# Patient Record
Sex: FEMALE | Race: WHITE | NOT HISPANIC OR LATINO | Employment: PART TIME | URBAN - METROPOLITAN AREA
[De-identification: names, ages, dates, MRNs, and addresses within clinical notes are randomized per-mention and may not be internally consistent; named-entity substitution may affect disease eponyms.]

---

## 2017-01-05 ENCOUNTER — OFFICE VISIT (OUTPATIENT)
Dept: OBSTETRICS AND GYNECOLOGY | Facility: HOSPITAL | Age: 27
End: 2017-01-05

## 2017-01-05 ENCOUNTER — ROUTINE PRENATAL (OUTPATIENT)
Dept: OBSTETRICS AND GYNECOLOGY | Facility: CLINIC | Age: 27
End: 2017-01-05

## 2017-01-05 ENCOUNTER — HOSPITAL ENCOUNTER (OUTPATIENT)
Dept: WOMENS IMAGING | Facility: HOSPITAL | Age: 27
Discharge: HOME | End: 2017-01-05
Attending: OBSTETRICS & GYNECOLOGY | Admitting: OBSTETRICS & GYNECOLOGY

## 2017-01-05 VITALS — BODY MASS INDEX: 33.66 KG/M2 | SYSTOLIC BLOOD PRESSURE: 134 MMHG | DIASTOLIC BLOOD PRESSURE: 73 MMHG | WEIGHT: 190 LBS

## 2017-01-05 VITALS — BODY MASS INDEX: 33.66 KG/M2 | WEIGHT: 190 LBS | SYSTOLIC BLOOD PRESSURE: 142 MMHG | DIASTOLIC BLOOD PRESSURE: 80 MMHG

## 2017-01-05 DIAGNOSIS — Z86.79 HISTORY OF HYPERTENSION: ICD-10-CM

## 2017-01-05 DIAGNOSIS — Z34.83 PRENATAL CARE, SUBSEQUENT PREGNANCY, THIRD TRIMESTER: Primary | ICD-10-CM

## 2017-01-05 DIAGNOSIS — O10.913 CHRONIC HYPERTENSION COMPLICATING OR REASON FOR CARE DURING PREGNANCY, THIRD TRIMESTER: ICD-10-CM

## 2017-01-05 DIAGNOSIS — O34.219 PREVIOUS CESAREAN DELIVERY AFFECTING PREGNANCY: Primary | ICD-10-CM

## 2017-01-05 DIAGNOSIS — O34.219 PREVIOUS CESAREAN DELIVERY AFFECTING PREGNANCY: ICD-10-CM

## 2017-01-05 DIAGNOSIS — R10.13 EPIGASTRIC PAIN: ICD-10-CM

## 2017-01-05 PROBLEM — O10.919 CHRONIC HYPERTENSION COMPLICATING OR REASON FOR CARE DURING PREGNANCY: Status: ACTIVE | Noted: 2017-01-05

## 2017-01-05 PROCEDURE — 99213 OFFICE O/P EST LOW 20 MIN: CPT | Performed by: OBSTETRICS & GYNECOLOGY

## 2017-01-05 PROCEDURE — 76816 OB US FOLLOW-UP PER FETUS: CPT | Performed by: OBSTETRICS & GYNECOLOGY

## 2017-01-05 PROCEDURE — 76816 OB US FOLLOW-UP PER FETUS: CPT

## 2017-01-05 RX ORDER — OMEPRAZOLE 20 MG/1
20 TABLET, DELAYED RELEASE ORAL DAILY
Qty: 30 TABLET | Refills: 3 | Status: SHIPPED | OUTPATIENT
Start: 2017-01-05 | End: 2017-03-10

## 2017-01-05 RX ORDER — HYDROXYPROGESTERONE CAPROATE 250 MG/ML
INJECTION INTRAMUSCULAR
Refills: 5 | COMMUNITY
Start: 2016-12-30 | End: 2017-01-27 | Stop reason: SINTOL

## 2017-01-05 NOTE — MR AVS SNAPSHOT
Chen NILESH Galaviz   2017 1:30 PM   Office Visit    Dept Phone:  [phone]   Encounter #:  [id number]    Provider:  Douglas A Milligan, MD   Department:  [facility]                Your Full Care Plan              Your Updated Medication List          This list is accurate as of: 17  1:56 PM.  Always use your most recent med list.                docusate sodium 100 MG capsule   Commonly known as:  COLACE   Take 1 capsule by mouth 2 (Two) Times a Day.       doxylamine 25 MG tablet   Commonly known as:  UNISOM   Take 1 tablet by mouth at night as needed for sleep.       GNP PRENATAL VITAMINS 28-0.8 MG tablet   Take 1 tablet by mouth daily.       MARIO 250 MG/ML injection   Generic drug:  HYDROXYprogesterone caproate       ondansetron ODT 8 MG disintegrating tablet   Commonly known as:  ZOFRAN-ODT       PROAIR  (90 BASE) MCG/ACT inhaler   Generic drug:  albuterol   INHALE 1-2 PUFFS EVERY 4-6 HOURS AS NEEDED FOR SHORTNESS OF BREATH       promethazine 25 MG tablet   Commonly known as:  PHENERGAN   Take 1 tablet by mouth Every 6 (Six) Hours As Needed for nausea or vomiting.       pyridoxine 50 MG tablet tablet   Commonly known as:  VITAMIN B-6   Take 1 tablet by mouth every night.               You Were Diagnosed With        Codes Comments    Previous  delivery affecting pregnancy    -  Primary ICD-10-CM: O34.219  ICD-9-CM: 654.20     Chronic hypertension complicating or reason for care during pregnancy, third trimester     ICD-10-CM: O10.913  ICD-9-CM: 642.03, 401.9       Instructions     None    Patient Instructions History      Upcoming Appointments     Visit Type Date Time Department    OB FOLLOWUP 2017  2:40 PM [facility] [place]    US [facility] 2017  1:45 PM [facility] US PER DIAG CTR    FOLLOW UP 2017  1:30 PM [facility] [place]    FOLLOW UP 2017 10:45 AM [facility] [place]    US [facility] 2017 11:00 AM [facility] US PER DIAG CTR      MyChart  Signup     Our records indicate that you have an active [facility] [employer] account.    You can view your After Visit Summary by going to Moya Ok[employer] and logging in with your [employer] username and password.  If you don't have a [employer] username and password but a parent or guardian has access to your record, the parent or guardian should login with their own [employer] username and password and access your record to view the After Visit Summary.    If you have questions, you can email [email or web address] or call [phone] to talk to our [employer] staff.  Remember, [employer] is NOT to be used for urgent needs.  For medical emergencies, dial 911.               Other Info from Your Visit           Your Appointments     2017  2:40 PM EST   OB FOLLOWUP with Fabien Blake MD   [facility] OBSTETRICS AND GYNECOLOGY (--)    [address]  [facility] [address] [phone]            2017 10:45 AM EST   Follow Up with  [facility] DEPT SCHEDULE   [facility] (--)    [address]  [facility] [address] [phone]           Arrive 15 minutes prior to appointment.            2017 11:00 AM EST   US teresa pdc with [facility]  1   [facility] PER DIAG CTR ([place])    [address] [address] [phone]           Patient is to be hydrated              Allergies     Bupropion  Other (See Comments)    seizure    Naproxen  Rash    Nsaids  Rash    Sulfa Antibiotics  Rash      Reason for Visit     Pregnancy Ultrasound hx lita, ptd      Vital Signs     Blood Pressure Weight Last Menstrual Period Body Mass Index Smoking Status       134/73 190 lb (86.2 kg) 2016 (Exact Date) 33.66 kg/m2 Current Every Day Smoker       Problems and Diagnoses Noted     Chronic high blood pressure complicating or reason for care during pregnancy    Previous  delivery affecting pregnancy

## 2017-01-05 NOTE — PROGRESS NOTES
Lab Results   Component Value Date    ABORH O Rh Positive 10/20/2015       Chief Complaint   Patient presents with   • Routine Prenatal Visit     After U/S at [facility], no complaints       Today Chen complains of heartburn  See ob flowsheet for physical exam.    Prenatal Assessment  Fetal Heart Rate: 152  Movement: Present  Prenatal Vitals  BP: 142/80  Weight: 190 lb (86.2 kg)  Edema  LLE Edema: None  RLE Edema: None  Facial Edema: None     Tests done today: U/S for EFW - at [facility]  At the time of the next visit, she will need to have none    Impression:   Encounter Diagnoses   Name Primary?   • Prenatal care, subsequent pregnancy, third trimester Yes   • Previous  delivery affecting pregnancy    • History of hypertension    • Epigastric pain        Plan: return 1 weeks    This note was electronically signed.    Fabien Blake MD

## 2017-01-05 NOTE — PROGRESS NOTES
Documentation of the ultasound findings, images, and interpretations with be available in the patient's Viewpoint report which is located in the imaging tab in chart review.

## 2017-01-05 NOTE — MR AVS SNAPSHOT
Chentahir Galaviz   1/5/2017 2:40 PM   Routine Prenatal    Dept Phone:  [phone]   Encounter #:  [id number]    Provider:  Fabien Blake MD   Department:  [facility] OBSTETRICS AND GYNECOLOGY                Your Full Care Plan              Today's Medication Changes          These changes are accurate as of: 1/5/17  3:10 PM.  If you have any questions, ask your nurse or doctor.               New Medication(s)Ordered:     omeprazole OTC 20 MG EC tablet   Commonly known as:  PRILOSEC OTC   Take 1 tablet by mouth Daily. Take daily 30 minutes prior to first morning meal   Started by:  Fabien Blake MD            Where to Get Your Medications      These medications were sent to [facility]/pharmacy #[id number] - [place], [place] - [address] - [phone]  - [phone] [place] [address]     Phone:  [phone]     omeprazole OTC 20 MG EC tablet                  Your Updated Medication List          This list is accurate as of: 1/5/17  3:10 PM.  Always use your most recent med list.                docusate sodium 100 MG capsule   Commonly known as:  COLACE   Take 1 capsule by mouth 2 (Two) Times a Day.       doxylamine 25 MG tablet   Commonly known as:  UNISOM   Take 1 tablet by mouth at night as needed for sleep.       GNP PRENATAL VITAMINS 28-0.8 MG tablet   Take 1 tablet by mouth daily.       MARIO 250 MG/ML injection   Generic drug:  HYDROXYprogesterone caproate       omeprazole OTC 20 MG EC tablet   Commonly known as:  PRILOSEC OTC   Take 1 tablet by mouth Daily. Take daily 30 minutes prior to first morning meal       ondansetron ODT 8 MG disintegrating tablet   Commonly known as:  ZOFRAN-ODT       PROAIR  (90 BASE) MCG/ACT inhaler   Generic drug:  albuterol   INHALE 1-2 PUFFS EVERY 4-6 HOURS AS NEEDED FOR SHORTNESS OF BREATH       promethazine 25 MG tablet   Commonly known as:  PHENERGAN   Take 1 tablet by mouth Every 6 (Six)  Hours As Needed for nausea or vomiting.       pyridoxine 50 MG tablet tablet   Commonly known as:  VITAMIN B-6   Take 1 tablet by mouth every night.               You Were Diagnosed With        Codes Comments    Prenatal care, subsequent pregnancy, third trimester    -  Primary ICD-10-CM: Z34.83  ICD-9-CM: V22.1     Previous  delivery affecting pregnancy     ICD-10-CM: O34.219  ICD-9-CM: 654.20     History of hypertension     ICD-10-CM: Z86.79  ICD-9-CM: V12.59     Epigastric pain     ICD-10-CM: R10.13  ICD-9-CM: 789.06       Instructions     None    Patient Instructions History      Upcoming Appointments     Visit Type Date Time Department    OB FOLLOWUP 2017  2:40 PM [facility] [place]    US [facility] 2017  1:45 PM  [facility] US PER DIAG CTR    FOLLOW UP 2017  1:30 PM [facility] [place]    [facility]    [facility] 2017 11:00 AM  [facility] US PER [facility]AG CTR      MyChart Signup     Our records indicate that you have an active [facility] account.    You can view your After Visit Summary by going to [employer] and logging in with your [employer] username and password.  If you don't have a [employer] username and password but a parent or guardian has access to your record, the parent or guardian should login with their own [employer] username and password and access your record to view the After Visit Summary.    If you have questions, you can email [email or web address] [email or web address] or call [phone] to talk to our [employer] staff.  Remember, [employer] is NOT to be used for urgent needs.  For medical emergencies, dial 911.               Other Info from Your Visit           Your Appointments     2017 10:45 AM EST   Follow Up with [facility] DEPT SCHEDULE   [facility] MEDICAL [facility] (--)    [address]  [facility] [address]   [phone]           Arrive 15 minutes prior to appointment.            2017 11:00 AM EST    teresa pdc with  [facility]   [facility] US PER DIAG CTR ([place])    [address] [address] [phone]           Patient is to be hydrated              Allergies     Bupropion  Other (See Comments)    seizure    Naproxen  Rash    Nsaids  Rash    Sulfa Antibiotics  Rash      Reason for Visit     Routine Prenatal Visit After U/S at [facility], no complaints      Vital Signs     Blood Pressure Weight Last Menstrual Period Body Mass Index Smoking Status       142/80 190 lb (86.2 kg) 2016 (Exact Date) 33.66 kg/m2 Current Every Day Smoker       Problems and Diagnoses Noted     Previous  delivery affecting pregnancy    Prenatal care    -  Primary    History of hypertension        Abdominal pain, pit of stomach

## 2017-01-05 NOTE — PROGRESS NOTES
Pt denies lof, vag bleeding or cramping. Reports having a headache that lasted from Sun-Wed. Denies current ha, blurred vision or chest pain.

## 2017-01-11 ENCOUNTER — ROUTINE PRENATAL (OUTPATIENT)
Dept: OBSTETRICS AND GYNECOLOGY | Facility: CLINIC | Age: 27
End: 2017-01-11

## 2017-01-11 VITALS — DIASTOLIC BLOOD PRESSURE: 86 MMHG | WEIGHT: 188 LBS | SYSTOLIC BLOOD PRESSURE: 130 MMHG | BODY MASS INDEX: 33.3 KG/M2

## 2017-01-11 DIAGNOSIS — O34.219 PREVIOUS CESAREAN DELIVERY AFFECTING PREGNANCY: Primary | ICD-10-CM

## 2017-01-11 DIAGNOSIS — O10.913 CHRONIC HYPERTENSION COMPLICATING OR REASON FOR CARE DURING PREGNANCY, THIRD TRIMESTER: ICD-10-CM

## 2017-01-11 DIAGNOSIS — Z34.83 PRENATAL CARE, SUBSEQUENT PREGNANCY, THIRD TRIMESTER: ICD-10-CM

## 2017-01-11 DIAGNOSIS — O09.213 HISTORY OF PRETERM LABOR, CURRENT PREGNANCY, THIRD TRIMESTER: ICD-10-CM

## 2017-01-11 PROCEDURE — 99213 OFFICE O/P EST LOW 20 MIN: CPT | Performed by: OBSTETRICS & GYNECOLOGY

## 2017-01-11 PROCEDURE — 59025 FETAL NON-STRESS TEST: CPT | Performed by: OBSTETRICS & GYNECOLOGY

## 2017-01-11 RX ORDER — NIFEDIPINE 30 MG/1
30 TABLET, EXTENDED RELEASE ORAL DAILY
Qty: 30 TABLET | Refills: 4 | Status: SHIPPED | OUTPATIENT
Start: 2017-01-11 | End: 2017-06-22

## 2017-01-11 NOTE — PROGRESS NOTES
Lab Results   Component Value Date    ABORH O Rh Positive 10/20/2015       Chief Complaint   Patient presents with   • Routine Prenatal Visit     c/o pelvic discomfort and back pain       Today Chen complains of low back pain and occ contractions, pt is on Val weekly  See ob flowsheet for physical exam.    Prenatal Assessment  Fetal Heart Rate: 140  Movement: Present  Prenatal Vitals  BP: 130/86  Weight: 188 lb (85.3 kg)  Urine Glucose/Protein  Urine Glucose: Negative  Urine Protein: Negative  Edema  LLE Edema: None  RLE Edema: None  Facial Edema: None     Tests done today: NST - reactive  At the time of the next visit, she will need to have NST - chronic hypertension    Impression:   Encounter Diagnoses   Name Primary?   • Previous  delivery affecting pregnancy Yes   • Chronic hypertension complicating or reason for care during pregnancy, third trimester    • Prenatal care, subsequent pregnancy, third trimester    • History of  labor, current pregnancy, third trimester        Plan: will start Procardia XL 30 mg qd, return 1 week, will start twice weekly NST's then    This note was electronically signed.    Fabien Blake MD

## 2017-01-11 NOTE — MR AVS SNAPSHOT
Chen Galaviz   1/11/2017 11:00 AM   Routine Prenatal    Dept Phone:  [phone]   Encounter #:  [id number]    Provider:  Fabien Blake MD   Department:  [facility] OBSTETRICS AND GYNECOLOGY                Your Full Care Plan              Today's Medication Changes          These changes are accurate as of: 1/11/17 12:16 PM.  If you have any questions, ask your nurse or doctor.               New Medication(s)Ordered:     NIFEdipine XL 30 MG 24 hr tablet   Commonly known as:  PROCARDIA XL   Take 1 tablet by mouth Daily.            Where to Get Your Medications      These medications were sent to [facility]/pharmacy #[id number] - [place], [place] - [address] - [phone]  - [phone]   [address] [place] [address]     Phone:  [phone]     NIFEdipine XL 30 MG 24 hr tablet                  Your Updated Medication List          This list is accurate as of: 1/11/17 12:16 PM.  Always use your most recent med list.                docusate sodium 100 MG capsule   Commonly known as:  COLACE   Take 1 capsule by mouth 2 (Two) Times a Day.       doxylamine 25 MG tablet   Commonly known as:  UNISOM   Take 1 tablet by mouth at night as needed for sleep.       GNP PRENATAL VITAMINS 28-0.8 MG tablet   Take 1 tablet by mouth daily.       MARIO 250 MG/ML injection   Generic drug:  HYDROXYprogesterone caproate       NIFEdipine XL 30 MG 24 hr tablet   Commonly known as:  PROCARDIA XL   Take 1 tablet by mouth Daily.       omeprazole OTC 20 MG EC tablet   Commonly known as:  PRILOSEC OTC   Take 1 tablet by mouth Daily. Take daily 30 minutes prior to first morning meal       ondansetron ODT 8 MG disintegrating tablet   Commonly known as:  ZOFRAN-ODT       PROAIR  (90 BASE) MCG/ACT inhaler   Generic drug:  albuterol   INHALE 1-2 PUFFS EVERY 4-6 HOURS AS NEEDED FOR SHORTNESS OF BREATH       promethazine 25 MG tablet   Commonly known as:  PHENERGAN   Take 1  tablet by mouth Every 6 (Six) Hours As Needed for nausea or vomiting.       pyridoxine 50 MG tablet tablet   Commonly known as:  VITAMIN B-6   Take 1 tablet by mouth every night.               We Performed the Following     Fetal Nonstress Test       You Were Diagnosed With        Codes Comments    Previous  delivery affecting pregnancy    -  Primary ICD-10-CM: O34.219  ICD-9-CM: 654.20     Chronic hypertension complicating or reason for care during pregnancy, third trimester     ICD-10-CM: O10.913  ICD-9-CM: 642.03, 401.9     Prenatal care, subsequent pregnancy, third trimester     ICD-10-CM: Z34.83  ICD-9-CM: V22.1     History of  labor, current pregnancy, third trimester     ICD-10-CM: O09.213  ICD-9-CM: V23.41       Instructions     None    Patient Instructions History      Upcoming Appointments     Visit Type Date Time Department    OB FOLLOWUP 2017 11:00 AM [facility] [place]    FOLLOW UP 2017 10:45 AM [facility] 2017 11:00 AM [facility] PER DIAG CTR      MyChart Signup     Our records indicate that you have an active [facility] [employer] account.    You can view your After Visit Summary by going to [employer] and logging in with your [employer] username and password.  If you don't have a [employer] username and password but a parent or guardian has access to your record, the parent or guardian should login with their own [employer] username and password and access your record to view the After Visit Summary.    If you have questions, you can email [email or web address] or call [phone] to talk to our [employer] staff.  Remember, [employer] is NOT to be used for urgent needs.  For medical emergencies, dial 911.               Other Info from Your Visit           Your Appointments     2017 10:45 AM EST   Follow Up with [facility] DEPT SCHEDULE   [facility] (--)    [address] [facility] [id number] [phone]            Arrive 15 minutes prior to appointment.            2017 11:00 AM EST   US teresa pdc with [facility] US 1   [facility] US PER DIAG CTR ([place])    [address] [address] [phone]           Patient is to be hydrated              Allergies     Bupropion  Other (See Comments)    seizure    Naproxen  Rash    Nsaids  Rash    Sulfa Antibiotics  Rash      Reason for Visit     Routine Prenatal Visit c/o pelvic discomfort and back pain      Vital Signs     Blood Pressure Weight Last Menstrual Period Body Mass Index Smoking Status       130/86 188 lb (85.3 kg) 2016 (Exact Date) 33.3 kg/m2 Current Every Day Smoker       Problems and Diagnoses Noted     Chronic high blood pressure complicating or reason for care during pregnancy    Previous  delivery affecting pregnancy    Prenatal care        Current pregnancy with history of pre-term labor

## 2017-01-18 ENCOUNTER — ROUTINE PRENATAL (OUTPATIENT)
Dept: OBSTETRICS AND GYNECOLOGY | Facility: CLINIC | Age: 27
End: 2017-01-18

## 2017-01-18 VITALS — WEIGHT: 186 LBS | SYSTOLIC BLOOD PRESSURE: 140 MMHG | DIASTOLIC BLOOD PRESSURE: 88 MMHG | BODY MASS INDEX: 32.95 KG/M2

## 2017-01-18 DIAGNOSIS — O10.913 CHRONIC HYPERTENSION COMPLICATING OR REASON FOR CARE DURING PREGNANCY, THIRD TRIMESTER: ICD-10-CM

## 2017-01-18 DIAGNOSIS — O09.213 HISTORY OF PRETERM LABOR, CURRENT PREGNANCY, THIRD TRIMESTER: ICD-10-CM

## 2017-01-18 DIAGNOSIS — O34.219 PREVIOUS CESAREAN DELIVERY AFFECTING PREGNANCY: Primary | ICD-10-CM

## 2017-01-18 DIAGNOSIS — Z34.83 PRENATAL CARE, SUBSEQUENT PREGNANCY, THIRD TRIMESTER: ICD-10-CM

## 2017-01-18 PROCEDURE — 59025 FETAL NON-STRESS TEST: CPT | Performed by: OBSTETRICS & GYNECOLOGY

## 2017-01-18 PROCEDURE — 99213 OFFICE O/P EST LOW 20 MIN: CPT | Performed by: OBSTETRICS & GYNECOLOGY

## 2017-01-18 NOTE — MR AVS SNAPSHOT
Chen GALLOWAY Guillaume   2017 11:20 AM   Routine Prenatal    Dept Phone:  [phone]   Encounter #:  [id number]    Provider:  Fabien Blake MD   Department:  [facility] OBSTETRICS AND GYNECOLOGY                Your Full Care Plan              Your Updated Medication List          This list is accurate as of: 17 12:58 PM.  Always use your most recent med list.                docusate sodium 100 MG capsule   Commonly known as:  COLACE   Take 1 capsule by mouth 2 (Two) Times a Day.       doxylamine 25 MG tablet   Commonly known as:  UNISOM   Take 1 tablet by mouth at night as needed for sleep.       GNP PRENATAL VITAMINS 28-0.8 MG tablet   Take 1 tablet by mouth daily.       MAIRO 250 MG/ML injection   Generic drug:  HYDROXYprogesterone caproate       NIFEdipine XL 30 MG 24 hr tablet   Commonly known as:  PROCARDIA XL   Take 1 tablet by mouth Daily.       omeprazole OTC 20 MG EC tablet   Commonly known as:  PRILOSEC OTC   Take 1 tablet by mouth Daily. Take daily 30 minutes prior to first morning meal       ondansetron ODT 8 MG disintegrating tablet   Commonly known as:  ZOFRAN-ODT       PROAIR  (90 BASE) MCG/ACT inhaler   Generic drug:  albuterol   INHALE 1-2 PUFFS EVERY 4-6 HOURS AS NEEDED FOR SHORTNESS OF BREATH       promethazine 25 MG tablet   Commonly known as:  PHENERGAN   Take 1 tablet by mouth Every 6 (Six) Hours As Needed for nausea or vomiting.       pyridoxine 50 MG tablet tablet   Commonly known as:  VITAMIN B-6   Take 1 tablet by mouth every night.               We Performed the Following     Fetal Nonstress Test       You Were Diagnosed With        Codes Comments    Previous  delivery affecting pregnancy    -  Primary ICD-10-CM: O34.219  ICD-9-CM: 654.20     Chronic hypertension complicating or reason for care during pregnancy, third trimester     ICD-10-CM: O10.913  ICD-9-CM: 642.03, 401.9     Prenatal care, subsequent pregnancy, third  trimester     ICD-10-CM: Z34.83  ICD-9-CM: V22.1     History of  labor, current pregnancy, third trimester     ICD-10-CM: O09.213  ICD-9-CM: V23.41       Instructions     None    Patient Instructions History      Upcoming Appointments     Visit Type Date Time Department    OB FOLLOWUP 2017 11:20 AM [facility] [place]    FOLLOW UP 2017 10:45 AM [facility] [place]    US [facility] 2017 11:00 AM  [facility] US PER DIAG CTR      OpenDNShart Signup     Our records indicate that you have an active [facility] [employer] account.    You can view your After Visit Summary by going to Ave[employer]a and logging in with your [employer] username and password.  If you don't have a [employer] username and password but a parent or guardian has access to your record, the parent or guardian should login with their own [employer] username and password and access your record to view the After Visit Summary.    If you have questions, you can email [email or web address] or call [phone] to talk to our [employer] staff.  Remember, [employer] is NOT to be used for urgent needs.  For medical emergencies, dial 911.               Other Info from Your Visit           Your Appointments     2017 10:45 AM EST   Follow Up with  [facility] DEPT SCHEDULE   [facility] MEDICAL [facility] (--)    [address] [address] [phone]           Arrive 15 minutes prior to appointment.            2017 11:00 AM EST   US teresa pdc with [facility] US 1   [facility] PER DIAG CTR ([place])    [address] [address] [phone]           Patient is to be hydrated              Allergies     Bupropion  Other (See Comments)    seizure    Naproxen  Rash    Nsaids  Rash    Sulfa Antibiotics  Rash      Reason for Visit     Routine Prenatal Visit no complaints      Vital Signs     Blood Pressure Weight Last Menstrual Period Body Mass Index Smoking Status       140/88 186 lb  (84.4 kg) 2016 (Exact Date) 32.95 kg/m2 Current Every Day Smoker       Problems and Diagnoses Noted     Chronic high blood pressure complicating or reason for care during pregnancy    Previous  delivery affecting pregnancy    Prenatal care        Current pregnancy with history of pre-term labor

## 2017-01-18 NOTE — PROGRESS NOTES
Lab Results   Component Value Date    ABORH O Rh Positive 10/20/2015       Chief Complaint   Patient presents with   • Routine Prenatal Visit     no complaints       Today Chen has no specific complaints  See ob flowsheet for physical exam.    Prenatal Assessment  Fetal Heart Rate: 164  Movement: Present  Prenatal Vitals  BP: 140/88  Weight: 186 lb (84.4 kg)  Urine Glucose/Protein  Urine Glucose: Negative  Urine Protein: Negative  Edema  LLE Edema: None  RLE Edema: None  Facial Edema: None     Tests done today: NST - reactive  At the time of the next visit, she will need to have NST - chronic hypertension    Impression:   Encounter Diagnoses   Name Primary?   • Previous  delivery affecting pregnancy Yes   • Chronic hypertension complicating or reason for care during pregnancy, third trimester    • Prenatal care, subsequent pregnancy, third trimester    • History of  labor, current pregnancy, third trimester        Plan: return 2017    This note was electronically signed.    Fabien Blake MD

## 2017-01-21 ENCOUNTER — HOSPITAL ENCOUNTER (OUTPATIENT)
Facility: HOSPITAL | Age: 27
Setting detail: OBSERVATION
Discharge: HOME | End: 2017-01-21
Attending: OBSTETRICS & GYNECOLOGY | Admitting: OBSTETRICS & GYNECOLOGY

## 2017-01-21 VITALS
HEART RATE: 81 BPM | SYSTOLIC BLOOD PRESSURE: 121 MMHG | RESPIRATION RATE: 16 BRPM | DIASTOLIC BLOOD PRESSURE: 73 MMHG | TEMPERATURE: 98.4 F

## 2017-01-21 PROCEDURE — 59025 FETAL NON-STRESS TEST: CPT | Performed by: OBSTETRICS & GYNECOLOGY

## 2017-01-21 PROCEDURE — 59025 FETAL NON-STRESS TEST: CPT

## 2017-01-21 NOTE — PROGRESS NOTES
Laborist   NST  CHTN  Reactive, moderate variability, positive accelerations 15 x 15, no decelerations, [id number], no reg CTX,  F/U routine OB or prn  2x/w   testing

## 2017-01-21 NOTE — IP AVS SNAPSHOT
AFTER VISIT SUMMARY             Chen Galaviz           About your hospitalization     You were admitted on:  January 21, 2017 You last received care in the:  [facility] ANTE[facility]UM       Procedures & Surgeries         Medications    If you or your caregiver advised us that you are currently taking a medication and that medication is marked below as “Resume”, this simply indicates that we have reviewed those medications to make sure our new therapy recommendations do not interfere.  If you have concerns about medications other than those new ones which we are prescribing today, please consult the physician who prescribed them (or your primary physician).  Our review of your home medications is not meant to indicate that we are directing their use.             Your Medications      CONTINUE taking these medications     docusate sodium 100 MG capsule   Take 1 capsule by mouth 2 (Two) Times a Day.   Commonly known as:  COLACE           doxylamine 25 MG tablet   Take 1 tablet by mouth at night as needed for sleep.   Commonly known as:  UNISOM           GNP PRENATAL VITAMINS 28-0.8 MG tablet   Take 1 tablet by mouth daily.           MARIO 250 MG/ML injection   Generic drug:  HYDROXYprogesterone caproate           NIFEdipine XL 30 MG 24 hr tablet   Take 1 tablet by mouth Daily.   Commonly known as:  PROCARDIA XL           omeprazole OTC 20 MG EC tablet   Take 1 tablet by mouth Daily. Take daily 30 minutes prior to first morning meal   Commonly known as:  PRILOSEC OTC           ondansetron ODT 8 MG disintegrating tablet   Take 1 tablet by mouth every 8 (eight) hours as needed.   Commonly known as:  ZOFRAN-ODT           PROAIR  (90 BASE) MCG/ACT inhaler   INHALE 1-2 PUFFS EVERY 4-6 HOURS AS NEEDED FOR SHORTNESS OF BREATH   Generic drug:  albuterol           promethazine 25 MG tablet   Take 1 tablet by mouth Every 6 (Six) Hours As Needed for nausea or vomiting.   Commonly known as:  PHENERGAN              pyridoxine 50 MG tablet tablet   Take 1 tablet by mouth every night.   Commonly known as:  VITAMIN B-6                      Your Medications      Your Medication List           Morning Noon Evening Bedtime As Needed    docusate sodium 100 MG capsule   Take 1 capsule by mouth 2 (Two) Times a Day.   Commonly known as:  COLACE                                doxylamine 25 MG tablet   Take 1 tablet by mouth at night as needed for sleep.   Commonly known as:  UNISOM                                GNP PRENATAL VITAMINS 28-0.8 MG tablet   Take 1 tablet by mouth daily.                                MARIO 250 MG/ML injection   Generic drug:  HYDROXYprogesterone caproate                                NIFEdipine XL 30 MG 24 hr tablet   Take 1 tablet by mouth Daily.   Commonly known as:  PROCARDIA XL                                omeprazole OTC 20 MG EC tablet   Take 1 tablet by mouth Daily. Take daily 30 minutes prior to first morning meal   Commonly known as:  PRILOSEC OTC                                ondansetron ODT 8 MG disintegrating tablet   Take 1 tablet by mouth every 8 (eight) hours as needed.   Commonly known as:  ZOFRAN-ODT                                PROAIR  (90 BASE) MCG/ACT inhaler   INHALE 1-2 PUFFS EVERY 4-6 HOURS AS NEEDED FOR SHORTNESS OF BREATH   Generic drug:  albuterol                                promethazine 25 MG tablet   Take 1 tablet by mouth Every 6 (Six) Hours As Needed for nausea or vomiting.   Commonly known as:  PHENERGAN                                pyridoxine 50 MG tablet tablet   Take 1 tablet by mouth every night.   Commonly known as:  VITAMIN B-6                                         Instructions for After Discharge        Other Instructions     Discharge Instructions       D/C to home F/U OB routine or PRN cont 2x/w  testing             Discharge References/Attachments     NONSTRESS TEST (ENGLISH)       Follow-ups for After Discharge        Scheduled  Appointments     Jan 24, 2017  9:50 AM EST   OB FOLLOWUP with Fabien Blake MD   [facility] OBSTETRICS AND GYNECOLOGY (--)    [address]  [facility] [address] [phone]            Feb 01, 2017 10:45 AM EST   Follow Up with  [facility] DEPT SCHEDULE   [facility] (--)    [address]  [place] [id number] [phone]           Arrive 15 minutes prior to appointment.            Feb 01, 2017 11:00 AM EST   US teresa pdc with [facility] US 1   [facility] PER DIAG CTR ([place])    [address] [facility] [address] [phone]           Patient is to be hydrated              MyChart Signup     Our records indicate that you have an active [employer] account.    You can view your After Visit Summary by going to QuanDx and logging in with your [employer] username and password.  If you don't have a [employer] username and password but a parent or guardian has access to your record, the parent or guardian should login with their own Ad[employer] username and password and access your record to view the After Visit Summary.    If you have questions, you can email Bagel [email or web address] or call [phone] to talk to our Ad[employer] staff.  Remember, [employer] is NOT to be used for urgent needs.  For medical emergencies, dial 911.           Summary of Your Hospitalization        Reason for Hospitalization     Your primary diagnosis was:  Not on File    Your diagnoses also included:  Pregnancy      Care Providers     Provider Service Role Specialty    Fabien Blake MD -- Attending Provider Obstetrics and Gynecology      Your Allergies  Date Reviewed: 1/21/2017    Allergen Reactions    Bupropion Other (See Comments)    seizure         Naproxen Rash         Nsaids Rash         Sulfa Antibiotics Rash      Patient Belongings Returned     Document Return of Belongings Flowsheet     Were the patient bedside  belongings sent home?   --   Belongings Retrieved from Security & Sent Home   --    Belongings Sent to Safe   --   Medications Retrieved from Pharmacy & Sent Home   --              More Information      Nonstress Test  The nonstress test is a procedure that monitors the fetus's heartbeat. The test will monitor the heartbeat when the fetus is at rest and while the fetus is moving. In a healthy fetus, there will be an increase in fetal heart rate when the fetus moves or kicks. The heart rate will decrease at rest. This test helps determine if the fetus is healthy. Your health care provider will look at a number of patterns in the heart rate tracing to make sure your baby is thriving. If there is concern, your health care provider may order additional tests or may suggest another course of action. This test is often done in the third trimester and can help determine if an early delivery is needed and safe. Common reasons to have this test are:  · You are past your due date.  · You have a high-risk pregnancy.  · You are feeling less movement than normal.  · You have lost a pregnancy in the past.  · Your health care provider suspects fetal growth problems.  · You have too much or too little amniotic fluid.  BEFORE THE PROCEDURE  · Eat a meal right before the test or as directed by your health care provider. Food may help stimulate fetal movements.  · Use the restroom right before the test.  PROCEDURE  · Two belts will be placed around your abdomen. These belts have monitors attached to them. One records the fetal heart rate and the other records uterine contractions.  · You may be asked to lie down on your side or to stay sitting upright.  · You may be given a button to press when you feel movement.  · The fetal heartbeat is listened to and watched on a screen. The heartbeat is recorded on a sheet of paper.  · If the fetus seems to be sleeping, you may be asked to drink some juice or soda, gently press your abdomen, or  make some noise to wake the fetus.  AFTER THE PROCEDURE   Your health care provider will discuss the test results with you and make recommendations for the near future.     This information is not intended to replace advice given to you by your health care provider. Make sure you discuss any questions you have with your health care provider.     Document Released: 12/08/2003 Document Revised: 01/08/2016 Document Reviewed: 01/21/2014  [employer] Interactive Patient Education ©2016 Elsevier Inc.            SYMPTOMS OF A STROKE    Call 911 or have someone take you to the Emergency Department if you have any of the following:    · Sudden numbness or weakness of your face, arm or leg especially on one side of the body  · Sudden confusion, diffiiculty speaking or trouble understanding   · Changes in your vision or loss of sight in one eye  · Sudden severe headache with no known cause  · sudden dizziness, trouble walking, loss of balance or coordination    It is important to seek emergency care right away if you have further stroke symptoms. If you get emergency help quickly, the powerful clot-dissolving medicines can reduce the disabilities caused by a stroke.     For more information:    American Stroke Association  9-859-6-STROKE  www.strokeassociation.org           IF YOU SMOKE OR USE TOBACCO PLEASE READ THE FOLLOWING:    Why is smoking bad for me?  Smoking increases the risk of heart disease, lung disease, vascular disease, stroke, and cancer.     If you smoke, STOP!    If you would like more information on quitting smoking, please visit the Page2Images website: www.Nu-Med Plus/Ocutec/healthier-together/smoke   This link will provide additional resources including the QUIT line and the Beat the Pack support groups.     For more information:    American Cancer Society  (245) 383-6060    American Heart Association  1-182.173.5769               YOU ARE THE MOST IMPORTANT FACTOR IN YOUR RECOVERY.      Follow all instructions carefully.     I have reviewed my discharge instructions with my nurse, including the following information, if applicable:     Information about my illness and diagnosis   Follow up appointments (including lab draws)   Wound Care   Equipment Needs   Medications (new and continuing) along with side effects   Preventative information such as vaccines and smoking cessations   Diet   Pain   I know when to contact my Doctor's office or seek emergency care      I want my nurse to describe the side effects of my medications: YES NO   If the answer is no, I understand the side effects of my medications: YES NO   My nurse described the side effects of my medications in a way that I could understand: YES NO   I have taken my personal belongings and my own medications with me at discharge: YES NO            I have received this information and my questions have been answered. I have discussed any concerns I see with this plan with the nurse or physician. I understand these instructions.    Signature of Patient or Responsible Person: _____________________________________    Date: _________________  Time: __________________    Signature of Healthcare Provider: _______________________________________  Date: _________________  Time: __________________

## 2017-01-24 ENCOUNTER — ROUTINE PRENATAL (OUTPATIENT)
Dept: OBSTETRICS AND GYNECOLOGY | Facility: CLINIC | Age: 27
End: 2017-01-24

## 2017-01-24 VITALS — WEIGHT: 186 LBS | DIASTOLIC BLOOD PRESSURE: 80 MMHG | SYSTOLIC BLOOD PRESSURE: 120 MMHG | BODY MASS INDEX: 32.95 KG/M2

## 2017-01-24 DIAGNOSIS — O10.913 CHRONIC HYPERTENSION COMPLICATING OR REASON FOR CARE DURING PREGNANCY, THIRD TRIMESTER: ICD-10-CM

## 2017-01-24 DIAGNOSIS — Z34.83 PRENATAL CARE, SUBSEQUENT PREGNANCY, THIRD TRIMESTER: ICD-10-CM

## 2017-01-24 DIAGNOSIS — O34.219 PREVIOUS CESAREAN DELIVERY AFFECTING PREGNANCY: Primary | ICD-10-CM

## 2017-01-24 DIAGNOSIS — O09.213 HISTORY OF PRETERM LABOR, CURRENT PREGNANCY, THIRD TRIMESTER: ICD-10-CM

## 2017-01-24 PROCEDURE — 59025 FETAL NON-STRESS TEST: CPT | Performed by: OBSTETRICS & GYNECOLOGY

## 2017-01-24 PROCEDURE — 99213 OFFICE O/P EST LOW 20 MIN: CPT | Performed by: OBSTETRICS & GYNECOLOGY

## 2017-01-24 NOTE — PROGRESS NOTES
Lab Results   Component Value Date    ABORH O Rh Positive 10/20/2015       Chief Complaint   Patient presents with   • Routine Prenatal Visit     c/o occasional contractiions       Today Chen has no specific complaints  See ob flowsheet for physical exam.    Prenatal Assessment  Fetal Heart Rate: 136  Movement: Present  Prenatal Vitals  BP: 120/80  Weight: 186 lb (84.4 kg)  Urine Glucose/Protein  Urine Glucose: Negative  Urine Protein: Negative  Edema  LLE Edema: None  RLE Edema: None  Facial Edema: None     Tests done today: NST - reactive  At the time of the next visit, she will need to have NST - chronic hypertension    Impression:   Encounter Diagnoses   Name Primary?   • Previous  delivery affecting pregnancy Yes   • Chronic hypertension complicating or reason for care during pregnancy, third trimester    • Prenatal care, subsequent pregnancy, third trimester    • History of  labor, current pregnancy, third trimester        Plan: Return 2017    This note was electronically signed.    Fabien Blake MD

## 2017-01-24 NOTE — MR AVS SNAPSHOT
Chen GALLOWAY Guillaume   2017 9:50 AM   Routine Prenatal    Dept Phone:  [phone]   Encounter #:  [id number]    Provider:  Fabien Blake MD   Department:  [facility] OBSTETRICS AND GYNECOLOGY                Your Full Care Plan              Your Updated Medication List          This list is accurate as of: 17 10:58 AM.  Always use your most recent med list.                docusate sodium 100 MG capsule   Commonly known as:  COLACE   Take 1 capsule by mouth 2 (Two) Times a Day.       doxylamine 25 MG tablet   Commonly known as:  UNISOM   Take 1 tablet by mouth at night as needed for sleep.       GNP PRENATAL VITAMINS 28-0.8 MG tablet   Take 1 tablet by mouth daily.       MARIO 250 MG/ML injection   Generic drug:  HYDROXYprogesterone caproate       NIFEdipine XL 30 MG 24 hr tablet   Commonly known as:  PROCARDIA XL   Take 1 tablet by mouth Daily.       omeprazole OTC 20 MG EC tablet   Commonly known as:  PRILOSEC OTC   Take 1 tablet by mouth Daily. Take daily 30 minutes prior to first morning meal       ondansetron ODT 8 MG disintegrating tablet   Commonly known as:  ZOFRAN-ODT       PROAIR  (90 BASE) MCG/ACT inhaler   Generic drug:  albuterol   INHALE 1-2 PUFFS EVERY 4-6 HOURS AS NEEDED FOR SHORTNESS OF BREATH       promethazine 25 MG tablet   Commonly known as:  PHENERGAN   Take 1 tablet by mouth Every 6 (Six) Hours As Needed for nausea or vomiting.       pyridoxine 50 MG tablet tablet   Commonly known as:  VITAMIN B-6   Take 1 tablet by mouth every night.               We Performed the Following     Fetal Nonstress Test       You Were Diagnosed With        Codes Comments    Previous  delivery affecting pregnancy    -  Primary ICD-10-CM: O34.219  ICD-9-CM: 654.20     Chronic hypertension complicating or reason for care during pregnancy, third trimester     ICD-10-CM: O10.913  ICD-9-CM: 642.03, 401.9     Prenatal care, subsequent pregnancy, third  trimester     ICD-10-CM: Z34.83  ICD-9-CM: V22.1     History of  labor, current pregnancy, third trimester     ICD-10-CM: O09.213  ICD-9-CM: V23.41       Instructions     None    Patient Instructions History      Upcoming Appointments     Visit Type Date Time Department    OB FOLLOWUP 2017  9:50 AM [facility] [place]    FOLLOW UP 2017 10:45 AM [facility] [place]    US [facility] 2017 11:00 AM  [facility]      My[employer] Signup     Our records indicate that you have an active [facility] AAVLife account.    You can view your After Visit Summary by going to [employer] and logging in with your [employer] username and password.  If you don't have a [employer] username and password but a parent or guardian has access to your record, the parent or guardian should login with their own [employer] username and password and access your record to view the After Visit Summary.    If you have questions, you can email [email or web address] or call [phone] to talk to our [employer] staff.  Remember, [employer] is NOT to be used for urgent needs.  For medical emergencies, dial 911.               Other Info from Your Visit           Your Appointments     2017 10:45 AM EST   Follow Up with  [facility] DEPT SCHEDULE   [facility] MED[facility] [place] (--)    [address] [facility] [address] [phone]           Arrive 15 minutes prior to appointment.            2017 11:00 AM EST   US teresa pdc with [facility] US 1   [facility] PER DIAG CTR ([place])    [address] [facility] [address] [phone]           Patient is to be hydrated              Allergies     Bupropion  Other (See Comments)    seizure    Naproxen  Rash    Nsaids  Rash    Sulfa Antibiotics  Rash      Reason for Visit     Routine Prenatal Visit c/o occasional contractiions      Vital Signs     Blood Pressure Weight Last Menstrual Period Body Mass Index Smoking Status        120/80 186 lb (84.4 kg) 2016 (Exact Date) 32.95 kg/m2 Current Every Day Smoker       Problems and Diagnoses Noted     Chronic high blood pressure complicating or reason for care during pregnancy    Previous  delivery affecting pregnancy    Prenatal care        Current pregnancy with history of pre-term labor

## 2017-01-27 ENCOUNTER — ROUTINE PRENATAL (OUTPATIENT)
Dept: OBSTETRICS AND GYNECOLOGY | Facility: CLINIC | Age: 27
End: 2017-01-27

## 2017-01-27 VITALS — SYSTOLIC BLOOD PRESSURE: 120 MMHG | BODY MASS INDEX: 32.42 KG/M2 | DIASTOLIC BLOOD PRESSURE: 80 MMHG | WEIGHT: 183 LBS

## 2017-01-27 DIAGNOSIS — O34.219 PREVIOUS CESAREAN DELIVERY AFFECTING PREGNANCY: Primary | ICD-10-CM

## 2017-01-27 DIAGNOSIS — O10.913 CHRONIC HYPERTENSION COMPLICATING OR REASON FOR CARE DURING PREGNANCY, THIRD TRIMESTER: ICD-10-CM

## 2017-01-27 DIAGNOSIS — O09.213 HISTORY OF PRETERM LABOR, CURRENT PREGNANCY, THIRD TRIMESTER: ICD-10-CM

## 2017-01-27 DIAGNOSIS — Z34.83 PRENATAL CARE, SUBSEQUENT PREGNANCY, THIRD TRIMESTER: ICD-10-CM

## 2017-01-27 PROCEDURE — 99213 OFFICE O/P EST LOW 20 MIN: CPT | Performed by: OBSTETRICS & GYNECOLOGY

## 2017-01-27 PROCEDURE — 59025 FETAL NON-STRESS TEST: CPT | Performed by: OBSTETRICS & GYNECOLOGY

## 2017-01-27 NOTE — PROGRESS NOTES
Lab Results   Component Value Date    ABORH O Rh Positive 10/20/2015       Chief Complaint   Patient presents with   • Routine Prenatal Visit   • Headache       Today Chen complains of headache and pt feels is secondary to 17 P shot, will switch to Prometrium 200 mg nightly  See ob flowsheet for physical exam.    Prenatal Assessment  Fetal Heart Rate: 140  Movement: Present  Prenatal Vitals  BP: 120/80  Weight: 183 lb (83 kg)  Urine Glucose/Protein  Urine Glucose: Negative  Urine Protein: Trace  Edema  LLE Edema: None  RLE Edema: None  Facial Edema: None     Tests done today: NST - reactive  At the time of the next visit, she will need to have U/S for EFW - at PD[facility]    Impression:   Encounter Diagnoses   Name Primary?   • Previous  delivery affecting pregnancy Yes   • Chronic hypertension complicating or reason for care during pregnancy, third trimester    • History of  labor, current pregnancy, third trimester    • Prenatal care, subsequent pregnancy, third trimester        Plan: return 17    This note was electronically signed.    Fabien Blake MD

## 2017-01-27 NOTE — MR AVS SNAPSHOT
Chen NILESH Galaviz   1/27/2017 8:20 AM   Routine Prenatal    Dept Phone:  [phone]   Encounter #:  [id number]    Provider:  Fabien Blake MD   Department:  [facility] OBSTETRICS AND GYNECOLOGY                Your Full Care Plan              Today's Medication Changes          These changes are accurate as of: 1/27/17  9:19 AM.  If you have any questions, ask your nurse or doctor.               Stop taking medication(s)listed here:     MARIO 250 MG/ML injection   Generic drug:  HYDROXYprogesterone caproate                      Your Updated Medication List          This list is accurate as of: 1/27/17  9:19 AM.  Always use your most recent med list.                docusate sodium 100 MG capsule   Commonly known as:  COLACE   Take 1 capsule by mouth 2 (Two) Times a Day.       doxylamine 25 MG tablet   Commonly known as:  UNISOM   Take 1 tablet by mouth at night as needed for sleep.       GNP PRENATAL VITAMINS 28-0.8 MG tablet   Take 1 tablet by mouth daily.       NIFEdipine XL 30 MG 24 hr tablet   Commonly known as:  PROCARDIA XL   Take 1 tablet by mouth Daily.       omeprazole OTC 20 MG EC tablet   Commonly known as:  PRILOSEC OTC   Take 1 tablet by mouth Daily. Take daily 30 minutes prior to first morning meal       ondansetron ODT 8 MG disintegrating tablet   Commonly known as:  ZOFRAN-ODT       PROAIR  (90 BASE) MCG/ACT inhaler   Generic drug:  albuterol   INHALE 1-2 PUFFS EVERY 4-6 HOURS AS NEEDED FOR SHORTNESS OF BREATH       progesterone 200 MG capsule   Commonly known as:  PROMETRIUM   Take 1 capsule by mouth Daily. Insert 1 capsule vaginally each evening       promethazine 25 MG tablet   Commonly known as:  PHENERGAN   Take 1 tablet by mouth Every 6 (Six) Hours As Needed for nausea or vomiting.       pyridoxine 50 MG tablet tablet   Commonly known as:  VITAMIN B-6   Take 1 tablet by mouth every night.               We Performed the Following     Fetal  Nonstress Test       You Were Diagnosed With        Codes Comments    Previous  delivery affecting pregnancy    -  Primary ICD-10-CM: O34.219  ICD-9-CM: 654.20     Chronic hypertension complicating or reason for care during pregnancy, third trimester     ICD-10-CM: O10.913  ICD-9-CM: 642.03, 401.9     History of  labor, current pregnancy, third trimester     ICD-10-CM: O09.213  ICD-9-CM: V23.41     Prenatal care, subsequent pregnancy, third trimester     ICD-10-CM: Z34.83  ICD-9-CM: V22.1       Instructions     None    Patient Instructions History      Upcoming Appointments     Visit Type Date Time Department    OB FOLLOWUP 2017  8:20 AM [facility] [place]    FOLLOW UP 2017 10:45 AM [facility] [place]    US [facility] 2017 11:00 AM  [facility]      [employer] Signup     Our records indicate that you have an active [employer] account.    You can view your After Visit Summary by going to Arganteal and logging in with your [employer] username and password.  If you don't have a [employer] username and password but a parent or guardian has access to your record, the parent or guardian should login with their own [employer] username and password and access your record to view the After Visit Summary.    If you have questions, you can email [email or web address] or call [phone] to talk to our [employer] staff.  Remember, [employer] is NOT to be used for urgent needs.  For medical emergencies, dial 911.               Other Info from Your Visit           Your Appointments     2017 10:45 AM EST   Follow Up with  [facility] DEPT SCHEDULE   [facility] MEDICAL [facility] (--)    [address] [facility] [address]  [place] [address]   [phone]           Arrive 15 minutes prior to appointment.            2017 11:00 AM EST   US teresa pdc with [facility] US 1   [facility] PER DIAG CTR ([place])    [address] [facility] [address]    [phone]           Patient is to be hydrated              Allergies     Bupropion  Other (See Comments)    seizure    Naproxen  Rash    Nsaids  Rash    Sulfa Antibiotics  Rash      Reason for Visit     Routine Prenatal Visit     Headache           Vital Signs     Blood Pressure Weight Last Menstrual Period Body Mass Index Smoking Status       120/80 183 lb (83 kg) 2016 (Exact Date) 32.42 kg/m2 Current Every Day Smoker       Problems and Diagnoses Noted     Chronic high blood pressure complicating or reason for care during pregnancy    Previous  delivery affecting pregnancy    Current pregnancy with history of pre-term labor        Prenatal care

## 2017-02-01 ENCOUNTER — OFFICE VISIT (OUTPATIENT)
Dept: OBSTETRICS AND GYNECOLOGY | Facility: HOSPITAL | Age: 27
End: 2017-02-01

## 2017-02-01 ENCOUNTER — HOSPITAL ENCOUNTER (OUTPATIENT)
Dept: WOMENS IMAGING | Facility: HOSPITAL | Age: 27
Discharge: HOME | End: 2017-02-01
Attending: OBSTETRICS & GYNECOLOGY | Admitting: OBSTETRICS & GYNECOLOGY

## 2017-02-01 ENCOUNTER — ROUTINE PRENATAL (OUTPATIENT)
Dept: OBSTETRICS AND GYNECOLOGY | Facility: CLINIC | Age: 27
End: 2017-02-01

## 2017-02-01 VITALS — DIASTOLIC BLOOD PRESSURE: 79 MMHG | BODY MASS INDEX: 33.34 KG/M2 | SYSTOLIC BLOOD PRESSURE: 134 MMHG | WEIGHT: 188.2 LBS

## 2017-02-01 VITALS — SYSTOLIC BLOOD PRESSURE: 140 MMHG | BODY MASS INDEX: 33.13 KG/M2 | DIASTOLIC BLOOD PRESSURE: 82 MMHG | WEIGHT: 187 LBS

## 2017-02-01 DIAGNOSIS — I10 ESSENTIAL HYPERTENSION: Primary | ICD-10-CM

## 2017-02-01 DIAGNOSIS — O10.913 CHRONIC HYPERTENSION COMPLICATING OR REASON FOR CARE DURING PREGNANCY, THIRD TRIMESTER: ICD-10-CM

## 2017-02-01 DIAGNOSIS — O34.219 PREVIOUS CESAREAN DELIVERY AFFECTING PREGNANCY: ICD-10-CM

## 2017-02-01 DIAGNOSIS — O34.219 PREVIOUS CESAREAN DELIVERY AFFECTING PREGNANCY: Primary | ICD-10-CM

## 2017-02-01 DIAGNOSIS — Z34.83 PRENATAL CARE, SUBSEQUENT PREGNANCY, THIRD TRIMESTER: ICD-10-CM

## 2017-02-01 PROCEDURE — 99212 OFFICE O/P EST SF 10 MIN: CPT | Performed by: OBSTETRICS & GYNECOLOGY

## 2017-02-01 PROCEDURE — 76816 OB US FOLLOW-UP PER FETUS: CPT | Performed by: OBSTETRICS & GYNECOLOGY

## 2017-02-01 PROCEDURE — 76816 OB US FOLLOW-UP PER FETUS: CPT

## 2017-02-01 NOTE — PROGRESS NOTES
Documentation of the ultrasound findings, images, and interpretations will be available in the patient's ViewPoint report located in the chart review imaging tab in [employer].

## 2017-02-01 NOTE — PROGRESS NOTES
"States, \"I can feel the baby's head moving in my pelvis\". Denies any bleeding or leaking fluid. Does report increased vaginal discharge past couple of days. Dr. Blake stopped [place] last week, patient states it caused migraines, and started her on vaginal Progesterone.   "

## 2017-02-01 NOTE — PROGRESS NOTES
Lab Results   Component Value Date    ABORH O Rh Positive 10/20/2015       Chief Complaint   Patient presents with   • Routine Prenatal Visit     c/o pelvic discomfort. Is here after U/S @ [facility].       Today Chen complains of pelvic pressure  See ob flowsheet for physical exam.    Prenatal Assessment  Fetal Heart Rate: 142  Movement: Present  Prenatal Vitals  BP: 140/82  Weight: 187 lb (84.8 kg)  Edema  LLE Edema: None  RLE Edema: None  Facial Edema: None     Tests done today: U/S for EFW - and BPP  At the time of the next visit, she will need to have NST - for chronic hypertension    Impression:   Encounter Diagnoses   Name Primary?   • Previous  delivery affecting pregnancy Yes   • Chronic hypertension complicating or reason for care during pregnancy, third trimester    • Prenatal care, subsequent pregnancy, third trimester        Plan: Return 2/3/17    This note was electronically signed.    Fabien Blake MD

## 2017-02-03 ENCOUNTER — ROUTINE PRENATAL (OUTPATIENT)
Dept: OBSTETRICS AND GYNECOLOGY | Facility: CLINIC | Age: 27
End: 2017-02-03

## 2017-02-03 VITALS — DIASTOLIC BLOOD PRESSURE: 84 MMHG | BODY MASS INDEX: 34.01 KG/M2 | WEIGHT: 192 LBS | SYSTOLIC BLOOD PRESSURE: 120 MMHG

## 2017-02-03 DIAGNOSIS — Z34.83 PRENATAL CARE, SUBSEQUENT PREGNANCY, THIRD TRIMESTER: ICD-10-CM

## 2017-02-03 DIAGNOSIS — O10.913 CHRONIC HYPERTENSION COMPLICATING OR REASON FOR CARE DURING PREGNANCY, THIRD TRIMESTER: ICD-10-CM

## 2017-02-03 DIAGNOSIS — O34.219 PREVIOUS CESAREAN DELIVERY AFFECTING PREGNANCY: Primary | ICD-10-CM

## 2017-02-03 PROCEDURE — 59025 FETAL NON-STRESS TEST: CPT | Performed by: OBSTETRICS & GYNECOLOGY

## 2017-02-03 PROCEDURE — 99213 OFFICE O/P EST LOW 20 MIN: CPT | Performed by: OBSTETRICS & GYNECOLOGY

## 2017-02-03 NOTE — PROGRESS NOTES
Lab Results   Component Value Date    ABORH O Rh Positive 10/20/2015       Chief Complaint   Patient presents with   • Routine Prenatal Visit     Complains of some pelvic pain and irregular contractions through out the day.       Today Chen complains of irregular contractions   See ob flowsheet for physical exam.    Prenatal Assessment  Fetal Heart Rate: 133  Movement: Present  Prenatal Vitals  BP: 120/84  Weight: 192 lb (87.1 kg)  Urine Glucose/Protein  Urine Glucose: Negative  Urine Protein: Negative     Tests done today: NST - reactive  At the time of the next visit, she will need to have NST - for chronic hypertension    Impression:   Encounter Diagnoses   Name Primary?   • Previous  delivery affecting pregnancy Yes   • Chronic hypertension complicating or reason for care during pregnancy, third trimester    • Prenatal care, subsequent pregnancy, third trimester        Plan: return 2017    This note was electronically signed.    Fabien Blake MD

## 2017-02-09 ENCOUNTER — HOSPITAL ENCOUNTER (OUTPATIENT)
Facility: HOSPITAL | Age: 27
Discharge: HOME | End: 2017-02-09
Attending: OBSTETRICS & GYNECOLOGY | Admitting: OBSTETRICS & GYNECOLOGY

## 2017-02-09 VITALS
TEMPERATURE: 98.7 F | SYSTOLIC BLOOD PRESSURE: 125 MMHG | DIASTOLIC BLOOD PRESSURE: 82 MMHG | RESPIRATION RATE: 16 BRPM | HEART RATE: 97 BPM

## 2017-02-09 PROCEDURE — 99213 OFFICE O/P EST LOW 20 MIN: CPT | Performed by: OBSTETRICS & GYNECOLOGY

## 2017-02-09 PROCEDURE — 59025 FETAL NON-STRESS TEST: CPT

## 2017-02-09 PROCEDURE — G0463 HOSPITAL OUTPT CLINIC VISIT: HCPCS

## 2017-02-09 PROCEDURE — G0008 ADMIN INFLUENZA VIRUS VAC: HCPCS | Performed by: OBSTETRICS & GYNECOLOGY

## 2017-02-09 PROCEDURE — 90674 CCIIV4 VAC NO PRSV 0.5 ML IM: CPT | Performed by: OBSTETRICS & GYNECOLOGY

## 2017-02-09 PROCEDURE — 25010000004 INFLUENZA VAC SPLIT QUAD 0.5 ML SUSPENSION PREFILLED SYRINGE: Performed by: OBSTETRICS & GYNECOLOGY

## 2017-02-09 RX ADMIN — INFLUENZA A VIRUS A/CALIFORNIA/7/2009 X-179A (H1N1) ANTIGEN (FORMALDEHYDE INACTIVATED), INFLUENZA A VIRUS A/HONG KONG/4801/2014 X-263B (H3N2) ANTIGEN (FORMALDEHYDE INACTIVATED), INFLUENZA B VIRUS B/PHUKET/3073/2013 ANTIGEN (FORMALDEHYDE INACTIVATED), AND INFLUENZA B VIRUS B/BRISBANE/60/2008 ANTIGEN (FORMALDEHYDE INACTIVATED) 0.5 ML: 15; 15; 15; 15 INJECTION, SUSPENSION INTRAMUSCULAR at 16:45

## 2017-02-09 NOTE — H&P
Date of Discharge:  2/9/2017    Discharge Diagnosis: Stable pregnancy    Presenting Problem/History of Present Illness  There are no admission diagnoses documented for this encounter.       Hospital Course  Patient is a 26 y.o. female presented with irregular contractions. No evidence of labor. .        Consults:   Consults     No orders found from 1/11/2017 to 2/10/2017.          Pertinent Test Results: Lab Results   Component Value Date    WBC 17.40 (H) 12/18/2016    HGB 11.6 12/18/2016    HCT 34.4 (L) 12/18/2016    MCV 84.3 12/18/2016     12/18/2016    ABORH O Rh Positive 10/20/2015    RUBELLAIGGIN Immune 07/25/2016    HEPBSAG Non-Reactive 07/25/2016                   Condition on Discharge:  stable    Vital Signs  Temp:  [98.7 °F (37.1 °C)] 98.7 °F (37.1 °C)  Heart Rate:  [97] 97  Resp:  [16] 16  BP: (125)/(82) 125/82    Physical Exam:     General Appearance:    Alert, cooperative, in no acute distress   Head:    Normocephalic, without obvious abnormality, atraumatic   Eyes:            Lids and lashes normal, conjunctivae and sclerae normal, no   icterus, no pallor, corneas clear, PERRLA   Ears:    Ears appear intact with no abnormalities noted   Throat:   No oral lesions, no thrush, oral mucosa moist   Neck:   No adenopathy, supple, trachea midline, no thyromegaly, no     carotid bruit, no JVD   Back:     No kyphosis present, no scoliosis present, no skin lesions,       erythema or scars, no tenderness to percussion or                   palpation,   range of motion normal   Lungs:     Clear to auscultation,respirations regular, even and                   unlabored    Heart:    Regular rhythm and normal rate, normal S1 and S2, no            murmur, no gallop, no rub, no click   Breast Exam:    Deferred   Abdomen:     Normal bowel sounds, no masses, no organomegaly, soft        non-tender, non-distended, no guarding, no rebound                 tenderness   Genitalia:    Deferred   Extremities:   Moves  all extremities well, no edema, no cyanosis, no              redness   Pulses:   Pulses palpable and equal bilaterally   Skin:   No bleeding, bruising or rash   Lymph nodes:   No palpable adenopathy   Neurologic:   Cranial nerves 2 - 12 grossly intact, sensation intact, DTR        present and equal bilaterally       Discharge Disposition  Home or Self Care    Discharge Medications   GuillaumeHeladioChen NILESH   Home Medication Instructions MAXWELL:[id number]    Printed on:02/09/17 1640   Medication Information                      docusate sodium (COLACE) 100 MG capsule  Take 1 capsule by mouth 2 (Two) Times a Day.             doxylamine (UNISOM) 25 MG tablet  Take 1 tablet by mouth at night as needed for sleep.             NIFEdipine XL (PROCARDIA XL) 30 MG 24 hr tablet  Take 1 tablet by mouth Daily.             omeprazole OTC (PRILOSEC OTC) 20 MG EC tablet  Take 1 tablet by mouth Daily. Take daily 30 minutes prior to first morning meal             ondansetron ODT (ZOFRAN-ODT) 8 MG disintegrating tablet  Take 1 tablet by mouth every 8 (eight) hours as needed.             Prenatal Vit-Fe Fumarate-FA (GNP PRENATAL VITAMINS) 28-0.8 MG tablet  Take 1 tablet by mouth daily.             PROAIR  (90 BASE) MCG/ACT inhaler  INHALE 1-2 PUFFS EVERY 4-6 HOURS AS NEEDED FOR SHORTNESS OF BREATH             progesterone (PROMETRIUM) 200 MG capsule  Take 1 capsule by mouth Daily. Insert 1 capsule vaginally each evening             promethazine (PHENERGAN) 25 MG tablet  Take 1 tablet by mouth Every 6 (Six) Hours As Needed for nausea or vomiting.             pyridoxine (VITAMIN B-6) 50 MG tablet tablet  Take 1 tablet by mouth every night.                 Activity at Discharge: routine    Follow-up Appointments  Future Appointments  Date Time Provider Department Center   2/10/2017 11:00 AM [facility] INDUCTION [facility]OM 2 [facility]   2/23/2017 9:30 AM Fabien Blake MD [facility] None   2/23/2017 11:00 AM PAT 1 [facility]    2/28/2017 9:00 AM Fabien Blake MD [facility] None     [unfilled]    Test Results Pending at Discharge  [unfilled]     David Melo MD  02/09/17  4:40 PM

## 2017-02-14 ENCOUNTER — ROUTINE PRENATAL (OUTPATIENT)
Dept: OBSTETRICS AND GYNECOLOGY | Facility: CLINIC | Age: 27
End: 2017-02-14

## 2017-02-14 VITALS — BODY MASS INDEX: 34.19 KG/M2 | SYSTOLIC BLOOD PRESSURE: 120 MMHG | DIASTOLIC BLOOD PRESSURE: 90 MMHG | WEIGHT: 193 LBS

## 2017-02-14 DIAGNOSIS — O34.219 PREVIOUS CESAREAN DELIVERY AFFECTING PREGNANCY: Primary | ICD-10-CM

## 2017-02-14 DIAGNOSIS — Z34.83 PRENATAL CARE, SUBSEQUENT PREGNANCY, THIRD TRIMESTER: ICD-10-CM

## 2017-02-14 DIAGNOSIS — O10.913 CHRONIC HYPERTENSION COMPLICATING OR REASON FOR CARE DURING PREGNANCY, THIRD TRIMESTER: ICD-10-CM

## 2017-02-14 DIAGNOSIS — B37.31 CANDIDIASIS OF VULVA AND VAGINA: ICD-10-CM

## 2017-02-14 DIAGNOSIS — N89.8 VAGINA ITCHING: ICD-10-CM

## 2017-02-14 LAB — WET PREP GENITAL: NORMAL

## 2017-02-14 PROCEDURE — 99213 OFFICE O/P EST LOW 20 MIN: CPT | Performed by: OBSTETRICS & GYNECOLOGY

## 2017-02-14 PROCEDURE — 87210 SMEAR WET MOUNT SALINE/INK: CPT | Performed by: OBSTETRICS & GYNECOLOGY

## 2017-02-14 PROCEDURE — 59025 FETAL NON-STRESS TEST: CPT | Performed by: OBSTETRICS & GYNECOLOGY

## 2017-02-14 NOTE — PROGRESS NOTES
Lab Results   Component Value Date    ABORH O Rh Positive 10/20/2015       Chief Complaint   Patient presents with   • Routine Prenatal Visit     Pt. complains of having vaginal itching and irritation for the last 2 days,.        Today Chen complains of vaginal itching  See ob flowsheet for physical exam.    Prenatal Assessment  Fetal Heart Rate: 136  Movement: Present  Prenatal Vitals  BP: 120/90  Weight: 193 lb (87.5 kg)     Tests done today: NST - reactive, Wet mount  At the time of the next visit, she will need to have NST - twice weekly    Impression:   Encounter Diagnoses   Name Primary?   • Previous  delivery affecting pregnancy Yes   • Chronic hypertension complicating or reason for care during pregnancy, third trimester    • Prenatal care, subsequent pregnancy, third trimester    • Vagina itching        Plan: Return 17    This note was electronically signed.    Fabien Blake MD

## 2017-02-20 ENCOUNTER — HOSPITAL ENCOUNTER (OUTPATIENT)
Facility: HOSPITAL | Age: 27
Setting detail: OBSERVATION
Discharge: HOME | End: 2017-02-20
Attending: OBSTETRICS & GYNECOLOGY | Admitting: OBSTETRICS & GYNECOLOGY

## 2017-02-20 VITALS
HEART RATE: 111 BPM | SYSTOLIC BLOOD PRESSURE: 123 MMHG | HEIGHT: 63 IN | RESPIRATION RATE: 18 BRPM | DIASTOLIC BLOOD PRESSURE: 79 MMHG | BODY MASS INDEX: 34.2 KG/M2 | TEMPERATURE: 97.9 F | WEIGHT: 193 LBS

## 2017-02-20 LAB
BACTERIA UR QL AUTO: ABNORMAL /HPF
BILIRUB UR QL STRIP: NEGATIVE
CLARITY UR: CLEAR
COLOR UR: YELLOW
GLUCOSE UR STRIP-MCNC: NEGATIVE MG/DL
HGB UR QL STRIP.AUTO: NEGATIVE
HYALINE CASTS UR QL AUTO: ABNORMAL /LPF
KETONES UR QL STRIP: NEGATIVE
LEUKOCYTE ESTERASE UR QL STRIP.AUTO: ABNORMAL
NITRITE UR QL STRIP: NEGATIVE
PH UR STRIP.AUTO: 6 [PH] (ref 5–8)
PROT UR QL STRIP: NEGATIVE
RBC # UR: ABNORMAL /HPF
REF LAB TEST METHOD: ABNORMAL
SP GR UR STRIP: 1.02 (ref 1–1.03)
SQUAMOUS #/AREA URNS HPF: ABNORMAL /HPF
UROBILINOGEN UR QL STRIP: ABNORMAL
WBC UR QL AUTO: ABNORMAL /HPF

## 2017-02-20 PROCEDURE — 59025 FETAL NON-STRESS TEST: CPT

## 2017-02-20 PROCEDURE — 99218 PR INITIAL OBSERVATION CARE/DAY 30 MINUTES: CPT | Performed by: OBSTETRICS & GYNECOLOGY

## 2017-02-20 PROCEDURE — 87086 URINE CULTURE/COLONY COUNT: CPT | Performed by: OBSTETRICS & GYNECOLOGY

## 2017-02-20 PROCEDURE — G0378 HOSPITAL OBSERVATION PER HR: HCPCS

## 2017-02-20 PROCEDURE — 59025 FETAL NON-STRESS TEST: CPT | Performed by: OBSTETRICS & GYNECOLOGY

## 2017-02-20 PROCEDURE — 81001 URINALYSIS AUTO W/SCOPE: CPT | Performed by: OBSTETRICS & GYNECOLOGY

## 2017-02-20 NOTE — DISCHARGE INSTR - APPOINTMENTS
Keep all follow up appointments. Call your doctor or go to the nearest hospital if you have any problems or concerns

## 2017-02-20 NOTE — NURSING NOTE
Discharge instructional sheets gone over and given to pt. Pt verbalized understanding of all. Pt walked out to private car with family.

## 2017-02-20 NOTE — PROGRESS NOTES
[facility]  Obstetric History and Physical    Chief Complaint   Patient presents with   • Contractions       Subjective     Patient is a 26 y.o. female  currently at 38w2d, who presents with C/O mild-moderate pelvic pressure and low back pain since last pm.  Reports some intermittent contractions 2-4/h, without associated leaking of fluid, vaginal bleeding, and reports normal fetal activity.  Patient denies any other associated symptoms.  Patient prenatal course complicated by previous  section of twins at 24 weeks gestation.  Patient planned have a repeat  section at 39 weeks.    Her prenatal care is complicated by  prior   desires repeat .  Her previous obstetric/gynecological history is noted for is remarkable for .    The following portions of the patients history were reviewed and updated as appropriate: current medications, allergies, past medical history, past surgical history, past family history, past social history and problem list .       Prenatal Information:   Maternal Prenatal Labs  Blood Type No results found for: ABO   Rh Status No results found for: RH   Antibody Screen No results found for: ABSCRN   Gonnorhea No results found for: GCCX   Chlamydia No results found for: CLAMYDCU   RPR No results found for: RPR   Syphilis Antibody No results found for: SYPHILIS   Rubella No results found for: RUBELLAIGGIN   Hepatitis B Surface Antigen No results found for: HEPBSAG   HIV-1 Antibody No results found for: LABHIV1   Hepatitis C Antibody No results found for: HEPCAB   Rapid Urin Drug Screen No results found for: AMPMETHU, BARBITSCNUR, LABBENZSCN, LABMETHSCN, LABOPIASCN, THCURSCR, COCAINEUR, AMPHETSCREEN, PROPOXSCN, BUPRENORSCNU, METAMPSCNUR, OXYCODONESCN, TRICYCLICSCN   Group B Strep Culture No results found for: GBSANTIGEN                 Past OB History:       Obstetric History       T0      TAB0   SAB0   E0   M1   L2       # Outcome Date GA Lbr  Anthony/2nd Weight Sex Delivery Anes PTL Lv   2 Current            1A   24w0d  1 lb 11 oz (0.765 kg) M CS-Unspec EPI Y Y      Complications: Twins,Breech presentation,Premature labor   1B   24w0d  1 lb 8 oz (0.68 kg) M CS-Unspec EPI Y Y      Complications: Twins,Premature labor,Breech presentation      Obstetric Comments   Same fob.        Past Medical History: Past Medical History   Diagnosis Date   • Allergic rhinitis    • Anxiety    • Arthritis      since age 21 in her back   • Asthma      seasonal   • Back pain    • Bipolar disorder      dx age 19   • Chest pain, pleuritic    • Depression    • Dyslipidemia    • Endometriosis    • Fatigue    • Febrile seizure    • Frequent UTI    • GERD (gastroesophageal reflux disease)    • Headache    • Herpes zoster      denies this visit   • Hyperlipidemia    • Hypertension      chronic since age 23   • Itching    • Mental retardation      A. 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 History of wisdom teeth extraction, age 21   •  section     • Pelvic laparoscopy       age 21   • Cholecystectomy       age 21   • Tonsillectomy       age 23   • [place] tooth extraction        Family History: Family History   Problem Relation Age of Onset   • Asthma Mother    • Hypertension Mother    • Breast cancer Mother    • Hypertension Father    • Asthma Brother    • Colon cancer Maternal Grandfather    • Ovarian cancer Neg Hx    • Uterine cancer Neg Hx       Social History:  reports that she has been smoking.  She has been smoking about 0.50 packs per day. She does not have any smokeless tobacco history on file.   reports that she does not drink alcohol.   reports that she does not use illicit drugs.                    General ROS Negative Findings:Headaches, Visual Changes, Epigastric pain, Anorexia, Nausia/Vomiting, ROM and Vaginal Bleeding    ROS      Objective       Vital Signs Range for the last 24 hours  Temperature: Temp:  [97.9 °F (36.6 °C)] 97.9 °F (36.6 °C)   Temp Source: Temp src: Oral   BP: BP: (123)/(79) 123/79   Pulse: Heart Rate:  [111] 111   Respirations: Resp:  [18] 18   SPO2:     O2 Amount (l/min):     O2 Devices     Weight: Weight:  [193 lb (87.5 kg)] 193 lb (87.5 kg)     Physical Examination:   General:   alert, appears stated age and cooperative   Skin:   normal   HEENT:     Lungs:   clear to auscultation bilaterally   Heart:   regular rate and rhythm, S1, S2 normal, no murmur, click, rub or gallop   Abdomen:  soft, uterus-nontender, no guarding, no rebound, negative CVA tenderness.     Lower Extremeties Trace edema, anus, DTRs 2+ over 4 no clonus,.     Pelvis:  External genitalia: normal general appearance  Vaginal: normal without tenderness, induration or masses  Uterus: enlarged         Presentation: VTX   Cervix: Exam by: Method: sterile exam per physician   Dilation: Dilation: 1   Effacement: Cervical Effacement: 50%   Station: Station: -2       Fetal Heart Rate Assessment   Method: Fetal HR Assessment Method: external   Beats/min: Fetal HR (Beats/Min): 135   Baseline: Fetal HR Baseline: normal range (110-160 bpm)   Varibility: Fetal HR Variability: moderate (amplitude range 6 to 25 bpm)   Accels: Fetal HR Accelerations: greater than/equal to 15 bpm   Decels: Fetal HR Decelerations: absent   Tracing Category:     NST ind  Contractions   Reactive MOD V , + accel,15x15, no decel,onset   1322     Offset  1500. No contractions  Uterine Assessment   Method: Method: palpation, TOCO (external toco transducer)   Frequency (min):     Ctx Count in 10 min:     Duration:     Intensity: Contraction Intensity: no contractions   Intensity by IUPC:     Resting Tone: Uterine Resting Tone: soft by palpation   Resting Tone  by BARRIE:     Nabor Units:       Laboratory Results: @[id number]@        UA   Cult pending  Radiology Review:@lastrad@  Other Studies:    Assessment/Plan     Active Problems:    Pregnancy    Previous  delivery affecting pregnancy        Assessment:  1.  Intrauterine pregnancy at 38w2d weeks gestation with reactive fetal status.    2.  No evidence of labor or ROM  3.  Urine cult pending  4.      Plan:  1. D/C to home,labor instructions,F/U OB ,  2. Plan of care has been reviewed with patient.  3.  Risks, benefits of treatment plan have been discussed.  4.  All questions have been answered.  5   D/W Dr Chay Schofield, DO  2017  3:03 PM

## 2017-02-21 ENCOUNTER — ROUTINE PRENATAL (OUTPATIENT)
Dept: OBSTETRICS AND GYNECOLOGY | Facility: CLINIC | Age: 27
End: 2017-02-21

## 2017-02-21 VITALS — SYSTOLIC BLOOD PRESSURE: 130 MMHG | DIASTOLIC BLOOD PRESSURE: 82 MMHG | BODY MASS INDEX: 34.19 KG/M2 | WEIGHT: 193 LBS

## 2017-02-21 DIAGNOSIS — Z34.83 PRENATAL CARE, SUBSEQUENT PREGNANCY, THIRD TRIMESTER: ICD-10-CM

## 2017-02-21 DIAGNOSIS — O34.219 PREVIOUS CESAREAN DELIVERY AFFECTING PREGNANCY: Primary | ICD-10-CM

## 2017-02-21 DIAGNOSIS — O10.913 CHRONIC HYPERTENSION COMPLICATING OR REASON FOR CARE DURING PREGNANCY, THIRD TRIMESTER: ICD-10-CM

## 2017-02-21 PROCEDURE — 99213 OFFICE O/P EST LOW 20 MIN: CPT | Performed by: OBSTETRICS & GYNECOLOGY

## 2017-02-21 NOTE — PROGRESS NOTES
Lab Results   Component Value Date    ABORH O Rh Positive 10/20/2015       Chief Complaint   Patient presents with   • Routine Prenatal Visit     Pt. has no complaints this visit.  section scheduled for Friday.       Today Chen has no specific complaints  See ob flowsheet for physical exam.    Prenatal Assessment  Fetal Heart Rate: 132  Movement: Present  Prenatal Vitals  BP: 130/82  Weight: 193 lb (87.5 kg)     Tests done today: none  At the time of the next visit, she will need to have NST - twice weekly NST    Impression:   Encounter Diagnoses   Name Primary?   • Previous  delivery affecting pregnancy Yes   • Chronic hypertension complicating or reason for care during pregnancy, third trimester    • Prenatal care, subsequent pregnancy, third trimester        Plan: returning to office 17    This note was electronically signed.    Fabien Blake MD

## 2017-02-22 ENCOUNTER — ANESTHESIA EVENT (OUTPATIENT)
Dept: LABOR AND DELIVERY | Facility: HOSPITAL | Age: 27
End: 2017-02-22

## 2017-02-22 LAB — BACTERIA SPEC AEROBE CULT: NORMAL

## 2017-02-23 ENCOUNTER — ROUTINE PRENATAL (OUTPATIENT)
Dept: OBSTETRICS AND GYNECOLOGY | Facility: CLINIC | Age: 27
End: 2017-02-23

## 2017-02-23 ENCOUNTER — APPOINTMENT (OUTPATIENT)
Dept: PREADMISSION TESTING | Facility: HOSPITAL | Age: 27
End: 2017-02-23

## 2017-02-23 VITALS — DIASTOLIC BLOOD PRESSURE: 86 MMHG | SYSTOLIC BLOOD PRESSURE: 136 MMHG | WEIGHT: 190 LBS | BODY MASS INDEX: 33.66 KG/M2

## 2017-02-23 VITALS — BODY MASS INDEX: 32.33 KG/M2 | WEIGHT: 189.38 LBS | HEIGHT: 64 IN

## 2017-02-23 DIAGNOSIS — O34.219 PREVIOUS CESAREAN DELIVERY AFFECTING PREGNANCY: Primary | ICD-10-CM

## 2017-02-23 DIAGNOSIS — Z34.83 PRENATAL CARE, SUBSEQUENT PREGNANCY, THIRD TRIMESTER: ICD-10-CM

## 2017-02-23 DIAGNOSIS — O10.913 CHRONIC HYPERTENSION COMPLICATING OR REASON FOR CARE DURING PREGNANCY, THIRD TRIMESTER: ICD-10-CM

## 2017-02-23 LAB
ABO GROUP BLD: NORMAL
ALP SERPL-CCNC: 194 U/L (ref 25–100)
ALT SERPL W P-5'-P-CCNC: 10 U/L (ref 7–40)
AST SERPL-CCNC: 10 U/L (ref 0–33)
BILIRUB SERPL-MCNC: 0.2 MG/DL (ref 0.3–1.2)
BLD GP AB SCN SERPL QL: NEGATIVE
CREAT BLD-MCNC: 0.6 MG/DL (ref 0.6–1.3)
DEPRECATED RDW RBC AUTO: 44.2 FL (ref 37–54)
ERYTHROCYTE [DISTWIDTH] IN BLOOD BY AUTOMATED COUNT: 14.9 % (ref 11.3–14.5)
HCT VFR BLD AUTO: 40.8 % (ref 34.5–44)
HGB BLD-MCNC: 14 G/DL (ref 11.5–15.5)
LDH SERPL-CCNC: 132 U/L (ref 120–246)
MCH RBC QN AUTO: 28 PG (ref 27–31)
MCHC RBC AUTO-ENTMCNC: 34.3 G/DL (ref 32–36)
MCV RBC AUTO: 81.6 FL (ref 80–99)
PLATELET # BLD AUTO: 272 10*3/MM3 (ref 150–450)
PMV BLD AUTO: 11.5 FL (ref 6–12)
RBC # BLD AUTO: 5 10*6/MM3 (ref 3.89–5.14)
RH BLD: POSITIVE
URATE SERPL-MCNC: 6.4 MG/DL (ref 3.1–7.8)
WBC NRBC COR # BLD: 14.58 10*3/MM3 (ref 3.5–10.8)

## 2017-02-23 PROCEDURE — 59025 FETAL NON-STRESS TEST: CPT | Performed by: OBSTETRICS & GYNECOLOGY

## 2017-02-23 PROCEDURE — 99213 OFFICE O/P EST LOW 20 MIN: CPT | Performed by: OBSTETRICS & GYNECOLOGY

## 2017-02-23 NOTE — PROGRESS NOTES
Lab Results   Component Value Date    ABORH O Rh Positive 10/20/2015       Chief Complaint   Patient presents with   • Routine Prenatal Visit     Scheduled for C/S tomorrow. C/O headache today       Today Chen complains of headache  See ob flowsheet for physical exam.    Prenatal Assessment  Fetal Heart Rate: 138  Movement: Present  Prenatal Vitals  BP: 136/86  Weight: 190 lb (86.2 kg)  Urine Glucose/Protein  Urine Glucose: Negative  Urine Protein: Negative  Edema  LLE Edema: Mild pitting, slight indentation  RLE Edema: Mild pitting, slight indentation  Facial Edema: None     Tests done today: NST - reactive  At the time of the next visit, she will need to have none    Impression:   Encounter Diagnoses   Name Primary?   • Previous  delivery affecting pregnancy Yes   • Chronic hypertension complicating or reason for care during pregnancy, third trimester    • Prenatal care, subsequent pregnancy, third trimester        Plan: Repeat C section tomorrow    This note was electronically signed.    Fabien Blake MD

## 2017-02-24 ENCOUNTER — ANESTHESIA (OUTPATIENT)
Dept: LABOR AND DELIVERY | Facility: HOSPITAL | Age: 27
End: 2017-02-24

## 2017-02-24 ENCOUNTER — HOSPITAL ENCOUNTER (INPATIENT)
Facility: HOSPITAL | Age: 27
LOS: 3 days | Discharge: HOME | End: 2017-02-27
Attending: OBSTETRICS & GYNECOLOGY | Admitting: OBSTETRICS & GYNECOLOGY

## 2017-02-24 DIAGNOSIS — O13.3 GESTATIONAL HYPERTENSION, THIRD TRIMESTER: ICD-10-CM

## 2017-02-24 DIAGNOSIS — Z98.51 S/P TUBAL LIGATION: ICD-10-CM

## 2017-02-24 DIAGNOSIS — Z98.891 STATUS POST CESAREAN SECTION: ICD-10-CM

## 2017-02-24 DIAGNOSIS — O34.219 PREVIOUS CESAREAN SECTION COMPLICATING PREGNANCY: Primary | ICD-10-CM

## 2017-02-24 PROBLEM — Z33.1 PREGNANT STATE, INCIDENTAL: Status: ACTIVE | Noted: 2017-02-24

## 2017-02-24 PROCEDURE — 59514 CESAREAN DELIVERY ONLY: CPT | Performed by: OBSTETRICS & GYNECOLOGY

## 2017-02-24 PROCEDURE — 0UB70ZZ EXCISION OF BILATERAL FALLOPIAN TUBES, OPEN APPROACH: ICD-10-PCS | Performed by: OBSTETRICS & GYNECOLOGY

## 2017-02-24 PROCEDURE — 58611 LIGATE OVIDUCT(S) ADD-ON: CPT | Performed by: OBSTETRICS & GYNECOLOGY

## 2017-02-24 PROCEDURE — 25010000002 MORPHINE PER 10 MG: Performed by: NURSE ANESTHETIST, CERTIFIED REGISTERED

## 2017-02-24 PROCEDURE — 25010000003 MORPHINE PER 10 MG: Performed by: NURSE ANESTHETIST, CERTIFIED REGISTERED

## 2017-02-24 PROCEDURE — 88302 TISSUE EXAM BY PATHOLOGIST: CPT | Performed by: OBSTETRICS & GYNECOLOGY

## 2017-02-24 PROCEDURE — 99221 1ST HOSP IP/OBS SF/LOW 40: CPT | Performed by: OBSTETRICS & GYNECOLOGY

## 2017-02-24 PROCEDURE — 59025 FETAL NON-STRESS TEST: CPT

## 2017-02-24 PROCEDURE — 25010000002 MIDAZOLAM PER 1 MG: Performed by: NURSE ANESTHETIST, CERTIFIED REGISTERED

## 2017-02-24 PROCEDURE — 63710000001 DIPHENHYDRAMINE PER 50 MG: Performed by: NURSE ANESTHETIST, CERTIFIED REGISTERED

## 2017-02-24 PROCEDURE — 25010000002 DIPHENHYDRAMINE PER 50 MG: Performed by: NURSE ANESTHETIST, CERTIFIED REGISTERED

## 2017-02-24 PROCEDURE — 25010000002 FENTANYL CITRATE (PF) 100 MCG/2ML SOLUTION: Performed by: NURSE ANESTHETIST, CERTIFIED REGISTERED

## 2017-02-24 PROCEDURE — 25010000002 METOCLOPRAMIDE PER 10 MG: Performed by: ANESTHESIOLOGY

## 2017-02-24 PROCEDURE — 25010000003 CEFAZOLIN IN DEXTROSE 2-4 GM/100ML-% SOLUTION: Performed by: OBSTETRICS & GYNECOLOGY

## 2017-02-24 PROCEDURE — 25010000002 PHENYLEPHRINE PER 1 ML: Performed by: NURSE ANESTHETIST, CERTIFIED REGISTERED

## 2017-02-24 RX ORDER — SODIUM CHLORIDE 0.9 % (FLUSH) 0.9 %
1-10 SYRINGE (ML) INJECTION AS NEEDED
Status: DISCONTINUED | OUTPATIENT
Start: 2017-02-24 | End: 2017-02-27 | Stop reason: HOSPADM

## 2017-02-24 RX ORDER — OXYTOCIN/RINGER'S LACTATE 20/1000 ML
125 PLASTIC BAG, INJECTION (ML) INTRAVENOUS AS NEEDED
Status: DISCONTINUED | OUTPATIENT
Start: 2017-02-24 | End: 2017-02-24 | Stop reason: HOSPADM

## 2017-02-24 RX ORDER — SODIUM CHLORIDE, SODIUM LACTATE, POTASSIUM CHLORIDE, CALCIUM CHLORIDE 600; 310; 30; 20 MG/100ML; MG/100ML; MG/100ML; MG/100ML
125 INJECTION, SOLUTION INTRAVENOUS CONTINUOUS
Status: DISCONTINUED | OUTPATIENT
Start: 2017-02-24 | End: 2017-02-27 | Stop reason: HOSPADM

## 2017-02-24 RX ORDER — DOCUSATE SODIUM 100 MG/1
100 CAPSULE, LIQUID FILLED ORAL 2 TIMES DAILY PRN
Status: DISCONTINUED | OUTPATIENT
Start: 2017-02-24 | End: 2017-02-27 | Stop reason: HOSPADM

## 2017-02-24 RX ORDER — CEFAZOLIN SODIUM 2 G/100ML
2 INJECTION, SOLUTION INTRAVENOUS EVERY 8 HOURS
Status: COMPLETED | OUTPATIENT
Start: 2017-02-24 | End: 2017-02-25

## 2017-02-24 RX ORDER — SODIUM CHLORIDE 0.9 % (FLUSH) 0.9 %
1-10 SYRINGE (ML) INJECTION AS NEEDED
Status: DISCONTINUED | OUTPATIENT
Start: 2017-02-24 | End: 2017-02-24 | Stop reason: HOSPADM

## 2017-02-24 RX ORDER — PROMETHAZINE HYDROCHLORIDE 25 MG/ML
12.5 INJECTION, SOLUTION INTRAMUSCULAR; INTRAVENOUS EVERY 6 HOURS PRN
Status: DISCONTINUED | OUTPATIENT
Start: 2017-02-24 | End: 2017-02-24 | Stop reason: HOSPADM

## 2017-02-24 RX ORDER — DIPHENHYDRAMINE HYDROCHLORIDE 50 MG/ML
25 INJECTION INTRAMUSCULAR; INTRAVENOUS EVERY 4 HOURS PRN
Status: DISCONTINUED | OUTPATIENT
Start: 2017-02-24 | End: 2017-02-27 | Stop reason: HOSPADM

## 2017-02-24 RX ORDER — FENTANYL CITRATE 50 UG/ML
INJECTION, SOLUTION INTRAMUSCULAR; INTRAVENOUS AS NEEDED
Status: DISCONTINUED | OUTPATIENT
Start: 2017-02-24 | End: 2017-02-24 | Stop reason: SURG

## 2017-02-24 RX ORDER — MORPHINE SULFATE 4 MG/ML
2 INJECTION, SOLUTION INTRAMUSCULAR; INTRAVENOUS
Status: DISCONTINUED | OUTPATIENT
Start: 2017-02-24 | End: 2017-02-27 | Stop reason: HOSPADM

## 2017-02-24 RX ORDER — PRENATAL WITH FERROUS FUM AND FOLIC ACID 3080; 920; 120; 400; 22; 1.84; 3; 20; 10; 1; 12; 200; 27; 25; 2 [IU]/1; [IU]/1; MG/1; [IU]/1; MG/1; MG/1; MG/1; MG/1; MG/1; MG/1; UG/1; MG/1; MG/1; MG/1; MG/1
1 TABLET ORAL DAILY
Status: DISCONTINUED | OUTPATIENT
Start: 2017-02-24 | End: 2017-02-27 | Stop reason: HOSPADM

## 2017-02-24 RX ORDER — LIDOCAINE HYDROCHLORIDE 10 MG/ML
5 INJECTION, SOLUTION INFILTRATION; PERINEURAL AS NEEDED
Status: DISCONTINUED | OUTPATIENT
Start: 2017-02-24 | End: 2017-02-24 | Stop reason: HOSPADM

## 2017-02-24 RX ORDER — METOCLOPRAMIDE HYDROCHLORIDE 5 MG/ML
INJECTION INTRAMUSCULAR; INTRAVENOUS AS NEEDED
Status: DISCONTINUED | OUTPATIENT
Start: 2017-02-24 | End: 2017-02-24 | Stop reason: SURG

## 2017-02-24 RX ORDER — MORPHINE SULFATE 0.5 MG/ML
INJECTION, SOLUTION EPIDURAL; INTRATHECAL; INTRAVENOUS AS NEEDED
Status: DISCONTINUED | OUTPATIENT
Start: 2017-02-24 | End: 2017-02-24 | Stop reason: SURG

## 2017-02-24 RX ORDER — PROMETHAZINE HYDROCHLORIDE 25 MG/ML
12.5 INJECTION, SOLUTION INTRAMUSCULAR; INTRAVENOUS EVERY 6 HOURS PRN
Status: DISCONTINUED | OUTPATIENT
Start: 2017-02-24 | End: 2017-02-27 | Stop reason: HOSPADM

## 2017-02-24 RX ORDER — SODIUM CHLORIDE, SODIUM LACTATE, POTASSIUM CHLORIDE, CALCIUM CHLORIDE 600; 310; 30; 20 MG/100ML; MG/100ML; MG/100ML; MG/100ML
125 INJECTION, SOLUTION INTRAVENOUS CONTINUOUS
Status: DISCONTINUED | OUTPATIENT
Start: 2017-02-24 | End: 2017-02-24 | Stop reason: SDUPTHER

## 2017-02-24 RX ORDER — BUPIVACAINE HYDROCHLORIDE 7.5 MG/ML
INJECTION, SOLUTION EPIDURAL; RETROBULBAR AS NEEDED
Status: DISCONTINUED | OUTPATIENT
Start: 2017-02-24 | End: 2017-02-24 | Stop reason: SURG

## 2017-02-24 RX ORDER — CARBOPROST TROMETHAMINE 250 UG/ML
250 INJECTION, SOLUTION INTRAMUSCULAR AS NEEDED
Status: DISCONTINUED | OUTPATIENT
Start: 2017-02-24 | End: 2017-02-24 | Stop reason: HOSPADM

## 2017-02-24 RX ORDER — OXYCODONE HYDROCHLORIDE AND ACETAMINOPHEN 5; 325 MG/1; MG/1
1 TABLET ORAL EVERY 4 HOURS PRN
Status: DISCONTINUED | OUTPATIENT
Start: 2017-02-24 | End: 2017-02-25 | Stop reason: SDUPTHER

## 2017-02-24 RX ORDER — MIDAZOLAM HYDROCHLORIDE 1 MG/ML
INJECTION INTRAMUSCULAR; INTRAVENOUS AS NEEDED
Status: DISCONTINUED | OUTPATIENT
Start: 2017-02-24 | End: 2017-02-24 | Stop reason: SURG

## 2017-02-24 RX ORDER — MORPHINE SULFATE 4 MG/ML
2 INJECTION, SOLUTION INTRAMUSCULAR; INTRAVENOUS
Status: ACTIVE | OUTPATIENT
Start: 2017-02-24 | End: 2017-02-24

## 2017-02-24 RX ORDER — MORPHINE SULFATE 4 MG/ML
2 INJECTION, SOLUTION INTRAMUSCULAR; INTRAVENOUS
Status: DISCONTINUED | OUTPATIENT
Start: 2017-02-24 | End: 2017-02-24 | Stop reason: HOSPADM

## 2017-02-24 RX ORDER — OXYTOCIN/RINGER'S LACTATE 20/1000 ML
999 PLASTIC BAG, INJECTION (ML) INTRAVENOUS ONCE
Status: DISCONTINUED | OUTPATIENT
Start: 2017-02-24 | End: 2017-02-24 | Stop reason: HOSPADM

## 2017-02-24 RX ORDER — DIPHENHYDRAMINE HCL 25 MG
25 CAPSULE ORAL EVERY 4 HOURS PRN
Status: DISCONTINUED | OUTPATIENT
Start: 2017-02-24 | End: 2017-02-27 | Stop reason: HOSPADM

## 2017-02-24 RX ORDER — ONDANSETRON 2 MG/ML
4 INJECTION INTRAMUSCULAR; INTRAVENOUS ONCE AS NEEDED
Status: DISCONTINUED | OUTPATIENT
Start: 2017-02-24 | End: 2017-02-27 | Stop reason: HOSPADM

## 2017-02-24 RX ORDER — PROMETHAZINE HYDROCHLORIDE 12.5 MG/1
12.5 SUPPOSITORY RECTAL EVERY 6 HOURS PRN
Status: DISCONTINUED | OUTPATIENT
Start: 2017-02-24 | End: 2017-02-24 | Stop reason: HOSPADM

## 2017-02-24 RX ORDER — OXYTOCIN/RINGER'S LACTATE 20/1000 ML
2 PLASTIC BAG, INJECTION (ML) INTRAVENOUS CONTINUOUS
Status: DISCONTINUED | OUTPATIENT
Start: 2017-02-24 | End: 2017-02-27 | Stop reason: HOSPADM

## 2017-02-24 RX ORDER — LIDOCAINE HYDROCHLORIDE 10 MG/ML
5 INJECTION, SOLUTION INFILTRATION; PERINEURAL AS NEEDED
Status: DISCONTINUED | OUTPATIENT
Start: 2017-02-24 | End: 2017-02-24

## 2017-02-24 RX ORDER — CEFAZOLIN SODIUM 2 G/100ML
2 INJECTION, SOLUTION INTRAVENOUS ONCE
Status: COMPLETED | OUTPATIENT
Start: 2017-02-24 | End: 2017-02-24

## 2017-02-24 RX ORDER — FAMOTIDINE 10 MG/ML
INJECTION, SOLUTION INTRAVENOUS AS NEEDED
Status: DISCONTINUED | OUTPATIENT
Start: 2017-02-24 | End: 2017-02-24 | Stop reason: SURG

## 2017-02-24 RX ORDER — SIMETHICONE 80 MG
80 TABLET,CHEWABLE ORAL
Status: DISCONTINUED | OUTPATIENT
Start: 2017-02-24 | End: 2017-02-27 | Stop reason: HOSPADM

## 2017-02-24 RX ORDER — HYDROCODONE BITARTRATE AND ACETAMINOPHEN 5; 325 MG/1; MG/1
1 TABLET ORAL EVERY 4 HOURS PRN
Status: DISCONTINUED | OUTPATIENT
Start: 2017-02-24 | End: 2017-02-27 | Stop reason: HOSPADM

## 2017-02-24 RX ORDER — METHYLERGONOVINE MALEATE 0.2 MG/ML
200 INJECTION INTRAVENOUS AS NEEDED
Status: DISCONTINUED | OUTPATIENT
Start: 2017-02-24 | End: 2017-02-24 | Stop reason: HOSPADM

## 2017-02-24 RX ORDER — MISOPROSTOL 200 UG/1
800 TABLET ORAL AS NEEDED
Status: DISCONTINUED | OUTPATIENT
Start: 2017-02-24 | End: 2017-02-24 | Stop reason: HOSPADM

## 2017-02-24 RX ORDER — PROMETHAZINE HYDROCHLORIDE 12.5 MG/1
12.5 TABLET ORAL EVERY 6 HOURS PRN
Status: DISCONTINUED | OUTPATIENT
Start: 2017-02-24 | End: 2017-02-24 | Stop reason: HOSPADM

## 2017-02-24 RX ORDER — SODIUM CHLORIDE, SODIUM LACTATE, POTASSIUM CHLORIDE, CALCIUM CHLORIDE 600; 310; 30; 20 MG/100ML; MG/100ML; MG/100ML; MG/100ML
125 INJECTION, SOLUTION INTRAVENOUS ONCE
Status: DISCONTINUED | OUTPATIENT
Start: 2017-02-24 | End: 2017-02-27 | Stop reason: HOSPADM

## 2017-02-24 RX ORDER — OXYTOCIN 10 [USP'U]/ML
INJECTION, SOLUTION INTRAMUSCULAR; INTRAVENOUS AS NEEDED
Status: DISCONTINUED | OUTPATIENT
Start: 2017-02-24 | End: 2017-02-24 | Stop reason: SURG

## 2017-02-24 RX ORDER — PROMETHAZINE HYDROCHLORIDE 12.5 MG/1
12.5 SUPPOSITORY RECTAL EVERY 6 HOURS PRN
Status: DISCONTINUED | OUTPATIENT
Start: 2017-02-24 | End: 2017-02-27 | Stop reason: HOSPADM

## 2017-02-24 RX ORDER — OXYCODONE HYDROCHLORIDE AND ACETAMINOPHEN 5; 325 MG/1; MG/1
1 TABLET ORAL EVERY 4 HOURS PRN
Status: DISCONTINUED | OUTPATIENT
Start: 2017-02-24 | End: 2017-02-24 | Stop reason: HOSPADM

## 2017-02-24 RX ORDER — ACETAMINOPHEN 325 MG/1
650 TABLET ORAL EVERY 4 HOURS PRN
Status: DISCONTINUED | OUTPATIENT
Start: 2017-02-24 | End: 2017-02-24 | Stop reason: HOSPADM

## 2017-02-24 RX ORDER — LANOLIN 100 %
OINTMENT (GRAM) TOPICAL
Status: DISCONTINUED | OUTPATIENT
Start: 2017-02-24 | End: 2017-02-27 | Stop reason: HOSPADM

## 2017-02-24 RX ORDER — PROMETHAZINE HYDROCHLORIDE 25 MG/1
25 TABLET ORAL EVERY 6 HOURS PRN
Status: DISCONTINUED | OUTPATIENT
Start: 2017-02-24 | End: 2017-02-27 | Stop reason: HOSPADM

## 2017-02-24 RX ORDER — NALOXONE HCL 0.4 MG/ML
0.4 VIAL (ML) INJECTION
Status: ACTIVE | OUTPATIENT
Start: 2017-02-24 | End: 2017-02-25

## 2017-02-24 RX ADMIN — SODIUM CHLORIDE, POTASSIUM CHLORIDE, SODIUM LACTATE AND CALCIUM CHLORIDE: 600; 310; 30; 20 INJECTION, SOLUTION INTRAVENOUS at 12:54

## 2017-02-24 RX ADMIN — OXYTOCIN 30 UNITS: 10 INJECTION, SOLUTION INTRAMUSCULAR; INTRAVENOUS at 12:35

## 2017-02-24 RX ADMIN — SIMETHICONE CHEW TAB 80 MG 80 MG: 80 TABLET ORAL at 22:01

## 2017-02-24 RX ADMIN — SODIUM CHLORIDE, POTASSIUM CHLORIDE, SODIUM LACTATE AND CALCIUM CHLORIDE: 600; 310; 30; 20 INJECTION, SOLUTION INTRAVENOUS at 12:05

## 2017-02-24 RX ADMIN — OXYTOCIN 30 UNITS: 10 INJECTION, SOLUTION INTRAMUSCULAR; INTRAVENOUS at 12:54

## 2017-02-24 RX ADMIN — MIDAZOLAM HYDROCHLORIDE 1.5 MG: 1 INJECTION, SOLUTION INTRAMUSCULAR; INTRAVENOUS at 12:03

## 2017-02-24 RX ADMIN — METOCLOPRAMIDE 10 MG: 5 INJECTION, SOLUTION INTRAMUSCULAR; INTRAVENOUS at 11:15

## 2017-02-24 RX ADMIN — DIPHENHYDRAMINE HYDROCHLORIDE 25 MG: 50 INJECTION INTRAMUSCULAR; INTRAVENOUS at 14:24

## 2017-02-24 RX ADMIN — MIDAZOLAM HYDROCHLORIDE 2 MG: 1 INJECTION, SOLUTION INTRAMUSCULAR; INTRAVENOUS at 12:43

## 2017-02-24 RX ADMIN — MORPHINE SULFATE 2 MG: 4 INJECTION, SOLUTION INTRAMUSCULAR; INTRAVENOUS at 13:58

## 2017-02-24 RX ADMIN — Medication 2 MILLI-UNITS/MIN: at 16:54

## 2017-02-24 RX ADMIN — BUPIVACAINE HYDROCHLORIDE 1.4 ML: 7.5 INJECTION, SOLUTION EPIDURAL; RETROBULBAR at 12:09

## 2017-02-24 RX ADMIN — MIDAZOLAM HYDROCHLORIDE 1.5 MG: 1 INJECTION, SOLUTION INTRAMUSCULAR; INTRAVENOUS at 12:37

## 2017-02-24 RX ADMIN — SIMETHICONE CHEW TAB 80 MG 80 MG: 80 TABLET ORAL at 18:03

## 2017-02-24 RX ADMIN — SODIUM CHLORIDE, POTASSIUM CHLORIDE, SODIUM LACTATE AND CALCIUM CHLORIDE 1000 ML/HR: 600; 310; 30; 20 INJECTION, SOLUTION INTRAVENOUS at 11:45

## 2017-02-24 RX ADMIN — FAMOTIDINE 20 MG: 10 INJECTION, SOLUTION INTRAVENOUS at 11:14

## 2017-02-24 RX ADMIN — SODIUM CITRATE AND CITRIC ACID MONOHYDRATE 30 ML: 500; 334 SOLUTION ORAL at 12:00

## 2017-02-24 RX ADMIN — DIPHENHYDRAMINE HYDROCHLORIDE 25 MG: 25 CAPSULE ORAL at 19:48

## 2017-02-24 RX ADMIN — CEFAZOLIN SODIUM 2 G: 2 INJECTION, SOLUTION INTRAVENOUS at 11:55

## 2017-02-24 RX ADMIN — FENTANYL CITRATE 15 MCG: 50 INJECTION, SOLUTION INTRAMUSCULAR; INTRAVENOUS at 12:09

## 2017-02-24 RX ADMIN — CEFAZOLIN SODIUM 2 G: 2 INJECTION, SOLUTION INTRAVENOUS at 19:32

## 2017-02-24 RX ADMIN — OXYCODONE AND ACETAMINOPHEN 1 TABLET: 5; 325 TABLET ORAL at 18:03

## 2017-02-24 RX ADMIN — PHENYLEPHRINE HYDROCHLORIDE 100 MCG: 10 INJECTION INTRAVENOUS at 12:12

## 2017-02-24 RX ADMIN — OXYCODONE AND ACETAMINOPHEN 1 TABLET: 5; 325 TABLET ORAL at 22:02

## 2017-02-24 RX ADMIN — OXYCODONE AND ACETAMINOPHEN 1 TABLET: 5; 325 TABLET ORAL at 13:59

## 2017-02-24 RX ADMIN — PHENYLEPHRINE HYDROCHLORIDE 100 MCG: 10 INJECTION INTRAVENOUS at 12:15

## 2017-02-24 RX ADMIN — PRENATAL WITH FERROUS FUM AND FOLIC ACID 1 TABLET: 3080; 920; 120; 400; 22; 1.84; 3; 20; 10; 1; 12; 200; 27; 25; 2 TABLET ORAL at 16:28

## 2017-02-24 RX ADMIN — MORPHINE SULFATE 0.15 MG: 0.5 INJECTION, SOLUTION EPIDURAL; INTRATHECAL; INTRAVENOUS at 12:09

## 2017-02-24 RX ADMIN — SODIUM CHLORIDE, POTASSIUM CHLORIDE, SODIUM LACTATE AND CALCIUM CHLORIDE: 600; 310; 30; 20 INJECTION, SOLUTION INTRAVENOUS at 12:31

## 2017-02-24 RX ADMIN — SODIUM CHLORIDE, POTASSIUM CHLORIDE, SODIUM LACTATE AND CALCIUM CHLORIDE 1000 ML: 600; 310; 30; 20 INJECTION, SOLUTION INTRAVENOUS at 11:00

## 2017-02-24 NOTE — ANESTHESIA PREPROCEDURE EVALUATION
Anesthesia Evaluation     Patient summary reviewed and Nursing notes reviewed      Airway   Mallampati: II  TM distance: <3 FB  Neck ROM: full  no difficulty expected  Dental      Pulmonary    (+) asthma,   Cardiovascular     (+) hypertension,       Neuro/Psych  (+) seizures (h/o) well controlled, psychiatric history Anxiety,    GI/Hepatic/Renal/Endo    (+)  GERD,     Musculoskeletal (-) negative ROS    Abdominal    Substance History - negative use     OB/GYN    (+) Pregnant, pregnancy induced hypertension        Other - negative ROS                                   Anesthesia Plan    ASA 3     spinal and ITN     Anesthetic plan and risks discussed with patient and spouse/significant other.    Plan discussed with CRNA.

## 2017-02-24 NOTE — ANESTHESIA POSTPROCEDURE EVALUATION
Patient: Chen Galaviz    Procedure Summary     Date Anesthesia Start Anesthesia Stop Room / Location    17 1203  [facility]       Procedure Diagnosis Surgeon Provider     SECTION REPEAT WITH TUBAL (N/A Abdomen) No diagnosis on file. MD Sammie Taylor DO          Anesthesia Type: spinal, ITN  Last vitals  BP (!) 123/107 (17 1318)    Temp 97.8 °F (36.6 °C) (17 1318)    Pulse 98 (17 1318)   Resp   18   SpO2   98%     Post Anesthesia Care and Evaluation    Patient location during evaluation: bedside  Patient participation: complete - patient participated  Level of consciousness: awake  Pain score: 0  Pain management: satisfactory to patient  Airway patency: patent  Anesthetic complications: No anesthetic complications    Cardiovascular status: acceptable  Respiratory status: acceptable  Hydration status: acceptable

## 2017-02-24 NOTE — ANESTHESIA PROCEDURE NOTES
Spinal Block    Indication:procedure for pain  Performed By  Anesthesiologist: MECHELLE CROOKS  CRNA: PENNY PARK  Preanesthetic Checklist  Completed: patient identified, surgical consent, pre-op evaluation, timeout performed, IV checked, risks and benefits discussed and monitors and equipment checked  Spinal Block Prep:  Patient Position:sitting  Sterile Tech:cap, gloves, mask and sterile barriers  Prep:Betadine  Patient Monitoring:blood pressure monitoring, continuous pulse oximetry and EKG  Spinal Block Procedure  Approach:midline  Guidance:palpation technique  Location:L3-L4  Needle Type:Bisi  Needle Gauge:25 G  Fluid Appearance:clear  Post Assessment  Patient Tolerance:patient tolerated the procedure well with no apparent complications  Complications no

## 2017-02-25 LAB
BASOPHILS # BLD AUTO: 0.01 10*3/MM3 (ref 0–0.2)
BASOPHILS NFR BLD AUTO: 0.1 % (ref 0–1)
DEPRECATED RDW RBC AUTO: 45.8 FL (ref 37–54)
EOSINOPHIL # BLD AUTO: 0.13 10*3/MM3 (ref 0.1–0.3)
EOSINOPHIL NFR BLD AUTO: 0.9 % (ref 0–3)
ERYTHROCYTE [DISTWIDTH] IN BLOOD BY AUTOMATED COUNT: 15.1 % (ref 11.3–14.5)
HCT VFR BLD AUTO: 35.1 % (ref 34.5–44)
HGB BLD-MCNC: 11.5 G/DL (ref 11.5–15.5)
IMM GRANULOCYTES # BLD: 0.07 10*3/MM3 (ref 0–0.03)
IMM GRANULOCYTES NFR BLD: 0.5 % (ref 0–0.6)
LYMPHOCYTES # BLD AUTO: 1.56 10*3/MM3 (ref 0.6–4.8)
LYMPHOCYTES NFR BLD AUTO: 11.1 % (ref 24–44)
MCH RBC QN AUTO: 27.4 PG (ref 27–31)
MCHC RBC AUTO-ENTMCNC: 32.8 G/DL (ref 32–36)
MCV RBC AUTO: 83.6 FL (ref 80–99)
MONOCYTES # BLD AUTO: 0.59 10*3/MM3 (ref 0–1)
MONOCYTES NFR BLD AUTO: 4.2 % (ref 0–12)
NEUTROPHILS # BLD AUTO: 11.67 10*3/MM3 (ref 1.5–8.3)
NEUTROPHILS NFR BLD AUTO: 83.2 % (ref 41–71)
PLAT MORPH BLD: NORMAL
PLATELET # BLD AUTO: 220 10*3/MM3 (ref 150–450)
PMV BLD AUTO: 11.6 FL (ref 6–12)
RBC # BLD AUTO: 4.2 10*6/MM3 (ref 3.89–5.14)
RBC MORPH BLD: NORMAL
WBC MORPH BLD: NORMAL
WBC NRBC COR # BLD: 14.03 10*3/MM3 (ref 3.5–10.8)

## 2017-02-25 PROCEDURE — 99231 SBSQ HOSP IP/OBS SF/LOW 25: CPT | Performed by: OBSTETRICS & GYNECOLOGY

## 2017-02-25 PROCEDURE — 85007 BL SMEAR W/DIFF WBC COUNT: CPT | Performed by: OBSTETRICS & GYNECOLOGY

## 2017-02-25 PROCEDURE — 85025 COMPLETE CBC W/AUTO DIFF WBC: CPT | Performed by: OBSTETRICS & GYNECOLOGY

## 2017-02-25 PROCEDURE — 63710000001 DIPHENHYDRAMINE PER 50 MG: Performed by: NURSE ANESTHETIST, CERTIFIED REGISTERED

## 2017-02-25 PROCEDURE — 25010000003 CEFAZOLIN IN DEXTROSE 2-4 GM/100ML-% SOLUTION: Performed by: OBSTETRICS & GYNECOLOGY

## 2017-02-25 RX ORDER — OXYCODONE HYDROCHLORIDE AND ACETAMINOPHEN 5; 325 MG/1; MG/1
1 TABLET ORAL
Status: CANCELLED | OUTPATIENT
Start: 2017-02-25

## 2017-02-25 RX ORDER — OXYCODONE AND ACETAMINOPHEN 10; 325 MG/1; MG/1
1 TABLET ORAL EVERY 4 HOURS PRN
Status: DISCONTINUED | OUTPATIENT
Start: 2017-02-25 | End: 2017-02-27 | Stop reason: HOSPADM

## 2017-02-25 RX ADMIN — DOCUSATE SODIUM 100 MG: 100 CAPSULE, LIQUID FILLED ORAL at 17:12

## 2017-02-25 RX ADMIN — SIMETHICONE CHEW TAB 80 MG 80 MG: 80 TABLET ORAL at 17:12

## 2017-02-25 RX ADMIN — SIMETHICONE CHEW TAB 80 MG 80 MG: 80 TABLET ORAL at 08:14

## 2017-02-25 RX ADMIN — OXYCODONE AND ACETAMINOPHEN 1 TABLET: 5; 325 TABLET ORAL at 12:14

## 2017-02-25 RX ADMIN — CEFAZOLIN SODIUM 2 G: 2 INJECTION, SOLUTION INTRAVENOUS at 04:43

## 2017-02-25 RX ADMIN — DIPHENHYDRAMINE HYDROCHLORIDE 25 MG: 25 CAPSULE ORAL at 00:47

## 2017-02-25 RX ADMIN — OXYCODONE HYDROCHLORIDE AND ACETAMINOPHEN 1 TABLET: 10; 325 TABLET ORAL at 16:00

## 2017-02-25 RX ADMIN — SIMETHICONE CHEW TAB 80 MG 80 MG: 80 TABLET ORAL at 12:14

## 2017-02-25 RX ADMIN — DOCUSATE SODIUM 100 MG: 100 CAPSULE, LIQUID FILLED ORAL at 08:15

## 2017-02-25 RX ADMIN — OXYCODONE AND ACETAMINOPHEN 1 TABLET: 5; 325 TABLET ORAL at 13:03

## 2017-02-25 RX ADMIN — PRENATAL WITH FERROUS FUM AND FOLIC ACID 1 TABLET: 3080; 920; 120; 400; 22; 1.84; 3; 20; 10; 1; 12; 200; 27; 25; 2 TABLET ORAL at 08:14

## 2017-02-25 RX ADMIN — OXYCODONE AND ACETAMINOPHEN 1 TABLET: 5; 325 TABLET ORAL at 08:15

## 2017-02-25 RX ADMIN — SODIUM CHLORIDE, POTASSIUM CHLORIDE, SODIUM LACTATE AND CALCIUM CHLORIDE 125 ML/HR: 600; 310; 30; 20 INJECTION, SOLUTION INTRAVENOUS at 00:47

## 2017-02-25 RX ADMIN — OXYCODONE HYDROCHLORIDE AND ACETAMINOPHEN 1 TABLET: 10; 325 TABLET ORAL at 19:54

## 2017-02-25 RX ADMIN — OXYCODONE HYDROCHLORIDE AND ACETAMINOPHEN 1 TABLET: 10; 325 TABLET ORAL at 23:40

## 2017-02-25 RX ADMIN — OXYCODONE AND ACETAMINOPHEN 1 TABLET: 5; 325 TABLET ORAL at 04:41

## 2017-02-26 PROCEDURE — 99231 SBSQ HOSP IP/OBS SF/LOW 25: CPT | Performed by: OBSTETRICS & GYNECOLOGY

## 2017-02-26 RX ADMIN — DOCUSATE SODIUM 100 MG: 100 CAPSULE, LIQUID FILLED ORAL at 16:23

## 2017-02-26 RX ADMIN — OXYCODONE HYDROCHLORIDE AND ACETAMINOPHEN 1 TABLET: 10; 325 TABLET ORAL at 20:06

## 2017-02-26 RX ADMIN — SIMETHICONE CHEW TAB 80 MG 80 MG: 80 TABLET ORAL at 20:06

## 2017-02-26 RX ADMIN — OXYCODONE HYDROCHLORIDE AND ACETAMINOPHEN 1 TABLET: 10; 325 TABLET ORAL at 04:02

## 2017-02-26 RX ADMIN — OXYCODONE HYDROCHLORIDE AND ACETAMINOPHEN 1 TABLET: 10; 325 TABLET ORAL at 16:23

## 2017-02-26 RX ADMIN — SIMETHICONE CHEW TAB 80 MG 80 MG: 80 TABLET ORAL at 07:59

## 2017-02-26 RX ADMIN — SIMETHICONE CHEW TAB 80 MG 80 MG: 80 TABLET ORAL at 16:23

## 2017-02-26 RX ADMIN — PRENATAL WITH FERROUS FUM AND FOLIC ACID 1 TABLET: 3080; 920; 120; 400; 22; 1.84; 3; 20; 10; 1; 12; 200; 27; 25; 2 TABLET ORAL at 07:59

## 2017-02-26 RX ADMIN — SIMETHICONE CHEW TAB 80 MG 80 MG: 80 TABLET ORAL at 12:00

## 2017-02-26 RX ADMIN — OXYCODONE HYDROCHLORIDE AND ACETAMINOPHEN 1 TABLET: 10; 325 TABLET ORAL at 07:59

## 2017-02-26 RX ADMIN — OXYCODONE HYDROCHLORIDE AND ACETAMINOPHEN 1 TABLET: 10; 325 TABLET ORAL at 12:00

## 2017-02-26 RX ADMIN — DOCUSATE SODIUM 100 MG: 100 CAPSULE, LIQUID FILLED ORAL at 07:59

## 2017-02-27 VITALS
HEIGHT: 63 IN | HEART RATE: 103 BPM | BODY MASS INDEX: 33.66 KG/M2 | DIASTOLIC BLOOD PRESSURE: 77 MMHG | RESPIRATION RATE: 18 BRPM | WEIGHT: 190 LBS | SYSTOLIC BLOOD PRESSURE: 122 MMHG | OXYGEN SATURATION: 100 % | TEMPERATURE: 98.7 F

## 2017-02-27 LAB
CYTO UR: NORMAL
LAB AP CASE REPORT: NORMAL
LAB AP CLINICAL INFORMATION: NORMAL
Lab: NORMAL
PATH REPORT.FINAL DX SPEC: NORMAL
PATH REPORT.GROSS SPEC: NORMAL

## 2017-02-27 PROCEDURE — 99238 HOSP IP/OBS DSCHRG MGMT 30/<: CPT | Performed by: OBSTETRICS & GYNECOLOGY

## 2017-02-27 RX ORDER — PSEUDOEPHEDRINE HCL 30 MG
100 TABLET ORAL 2 TIMES DAILY PRN
Qty: 60 CAPSULE | Refills: 2 | Status: SHIPPED | OUTPATIENT
Start: 2017-02-27 | End: 2017-03-10

## 2017-02-27 RX ORDER — OXYCODONE AND ACETAMINOPHEN 10; 325 MG/1; MG/1
1 TABLET ORAL EVERY 4 HOURS PRN
Qty: 20 TABLET | Refills: 0 | Status: SHIPPED | OUTPATIENT
Start: 2017-02-27 | End: 2017-03-07

## 2017-02-27 RX ADMIN — PRENATAL WITH FERROUS FUM AND FOLIC ACID 1 TABLET: 3080; 920; 120; 400; 22; 1.84; 3; 20; 10; 1; 12; 200; 27; 25; 2 TABLET ORAL at 08:54

## 2017-02-27 RX ADMIN — SIMETHICONE CHEW TAB 80 MG 80 MG: 80 TABLET ORAL at 13:39

## 2017-02-27 RX ADMIN — OXYCODONE HYDROCHLORIDE AND ACETAMINOPHEN 1 TABLET: 10; 325 TABLET ORAL at 13:00

## 2017-02-27 RX ADMIN — DOCUSATE SODIUM 100 MG: 100 CAPSULE, LIQUID FILLED ORAL at 08:54

## 2017-02-27 RX ADMIN — OXYCODONE HYDROCHLORIDE AND ACETAMINOPHEN 1 TABLET: 10; 325 TABLET ORAL at 08:54

## 2017-02-27 RX ADMIN — OXYCODONE HYDROCHLORIDE AND ACETAMINOPHEN 1 TABLET: 10; 325 TABLET ORAL at 00:50

## 2017-02-27 RX ADMIN — SIMETHICONE CHEW TAB 80 MG 80 MG: 80 TABLET ORAL at 08:54

## 2017-02-27 RX ADMIN — OXYCODONE HYDROCHLORIDE AND ACETAMINOPHEN 1 TABLET: 10; 325 TABLET ORAL at 04:48

## 2017-03-10 ENCOUNTER — POSTPARTUM VISIT (OUTPATIENT)
Dept: OBSTETRICS AND GYNECOLOGY | Facility: CLINIC | Age: 27
End: 2017-03-10

## 2017-03-10 VITALS
BODY MASS INDEX: 30.83 KG/M2 | SYSTOLIC BLOOD PRESSURE: 120 MMHG | WEIGHT: 174 LBS | DIASTOLIC BLOOD PRESSURE: 70 MMHG | HEIGHT: 63 IN

## 2017-03-10 DIAGNOSIS — Z98.891 STATUS POST CESAREAN SECTION: ICD-10-CM

## 2017-03-10 PROCEDURE — 0503F POSTPARTUM CARE VISIT: CPT | Performed by: OBSTETRICS & GYNECOLOGY

## 2017-03-10 RX ORDER — OMEPRAZOLE 20 MG/1
CAPSULE, DELAYED RELEASE ORAL
Refills: 3 | COMMUNITY
Start: 2017-02-28 | End: 2017-06-22

## 2017-03-10 NOTE — PROGRESS NOTES
Subjective   Chen Galaviz is a 26 y.o. female.     History of Present Illness    2 weeks post C section, no PP depression, BC BTL, bottle feeding, no problems  The following portions of the patient's history were reviewed and updated as appropriate: allergies, current medications, past family history, past medical history, past social history, past surgical history and problem list.    Review of Systems   Constitutional: Negative.    Respiratory: Negative.    Cardiovascular: Negative.    Gastrointestinal: Negative.    Genitourinary: Negative.    Psychiatric/Behavioral: Negative.        Objective   Physical Exam   Abdominal:           Assessment/Plan   Chen was seen today for postpartum care.    Diagnoses and all orders for this visit:    Routine postpartum follow-up    Status post  section       Return 4 weeks    Fabien Blake MD

## 2017-04-07 ENCOUNTER — POSTPARTUM VISIT (OUTPATIENT)
Dept: OBSTETRICS AND GYNECOLOGY | Facility: CLINIC | Age: 27
End: 2017-04-07

## 2017-04-07 VITALS
WEIGHT: 173 LBS | DIASTOLIC BLOOD PRESSURE: 80 MMHG | HEIGHT: 63 IN | SYSTOLIC BLOOD PRESSURE: 120 MMHG | BODY MASS INDEX: 30.65 KG/M2

## 2017-04-07 DIAGNOSIS — Z98.891 STATUS POST CESAREAN SECTION: ICD-10-CM

## 2017-04-07 DIAGNOSIS — R10.31 PAIN, ABDOMINAL, RLQ: ICD-10-CM

## 2017-04-07 DIAGNOSIS — Z98.51 STATUS POST TUBAL LIGATION: ICD-10-CM

## 2017-04-07 NOTE — PROGRESS NOTES
Subjective   Chen Galaviz is a 26 y.o. female.     History of Present Illness    6 weeks post C section, bottle feeding, tubal for BC, no PP depression, RLQ pain for 1 month, pt seen at [facility], CT of abdomen was normal  The following portions of the patient's history were reviewed and updated as appropriate: allergies, current medications, past family history, past medical history, past social history, past surgical history and problem list.    Review of Systems   Constitutional: Negative.    Respiratory: Negative.    Cardiovascular: Negative.    Gastrointestinal: Positive for abdominal pain and nausea. Negative for abdominal distention, anal bleeding, blood in stool, constipation, diarrhea, rectal pain and vomiting.   Genitourinary: Positive for pelvic pain. Negative for decreased urine volume, difficulty urinating, dysuria, enuresis, flank pain, frequency, genital sores, hematuria, menstrual problem, urgency, vaginal bleeding, vaginal discharge and vaginal pain.   Psychiatric/Behavioral: Negative.        Objective   Physical Exam   Constitutional: She appears well-developed and well-nourished.   HENT:   Head: Normocephalic.   Abdominal: Soft. Bowel sounds are normal. She exhibits no distension and no mass. There is no hepatosplenomegaly, splenomegaly or hepatomegaly. There is tenderness. There is guarding. There is no rebound, no tenderness at McBurney's point and negative Lebron's sign. No hernia.       Genitourinary: No labial fusion. There is no rash, tenderness, lesion or injury on the right labia. There is no rash, tenderness, lesion or injury on the left labia. Uterus is fixed and tender. Uterus is not deviated and not enlarged. Cervix exhibits no motion tenderness, no discharge and no friability. Right adnexum displays tenderness. Right adnexum displays no mass and no fullness. Left adnexum displays no mass, no tenderness and no fullness. There is bleeding in the vagina. No erythema or tenderness in the  vagina. No foreign body in the vagina. No signs of injury around the vagina. No vaginal discharge found.       Nursing note and vitals reviewed.      Assessment/Plan   Chen was seen today for postpartum care.    Diagnoses and all orders for this visit:    Routine postpartum follow-up    Status post  section    Status post tubal ligation    Pain, abdominal, RLQ            Continue Motrin for pain  Return 1 month    Fabien Blake MD

## 2017-05-09 ENCOUNTER — PATIENT MESSAGE (OUTPATIENT)
Dept: OBSTETRICS AND GYNECOLOGY | Facility: CLINIC | Age: 27
End: 2017-05-09

## 2017-05-26 ENCOUNTER — POSTPARTUM VISIT (OUTPATIENT)
Dept: OBSTETRICS AND GYNECOLOGY | Facility: CLINIC | Age: 27
End: 2017-05-26

## 2017-05-26 VITALS
BODY MASS INDEX: 31.54 KG/M2 | HEIGHT: 63 IN | SYSTOLIC BLOOD PRESSURE: 120 MMHG | WEIGHT: 178 LBS | DIASTOLIC BLOOD PRESSURE: 70 MMHG

## 2017-05-26 DIAGNOSIS — R10.2 PELVIC PAIN: ICD-10-CM

## 2017-05-26 DIAGNOSIS — Z98.891 STATUS POST CESAREAN SECTION: Primary | ICD-10-CM

## 2017-05-26 DIAGNOSIS — Z98.51 STATUS POST TUBAL LIGATION: ICD-10-CM

## 2017-05-26 DIAGNOSIS — Z87.42 HISTORY OF ENDOMETRIOSIS: ICD-10-CM

## 2017-05-26 PROCEDURE — 99213 OFFICE O/P EST LOW 20 MIN: CPT | Performed by: OBSTETRICS & GYNECOLOGY

## 2017-05-26 RX ORDER — NORETHINDRONE ACETATE AND ETHINYL ESTRADIOL 1; .02 MG/1; MG/1
1 TABLET ORAL DAILY
Qty: 28 TABLET | Refills: 12 | Status: SHIPPED | OUTPATIENT
Start: 2017-05-26 | End: 2018-05-26

## 2017-06-22 ENCOUNTER — TELEPHONE (OUTPATIENT)
Dept: INTERNAL MEDICINE | Facility: CLINIC | Age: 27
End: 2017-06-22

## 2017-06-22 ENCOUNTER — OFFICE VISIT (OUTPATIENT)
Dept: INTERNAL MEDICINE | Facility: CLINIC | Age: 27
End: 2017-06-22

## 2017-06-22 VITALS
SYSTOLIC BLOOD PRESSURE: 132 MMHG | BODY MASS INDEX: 30.82 KG/M2 | HEART RATE: 93 BPM | WEIGHT: 174 LBS | OXYGEN SATURATION: 99 % | DIASTOLIC BLOOD PRESSURE: 72 MMHG

## 2017-06-22 DIAGNOSIS — F32.A DEPRESSION, UNSPECIFIED DEPRESSION TYPE: Primary | ICD-10-CM

## 2017-06-22 DIAGNOSIS — R11.0 CHRONIC NAUSEA: ICD-10-CM

## 2017-06-22 DIAGNOSIS — F31.9 BIPOLAR 1 DISORDER, DEPRESSED (HCC): ICD-10-CM

## 2017-06-22 PROCEDURE — 99214 OFFICE O/P EST MOD 30 MIN: CPT | Performed by: NURSE PRACTITIONER

## 2017-06-22 RX ORDER — LAMOTRIGINE 25 MG/1
TABLET ORAL
Qty: 60 TABLET | Refills: 11 | Status: SHIPPED | OUTPATIENT
Start: 2017-06-22 | End: 2018-04-04

## 2017-06-22 RX ORDER — LAMOTRIGINE 200 MG/1
200 TABLET ORAL DAILY
Qty: 30 TABLET | Refills: 11 | Status: SHIPPED | OUTPATIENT
Start: 2017-06-22 | End: 2017-07-07

## 2017-06-22 RX ORDER — PAROXETINE 30 MG/1
30 TABLET, FILM COATED ORAL EVERY MORNING
Qty: 30 TABLET | Refills: 11 | Status: SHIPPED | OUTPATIENT
Start: 2017-06-22 | End: 2018-04-04

## 2017-06-22 RX ORDER — PROMETHAZINE HYDROCHLORIDE 25 MG/1
25 TABLET ORAL EVERY 6 HOURS PRN
Qty: 30 TABLET | Refills: 3 | Status: SHIPPED | OUTPATIENT
Start: 2017-06-22 | End: 2018-02-26 | Stop reason: SDUPTHER

## 2017-06-22 NOTE — TELEPHONE ENCOUNTER
Alexa from [facility] called to clarify dosage on Lamictal that was sent over for pt today. Kang wanted to know if the pt was to take 250mg daily. Kang stated they had not filled Lamictal for the pt in over a year and it was dangerous to start her out on such a high dose without triturating it up. Kang wanted to make sure we had the correct information from the pt and unless she was getting her medication somewhere else, she had not been taking it for a long time. I informed Kang I would have to speak with her provider and contact him back with her recommendations. Kang verbalized understanding and had no further questions.  Contact number for Kang is [phone].

## 2017-06-22 NOTE — PROGRESS NOTES
Subjective  Med Refill (BP, phenergan, would like to discuss Prozac and BP medication )      Chen Galaviz is a 26 y.o. female.   Allergies   Allergen Reactions   • Bupropion Other (See Comments)     Seizure / wellbutrin    • Naproxen Rash   • Nsaids Rash   • Sulfa Antibiotics Rash     History of Present Illness      When had last pregnancy was on paxil and lamictal and was seeing [facility] care , they took her out of [facility] care system and told her to come to pcp, is not breast feeding, does feels depression and mood swings, had before got pregnant and then restarted 2 mos ago  Does take phenergan for chronic nausea  The following portions of the patient's history were reviewed and updated as appropriate: allergies, current medications, past family history, past medical history, past social history, past surgical history and problem list.    Review of Systems   Gastrointestinal: Positive for nausea.   Psychiatric/Behavioral: Positive for behavioral problems and decreased concentration.   All other systems reviewed and are negative.      Objective   Physical Exam   Constitutional: She is oriented to person, place, and time. She appears well-developed and well-nourished.   HENT:   Head: Normocephalic and atraumatic.   Eyes: Conjunctivae are normal.   Cardiovascular: Normal rate and regular rhythm.    Pulmonary/Chest: Effort normal and breath sounds normal.   Neurological: She is alert and oriented to person, place, and time.   Skin: Skin is warm and dry.   Psychiatric: She has a normal mood and affect. Her behavior is normal. Judgment and thought content normal.   Nursing note and vitals reviewed.    /72  Pulse 93  Wt 174 lb (78.9 kg)  LMP 04/20/2017 (Approximate)  SpO2 99%  BMI 30.82 kg/m2    Assessment/Plan     Problem List Items Addressed This Visit        Other    Depression - Primary    Relevant Medications    PARoxetine (PAXIL) 30 MG tablet      Other Visit Diagnoses     Bipolar 1 disorder, depressed         Relevant Medications    PARoxetine (PAXIL) 30 MG tablet    lamoTRIgine (LaMICtal) 200 MG tablet    lamoTRIgine (LaMICtal) 25 MG tablet    Chronic nausea        Relevant Medications    promethazine (PHENERGAN) 25 MG tablet          rtc 3 mos

## 2017-06-22 NOTE — TELEPHONE ENCOUNTER
Called and informed Kang of Crystal's recommendations. Kang verbalized understanding and had no further questions.

## 2017-07-21 ENCOUNTER — OFFICE VISIT (OUTPATIENT)
Dept: OBSTETRICS AND GYNECOLOGY | Facility: CLINIC | Age: 27
End: 2017-07-21

## 2017-07-21 VITALS
BODY MASS INDEX: 32.07 KG/M2 | DIASTOLIC BLOOD PRESSURE: 80 MMHG | WEIGHT: 181 LBS | HEIGHT: 63 IN | SYSTOLIC BLOOD PRESSURE: 120 MMHG

## 2017-07-21 DIAGNOSIS — R10.2 PELVIC PAIN: Primary | ICD-10-CM

## 2017-07-21 DIAGNOSIS — N94.10 DYSPAREUNIA IN FEMALE: ICD-10-CM

## 2017-07-21 DIAGNOSIS — N92.1 BREAKTHROUGH BLEEDING ON BIRTH CONTROL PILLS: ICD-10-CM

## 2017-07-21 PROBLEM — Z33.1 PREGNANT STATE, INCIDENTAL: Status: RESOLVED | Noted: 2017-02-24 | Resolved: 2017-07-21

## 2017-07-21 PROCEDURE — 99213 OFFICE O/P EST LOW 20 MIN: CPT | Performed by: OBSTETRICS & GYNECOLOGY

## 2017-07-21 NOTE — PROGRESS NOTES
Subjective   Chen Galaviz is a 27 y.o. female.     History of Present Illness  Patient had a  bilateral tubal ligation in February of this year.  Pain started about a month after her delivery.  The pain has persisted.  The pain is periodic but worse in the last week.  The patient has been on birth control pills for about 1 month.  Her period is still heavy and she is having breakthrough bleeding on the pill.  She has developed dyspareunia.  The following portions of the patient's history were reviewed and updated as appropriate: allergies, current medications, past family history, past medical history, past social history, past surgical history and problem list.    Review of Systems   Constitutional: Negative.    Gastrointestinal: Negative.    Genitourinary: Positive for dyspareunia and pelvic pain. Negative for decreased urine volume, difficulty urinating, dysuria, enuresis, flank pain, frequency, genital sores, hematuria, menstrual problem, urgency, vaginal bleeding, vaginal discharge and vaginal pain.   Psychiatric/Behavioral: The patient is nervous/anxious.         Depression, pt had depression prior to pregnancy       Objective   Physical Exam   Constitutional: She appears well-developed and well-nourished.   Abdominal: Soft. Bowel sounds are normal. She exhibits no distension and no mass. There is no tenderness. There is no rebound and no guarding. No hernia.   Genitourinary: Vagina normal. Pelvic exam was performed with patient supine. No labial fusion. There is no rash, tenderness, lesion or injury on the right labia. There is no rash, tenderness, lesion or injury on the left labia. Uterus is tender. Uterus is not deviated, not enlarged and not fixed. Cervix exhibits no motion tenderness, no discharge and no friability. Right adnexum displays no mass, no tenderness and no fullness. Left adnexum displays no mass, no tenderness and no fullness.       Nursing note and vitals  reviewed.      Assessment/Plan   Chen was seen today for follow-up.    Diagnoses and all orders for this visit:    Pelvic pain  -     Ambulatory Referral to Gynecology    Dyspareunia in female  -     Ambulatory Referral to Gynecology    Breakthrough bleeding on birth control pills       Continue birth control pills  Refer to Dr. Richey possible laparoscopy  Return when necessary    Fabien Blake MD

## 2017-09-12 ENCOUNTER — OFFICE VISIT (OUTPATIENT)
Dept: RETAIL CLINIC | Facility: CLINIC | Age: 27
End: 2017-09-12

## 2017-09-12 VITALS
WEIGHT: 185 LBS | HEART RATE: 110 BPM | BODY MASS INDEX: 32.78 KG/M2 | HEIGHT: 63 IN | TEMPERATURE: 98 F | OXYGEN SATURATION: 98 %

## 2017-09-12 DIAGNOSIS — J06.9 ACUTE URI: Primary | ICD-10-CM

## 2017-09-12 DIAGNOSIS — R50.9 FEVER AND CHILLS: ICD-10-CM

## 2017-09-12 PROCEDURE — 99213 OFFICE O/P EST LOW 20 MIN: CPT | Performed by: NURSE PRACTITIONER

## 2017-09-12 NOTE — PROGRESS NOTES
Chen Galaviz is 27 y.o. female      Fever    This is a new problem. The current episode started yesterday. The problem occurs intermittently. The problem has been resolved. The maximum temperature noted was 101 to 101.9 F. The temperature was taken using an oral thermometer. Associated symptoms include congestion, coughing, ear pain, headaches, muscle aches and a sore throat. Pertinent negatives include no abdominal pain, chest pain, diarrhea, nausea, rash, sleepiness, urinary pain, vomiting or wheezing. She has tried acetaminophen for the symptoms. The treatment provided significant relief.   Risk factors: no contaminated food, no contaminated water, no hx of cancer, no immunosuppression, no occupational exposure, no recent sickness, no recent travel and no sick contacts    Sore Throat    This is a new problem. The current episode started yesterday. The problem has been resolved. Associated symptoms include congestion, coughing, ear pain and headaches. Pertinent negatives include no abdominal pain, diarrhea, ear discharge, neck pain, shortness of breath, stridor, swollen glands, trouble swallowing or vomiting. She has tried acetaminophen for the symptoms. The treatment provided significant relief.   Earache    There is pain in both ears. The current episode started yesterday. The problem has been gradually improving. The pain is mild. Associated symptoms include coughing, headaches, rhinorrhea and a sore throat. Pertinent negatives include no abdominal pain, diarrhea, ear discharge, hearing loss, neck pain, rash or vomiting. She has tried acetaminophen for the symptoms. The treatment provided moderate relief.   Cough   This is a new problem. The current episode started yesterday. The problem occurs every few hours. The cough is non-productive. Associated symptoms include chills, ear congestion, ear pain, a fever, headaches, nasal congestion, rhinorrhea and a sore throat. Pertinent negatives include no chest pain,  heartburn, hemoptysis, postnasal drip, rash, shortness of breath, sweats, weight loss or wheezing. Nothing aggravates the symptoms. Risk factors for lung disease include smoking/tobacco exposure. She has tried nothing for the symptoms. Her past medical history is significant for asthma. There is no history of bronchiectasis, bronchitis, COPD, emphysema, environmental allergies or pneumonia.             Current Outpatient Prescriptions:   •  lamoTRIgine (LaMICtal) 25 MG tablet, 2 po qd  Indications: Manic-Depression, Disp: 60 tablet, Rfl: 11  •  norethindrone-ethinyl estradiol (LOESTRIN 1/20, 21,) 1-20 MG-MCG per tablet, Take 1 tablet by mouth Daily., Disp: 28 tablet, Rfl: 12  •  PARoxetine (PAXIL) 30 MG tablet, Take 1 tablet by mouth Every Morning., Disp: 30 tablet, Rfl: 11  •  PROAIR  (90 BASE) MCG/ACT inhaler, INHALE 1-2 PUFFS EVERY 4-6 HOURS AS NEEDED FOR SHORTNESS OF BREATH, Disp: 8.5 inhaler, Rfl: 5  •  promethazine (PHENERGAN) 25 MG tablet, Take 1 tablet by mouth Every 6 (Six) Hours As Needed for Nausea or Vomiting., Disp: 30 tablet, Rfl: 3        Allergies   Allergen Reactions   • Bupropion Other (See Comments)     Seizure / wellbutrin    • Naproxen Rash   • Nsaids Rash   • Sulfa Antibiotics Rash           Past Medical History:   Diagnosis Date   • Allergic rhinitis    • Anxiety    • Arthritis     since age 21 in her back   • Asthma     seasonal   • Back pain    • Bipolar disorder     dx age 19   • Chest pain, pleuritic    • Depression    • Dyslipidemia    • Endometriosis    • Fatigue    • Febrile seizure     FEBRILE SEIZURE AT 3YO AND AT 14YO D/T WELBUTRIN   • Frequent UTI    • GERD (gastroesophageal reflux disease)    • Gestational hypertension    • Headache    • Herpes zoster     denies this visit   • Hypertension     chronic since age 23   • Itching    • 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 History of wisdom teeth extraction, age 21   • PELVIC LAPAROSCOPY      age 21   • TONSILLECTOMY      age 23   • WISDOM TOOTH EXTRACTION             Family History   Problem Relation Age of Onset   • Asthma Mother    • Hypertension Mother    • Breast cancer Mother    • Hypertension Father    • Asthma Brother    • Colon cancer Maternal Grandfather    • Ovarian cancer Neg Hx    • Uterine cancer Neg Hx            Social History     Social History   • Marital status: Single     Spouse name: N/A   • Number of children: N/A   • Years of education: N/A     Occupational History   • Not on file.     Social History Main Topics   • Smoking status: Current Every Day Smoker     Packs/day: 0.50     Years: 9.00     Types: Cigarettes   • Smokeless tobacco: Never Used   • Alcohol use No   • Drug use: No   • Sexual activity: Yes     Partners: Male      Comment: yesterday     Other Topics Concern   • Not on file     Social History Narrative           Review of Systems   Constitutional: Positive for chills and fever. Negative for activity change, appetite change, fatigue and weight loss.   HENT: Positive for congestion, ear pain, rhinorrhea and sore throat. Negative for ear discharge, hearing loss, mouth sores, nosebleeds, postnasal drip, sinus pressure, trouble swallowing and voice change.    Eyes: Negative.    Respiratory: Positive for cough. Negative for hemoptysis, choking, chest tightness, shortness of breath, wheezing and stridor.    Cardiovascular: Negative.  Negative for chest pain.   Gastrointestinal: Negative.   Negative for abdominal pain, diarrhea, heartburn, nausea and vomiting.   Genitourinary: Negative for dysuria.   Musculoskeletal: Negative for neck pain.   Skin: Negative.  Negative for rash.   Allergic/Immunologic: Negative.  Negative for environmental allergies.   Neurological: Positive for headaches.           Physical Exam   Constitutional: She is oriented to person, place, and time. Vital signs are normal. She appears well-developed and well-nourished. She is cooperative.  Non-toxic appearance. She does not have a sickly appearance. She does not appear ill. No distress.   HENT:   Head: Normocephalic and atraumatic.   Right Ear: External ear normal.   Left Ear: Tympanic membrane and external ear normal.   Nose: Nose normal. Right sinus exhibits no maxillary sinus tenderness and no frontal sinus tenderness. Left sinus exhibits no maxillary sinus tenderness and no frontal sinus tenderness.   Mouth/Throat: Uvula is midline, oropharynx is clear and moist and mucous membranes are normal. No oropharyngeal exudate. No tonsillar exudate.   Eyes: Conjunctivae are normal.   Neck: Neck supple.   Cardiovascular: Normal rate, regular rhythm and normal heart sounds.    Pulmonary/Chest: Effort normal and breath sounds normal. No respiratory distress. She has no wheezes. She has no rales. She exhibits no tenderness.   Lymphadenopathy:     She has no cervical adenopathy.   Neurological: She is alert and oriented to person, place, and time.   Skin: Skin is warm and dry.   Psychiatric: She has a normal mood and affect. Her behavior is normal.           Chen was seen today for fever, sore throat, earache and cough.    Diagnoses and all orders for this visit:    Acute URI    Fever and chills  Comments:  Resolved            Education instructions given to patient per AVS diagnosis. Patient verbalizes understanding of instructions.

## 2017-09-12 NOTE — PATIENT INSTRUCTIONS
Fever, Adult  A fever is an increase in the body's temperature. It is often defined as a temperature of 100° F (38°C) or higher. Short mild or moderate fevers often have no long-term effects. They also often do not need treatment. Moderate or high fevers may make you feel uncomfortable. Sometimes, they can also be a sign of a serious illness or disease. The sweating that may happen with repeated fevers or fevers that last a while may also cause you to not have enough fluid in your body (dehydration).  You can take your temperature with a thermometer to see if you have a fever. A measured temperature can change with:  · Age.  · Time of day.  · Where the thermometer is placed:    Mouth (oral).    Rectum (rectal).    Ear (tympanic).    Underarm (axillary).    Forehead (temporal).  HOME CARE  Pay attention to any changes in your symptoms. Take these actions to help with your condition:  · Take over-the-counter and prescription medicines only as told by your doctor. Follow the dosing instructions carefully.  · If you were prescribed an antibiotic medicine, take it as told by your doctor. Do not stop taking the antibiotic even if you start to feel better.  · Rest as needed.  · Drink enough fluid to keep your pee (urine) clear or pale yellow.  · Sponge yourself or bathe with room-temperature water as needed. This helps to lower your body temperature . Do not use ice water.  · Do not wear too many blankets or heavy clothes.  GET HELP IF:  · You throw up (vomit).  · You cannot eat or drink without throwing up.  · You have watery poop (diarrhea).  · It hurts when you pee.  · Your symptoms do not get better with treatment.  · You have new symptoms.  · You feel very weak.  GET HELP RIGHT AWAY IF:  · You are short of breath or have trouble breathing.  · You are dizzy or you pass out (faint).  · You feel confused.  · You have signs of not having enough fluid in your body, such as:    A dry mouth.    Peeing less.    Looking  "pale.  · You have very bad pain in your belly (abdomen).  · You keep throwing up or having water poop.  · You have a skin rash.  · Your symptoms suddenly get worse.     This information is not intended to replace advice given to you by your health care provider. Make sure you discuss any questions you have with your health care provider.     Document Released: 09/26/2009 Document Revised: 09/07/2016 Document Reviewed: 02/11/2016  [employer] Interactive Patient Education ©2017 [employer]  Upper Respiratory Infection, Adult  Most upper respiratory infections (URIs) are caused by a virus. A URI affects the nose, throat, and upper air passages. The most common type of URI is often called \"the common cold.\"  HOME CARE   · Take medicines only as told by your doctor.  · Gargle warm saltwater or take cough drops to comfort your throat as told by your doctor.  · Use a warm mist humidifier or inhale steam from a shower to increase air moisture. This may make it easier to breathe.  · Drink enough fluid to keep your pee (urine) clear or pale yellow.  · Eat soups and other clear broths.  · Have a healthy diet.  · Rest as needed.  · Go back to work when your fever is gone or your doctor says it is okay.  ¨ You may need to stay home longer to avoid giving your URI to others.  ¨ You can also wear a face mask and wash your hands often to prevent spread of the virus.  · Use your inhaler more if you have asthma.  · Do not use any tobacco products, including cigarettes, chewing tobacco, or electronic cigarettes. If you need help quitting, ask your doctor.  GET HELP IF:  · You are getting worse, not better.  · Your symptoms are not helped by medicine.  · You have chills.  · You are getting more short of breath.  · You have brown or red mucus.  · You have yellow or brown discharge from your nose.  · You have pain in your face, especially when you bend forward.  · You have a fever.  · You have puffy (swollen) neck glands.  · You have " pain while swallowing.  · You have white areas in the back of your throat.  GET HELP RIGHT AWAY IF:   · You have very bad or constant:    Headache.    Ear pain.    Pain in your forehead, behind your eyes, and over your cheekbones (sinus pain).    Chest pain.  · You have long-lasting (chronic) lung disease and any of the following:    Wheezing.    Long-lasting cough.    Coughing up blood.    A change in your usual mucus.  · You have a stiff neck.  · You have changes in your:    Vision.    Hearing.    Thinking.    Mood.  MAKE SURE YOU:   · Understand these instructions.  · Will watch your condition.  · Will get help right away if you are not doing well or get worse.     This information is not intended to replace advice given to you by your health care provider. Make sure you discuss any questions you have with your health care provider.     Document Released: 06/05/2009 Document Revised: 05/03/2016 Document Reviewed: 03/25/2015  [employer] Interactive Patient Education ©2017 [employer] Inc.

## 2017-09-18 ENCOUNTER — OFFICE VISIT (OUTPATIENT)
Dept: INTERNAL MEDICINE | Facility: CLINIC | Age: 27
End: 2017-09-18

## 2017-09-18 VITALS
SYSTOLIC BLOOD PRESSURE: 118 MMHG | HEIGHT: 63 IN | OXYGEN SATURATION: 98 % | DIASTOLIC BLOOD PRESSURE: 78 MMHG | WEIGHT: 186.6 LBS | BODY MASS INDEX: 33.06 KG/M2 | HEART RATE: 96 BPM

## 2017-09-18 DIAGNOSIS — M54.9 MID BACK PAIN: ICD-10-CM

## 2017-09-18 DIAGNOSIS — M54.50 ACUTE MIDLINE LOW BACK PAIN WITHOUT SCIATICA: Primary | ICD-10-CM

## 2017-09-18 PROCEDURE — 99213 OFFICE O/P EST LOW 20 MIN: CPT | Performed by: NURSE PRACTITIONER

## 2017-09-19 ENCOUNTER — HOSPITAL ENCOUNTER (OUTPATIENT)
Dept: GENERAL RADIOLOGY | Facility: HOSPITAL | Age: 27
Discharge: HOME | End: 2017-09-19
Admitting: NURSE PRACTITIONER

## 2017-09-19 ENCOUNTER — TELEPHONE (OUTPATIENT)
Dept: INTERNAL MEDICINE | Facility: CLINIC | Age: 27
End: 2017-09-19

## 2017-09-19 DIAGNOSIS — M54.50 ACUTE MIDLINE LOW BACK PAIN WITHOUT SCIATICA: ICD-10-CM

## 2017-09-19 DIAGNOSIS — M54.9 MID BACK PAIN: ICD-10-CM

## 2017-09-19 PROCEDURE — 72100 X-RAY EXAM L-S SPINE 2/3 VWS: CPT

## 2017-09-19 PROCEDURE — 72072 X-RAY EXAM THORAC SPINE 3VWS: CPT

## 2017-09-19 NOTE — TELEPHONE ENCOUNTER
PATIENT HAD X-RAYS DONE TODAY. SHE IS WANTING TO SEE IF WE GOT THE RESULTS FROM THE X-RAYS. YOU CAN REACH PATIENT BACK [phone]

## 2017-09-20 NOTE — TELEPHONE ENCOUNTER
PT CALLING BACK ABOUT THE XRAY RESULTS.  I ADVISED HER THAT THE XRAY WAS NORMAL AND THAT [facility] COULD DO A REFERRAL FOR PT.  SHE HAD SOME QUESTIONS ABOUT IF THE XRAY IS NORMAL WHAT COULD CAUSE THE PAIN, AND SO ON. I TOLD HER I WOULD HAVE A NURSE CONTACT HER AND DISCUSS THE XRAY AND WHAT PT MIGHT HELP WITH.

## 2017-09-21 ENCOUNTER — TELEPHONE (OUTPATIENT)
Dept: INTERNAL MEDICINE | Facility: CLINIC | Age: 27
End: 2017-09-21

## 2017-09-21 DIAGNOSIS — M54.50 ACUTE BILATERAL LOW BACK PAIN WITHOUT SCIATICA: Primary | ICD-10-CM

## 2017-09-21 NOTE — TELEPHONE ENCOUNTER
I don't know if her insurance will cover before PT but I will put into the referral team and they will check and order

## 2017-09-21 NOTE — TELEPHONE ENCOUNTER
Pt was notified but she is asking if she can have an MRI before she does any physical therapy because she thinks that she may have a bulging disc

## 2017-09-21 NOTE — TELEPHONE ENCOUNTER
PATIENT HAS MRI SCHEDULED FOR 9/29.  WOULD LIKE SOMETHING FOR NERVES FOR HER MRI BECAUSE SHE IS CLAUSTROPHOBIC

## 2017-09-22 RX ORDER — DIAZEPAM 5 MG/1
TABLET ORAL
Qty: 1 TABLET | Refills: 0 | Status: SHIPPED | OUTPATIENT
Start: 2017-09-22 | End: 2017-09-22 | Stop reason: SDUPTHER

## 2017-09-22 RX ORDER — DIAZEPAM 5 MG/1
TABLET ORAL
Qty: 1 TABLET | Refills: 0 | OUTPATIENT
Start: 2017-09-22 | End: 2017-12-27

## 2017-09-29 ENCOUNTER — HOSPITAL ENCOUNTER (OUTPATIENT)
Dept: MRI IMAGING | Facility: HOSPITAL | Age: 27
Discharge: HOME | End: 2017-09-29
Admitting: NURSE PRACTITIONER

## 2017-09-29 DIAGNOSIS — M54.50 ACUTE BILATERAL LOW BACK PAIN WITHOUT SCIATICA: ICD-10-CM

## 2017-09-29 PROCEDURE — 72148 MRI LUMBAR SPINE W/O DYE: CPT

## 2017-12-14 NOTE — NURSING NOTE
1641 - Discharge instructions reviewed with patient and mother including medications, and follow up appts. Patient and mother verbalized understanding and denied further needs or concerns. Flu shot given in left deltoid. Patient ambulated off unit with mother to private vehicle for discharge.    Occupational Therapy         Patient Name: Sol SNOW Date: 12/14/2017      OT orders received and chart reviewed- pt on spine prec pending ns consult and possible sx - will defer    Bertin Rogers OT

## 2017-12-27 ENCOUNTER — OFFICE VISIT (OUTPATIENT)
Dept: RETAIL CLINIC | Facility: CLINIC | Age: 27
End: 2017-12-27

## 2017-12-27 VITALS
HEART RATE: 87 BPM | BODY MASS INDEX: 32.43 KG/M2 | RESPIRATION RATE: 16 BRPM | HEIGHT: 63 IN | SYSTOLIC BLOOD PRESSURE: 122 MMHG | OXYGEN SATURATION: 98 % | DIASTOLIC BLOOD PRESSURE: 82 MMHG | TEMPERATURE: 99.1 F | WEIGHT: 183 LBS

## 2017-12-27 DIAGNOSIS — J11.1 FLU: Primary | ICD-10-CM

## 2017-12-27 DIAGNOSIS — J45.20 MILD INTERMITTENT ASTHMA WITHOUT COMPLICATION: ICD-10-CM

## 2017-12-27 LAB
EXPIRATION DATE: NORMAL
FLUAV AG NPH QL: NEGATIVE
FLUBV AG NPH QL: NEGATIVE
INTERNAL CONTROL: NORMAL
Lab: NORMAL

## 2017-12-27 PROCEDURE — 99213 OFFICE O/P EST LOW 20 MIN: CPT | Performed by: NURSE PRACTITIONER

## 2017-12-27 PROCEDURE — 87804 INFLUENZA ASSAY W/OPTIC: CPT | Performed by: NURSE PRACTITIONER

## 2017-12-27 RX ORDER — FLUCONAZOLE 150 MG/1
150 TABLET ORAL ONCE
Qty: 1 TABLET | Refills: 0 | Status: SHIPPED | OUTPATIENT
Start: 2017-12-27 | End: 2017-12-27

## 2017-12-27 RX ORDER — AMOXICILLIN 500 MG/1
1000 CAPSULE ORAL DAILY
Qty: 20 CAPSULE | Refills: 0 | Status: SHIPPED | OUTPATIENT
Start: 2017-12-27 | End: 2018-04-04

## 2017-12-27 RX ORDER — METHYLPREDNISOLONE 4 MG/1
TABLET ORAL
Qty: 1 EACH | Refills: 0 | Status: SHIPPED | OUTPATIENT
Start: 2017-12-27 | End: 2018-04-04

## 2017-12-27 NOTE — PATIENT INSTRUCTIONS
Otitis Media, Adult  Otitis media is redness, soreness, and inflammation of the middle ear. Otitis media may be caused by allergies or, most commonly, by infection. Often it occurs as a complication of the common cold.  SIGNS AND SYMPTOMS  Symptoms of otitis media may include:  · Earache.  · Fever.  · Ringing in your ear.  · Headache.  · Leakage of fluid from the ear.  DIAGNOSIS  To diagnose otitis media, your health care provider will examine your ear with an otoscope. This is an instrument that allows your health care provider to see into your ear in order to examine your eardrum. Your health care provider also will ask you questions about your symptoms.  TREATMENT   Typically, otitis media resolves on its own within 3-5 days. Your health care provider may prescribe medicine to ease your symptoms of pain. If otitis media does not resolve within 5 days or is recurrent, your health care provider may prescribe antibiotic medicines if he or she suspects that a bacterial infection is the cause.  HOME CARE INSTRUCTIONS   · If you were prescribed an antibiotic medicine, finish it all even if you start to feel better.  · Take medicines only as directed by your health care provider.  · Keep all follow-up visits as directed by your health care provider.  SEEK MEDICAL CARE IF:  · You have otitis media only in one ear, or bleeding from your nose, or both.  · You notice a lump on your neck.  · You are not getting better in 3-5 days.  · You feel worse instead of better.  SEEK IMMEDIATE MEDICAL CARE IF:   · You have pain that is not controlled with medicine.  · You have swelling, redness, or pain around your ear or stiffness in your neck.  · You notice that part of your face is paralyzed.  · You notice that the bone behind your ear (mastoid) is tender when you touch it.  MAKE SURE YOU:   · Understand these instructions.  · Will watch your condition.  · Will get help right away if you are not doing well or get worse.     This  "information is not intended to replace advice given to you by your health care provider. Make sure you discuss any questions you have with your health care provider.     Document Released: 09/22/2005 Document Revised: 04/10/2017 Document Reviewed: 07/15/2014  [employer] Interactive Patient Education ©2017 [employer].  Influenza, Adult  Influenza, more commonly known as \"the flu,\" is a viral infection that primarily affects the respiratory tract. The respiratory tract includes organs that help you breathe, such as the lungs, nose, and throat. The flu causes many common cold symptoms, as well as a high fever and body aches.  The flu spreads easily from person to person (is contagious). Getting a flu shot (influenza vaccination) every year is the best way to prevent influenza.  CAUSES  Influenza is caused by a virus. You can catch the virus by:  · Breathing in droplets from an infected person's cough or sneeze.  · Touching something that was recently contaminated with the virus and then touching your mouth, nose, or eyes.  RISK FACTORS  The following factors may make you more likely to get the flu:  · Not cleaning your hands frequently with soap and water or alcohol-based hand .  · Having close contact with many people during cold and flu season.  · Touching your mouth, eyes, or nose without washing or sanitizing your hands first.  · Not drinking enough fluids or not eating a healthy diet.  · Not getting enough sleep or exercise.  · Being under a high amount of stress.  · Not getting a yearly (annual) flu shot.  You may be at a higher risk of complications from the flu, such as a severe lung infection (pneumonia), if you:  · Are over the age of 65.  · Are pregnant.  · Have a weakened disease-fighting system (immune system). You may have a weakened immune system if you:    Have HIV or AIDS.    Are undergoing chemotherapy.    Are taking medicines that reduce the activity of (suppress) the immune system.  · Have " a long-term (chronic) illness, such as heart disease, kidney disease, diabetes, or lung disease.  · Have a liver disorder.  · Are obese.  · Have anemia.  SYMPTOMS  Symptoms of this condition typically last 4-10 days and may include:  · Fever.  · Chills.  · Headache, body aches, or muscle aches.  · Sore throat.  · Cough.  · Runny or congested nose.  · Chest discomfort and cough.  · Poor appetite.  · Weakness or tiredness (fatigue).  · Dizziness.  · Nausea or vomiting.  DIAGNOSIS  This condition may be diagnosed based on your medical history and a physical exam. Your health care provider may do a nose or throat swab test to confirm the diagnosis.  TREATMENT  If influenza is detected early, you can be treated with antiviral medicine that can reduce the length of your illness and the severity of your symptoms. This medicine may be given by mouth (orally) or through an IV tube that is inserted in one of your veins.  The goal of treatment is to relieve symptoms by taking care of yourself at home. This may include taking over-the-counter medicines, drinking plenty of fluids, and adding humidity to the air in your home.  In some cases, influenza goes away on its own. Severe influenza or complications from influenza may be treated in a hospital.  HOME CARE INSTRUCTIONS  · Take over-the-counter and prescription medicines only as told by your health care provider.  · Use a cool mist humidifier to add humidity to the air in your home. This can make breathing easier.  · Rest as needed.  · Drink enough fluid to keep your urine clear or pale yellow.  · Cover your mouth and nose when you cough or sneeze.  · Wash your hands with soap and water often, especially after you cough or sneeze. If soap and water are not available, use hand .  · Stay home from work or school as told by your health care provider. Unless you are visiting your health care provider, try to avoid leaving home until your fever has been gone for 24 hours  without the use of medicine.  · Keep all follow-up visits as told by your health care provider. This is important.  PREVENTION  · Getting an annual flu shot is the best way to avoid getting the flu. You may get the flu shot in late summer, fall, or winter. Ask your health care provider when you should get your flu shot.  · Wash your hands often or use hand  often.  · Avoid contact with people who are sick during cold and flu season.  · Eat a healthy diet, drink plenty of fluids, get enough sleep, and exercise regularly.  SEEK MEDICAL CARE IF:  · You develop new symptoms.  · You have:    Chest pain.    Diarrhea.    A fever.  · Your cough gets worse.  · You produce more mucus.  · You feel nauseous or you vomit.  SEEK IMMEDIATE MEDICAL CARE IF:  · You develop shortness of breath or difficulty breathing.  · Your skin or nails turn a bluish color.  · You have severe pain or stiffness in your neck.  · You develop a sudden headache or sudden pain in your face or ear.  · You cannot stop vomiting.     This information is not intended to replace advice given to you by your health care provider. Make sure you discuss any questions you have with your health care provider.     Document Released: 12/15/2001 Document Revised: 04/10/2017 Document Reviewed: 10/11/2016  [employer] Interactive Patient Education ©2017 [employer]    See your primary care provider if you do not improve within 3-5 days, you get worse or you develop new symptoms  Drink plenty of water and other noncaffeinated fluids  You may take tylenol as needed per bottle instructions

## 2017-12-27 NOTE — PROGRESS NOTES
Subjective   MARSI    Chen Galaviz is a 27 y.o. female who complains of cough, headache ear pain and nausea. .     Influenza   This is a new problem. Episode onset: 4 days. The problem occurs constantly. The problem has been unchanged. Associated symptoms include chills, congestion, coughing, fatigue, headaches, nausea and a sore throat. Pertinent negatives include no abdominal pain, chest pain, neck pain or rash. Nothing aggravates the symptoms. She has tried acetaminophen for the symptoms. The treatment provided no relief.        History Obtained from: Patient    Past Medical History:   Diagnosis Date   • Allergic rhinitis    • Anxiety    • Arthritis     since age 21 in her back   • Asthma     seasonal   • Back pain    • Bipolar disorder     dx age 19   • Chest pain, pleuritic    • Depression    • Dyslipidemia    • Endometriosis    • Fatigue    • Febrile seizure     FEBRILE SEIZURE AT 3YO AND AT 14YO D/T WELBUTRIN   • Frequent UTI    • GERD (gastroesophageal reflux disease)    • Gestational hypertension    • Headache    • Herpes zoster     denies this visit   • Hypertension     chronic since age 23   • Itching    • Mental retardation     A. 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 History of wisdom teeth extraction, age 21   • PELVIC LAPAROSCOPY      age 21   • TONSILLECTOMY      age 23   • WISDOM TOOTH EXTRACTION       Social History     Social History   • Marital status: Single     Spouse name: N/A   • Number of children: N/A    • Years of education: N/A     Occupational History   • Not on file.     Social History Main Topics   • Smoking status: Current Every Day Smoker     Packs/day: 0.50     Years: 9.00     Types: Cigarettes   • Smokeless tobacco: Never Used      Comment: cutting back   • Alcohol use No   • Drug use: No   • Sexual activity: Yes     Partners: Male     Birth control/ protection: OCP     Other Topics Concern   • Not on file     Social History Narrative     Family History   Problem Relation Age of Onset   • Asthma Mother    • Hypertension Mother    • Breast cancer Mother    • Hypertension Father    • Asthma Brother    • Colon cancer Maternal Grandfather    • Ovarian cancer Neg Hx    • Uterine cancer Neg Hx      Allergies   Allergen Reactions   • Bupropion Other (See Comments)     Seizure / wellbutrin    • Naproxen Rash   • Nsaids Rash   • Sulfa Antibiotics Rash     Current Outpatient Prescriptions   Medication Sig Dispense Refill   • lamoTRIgine (LaMICtal) 25 MG tablet 2 po qd  Indications: Manic-Depression 60 tablet 11   • norethindrone-ethinyl estradiol (LOESTRIN 1/20, 21,) 1-20 MG-MCG per tablet Take 1 tablet by mouth Daily. 28 tablet 12   • PARoxetine (PAXIL) 30 MG tablet Take 1 tablet by mouth Every Morning. 30 tablet 11   • PROAIR  (90 Base) MCG/ACT inhaler INHALE 1-2 PUFFS EVERY 4-6 HOURS AS NEEDED FOR SHORTNESS OF BREATH 8.5 inhaler 0   • promethazine (PHENERGAN) 25 MG tablet Take 1 tablet by mouth Every 6 (Six) Hours As Needed for Nausea or Vomiting. 30 tablet 3   • amoxicillin (AMOXIL) 500 MG capsule Take 2 capsules by mouth Daily. 20 capsule 0   • fluconazole (DIFLUCAN) 150 MG tablet Take 1 tablet by mouth 1 (One) Time for 1 dose. 1 tablet 0   • MethylPREDNISolone (MEDROL, MIRELA,) 4 MG tablet Take as directed on package instructions. 1 each 0     No current facility-administered medications for this visit.         The following portions of the patient's history were reviewed and updated as appropriate:  "allergies, current medications, past family history, past medical history, past social history and past surgical history.    Review of Systems   Constitutional: Positive for chills and fatigue.   HENT: Positive for congestion, ear pain, rhinorrhea and sore throat. Negative for sinus pressure.    Respiratory: Positive for cough and wheezing.    Cardiovascular: Negative for chest pain and palpitations.   Gastrointestinal: Positive for nausea. Negative for abdominal pain and diarrhea.   Musculoskeletal: Negative for neck pain and neck stiffness.   Skin: Negative for rash and wound.   Neurological: Positive for headaches. Negative for dizziness.   Hematological: Negative for adenopathy.       Objective     VITAL SIGNS:   Vitals:    12/27/17 0954   BP: 122/82   Pulse: 87   Resp: 16   Temp: 99.1 °F (37.3 °C)   TempSrc: Oral   SpO2: 98%   Weight: 83 kg (183 lb)   Height: 160 cm (63\")   Body mass index is 32.42 kg/(m^2).    Physical Exam   Constitutional: She appears well-developed and well-nourished. She appears ill. No distress.   HENT:   Head: Normocephalic and atraumatic.   Right Ear: External ear and ear canal normal. Tympanic membrane is injected. A middle ear effusion is present.   Left Ear: External ear and ear canal normal. Tympanic membrane is injected. A middle ear effusion is present.   Nose: Rhinorrhea present.   Mouth/Throat: Uvula is midline and mucous membranes are normal. Posterior oropharyngeal erythema present. No tonsillar exudate.   Eyes: Conjunctivae and EOM are normal. Pupils are equal, round, and reactive to light. No scleral icterus.   Neck: Phonation normal. Neck supple. No tracheal deviation present.   Cardiovascular: Normal rate and regular rhythm.    Pulmonary/Chest: No accessory muscle usage. No tachypnea. No respiratory distress. She has no decreased breath sounds. She has wheezes. She has no rhonchi. She has no rales.   Lymphadenopathy:     She has no cervical adenopathy.        Right: No " supraclavicular adenopathy present.        Left: No supraclavicular adenopathy present.   Neurological: She is alert. Gait normal.   Skin: Skin is warm and dry. No cyanosis. No pallor.   Psychiatric: Her behavior is normal. She is attentive.       LABS:   Results for orders placed or performed in visit on 12/27/17   POCT Influenza A/B   Result Value Ref Range    Rapid Influenza A Ag NEGATIVE     Rapid Influenza B Ag NEGATIVE     Internal Control Passed Passed    Lot Number [id number]     Expiration Date 29349            Assessment/Plan     Chen was seen today for uri.    Diagnoses and all orders for this visit:    Flu  -     POCT Influenza A/B    Mild intermittent asthma without complication    Other orders  -     amoxicillin (AMOXIL) 500 MG capsule; Take 2 capsules by mouth Daily.  -     MethylPREDNISolone (MEDROL, MIRELA,) 4 MG tablet; Take as directed on package instructions.  -     fluconazole (DIFLUCAN) 150 MG tablet; Take 1 tablet by mouth 1 (One) Time for 1 dose.      Flu diagnosis is based on history and clinical PE.   PLAN:  Patient should follow up with primary care provider if symptoms fail to improve, worsen or for the development of new symptoms that need attention.    The patient voiced understanding and agreement to the patient treatment plan and instructions       FRANCHESKA Weston

## 2018-02-26 DIAGNOSIS — R11.0 CHRONIC NAUSEA: ICD-10-CM

## 2018-02-27 RX ORDER — PROMETHAZINE HYDROCHLORIDE 25 MG/1
TABLET ORAL
Qty: 30 TABLET | Refills: 3 | Status: SHIPPED | OUTPATIENT
Start: 2018-02-27 | End: 2018-12-11

## 2018-04-04 ENCOUNTER — OFFICE VISIT (OUTPATIENT)
Dept: INTERNAL MEDICINE | Facility: CLINIC | Age: 28
End: 2018-04-04

## 2018-04-04 VITALS
SYSTOLIC BLOOD PRESSURE: 136 MMHG | OXYGEN SATURATION: 98 % | WEIGHT: 195 LBS | DIASTOLIC BLOOD PRESSURE: 72 MMHG | HEART RATE: 90 BPM | BODY MASS INDEX: 34.54 KG/M2

## 2018-04-04 DIAGNOSIS — E66.09 CLASS 1 OBESITY DUE TO EXCESS CALORIES WITHOUT SERIOUS COMORBIDITY WITH BODY MASS INDEX (BMI) OF 34.0 TO 34.9 IN ADULT: ICD-10-CM

## 2018-04-04 DIAGNOSIS — Z80.3 FAMILY HISTORY OF BREAST CANCER IN MOTHER: ICD-10-CM

## 2018-04-04 DIAGNOSIS — R61 HYPERHIDROSIS: Primary | ICD-10-CM

## 2018-04-04 PROCEDURE — 99214 OFFICE O/P EST MOD 30 MIN: CPT | Performed by: NURSE PRACTITIONER

## 2018-04-04 RX ORDER — FLUOXETINE 10 MG/1
1 CAPSULE ORAL DAILY
Refills: 1 | COMMUNITY
Start: 2018-03-07 | End: 2019-01-21

## 2018-04-04 RX ORDER — LAMOTRIGINE 150 MG/1
1 TABLET ORAL DAILY
Refills: 1 | COMMUNITY
Start: 2018-03-23 | End: 2021-04-21 | Stop reason: SDUPTHER

## 2018-04-04 RX ORDER — OXYBUTYNIN CHLORIDE 5 MG/1
5 TABLET, EXTENDED RELEASE ORAL DAILY
Refills: 3 | COMMUNITY
Start: 2018-02-09 | End: 2018-04-10

## 2018-04-04 RX ORDER — GLYCOPYRROLATE 1 MG/1
TABLET ORAL
Qty: 60 TABLET | Refills: 11 | Status: SHIPPED | OUTPATIENT
Start: 2018-04-04 | End: 2018-09-11

## 2018-04-04 RX ORDER — BUSPIRONE HYDROCHLORIDE 7.5 MG/1
1 TABLET ORAL 2 TIMES DAILY
Refills: 1 | COMMUNITY
Start: 2018-03-23 | End: 2018-12-11

## 2018-04-04 RX ORDER — TRAZODONE HYDROCHLORIDE 50 MG/1
1 TABLET ORAL NIGHTLY
Refills: 1 | COMMUNITY
Start: 2018-03-23 | End: 2021-04-21

## 2018-04-04 RX ORDER — MIRABEGRON 50 MG/1
1 TABLET, FILM COATED, EXTENDED RELEASE ORAL DAILY
Refills: 3 | COMMUNITY
Start: 2018-03-02 | End: 2018-12-11

## 2018-04-04 NOTE — PROGRESS NOTES
Subjective  Follow-up (depression )      Chen Galaviz is a 27 y.o. female.   Allergies   Allergen Reactions   • Bupropion Other (See Comments)     Seizure / wellbutrin    • Naproxen Rash   • Nsaids Rash   • Sulfa Antibiotics Rash     History of Present Illness      Gaining weight  X 3-4 months , after she had 3 rd baby she was losing weight well but periods back and now gaining weight again , gyn checked tsh and it normal, gets outside on the sidewalks with the kids , does not get cv exercise , cooks at home but usually fried meats and pot, eric/cheese , drinks 3-4 can diet pop a day     Mom recently dx with breast cancer was told to check with pcp    Pt c/o sweating and smelling like [place] even though she wears deodorant , has tried different kinds and products but none seem to be working, worsening about 3 mos ago   The following portions of the patient's history were reviewed and updated as appropriate: allergies, current medications and problem list.    Review of Systems   Constitutional:        Obesity   Endocrine:        Increase sweating   All other systems reviewed and are negative.      Objective   Physical Exam   Constitutional: She is oriented to person, place, and time. She appears well-developed and well-nourished.   obesity   HENT:   Head: Normocephalic.   Eyes: Conjunctivae are normal.   Cardiovascular: Normal rate, regular rhythm and normal heart sounds.    Pulmonary/Chest: Effort normal and breath sounds normal.   Neurological: She is alert and oriented to person, place, and time.   Skin: Skin is warm and dry.   Psychiatric: She has a normal mood and affect. Her behavior is normal. Judgment and thought content normal.   Nursing note and vitals reviewed.    /72   Pulse 90   Wt 88.5 kg (195 lb)   SpO2 98%   BMI 34.54 kg/m²     Assessment/Plan     Problem List Items Addressed This Visit     None      Visit Diagnoses     Family history of breast cancer in mother    -  Primary    Relevant Orders     Ambulatory Referral to Genetic Counseling (Erica)    Hyperhidrosis        Relevant Medications    glycopyrrolate (ROBINUL) 1 MG tablet    Class 1 obesity due to excess calories without serious comorbidity with body mass index (BMI) of 34.0 to 34.9 in adult              Encouraged cv activity 5 days week 45 mins each session , needs to eat more baked/grilled lean meat and protein, focused on correct portion control and how to measure , rc 3 mos

## 2018-04-10 ENCOUNTER — OFFICE VISIT (OUTPATIENT)
Dept: OBSTETRICS AND GYNECOLOGY | Facility: CLINIC | Age: 28
End: 2018-04-10

## 2018-04-10 ENCOUNTER — PREP FOR SURGERY (OUTPATIENT)
Dept: OTHER | Facility: HOSPITAL | Age: 28
End: 2018-04-10

## 2018-04-10 VITALS
SYSTOLIC BLOOD PRESSURE: 124 MMHG | HEIGHT: 63 IN | WEIGHT: 196 LBS | DIASTOLIC BLOOD PRESSURE: 80 MMHG | BODY MASS INDEX: 34.73 KG/M2

## 2018-04-10 DIAGNOSIS — N92.0 MENORRHAGIA WITH REGULAR CYCLE: Primary | ICD-10-CM

## 2018-04-10 DIAGNOSIS — N80.9 ENDOMETRIOSIS DETERMINED BY LAPAROSCOPY: ICD-10-CM

## 2018-04-10 DIAGNOSIS — R10.2 PELVIC PAIN: Primary | ICD-10-CM

## 2018-04-10 PROBLEM — O10.919 CHRONIC HYPERTENSION COMPLICATING OR REASON FOR CARE DURING PREGNANCY: Status: RESOLVED | Noted: 2017-01-05 | Resolved: 2018-04-10

## 2018-04-10 PROCEDURE — 99214 OFFICE O/P EST MOD 30 MIN: CPT | Performed by: OBSTETRICS & GYNECOLOGY

## 2018-04-10 RX ORDER — SODIUM CHLORIDE 0.9 % (FLUSH) 0.9 %
1-10 SYRINGE (ML) INJECTION AS NEEDED
Status: CANCELLED | OUTPATIENT
Start: 2018-04-10

## 2018-04-10 NOTE — PROGRESS NOTES
Subjective   Chief Complaint   Patient presents with   • Consult     discuss surgery for endometriosis     Chen Galaviz is a 27 y.o. year old  presenting to be seen because of Increasing pelvic pain with a history of endometriosis.   Current birth control method: tubal ligation.  She is complaining of horrible cramping with clotting SCUD worse this last year.  Ever since her  section.  She has painful intercourse can have sex because of the pain and this is disrupting her life.  She is sharp and achy pains that radiate from her lower abdomen to her low back.  This is a cyclic pain starts about 2 days before her period.  She also has bloating and cramping.  Medications over-the-counter not helping.  She had a laparoscopy about  that showed endometriosis.  Sometimes it hurts to void or defecate.  Also having heavy periods lasting 6-7 days with 4-5 days being heavy.  Current use using birth control pill in addition to her tubal ligation.  Doesn't want try Depo-Provera because she had increased weight gain.  Changing pads and tampons fairly frequently.    Options discussed included doing nothing the size may be multiple vitamins with iron to prevent anemia of other option would be birth control pills but she's currently on smoker so not a good option long-term.  She declines double because of weight gain not interested in an IUD she's had Lupron for 3 months before and had side effects which made her crazy and she is on Paxil at the same time.  Other option would be repeat laparoscopy and consider ablation due to heavy menses.  Other more definitive therapy would be hysterectomy with removal of her tubes and preserve her ovaries at her young age.  Sounds like she like to preserve her uterus for now may want to have laparoscopy and if the pain came back after that consider hysterectomy.    Discussed smoking.  About 1 pack per day or more.  She has 3 children discussed health risks of them and herself.  " Also discussed cost of cigarettes thing her mother Ortiz and baby with her she bowels but 2 cards a month and then bums the rest of the cigarettes.  She try to pass but has problems with sweating was recently started on the medication.  She try the gum in the past and this did not help.  She used Chantix for about 3 months.     Past 6 month menstrual and contraceptive history:    Patient's last menstrual period was 03/27/2018.  Cycle Frequency: regular, predictable and consistent every 28 - 32 days   Menstrual cycle character: flow is typically moderately heavy   Cycle Duration: 5 - 7   Number of heavy days of flows: 5   Intermenstrual bleeding present: no   Post-coital bleeding present: no       The following portions of the patient's history were reviewed and updated as appropriate:problem list, current medications and allergies    Review of Systems normal bowels but occasional urinary leakage with cough laughing especially.     Objective   /80   Ht 160 cm (63\")   Wt 88.9 kg (196 lb)   LMP 03/27/2018   BMI 34.72 kg/m²     General:  well developed; well nourished  no acute distress  appears stated age   Skin:  No suspicious lesions seen   Thyroid: not examined   Lungs:  breathing is unlabored  clear to auscultation bilaterally   Heart:  regular rate and rhythm       Abdomen: no umbilical or inginual hernias are present  no hepato-splenomegaly  Tender left lower quadrant greater than right overall soft   Pelvis: Clinical staff was present for exam  External genitalia:  normal appearance of the external genitalia including Bartholin's and [place]'s glands.  :  urethral meatus normal;  Vaginal:  normal pink mucosa without prolapse or lesions. she is able to perform a Kegel contraction upon request; Tenderness on examination and entry  Uterus:  normal size, shape and consistency. tenderness to palpation is present;  Adnexa:  tenderness of the bilateral adnexa to  deep palpation;  Rectal:  digital rectal exam " not performed; anus visually normal appearing.     Lab Review   No data reviewed    Imaging   No data reviewed       Assessment   1. History of endometriosis with dyspareunia dysmenorrhea; options discussed including nothing, birth control pills/IUD/laparoscopy with or without ablation/hysterectomy  2. Menorrhagia despite birth control pills.  3. Status post tubal ligation at repeat   4. Some urinary leakage when she laughs.  Kegel exercises do help  5. Smoker discussed health risks along with finances     Plan   1. Check ultrasound regarding pain and menorrhagia  2. Put in prep for surgery for diagnostic laser laparoscopy and endometrial ablation (had some difficulty and contacted IT to get this corrected)  3. Given Epic information regarding laparoscopy and ablation's    Medications Rx this encounter:  No orders of the defined types were placed in this encounter.         This note was electronically signed.    Amador Richey MD  April 10, 2018

## 2018-04-11 ENCOUNTER — PREP FOR SURGERY (OUTPATIENT)
Dept: OTHER | Facility: HOSPITAL | Age: 28
End: 2018-04-11

## 2018-04-11 DIAGNOSIS — R10.2 PELVIC PAIN: Primary | ICD-10-CM

## 2018-04-11 DIAGNOSIS — R52 PAIN: Primary | ICD-10-CM

## 2018-04-11 PROBLEM — N92.0 MENORRHAGIA: Status: ACTIVE | Noted: 2018-04-11

## 2018-04-11 PROBLEM — N80.9 ENDOMETRIOSIS DETERMINED BY LAPAROSCOPY: Status: ACTIVE | Noted: 2018-04-11

## 2018-04-11 PROBLEM — Z90.49 S/P CHOLECYSTECTOMY: Status: ACTIVE | Noted: 2018-04-11

## 2018-04-11 PROBLEM — F17.200 SMOKER: Status: ACTIVE | Noted: 2018-04-11

## 2018-04-11 PROBLEM — Z98.891 PREVIOUS CESAREAN SECTION: Status: ACTIVE | Noted: 2018-04-11

## 2018-04-11 PROBLEM — Z80.3 FAMILY HISTORY OF BREAST CANCER IN MOTHER: Status: ACTIVE | Noted: 2018-04-11

## 2018-04-20 ENCOUNTER — OUTSIDE FACILITY SERVICE (OUTPATIENT)
Dept: OBSTETRICS AND GYNECOLOGY | Facility: CLINIC | Age: 28
End: 2018-04-20

## 2018-04-20 PROCEDURE — 58662 LAPAROSCOPY EXCISE LESIONS: CPT | Performed by: OBSTETRICS & GYNECOLOGY

## 2018-04-20 PROCEDURE — 58563 HYSTEROSCOPY ABLATION: CPT | Performed by: OBSTETRICS & GYNECOLOGY

## 2018-04-26 ENCOUNTER — APPOINTMENT (OUTPATIENT)
Dept: GENETICS | Facility: HOSPITAL | Age: 28
End: 2018-04-26

## 2018-05-03 ENCOUNTER — CLINICAL SUPPORT (OUTPATIENT)
Dept: GENETICS | Facility: HOSPITAL | Age: 28
End: 2018-05-03

## 2018-05-03 DIAGNOSIS — Z80.3 FAMILY HISTORY OF BREAST CANCER: Primary | ICD-10-CM

## 2018-05-03 PROCEDURE — 96040: CPT | Performed by: GENETIC COUNSELOR, MS

## 2018-05-03 NOTE — PROGRESS NOTES
Chen Galaviz, a 27-year-old female, was seen for genetic counseling due to a family history of breast and ovarian cancer.  Ms. Galaviz was 15 years old at menarche and had her first child at age 25.  She retains her uterus and ovaries, and is pre-menopausal.  She had a breast ultrasound several years ago which identified cysts.  She was interested in discussing genetic testing recommendations based on her family history.    PERTINENT FAMILY HISTORY: (See attached pedigree)   Mother:  Breast cancer, 50     “Pre-cancerous” finding in breast, 36  Mat. Grandmother: Melanoma, 66  Mat. Grandfather: Melanoma     Throat cancer  Pat. Grandmother: Stomach cancer, 79     We do not currently have records regarding any of these diagnoses.      RISK ASSESSMENT:  Ms. Galaviz’s mother’s history of breast cancer raises the questions of a hereditary cancer syndrome.  It was not known if the reported “pre-cancerous” finding in the breast at age 37 for Ms. Galaviz’s mother was a type of atypical hyperplasia or if it was ductal carcinoma in situ (DCIS), Ms. Galaviz plans to clarify this with her mother.  Genetic testing is always most informative if first completed on an affected family member.  If Ms. Galaviz’s mother were to have negative genetic testing, testing would not be indicated for Ms. Galaviz or other unaffected relatives.   Ms. Galaviz did not know if her mother had undergone genetic testing, and plans to discuss this with her.  If her mother were to test positive for a mutation in BRCA1/2 or another breast cancer susceptibility gene, single site testing could then be offered to Ms. Galaviz and her brother, as well as other relatives.      If Ms. Galaviz’s mother were to test negative for a causative genetic mutation, Ms. Galaviz’s management should be guided by a family history based risk assessment.  Risk modeling using a family history based model, such as the Tyrer-Cuzick model, can estimate the lifetime risk for breast cancer based on  family history and personal factors (age at menarche, age of first liveborn child, etc).  A risk greater than 20% warrants consideration of increased screening per NCCN guidelines.  This risk assessment is based on the family history information provided at the time of the appointment.  The assessment could change in the future should new information be obtained.    GENETIC COUNSELING (35 minutes):  We reviewed the family history information in detail.  Cancer is very common; approximately 1 in 3 individuals will develop cancer within a lifetime.  It is not uncommon to see multiple individuals within a family with cancer given the high chance for a person to develop cancer.  The interaction of many factors determines each person’s personal risk, including age, family or personal history of cancer, and external factors such as diet and environmental exposures.  Exactly how these various risk factors interact in an individual is unclear.  Most often, we believe cancer to be a multifactorial disease caused by an accumulation of genetic alterations acquired over our lifetime, with environmental factors acting in the development of a malignancy.    Cancer cases follow three general patterns: sporadic, familial, and hereditary.  While most breast cancer is sporadic, some cases appear to occur in family clusters.  These cases are said to be familial and account for 10-20% of breast cancer cases.  Familial cases may be due to a combination of shared genes and environmental factors among family members.  In even fewer families, the cancer is said to be inherited, and the genes responsible for the cancer are known.      The pedigree patterns observed in sporadic versus hereditary cancer families were reviewed.  Family histories typical of hereditary cancer syndromes usually include multiple first- and second-degree relatives diagnosed with cancer types that define a syndrome.  These cases tend to be diagnosed at younger-  than-expected ages and can be bilateral or multifocal.  The cancer in these families follows an autosomal dominant inheritance pattern, which indicates the likely presence of a mutation in a cancer susceptibility gene.  Children and siblings of an individual believed to carry this mutation have a 50% chance of inheriting that mutation, thereby inheriting the increased risk to develop cancer.    Hereditary breast cancer accounts for 5-10% of all cases of breast cancer.  A significant proportion of hereditary breast cancer can be attributed to mutations in the BRCA1 and BRCA2 genes.  The cancer in these families follows an autosomal dominant pattern of inheritance.  Mutations in these genes confer an increased risk for breast cancer, ovarian cancer, male breast cancer, prostate cancer and pancreatic cancer.  There are other genes that impact breast cancer risk, and we discussed the availability of multigene panels which allow for testing of BRCA1/2 and a number of other breast cancer susceptibility genes simultaneously.       GENETIC TESTING:  The risks, benefits and limitations of genetic testing and implications for clinical management following testing were reviewed.  DNA test results can influence decisions regarding screening and prevention.  Genetic testing can have significant psychological implications for both individuals and families. The implications of a positive or negative test result were discussed.  The limited value of negative test results was emphasized, particularly given the significant population risk for breast cancer and the existence of other breast cancer susceptibility genes.  The importance of initiating testing on an affected family member was emphasized.  In general, a negative genetic test result in an unaffected individual is most informative if a mutation has first been established in an affected member of the family.  In cases where an affected individual is not willing or available  to be tested, it is reasonable to offer testing to an unaffected individual.  Ms. Galaviz is going to discuss this information further with her mother before proceeding with testing.  If multigene panel testing (evaluating for BRCA1/2 and several other breast cancer related genes) was negative for Ms. Galaviz’s mother, testing would not be indicated for Ms. Galaviz or other unaffected relatives.    PLAN:  Genetic testing will be more informative if first completed on Ms. Galaviz’s mother who has a personal history of breast cancer. Ms. Galaviz plans to discuss this information with her mother.  We would be happy to see her mother for counseling and coordination of testing if it has not already been completed.  Ms. Galaviz will call us to follow-up once she obtains additional information.  Ms. Galaviz and her family are welcome to contact us with any questions they may have at [phone].      Joeclyn Copeland MS, [facility] [place] [facility]  Licensed Certified Genetic Counselor    Cc:  FRANCHESKA Yu

## 2018-05-04 ENCOUNTER — OFFICE VISIT (OUTPATIENT)
Dept: OBSTETRICS AND GYNECOLOGY | Facility: CLINIC | Age: 28
End: 2018-05-04

## 2018-05-04 VITALS
BODY MASS INDEX: 34.54 KG/M2 | DIASTOLIC BLOOD PRESSURE: 70 MMHG | RESPIRATION RATE: 16 BRPM | SYSTOLIC BLOOD PRESSURE: 124 MMHG | WEIGHT: 195 LBS

## 2018-05-04 DIAGNOSIS — Z09 SURGERY FOLLOW-UP: Primary | ICD-10-CM

## 2018-05-04 PROCEDURE — 99024 POSTOP FOLLOW-UP VISIT: CPT | Performed by: OBSTETRICS & GYNECOLOGY

## 2018-05-04 NOTE — PROGRESS NOTES
Subjective   Chief Complaint   Patient presents with   • Post-op     s/p op hyst/novasure ablation/op lap 18     Chen Galaviz is a 27 y.o. year old  presenting to be seen for her post-operative visit.  She had a endometrial ablation, laser of endometriosis and lysis of adhesions.  Currently she reports no problems with eating, bowel movements, voiding, or wound drainage and pain is well controlled.    The following portions of the patient's history were reviewed and updated as appropriate:problem list, current medications and allergies    Review of Systems no problems     Objective   /70   Resp 16   Wt 88.5 kg (195 lb)   BMI 34.54 kg/m²     General:  well developed; well nourished  no acute distress  appears stated age   Abdomen: soft, non-tender; no masses  no umbilical or inginual hernias are present  no hepato-splenomegaly  incision is healing and erythematous   Pelvis: Clinical staff was present for exam  External genitalia:  normal appearance of the external genitalia including Bartholin's and [place]'s glands.  :  urethral meatus normal;  Cervix:  normal appearance. mobile non tender  Uterus:  normal size, shape and consistency. anteverted; mobile min tender  Adnexa:  normal bimanual exam of the adnexa.  Rectal:  digital rectal exam not performed; anus visually normal appearing.          Assessment   1. Doing well status post NovaSure ablation combined with laser vaporization of endometriosis and lysis of dense uterine adhesion to anterior abdominal wall  2. Discussed that she may not have total amenorrhea given the fact that she has endometriosis.     Plan   1. Normal activities at this point  2. Her discharge may persist for 2 weeks more after the ablation but should resolve  3. Yearly or as needed    Medications Rx this encounter:  No orders of the defined types were placed in this encounter.         This note was electronically signed.    Amador Richey MD  May 4, 2018

## 2018-05-16 ENCOUNTER — TELEPHONE (OUTPATIENT)
Dept: OBSTETRICS AND GYNECOLOGY | Facility: CLINIC | Age: 28
End: 2018-05-16

## 2018-05-16 NOTE — TELEPHONE ENCOUNTER
Called patient, advised her no uterine ablations do not cause yeast infections.  States that she has vag itching and discharge. Would like medication for this

## 2018-05-16 NOTE — TELEPHONE ENCOUNTER
Kaiden pt called stating she had an ablation on 4/20/18 and keeps getting yeast infections. Wants to know if this could be from the surgery. Please contact pt

## 2018-05-17 ENCOUNTER — TELEPHONE (OUTPATIENT)
Dept: OBSTETRICS AND GYNECOLOGY | Facility: CLINIC | Age: 28
End: 2018-05-17

## 2018-05-17 RX ORDER — FLUCONAZOLE 150 MG/1
150 TABLET ORAL DAILY
Qty: 1 TABLET | Refills: 0 | Status: SHIPPED | OUTPATIENT
Start: 2018-05-17 | End: 2018-06-07

## 2018-05-17 NOTE — TELEPHONE ENCOUNTER
If she has a fever she needs to see primary care if that's been going on for 3 days of doubt her vagina L itching is the cause.  Regarding the piece of flesh-like tissue she needed an exam for that.

## 2018-05-17 NOTE — TELEPHONE ENCOUNTER
Dr Richey Pt    Pt states she has had a fever for 3 days and is also having itching and vaginal discharge.   She also noticed a tissue like piece of flesh when she wiped today.    Callback  [phone]    [facility]

## 2018-05-19 ENCOUNTER — HOSPITAL ENCOUNTER (EMERGENCY)
Facility: HOSPITAL | Age: 28
Discharge: HOME | End: 2018-05-20
Attending: EMERGENCY MEDICINE | Admitting: EMERGENCY MEDICINE

## 2018-05-19 DIAGNOSIS — N83.202 CYST OF LEFT OVARY: Primary | ICD-10-CM

## 2018-05-19 DIAGNOSIS — R10.12 INTERMITTENT LEFT UPPER QUADRANT ABDOMINAL PAIN: ICD-10-CM

## 2018-05-19 LAB
ALBUMIN SERPL-MCNC: 4.5 G/DL (ref 3.2–4.8)
ALBUMIN/GLOB SERPL: 1.7 G/DL (ref 1.5–2.5)
ALP SERPL-CCNC: 133 U/L (ref 25–100)
ALT SERPL W P-5'-P-CCNC: 37 U/L (ref 7–40)
ANION GAP SERPL CALCULATED.3IONS-SCNC: 6 MMOL/L (ref 3–11)
AST SERPL-CCNC: 28 U/L (ref 0–33)
B-HCG UR QL: NEGATIVE
BASOPHILS # BLD AUTO: 0.03 10*3/MM3 (ref 0–0.2)
BASOPHILS NFR BLD AUTO: 0.3 % (ref 0–1)
BILIRUB SERPL-MCNC: 0.2 MG/DL (ref 0.3–1.2)
BILIRUB UR QL STRIP: NEGATIVE
BUN BLD-MCNC: 14 MG/DL (ref 9–23)
BUN/CREAT SERPL: 23.3 (ref 7–25)
CALCIUM SPEC-SCNC: 9.4 MG/DL (ref 8.7–10.4)
CHLORIDE SERPL-SCNC: 106 MMOL/L (ref 99–109)
CLARITY UR: CLEAR
CO2 SERPL-SCNC: 26 MMOL/L (ref 20–31)
COLOR UR: YELLOW
CREAT BLD-MCNC: 0.6 MG/DL (ref 0.6–1.3)
DEPRECATED RDW RBC AUTO: 43.8 FL (ref 37–54)
EOSINOPHIL # BLD AUTO: 0.43 10*3/MM3 (ref 0–0.3)
EOSINOPHIL NFR BLD AUTO: 5 % (ref 0–3)
ERYTHROCYTE [DISTWIDTH] IN BLOOD BY AUTOMATED COUNT: 14.5 % (ref 11.3–14.5)
GFR SERPL CREATININE-BSD FRML MDRD: 121 ML/MIN/1.73
GLOBULIN UR ELPH-MCNC: 2.7 GM/DL
GLUCOSE BLD-MCNC: 110 MG/DL (ref 70–100)
GLUCOSE UR STRIP-MCNC: NEGATIVE MG/DL
HCT VFR BLD AUTO: 44 % (ref 34.5–44)
HGB BLD-MCNC: 14.7 G/DL (ref 11.5–15.5)
HGB UR QL STRIP.AUTO: NEGATIVE
IMM GRANULOCYTES # BLD: 0.02 10*3/MM3 (ref 0–0.03)
IMM GRANULOCYTES NFR BLD: 0.2 % (ref 0–0.6)
KETONES UR QL STRIP: NEGATIVE
LEUKOCYTE ESTERASE UR QL STRIP.AUTO: NEGATIVE
LIPASE SERPL-CCNC: 44 U/L (ref 6–51)
LYMPHOCYTES # BLD AUTO: 2.55 10*3/MM3 (ref 0.6–4.8)
LYMPHOCYTES NFR BLD AUTO: 29.7 % (ref 24–44)
MCH RBC QN AUTO: 27.6 PG (ref 27–31)
MCHC RBC AUTO-ENTMCNC: 33.4 G/DL (ref 32–36)
MCV RBC AUTO: 82.7 FL (ref 80–99)
MONOCYTES # BLD AUTO: 0.57 10*3/MM3 (ref 0–1)
MONOCYTES NFR BLD AUTO: 6.6 % (ref 0–12)
NEUTROPHILS # BLD AUTO: 5.01 10*3/MM3 (ref 1.5–8.3)
NEUTROPHILS NFR BLD AUTO: 58.4 % (ref 41–71)
NITRITE UR QL STRIP: NEGATIVE
PH UR STRIP.AUTO: 5.5 [PH] (ref 5–8)
PLATELET # BLD AUTO: 281 10*3/MM3 (ref 150–450)
PMV BLD AUTO: 11 FL (ref 6–12)
POTASSIUM BLD-SCNC: 3.9 MMOL/L (ref 3.5–5.5)
PROT SERPL-MCNC: 7.2 G/DL (ref 5.7–8.2)
PROT UR QL STRIP: NEGATIVE
RBC # BLD AUTO: 5.32 10*6/MM3 (ref 3.89–5.14)
SODIUM BLD-SCNC: 138 MMOL/L (ref 132–146)
SP GR UR STRIP: 1.01 (ref 1–1.03)
UROBILINOGEN UR QL STRIP: NORMAL
WBC NRBC COR # BLD: 8.59 10*3/MM3 (ref 3.5–10.8)

## 2018-05-19 PROCEDURE — 80053 COMPREHEN METABOLIC PANEL: CPT | Performed by: EMERGENCY MEDICINE

## 2018-05-19 PROCEDURE — 81003 URINALYSIS AUTO W/O SCOPE: CPT | Performed by: EMERGENCY MEDICINE

## 2018-05-19 PROCEDURE — 81025 URINE PREGNANCY TEST: CPT | Performed by: EMERGENCY MEDICINE

## 2018-05-19 PROCEDURE — 85025 COMPLETE CBC W/AUTO DIFF WBC: CPT | Performed by: EMERGENCY MEDICINE

## 2018-05-19 PROCEDURE — 99284 EMERGENCY DEPT VISIT MOD MDM: CPT

## 2018-05-19 PROCEDURE — 83690 ASSAY OF LIPASE: CPT | Performed by: EMERGENCY MEDICINE

## 2018-05-19 RX ORDER — MORPHINE SULFATE 2 MG/ML
4 INJECTION, SOLUTION INTRAMUSCULAR; INTRAVENOUS ONCE
Status: DISCONTINUED | OUTPATIENT
Start: 2018-05-19 | End: 2018-05-20

## 2018-05-19 RX ORDER — SODIUM CHLORIDE 0.9 % (FLUSH) 0.9 %
10 SYRINGE (ML) INJECTION AS NEEDED
Status: DISCONTINUED | OUTPATIENT
Start: 2018-05-19 | End: 2018-05-20 | Stop reason: HOSPADM

## 2018-05-19 RX ORDER — ONDANSETRON 2 MG/ML
4 INJECTION INTRAMUSCULAR; INTRAVENOUS ONCE
Status: COMPLETED | OUTPATIENT
Start: 2018-05-19 | End: 2018-05-20

## 2018-05-20 ENCOUNTER — APPOINTMENT (OUTPATIENT)
Dept: CT IMAGING | Facility: HOSPITAL | Age: 28
End: 2018-05-20

## 2018-05-20 VITALS
HEART RATE: 90 BPM | DIASTOLIC BLOOD PRESSURE: 59 MMHG | TEMPERATURE: 97.8 F | BODY MASS INDEX: 35.08 KG/M2 | RESPIRATION RATE: 16 BRPM | HEIGHT: 63 IN | OXYGEN SATURATION: 94 % | WEIGHT: 198 LBS | SYSTOLIC BLOOD PRESSURE: 104 MMHG

## 2018-05-20 LAB
HOLD SPECIMEN: NORMAL
HOLD SPECIMEN: NORMAL
WHOLE BLOOD HOLD SPECIMEN: NORMAL
WHOLE BLOOD HOLD SPECIMEN: NORMAL

## 2018-05-20 PROCEDURE — 25010000002 ONDANSETRON PER 1 MG: Performed by: EMERGENCY MEDICINE

## 2018-05-20 PROCEDURE — 74176 CT ABD & PELVIS W/O CONTRAST: CPT

## 2018-05-20 PROCEDURE — 25010000002 MORPHINE PER 10 MG: Performed by: EMERGENCY MEDICINE

## 2018-05-20 PROCEDURE — 96374 THER/PROPH/DIAG INJ IV PUSH: CPT

## 2018-05-20 PROCEDURE — 96361 HYDRATE IV INFUSION ADD-ON: CPT

## 2018-05-20 PROCEDURE — 96375 TX/PRO/DX INJ NEW DRUG ADDON: CPT

## 2018-05-20 RX ORDER — MORPHINE SULFATE 4 MG/ML
4 INJECTION, SOLUTION INTRAMUSCULAR; INTRAVENOUS ONCE
Status: COMPLETED | OUTPATIENT
Start: 2018-05-20 | End: 2018-05-20

## 2018-05-20 RX ORDER — HYDROCODONE BITARTRATE AND ACETAMINOPHEN 5; 325 MG/1; MG/1
1 TABLET ORAL EVERY 6 HOURS PRN
Qty: 12 TABLET | Refills: 0 | Status: SHIPPED | OUTPATIENT
Start: 2018-05-20 | End: 2018-08-06

## 2018-05-20 RX ADMIN — MORPHINE SULFATE 4 MG: 4 INJECTION, SOLUTION INTRAMUSCULAR; INTRAVENOUS at 00:41

## 2018-05-20 RX ADMIN — SODIUM CHLORIDE 1000 ML: 9 INJECTION, SOLUTION INTRAVENOUS at 00:36

## 2018-05-20 RX ADMIN — ONDANSETRON 4 MG: 2 INJECTION INTRAMUSCULAR; INTRAVENOUS at 00:36

## 2018-05-20 NOTE — ED PROVIDER NOTES
Subjective   Ms. Chen Galaviz is a 27 year old female who presents to the ED with c/o abdominal pain. She reports she experiences constant dull left sided abdominal pain with intermittent episodes of exacerbations described as sharp. She experiences these exacerbations 8-9 times per day and reports this is increasing in frequency and severity. She also complains of a non-productive cough and a fever of 101.2 yesterday as well as nausea and vomiting. She denies dysuria and chest pain. She had a recent surgery for endometriosis performed by Dr. Richey 3 weeks ago, her second surgery for endometriosis. She also reports a surgical history of  x2 and cholecystectomy. There are no other acute symptoms at this time.        History provided by:  Patient  Abdominal Pain   Pain quality: dull and sharp    Pain radiates to:  Does not radiate  Pain severity:  Moderate  Onset quality:  Gradual  Duration:  1 week  Timing:  Constant  Progression:  Worsening  Chronicity:  New  Relieved by:  None tried  Worsened by:  Nothing  Ineffective treatments:  None tried  Associated symptoms: cough (non-productive), fever (101.1 highest, yesterday), nausea and vomiting    Associated symptoms: no chest pain, no dysuria and no shortness of breath    Cough:     Cough characteristics:  Non-productive    Duration:  3 days      Review of Systems   Constitutional: Positive for fever (101.1 highest, yesterday).   Respiratory: Positive for cough (non-productive). Negative for shortness of breath.    Cardiovascular: Negative for chest pain.   Gastrointestinal: Positive for abdominal pain, nausea and vomiting.   Genitourinary: Negative for dysuria.   All other systems reviewed and are negative.      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   • Migraine    • Migraine    • Multiple gestation    • Organic hypersomnia    • Ovarian cyst    • Pain, upper back    • Pulmonary nodule    • Sinus tachycardia        Allergies   Allergen Reactions   • Bupropion Other (See Comments)     Seizure / wellbutrin    • Naproxen Rash   • Nsaids Rash   • Sulfa Antibiotics Rash       Past Surgical History:   Procedure Laterality Date   •  SECTION     •  SECTION WITH TUBAL N/A 2017    Procedure:  SECTION REPEAT WITH TUBAL;  Surgeon: Fabien Blake MD;  Location: [facility] LABOR DELIVERY;  Service:    • CHOLECYSTECTOMY      age 21   • DIAGNOSTIC LAPAROSCOPY     • ENDOMETRIAL ABLATION     • MOUTH SURGERY      A. History of wisdom teeth extraction, age 21   • PELVIC LAPAROSCOPY      age 21   • TONSILLECTOMY      age 23   • WISDOM TOOTH EXTRACTION         Family History   Problem Relation Age of Onset   • Asthma Mother    • Hypertension Mother    • Breast cancer Mother    • Hypertension Father    • Asthma Brother    • Colon cancer Maternal Grandfather    • Ovarian cancer Neg Hx    • Uterine cancer Neg Hx        Social History     Social History   • Marital status: Single     Social History Main Topics   • Smoking status: Current Every Day Smoker     Packs/day: 0.50     Years: 9.00     Types: Cigarettes   • Smokeless tobacco: Never Used      Comment: cutting back   • Alcohol use No   • Drug use: No   • Sexual activity: Yes     Partners: Male     Birth control/ protection: OCP     Other Topics Concern   • Not on file         Objective   Physical Exam    Constitutional: She is oriented to person, place, and time. She appears well-developed and well-nourished. No distress.   HENT:   Head: Normocephalic and atraumatic.   Nose: Nose normal.   Eyes: Conjunctivae are normal. No scleral icterus.   Neck: Normal range of motion.   Cardiovascular: Normal rate, regular rhythm and normal heart sounds.    No murmur heard.  Pulmonary/Chest: Effort normal and breath sounds normal. No respiratory distress.   Abdominal: Soft. There is tenderness (  Mod tenderness in the LUQ, left mid, and LLQ).   Musculoskeletal: Normal range of motion.   Neurological: She is alert and oriented to person, place, and time.   Skin: Skin is warm and dry.   Psychiatric: She has a normal mood and affect. Her behavior is normal.   Nursing note and vitals reviewed.      Procedures         ED Course                     MDM  Number of Diagnoses or Management Options  Cyst of left ovary: new and requires workup  Intermittent left upper quadrant abdominal pain: new and requires workup  Diagnosis management comments: CT scan of the abdomen pelvis shows 3.7 cm left ovarian cyst versus endometrioma, there is no concern for underlying ovarian torsion.  Patient is resting comfortably on repeat evaluation.    No significant laboratory abnormalities.    Patient will be discharged with a course of Lortab to help with pain, and will be advised to follow-up with Dr. Richey on an outpatient basis.       Amount and/or Complexity of Data Reviewed  Clinical lab tests: ordered and reviewed  Tests in the radiology section of CPT®: ordered and reviewed  Review and summarize past medical records: yes  Independent visualization of images, tracings, or specimens: yes    Patient Progress  Patient progress: stable      Final diagnoses:   Cyst of left ovary   Intermittent left upper quadrant abdominal pain       Documentation assistance provided by jame Cruz.  Information recorded by the jame was done at my direction  and has been verified and validated by me.     Steve Cruz  05/20/18 0010       Nader Dejesus MD  05/20/18 0126

## 2018-05-21 ENCOUNTER — EPISODE CHANGES (OUTPATIENT)
Dept: CASE MANAGEMENT | Facility: OTHER | Age: 28
End: 2018-05-21

## 2018-06-07 ENCOUNTER — OFFICE VISIT (OUTPATIENT)
Dept: INTERNAL MEDICINE | Facility: CLINIC | Age: 28
End: 2018-06-07

## 2018-06-07 VITALS
DIASTOLIC BLOOD PRESSURE: 84 MMHG | WEIGHT: 201 LBS | HEART RATE: 93 BPM | HEIGHT: 63 IN | BODY MASS INDEX: 35.61 KG/M2 | OXYGEN SATURATION: 99 % | SYSTOLIC BLOOD PRESSURE: 124 MMHG

## 2018-06-07 DIAGNOSIS — M54.42 ACUTE LEFT-SIDED LOW BACK PAIN WITH LEFT-SIDED SCIATICA: Primary | ICD-10-CM

## 2018-06-07 PROCEDURE — 96372 THER/PROPH/DIAG INJ SC/IM: CPT | Performed by: NURSE PRACTITIONER

## 2018-06-07 PROCEDURE — 99213 OFFICE O/P EST LOW 20 MIN: CPT | Performed by: NURSE PRACTITIONER

## 2018-06-07 RX ORDER — METHYLPREDNISOLONE ACETATE 40 MG/ML
40 INJECTION, SUSPENSION INTRA-ARTICULAR; INTRALESIONAL; INTRAMUSCULAR; SOFT TISSUE ONCE
Status: COMPLETED | OUTPATIENT
Start: 2018-06-07 | End: 2018-06-07

## 2018-06-07 RX ORDER — PREDNISONE 20 MG/1
TABLET ORAL
Qty: 19 TABLET | Refills: 0 | Status: SHIPPED | OUTPATIENT
Start: 2018-06-07 | End: 2018-08-06

## 2018-06-07 RX ADMIN — METHYLPREDNISOLONE ACETATE 40 MG: 40 INJECTION, SUSPENSION INTRA-ARTICULAR; INTRALESIONAL; INTRAMUSCULAR; SOFT TISSUE at 11:52

## 2018-06-07 NOTE — PROGRESS NOTES
"Subjective  Sciatica      Chen Galaviz is a 27 y.o. female.   Allergies   Allergen Reactions   • Bupropion Other (See Comments)     Seizure / wellbutrin    • Naproxen Rash   • Nsaids Rash   • Sulfa Antibiotics Rash     History of Present Illness      Pain left hip and down into buttock , goes down back of leg , started 2 days ago and is worsening, is constant , has tried advil/tylenol w/o relief   .    Review of Systems   Musculoskeletal: Positive for back pain.   All other systems reviewed and are negative.      Objective   Physical Exam   Constitutional: She is oriented to person, place, and time. She appears well-developed and well-nourished.   HENT:   Head: Normocephalic and atraumatic.   Eyes: Conjunctivae are normal.   Cardiovascular: Normal rate.    Pulmonary/Chest: Effort normal.   Musculoskeletal:        Left hip: She exhibits bony tenderness.   Neurological: She is alert and oriented to person, place, and time.   Skin: Skin is warm and dry.   Psychiatric: She has a normal mood and affect. Her behavior is normal. Judgment and thought content normal.   Nursing note and vitals reviewed.    /84   Pulse 93   Ht 160 cm (63\")   Wt 91.2 kg (201 lb)   SpO2 99%   BMI 35.61 kg/m²     Assessment/Plan     Problem List Items Addressed This Visit        Nervous and Auditory    Back pain - Primary    Relevant Medications    predniSONE (DELTASONE) 20 MG tablet    methylPREDNISolone acetate (DEPO-medrol) injection 40 mg (Completed) (Start on 6/7/2018 12:30 PM)          Heat/ice     "

## 2018-06-11 RX ORDER — NORETHINDRONE ACETATE AND ETHINYL ESTRADIOL 1; 20 MG/1; UG/1
1 TABLET ORAL DAILY
Qty: 21 TABLET | Refills: 12 | OUTPATIENT
Start: 2018-06-11 | End: 2019-06-11

## 2018-06-19 ENCOUNTER — TELEPHONE (OUTPATIENT)
Dept: OBSTETRICS AND GYNECOLOGY | Facility: CLINIC | Age: 28
End: 2018-06-19

## 2018-06-19 NOTE — TELEPHONE ENCOUNTER
Provider Name  Dr Richey    Reason for Call  Has questions about MRI that she had in Emergency Room after her surgery, patient still having pain, please call her    Pharmacy Name  [facility] [address] in [place], [place]    Call Back Number  [phone]

## 2018-06-20 ENCOUNTER — OFFICE VISIT (OUTPATIENT)
Dept: OBSTETRICS AND GYNECOLOGY | Facility: CLINIC | Age: 28
End: 2018-06-20

## 2018-06-20 VITALS
DIASTOLIC BLOOD PRESSURE: 70 MMHG | RESPIRATION RATE: 16 BRPM | WEIGHT: 203 LBS | SYSTOLIC BLOOD PRESSURE: 118 MMHG | BODY MASS INDEX: 35.96 KG/M2

## 2018-06-20 DIAGNOSIS — N80.9 ENDOMETRIOSIS: ICD-10-CM

## 2018-06-20 DIAGNOSIS — N94.6 DYSMENORRHEA: Primary | ICD-10-CM

## 2018-06-20 PROBLEM — Z98.890 S/P ENDOMETRIAL ABLATION: Status: ACTIVE | Noted: 2018-06-20

## 2018-06-20 PROCEDURE — 99213 OFFICE O/P EST LOW 20 MIN: CPT | Performed by: OBSTETRICS & GYNECOLOGY

## 2018-06-20 NOTE — PROGRESS NOTES
Subjective   Chief Complaint   Patient presents with   • Follow-up     from ER on 18, CT scan results in chart     Chen Galaviz is a 27 y.o. year old  presenting to be seen because of Abdominal/pelvic pain.  She recently underwent a laparoscopy and endometrial ablation.  Findings revealed dense adhesions and endometriosis.  Her pain is more on the right side.  She had a CAT scan in May that showed a 3.7 left centimeter cyst.  I reviewed her operative pictures with her and certainly there is nothing about size that time for surgery.  There were adhesions of spot of endometriosis on the ovary.  There is adhesions involving the right tube to the sidewall that was taken down.  She has a good response to the ablation if she's not having periods and really no cramping.  She's not had intercourse so no evaluation regarding dyspareunia.  She describes the pain more as a stabbing sort of pain not every day so days are good some days are bad yesterday and brought her to her knees.  Bowels and bladder are normal.   Current birth control method: tubal ligation.     Past 6 month menstrual and contraceptive history:    No LMP recorded. Patient has had an ablation. recently       The following portions of the patient's history were reviewed and updated as appropriate:problem list, current medications and allergies    Review of Systems normal bladder and bowels     Objective   /70   Resp 16   Wt 92.1 kg (203 lb)   BMI 35.96 kg/m²     General:  well developed; well nourished  no acute distress  appears stated age   Skin:  No suspicious lesions seen   Thyroid: not examined   Lungs:  breathing is unlabored   Heart:  Not performed.       Abdomen: soft, non-tender; no masses  no umbilical or inginual hernias are present  no hepato-splenomegaly  slightly tender but soft everywhere   Pelvis: Clinical staff was present for exam  External genitalia:  normal appearance of the external genitalia including Bartholin's and  [place]'s glands.  :  urethral meatus normal;  Vaginal:  normal pink mucosa without prolapse or lesions.  Uterus:  normal size, shape and consistency. anteverted; Slightly tender more so abdominally than on vaginal examination.  Uterus feels fairly mobile  Adnexa:  Guarding bilaterally no masses noted     Lab Review   No data reviewed    Imaging   CT of abdomen/pelvis report       Assessment   1. Persistent pain despite laparoscopy.  Due to recent nature could consider Lupron prior to next step of hysterectomy.  Certainly has had a good response to the ablation.  May need trial of birth control pills temporarily until Lupron effective     Plan   1. As above however looking at her insurance is Medicare I'm not sure whether they'll cover Lupron or not.  2. We'll give samples of Ortho-Novum 7/7/7 birth control pills.  #3    Medications Rx this encounter:  New Medications Ordered This Visit   Medications   • leuprolide (LUPRON DEPOT, 1-MONTH,) 3.75 MG injection     Sig: Inject 3.75 mg into the shoulder, thigh, or buttocks Every 28 (Twenty-Eight) Days.     Dispense:  1 each     Refill:  5          This note was electronically signed.    Amador Richey MD  June 20, 2018

## 2018-06-29 ENCOUNTER — TELEPHONE (OUTPATIENT)
Dept: OBSTETRICS AND GYNECOLOGY | Facility: CLINIC | Age: 28
End: 2018-06-29

## 2018-07-06 ENCOUNTER — TELEPHONE (OUTPATIENT)
Dept: OBSTETRICS AND GYNECOLOGY | Facility: CLINIC | Age: 28
End: 2018-07-06

## 2018-07-06 NOTE — TELEPHONE ENCOUNTER
Provider Name  Dr Richey    Reason for Call  Patient having very heavy period, has questions, please call her    [facility] Name  [facility] [address]    Call Back Number  [phone]

## 2018-07-09 ENCOUNTER — TELEPHONE (OUTPATIENT)
Dept: OBSTETRICS AND GYNECOLOGY | Facility: CLINIC | Age: 28
End: 2018-07-09

## 2018-07-09 NOTE — TELEPHONE ENCOUNTER
I called patient to tell her that her Lupron was approved and [facility] will be shipping it out on Wednesday. She scheduled her first injection for Monday 7/16.

## 2018-07-17 ENCOUNTER — CLINICAL SUPPORT (OUTPATIENT)
Dept: OBSTETRICS AND GYNECOLOGY | Facility: CLINIC | Age: 28
End: 2018-07-17

## 2018-07-17 DIAGNOSIS — N80.9 ENDOMETRIOSIS: Primary | ICD-10-CM

## 2018-07-17 PROCEDURE — 96372 THER/PROPH/DIAG INJ SC/IM: CPT | Performed by: OBSTETRICS & GYNECOLOGY

## 2018-07-23 ENCOUNTER — TELEPHONE (OUTPATIENT)
Dept: OBSTETRICS AND GYNECOLOGY | Facility: CLINIC | Age: 28
End: 2018-07-23

## 2018-07-23 NOTE — TELEPHONE ENCOUNTER
Dr. Richey Pt    Pt has been on Lupron.  She has been having Sx of hot flashes and headaches and menstrual cramping since she has been on the Lupron.    Callback [phone]    [facility]

## 2018-07-23 NOTE — TELEPHONE ENCOUNTER
I would use add back therapy with Aygestin 5 mg daily if that does not help then we can go to 10 mg a day.  I sent in a prescription #30 with 3 refills

## 2018-08-06 ENCOUNTER — APPOINTMENT (OUTPATIENT)
Dept: ULTRASOUND IMAGING | Facility: HOSPITAL | Age: 28
End: 2018-08-06

## 2018-08-06 ENCOUNTER — HOSPITAL ENCOUNTER (EMERGENCY)
Facility: HOSPITAL | Age: 28
Discharge: HOME | End: 2018-08-06
Attending: EMERGENCY MEDICINE | Admitting: EMERGENCY MEDICINE

## 2018-08-06 ENCOUNTER — APPOINTMENT (OUTPATIENT)
Dept: CT IMAGING | Facility: HOSPITAL | Age: 28
End: 2018-08-06

## 2018-08-06 VITALS
SYSTOLIC BLOOD PRESSURE: 114 MMHG | TEMPERATURE: 97.9 F | HEIGHT: 63 IN | OXYGEN SATURATION: 98 % | DIASTOLIC BLOOD PRESSURE: 67 MMHG | RESPIRATION RATE: 18 BRPM | BODY MASS INDEX: 35.44 KG/M2 | WEIGHT: 200 LBS | HEART RATE: 79 BPM

## 2018-08-06 DIAGNOSIS — R10.2 PELVIC PAIN: ICD-10-CM

## 2018-08-06 DIAGNOSIS — N83.202 CYSTS OF BOTH OVARIES: Primary | ICD-10-CM

## 2018-08-06 DIAGNOSIS — N83.201 CYSTS OF BOTH OVARIES: Primary | ICD-10-CM

## 2018-08-06 LAB
ALBUMIN SERPL-MCNC: 4.37 G/DL (ref 3.2–4.8)
ALBUMIN/GLOB SERPL: 1.8 G/DL (ref 1.5–2.5)
ALP SERPL-CCNC: 94 U/L (ref 25–100)
ALT SERPL W P-5'-P-CCNC: 11 U/L (ref 7–40)
ANION GAP SERPL CALCULATED.3IONS-SCNC: 11 MMOL/L (ref 3–11)
AST SERPL-CCNC: 12 U/L (ref 0–33)
B-HCG UR QL: NEGATIVE
BACTERIA UR QL AUTO: ABNORMAL /HPF
BASOPHILS # BLD AUTO: 0.05 10*3/MM3 (ref 0–0.2)
BASOPHILS NFR BLD AUTO: 0.6 % (ref 0–1)
BILIRUB SERPL-MCNC: 0.2 MG/DL (ref 0.3–1.2)
BILIRUB UR QL STRIP: NEGATIVE
BUN BLD-MCNC: 10 MG/DL (ref 9–23)
BUN/CREAT SERPL: 11.9 (ref 7–25)
CALCIUM SPEC-SCNC: 9.2 MG/DL (ref 8.7–10.4)
CHLORIDE SERPL-SCNC: 109 MMOL/L (ref 99–109)
CLARITY UR: ABNORMAL
CO2 SERPL-SCNC: 24 MMOL/L (ref 20–31)
COLOR UR: YELLOW
CREAT BLD-MCNC: 0.84 MG/DL (ref 0.6–1.3)
DEPRECATED RDW RBC AUTO: 46.1 FL (ref 37–54)
EOSINOPHIL # BLD AUTO: 0.06 10*3/MM3 (ref 0–0.3)
EOSINOPHIL NFR BLD AUTO: 0.7 % (ref 0–3)
ERYTHROCYTE [DISTWIDTH] IN BLOOD BY AUTOMATED COUNT: 15.2 % (ref 11.3–14.5)
GFR SERPL CREATININE-BSD FRML MDRD: 81 ML/MIN/1.73
GLOBULIN UR ELPH-MCNC: 2.4 GM/DL
GLUCOSE BLD-MCNC: 118 MG/DL (ref 70–100)
GLUCOSE UR STRIP-MCNC: NEGATIVE MG/DL
HCT VFR BLD AUTO: 44.1 % (ref 34.5–44)
HGB BLD-MCNC: 14.6 G/DL (ref 11.5–15.5)
HGB UR QL STRIP.AUTO: NEGATIVE
HOLD SPECIMEN: NORMAL
HOLD SPECIMEN: NORMAL
HYALINE CASTS UR QL AUTO: ABNORMAL /LPF
IMM GRANULOCYTES # BLD: 0.03 10*3/MM3 (ref 0–0.03)
IMM GRANULOCYTES NFR BLD: 0.3 % (ref 0–0.6)
INTERNAL NEGATIVE CONTROL: NORMAL
INTERNAL POSITIVE CONTROL: NORMAL
KETONES UR QL STRIP: NEGATIVE
LEUKOCYTE ESTERASE UR QL STRIP.AUTO: ABNORMAL
LIPASE SERPL-CCNC: 39 U/L (ref 6–51)
LYMPHOCYTES # BLD AUTO: 2.28 10*3/MM3 (ref 0.6–4.8)
LYMPHOCYTES NFR BLD AUTO: 25.1 % (ref 24–44)
Lab: NORMAL
MCH RBC QN AUTO: 27.6 PG (ref 27–31)
MCHC RBC AUTO-ENTMCNC: 33.1 G/DL (ref 32–36)
MCV RBC AUTO: 83.4 FL (ref 80–99)
MONOCYTES # BLD AUTO: 0.47 10*3/MM3 (ref 0–1)
MONOCYTES NFR BLD AUTO: 5.2 % (ref 0–12)
NEUTROPHILS # BLD AUTO: 6.23 10*3/MM3 (ref 1.5–8.3)
NEUTROPHILS NFR BLD AUTO: 68.4 % (ref 41–71)
NITRITE UR QL STRIP: NEGATIVE
PH UR STRIP.AUTO: 5.5 [PH] (ref 5–8)
PLATELET # BLD AUTO: 300 10*3/MM3 (ref 150–450)
PMV BLD AUTO: 10.6 FL (ref 6–12)
POTASSIUM BLD-SCNC: 4 MMOL/L (ref 3.5–5.5)
PROT SERPL-MCNC: 6.8 G/DL (ref 5.7–8.2)
PROT UR QL STRIP: NEGATIVE
RBC # BLD AUTO: 5.29 10*6/MM3 (ref 3.89–5.14)
RBC # UR: ABNORMAL /HPF
REF LAB TEST METHOD: ABNORMAL
SODIUM BLD-SCNC: 144 MMOL/L (ref 132–146)
SP GR UR STRIP: 1.01 (ref 1–1.03)
SQUAMOUS #/AREA URNS HPF: ABNORMAL /HPF
UROBILINOGEN UR QL STRIP: ABNORMAL
WBC NRBC COR # BLD: 9.09 10*3/MM3 (ref 3.5–10.8)
WBC UR QL AUTO: ABNORMAL /HPF
WHOLE BLOOD HOLD SPECIMEN: NORMAL
WHOLE BLOOD HOLD SPECIMEN: NORMAL

## 2018-08-06 PROCEDURE — 76830 TRANSVAGINAL US NON-OB: CPT

## 2018-08-06 PROCEDURE — 96376 TX/PRO/DX INJ SAME DRUG ADON: CPT

## 2018-08-06 PROCEDURE — 85025 COMPLETE CBC W/AUTO DIFF WBC: CPT | Performed by: EMERGENCY MEDICINE

## 2018-08-06 PROCEDURE — 81025 URINE PREGNANCY TEST: CPT | Performed by: EMERGENCY MEDICINE

## 2018-08-06 PROCEDURE — 25010000002 ONDANSETRON PER 1 MG: Performed by: EMERGENCY MEDICINE

## 2018-08-06 PROCEDURE — 74176 CT ABD & PELVIS W/O CONTRAST: CPT

## 2018-08-06 PROCEDURE — 96374 THER/PROPH/DIAG INJ IV PUSH: CPT

## 2018-08-06 PROCEDURE — 99284 EMERGENCY DEPT VISIT MOD MDM: CPT

## 2018-08-06 PROCEDURE — 80053 COMPREHEN METABOLIC PANEL: CPT | Performed by: EMERGENCY MEDICINE

## 2018-08-06 PROCEDURE — 81001 URINALYSIS AUTO W/SCOPE: CPT | Performed by: EMERGENCY MEDICINE

## 2018-08-06 PROCEDURE — 96375 TX/PRO/DX INJ NEW DRUG ADDON: CPT

## 2018-08-06 PROCEDURE — 25010000002 HYDROMORPHONE PER 4 MG: Performed by: EMERGENCY MEDICINE

## 2018-08-06 PROCEDURE — 83690 ASSAY OF LIPASE: CPT | Performed by: EMERGENCY MEDICINE

## 2018-08-06 RX ORDER — HYDROMORPHONE HYDROCHLORIDE 1 MG/ML
0.5 INJECTION, SOLUTION INTRAMUSCULAR; INTRAVENOUS; SUBCUTANEOUS ONCE
Status: COMPLETED | OUTPATIENT
Start: 2018-08-06 | End: 2018-08-06

## 2018-08-06 RX ORDER — ONDANSETRON 2 MG/ML
4 INJECTION INTRAMUSCULAR; INTRAVENOUS ONCE
Status: COMPLETED | OUTPATIENT
Start: 2018-08-06 | End: 2018-08-06

## 2018-08-06 RX ORDER — HYDROMORPHONE HYDROCHLORIDE 1 MG/ML
0.25 INJECTION, SOLUTION INTRAMUSCULAR; INTRAVENOUS; SUBCUTANEOUS ONCE
Status: COMPLETED | OUTPATIENT
Start: 2018-08-06 | End: 2018-08-06

## 2018-08-06 RX ORDER — SODIUM CHLORIDE 0.9 % (FLUSH) 0.9 %
10 SYRINGE (ML) INJECTION AS NEEDED
Status: DISCONTINUED | OUTPATIENT
Start: 2018-08-06 | End: 2018-08-06 | Stop reason: HOSPADM

## 2018-08-06 RX ADMIN — Medication 10 ML: at 14:44

## 2018-08-06 RX ADMIN — HYDROMORPHONE HYDROCHLORIDE 0.5 MG: 1 INJECTION, SOLUTION INTRAMUSCULAR; INTRAVENOUS; SUBCUTANEOUS at 11:21

## 2018-08-06 RX ADMIN — Medication 10 ML: at 11:21

## 2018-08-06 RX ADMIN — HYDROMORPHONE HYDROCHLORIDE 0.25 MG: 1 INJECTION, SOLUTION INTRAMUSCULAR; INTRAVENOUS; SUBCUTANEOUS at 14:43

## 2018-08-06 RX ADMIN — ONDANSETRON 4 MG: 2 INJECTION INTRAMUSCULAR; INTRAVENOUS at 11:20

## 2018-08-07 ENCOUNTER — EPISODE CHANGES (OUTPATIENT)
Dept: CASE MANAGEMENT | Facility: OTHER | Age: 28
End: 2018-08-07

## 2018-08-08 NOTE — ED PROVIDER NOTES
Subjective   Patient complaining of lower abdominal pain.  Patient reports she has a history of ovarian cysts in the past but states that she's been having pain in the periumbilical area as well.  Patient reports some pain in the right upper quadrant.  Should no fevers no chills his had no and has had nausea but no vomiting.  Patient had extensive workups in the past.  Patient does a vaginal bleeding vaginal discharge.  She's had no melena hematochezia or hematemesis.  Denies a dysuria frequency urgency or hematuria associated with this.        History provided by:  Patient   used: No    Abdominal Pain   Pain location:  Generalized  Pain quality: aching and dull    Pain radiates to:  Does not radiate  Pain severity:  Moderate  Onset quality:  Gradual  Timing:  Intermittent  Progression:  Waxing and waning  Chronicity:  Recurrent  Context: not alcohol use, not diet changes, not eating, not previous surgeries, not recent illness, not recent sexual activity, not recent travel, not sick contacts, not suspicious food intake and not trauma    Relieved by:  Nothing  Worsened by:  Nothing  Ineffective treatments:  None tried  Associated symptoms: nausea    Associated symptoms: no anorexia, no chills, no cough, no diarrhea, no dysuria, no hematemesis, no hematochezia, no hematuria, no shortness of breath, no vaginal bleeding, no vaginal discharge and no vomiting        Review of Systems   Constitutional: Negative for chills.   Respiratory: Negative for cough and shortness of breath.    Gastrointestinal: Positive for abdominal pain and nausea. Negative for anorexia, diarrhea, hematemesis, hematochezia and vomiting.   Genitourinary: Negative for dysuria, hematuria, urgency, vaginal bleeding and vaginal discharge.   Musculoskeletal: Negative for arthralgias and neck pain.   Neurological: Negative for dizziness and weakness.   Hematological: Negative for adenopathy.   Psychiatric/Behavioral: Negative.    All  other systems reviewed and are negative.      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   • Migraine    • Migraine    • Multiple gestation    • Organic hypersomnia    • Ovarian cyst    • Pain, upper back    • Pulmonary nodule    • Sinus tachycardia        Allergies   Allergen Reactions   • Bupropion Other (See Comments)     Seizure / wellbutrin    • Naproxen Rash   • Nsaids Rash   • Sulfa Antibiotics Rash       Past Surgical History:   Procedure Laterality Date   •  SECTION     •  SECTION WITH TUBAL N/A 2017    Procedure:  SECTION REPEAT WITH TUBAL;  Surgeon: Fabien Blake MD;  Location: [facility] LABOR DELIVERY;  Service:    • CHOLECYSTECTOMY      age 21   • DIAGNOSTIC LAPAROSCOPY     • ENDOMETRIAL ABLATION     • MOUTH SURGERY      A. History of wisdom teeth extraction, age 21   • PELVIC LAPAROSCOPY      age 21   • TONSILLECTOMY      age 23   • WISDOM TOOTH EXTRACTION         Family History   Problem Relation Age of Onset   • Asthma Mother    • Hypertension Mother    • Breast cancer Mother    • Hypertension Father    • Asthma Brother    • Colon cancer Maternal Grandfather    • Ovarian cancer Neg Hx    • Uterine cancer Neg Hx        Social History     Social History   • Marital status: Single     Social History Main Topics   • Smoking status: Current Every Day Smoker      Packs/day: 0.50     Years: 9.00     Types: Cigarettes   • Smokeless tobacco: Never Used      Comment: cutting back   • Alcohol use No   • Drug use: No   • Sexual activity: Yes     Partners: Male     Birth control/ protection: OCP     Other Topics Concern   • Not on file           Objective   Physical Exam   Constitutional: She is oriented to person, place, and time. She appears well-developed and well-nourished.   HENT:   Head: Normocephalic and atraumatic.   Right Ear: External ear normal.   Left Ear: External ear normal.   Nose: Nose normal.   Mouth/Throat: Oropharynx is clear and moist.   Eyes: Pupils are equal, round, and reactive to light. Conjunctivae and EOM are normal. No scleral icterus.   Neck: Normal range of motion. No thyromegaly present.   Cardiovascular: Normal rate, regular rhythm and normal heart sounds.    Pulmonary/Chest: Effort normal and breath sounds normal. No respiratory distress. She has no wheezes. She has no rales. She exhibits no tenderness.   Abdominal: Soft. Bowel sounds are normal. She exhibits no distension. There is tenderness (diffuse tenderness no focal tenderness.  She has no guarding no rebound or rigidity no tenderness at McBurney point negative Lebron sign.).   Musculoskeletal: Normal range of motion.   Lymphadenopathy:     She has no cervical adenopathy.   Neurological: She is alert and oriented to person, place, and time. She has normal reflexes. She displays normal reflexes. No cranial nerve deficit. Coordination normal.   Skin: Skin is warm and dry.   Psychiatric: She has a normal mood and affect. Her behavior is normal. Judgment and thought content normal.   Nursing note and vitals reviewed.      Procedures           ED Course  ED Course as of Aug 07 2319   Tue Aug 07, 2018   2319 Discussed the findings with the patient.  Is no urinary symptoms.  [CHRISTINE]      ED Course User Index  [CHRISTINE] Dinesh Farr PA                  MDM  Number of Diagnoses or Management  Options  Cysts of both ovaries: new and requires workup  Pelvic pain: new and requires workup     Amount and/or Complexity of Data Reviewed  Clinical lab tests: reviewed and ordered  Tests in the radiology section of CPT®: reviewed and ordered  Discuss the patient with other providers: yes    Patient Progress  Patient progress: stable        Final diagnoses:   Cysts of both ovaries   Pelvic pain            Dinesh Farr PA  08/07/18 1561

## 2018-08-09 ENCOUNTER — TELEPHONE (OUTPATIENT)
Dept: OBSTETRICS AND GYNECOLOGY | Facility: CLINIC | Age: 28
End: 2018-08-09

## 2018-08-09 NOTE — TELEPHONE ENCOUNTER
Dr. Richey Pt    Pt is having Sx of mood swings, irritability, hot flashes.  She states that the Aygestin she is taking has not helped.    Callback [phone]    [facility]

## 2018-08-09 NOTE — TELEPHONE ENCOUNTER
Patient is on Lupron 3.75mg injection with add back therapy Aygestin 5 mg. First injection was on 7/16/2018. Patient states she is having mood swings, irritability and hot flashes.

## 2018-08-15 ENCOUNTER — CLINICAL SUPPORT (OUTPATIENT)
Dept: OBSTETRICS AND GYNECOLOGY | Facility: CLINIC | Age: 28
End: 2018-08-15

## 2018-08-15 ENCOUNTER — TELEPHONE (OUTPATIENT)
Dept: OBSTETRICS AND GYNECOLOGY | Facility: CLINIC | Age: 28
End: 2018-08-15

## 2018-08-15 DIAGNOSIS — N80.9 ENDOMETRIOSIS: Primary | ICD-10-CM

## 2018-08-15 NOTE — TELEPHONE ENCOUNTER
[phone] called [facility] and spoke with Galina and advised her that it's OK to go ahead and ship patient's Lupron.

## 2018-08-15 NOTE — TELEPHONE ENCOUNTER
Provider Name  Dr Richey    Reason for Call  [facility] calling to see if okay to send Lupron, please call [facility]      Call Back Number  [phone]  [facility]

## 2018-08-24 DIAGNOSIS — R11.0 CHRONIC NAUSEA: ICD-10-CM

## 2018-08-24 RX ORDER — PROMETHAZINE HYDROCHLORIDE 25 MG/1
TABLET ORAL
Qty: 30 TABLET | Refills: 3 | OUTPATIENT
Start: 2018-08-24

## 2018-08-29 ENCOUNTER — OFFICE VISIT (OUTPATIENT)
Dept: RETAIL CLINIC | Facility: CLINIC | Age: 28
End: 2018-08-29

## 2018-08-29 VITALS
HEIGHT: 63 IN | BODY MASS INDEX: 37.56 KG/M2 | WEIGHT: 212 LBS | SYSTOLIC BLOOD PRESSURE: 132 MMHG | TEMPERATURE: 98.1 F | OXYGEN SATURATION: 98 % | HEART RATE: 80 BPM | DIASTOLIC BLOOD PRESSURE: 76 MMHG | RESPIRATION RATE: 20 BRPM

## 2018-08-29 DIAGNOSIS — J02.9 SORE THROAT: Primary | ICD-10-CM

## 2018-08-29 DIAGNOSIS — B37.0 ORAL THRUSH: ICD-10-CM

## 2018-08-29 LAB
EXPIRATION DATE: NORMAL
INTERNAL CONTROL: NORMAL
Lab: NORMAL
S PYO AG THROAT QL: NEGATIVE

## 2018-08-29 PROCEDURE — 87880 STREP A ASSAY W/OPTIC: CPT | Performed by: NURSE PRACTITIONER

## 2018-08-29 PROCEDURE — 99213 OFFICE O/P EST LOW 20 MIN: CPT | Performed by: NURSE PRACTITIONER

## 2018-08-29 NOTE — PATIENT INSTRUCTIONS
Oral Thrush, Adult  Oral thrush is an infection in your mouth and throat. It causes white patches on your tongue and in your mouth.  Follow these instructions at home:  Helping with soreness  · To lessen your pain:  ? Drink cold liquids, like water and iced tea.  ? Eat frozen ice pops or frozen juices.  ? Eat foods that are easy to swallow, like gelatin and ice cream.  ? Drink from a straw if the patches in your mouth are painful.  General instructions    · Take or use over-the-counter and prescription medicines only as told by your doctor. Medicine for oral thrush may be something to swallow, or it may be something to put on the infected area.  · Eat plain yogurt that has live cultures in it. Read the label to make sure.  · If you wear dentures:  ? Take out your dentures before you go to bed.  ? Brush them well.  ? Soak them in a denture .  · Rinse your mouth with warm salt-water many times a day. To make the salt-water mixture, completely dissolve 1/2-1 teaspoon of salt in 1 cup of warm water.  Contact a doctor if:  · Your problems are getting worse.  · Your problems do not get better in less than 7 days with treatment.  · Your infection is spreading. This may show as white patches on the skin outside of your mouth.  · You are nursing your baby and you have redness and pain in the nipples.  This information is not intended to replace advice given to you by your health care provider. Make sure you discuss any questions you have with your health care provider.  Document Released: 03/14/2011 Document Revised: 09/11/2017 Document Reviewed: 09/11/2017  [employer] Interactive Patient Education © 2017 [employer]  Sore Throat  When you have a sore throat, your throat may:  · Hurt.  · Burn.  · Feel irritated.  · Feel scratchy.    Many things can cause a sore throat, including:  · An infection.  · Allergies.  · Dryness in the air.  · Smoke or pollution.  · Gastroesophageal reflux disease (GERD).  · A tumor.    A  sore throat can be the first sign of another sickness. It can happen with other problems, like coughing or a fever. Most sore throats go away without treatment.  Follow these instructions at home:  · Take over-the-counter medicines only as told by your doctor.  · Drink enough fluids to keep your pee (urine) clear or pale yellow.  · Rest when you feel you need to.  · To help with pain, try:  ? Sipping warm liquids, such as broth, herbal tea, or warm water.  ? Eating or drinking cold or frozen liquids, such as frozen ice pops.  ? Gargling with a salt-water mixture 3-4 times a day or as needed. To make a salt-water mixture, add ½-1 tsp of salt in 1 cup of warm water. Mix it until you cannot see the salt anymore.  ? Sucking on hard candy or throat lozenges.  ? Putting a cool-mist humidifier in your bedroom at night.  ? Sitting in the bathroom with the door closed for 5-10 minutes while you run hot water in the shower.  · Do not use any tobacco products, such as cigarettes, chewing tobacco, and e-cigarettes. If you need help quitting, ask your doctor.  Contact a doctor if:  · You have a fever for more than 2-3 days.  · You keep having symptoms for more than 2-3 days.  · Your throat does not get better in 7 days.  · You have a fever and your symptoms suddenly get worse.  Get help right away if:  · You have trouble breathing.  · You cannot swallow fluids, soft foods, or your saliva.  · You have swelling in your throat or neck that gets worse.  · You keep feeling like you are going to throw up (vomit).  · You keep throwing up.  This information is not intended to replace advice given to you by your health care provider. Make sure you discuss any questions you have with your health care provider.  Document Released: 09/26/2009 Document Revised: 08/13/2017 Document Reviewed: 10/07/2016  [employer] Interactive Patient Education © 2018 [employer]    Medications and plan of care reviewed with patient and teaching done on  medication reactions/side effects.  Oral care as discussed.  If symptoms persist follow up with PCP, if worse go to urgent care or ER as discussed.

## 2018-08-29 NOTE — PROGRESS NOTES
LUIS@  Chen GALLOWAY Guillaume is a 28 y.o. female presents with sore throat, white spots on tongue and white coating.  Patient states tongue started hurting yesterday and she noticed white spots.  States throat has been sore for a couple of days. Denies fever.  No known exposure to strep.     Chief Complaint   Patient presents with   • Sore Throat      Sore Throat    This is a new problem. The current episode started in the past 7 days. The problem has been waxing and waning. There has been no fever. The pain is at a severity of 6/10. The pain is moderate. Associated symptoms include coughing (mild dry cough) and headaches. Pertinent negatives include no abdominal pain, congestion, diarrhea, drooling, ear discharge, ear pain, hoarse voice, plugged ear sensation, neck pain, shortness of breath, stridor, swollen glands or trouble swallowing. She has tried nothing for the symptoms.        The following portions of the patient's history were reviewed and updated as appropriate: allergies, current medications, past family history, past medical history, past social history, past surgical history and problem list.    Current Outpatient Prescriptions:   •  busPIRone (BUSPAR) 7.5 MG tablet, Take 1 tablet by mouth 2 (Two) Times a Day., Disp: , Rfl: 1  •  FLUoxetine (PROzac) 10 MG capsule, Take 1 capsule by mouth Daily., Disp: , Rfl: 1  •  glycopyrrolate (ROBINUL) 1 MG tablet, One po bid, Disp: 60 tablet, Rfl: 11  •  lamoTRIgine (LaMICtal) 150 MG tablet, Take 1 tablet by mouth Daily., Disp: , Rfl: 1  •  leuprolide (LUPRON DEPOT, 1-MONTH,) 3.75 MG injection, Inject 3.75 mg into the shoulder, thigh, or buttocks Every 28 (Twenty-Eight) Days., Disp: 1 each, Rfl: 5  •  MYRBETRIQ 50 MG tablet sustained-release 24 hour 24 hr tablet, Take 1 tablet by mouth Daily., Disp: , Rfl: 3  •  norethindrone (AYGESTIN) 5 MG tablet, Take 2 tablets by mouth Daily., Disp: 60 tablet, Rfl: 3  •  PROAIR  (90 Base) MCG/ACT inhaler, INHALE 1-2  "PUFFS EVERY 4-6 HOURS AS NEEDED FOR SHORTNESS OF BREATH, Disp: 8.5 inhaler, Rfl: 0  •  promethazine (PHENERGAN) 25 MG tablet, TAKE 1 TABLET BY MOUTH EVERY 6 (SIX) HOURS AS NEEDED FOR NAUSEA OR VOMITING., Disp: 30 tablet, Rfl: 3  •  traZODone (DESYREL) 50 MG tablet, Take 1 tablet by mouth Every Night., Disp: , Rfl: 1    Allergies   Allergen Reactions   • Bupropion Other (See Comments)     Seizure / wellbutrin    • Naproxen Rash   • Nsaids Rash   • Sulfa Antibiotics Rash       Review of Systems   Constitutional: Negative for appetite change, chills, fever and unexpected weight change.   HENT: Positive for sore throat (sore throat and white coating on tongue). Negative for congestion, drooling, ear discharge, ear pain, hoarse voice, postnasal drip, rhinorrhea, sinus pain, sinus pressure, sneezing, trouble swallowing and voice change.    Eyes: Negative.    Respiratory: Positive for cough (mild dry cough). Negative for chest tightness, shortness of breath, wheezing and stridor.    Cardiovascular: Negative.    Gastrointestinal: Negative.  Negative for abdominal pain and diarrhea.   Genitourinary: Negative.    Musculoskeletal: Negative.  Negative for neck pain.   Skin: Negative.    Neurological: Positive for headaches. Negative for dizziness and syncope.       Objective     Visit Vitals  /76 (BP Location: Left arm)   Pulse 80   Temp 98.1 °F (36.7 °C) (Oral)   Resp 20   Ht 160 cm (63\")   Wt 96.2 kg (212 lb)   LMP 07/03/2018   SpO2 98%   BMI 37.55 kg/m²         Physical Exam   Constitutional: She is oriented to person, place, and time. She appears well-developed and well-nourished. No distress.   HENT:   Head: Normocephalic and atraumatic.   Right Ear: A middle ear effusion is present.   Left Ear: A middle ear effusion is present.   Nose: Mucosal edema present. No sinus tenderness.   Mouth/Throat: Uvula is midline, oropharynx is clear and moist and mucous membranes are normal. Tonsils are 0 on the right. Tonsils are 0 " on the left. No tonsillar exudate.       Eyes: Pupils are equal, round, and reactive to light. Conjunctivae and lids are normal.   Neck: Normal range of motion.   Cardiovascular: Normal rate and regular rhythm.    No murmur heard.  Pulmonary/Chest: Effort normal and breath sounds normal. No respiratory distress.   Lymphadenopathy:     She has no cervical adenopathy.   Neurological: She is alert and oriented to person, place, and time.   Skin: Skin is warm and dry. Capillary refill takes less than 2 seconds.   Psychiatric: She has a normal mood and affect. Her speech is normal and behavior is normal.         Assessment/Plan   There are no diagnoses linked to this encounter.   Chen was seen today for sore throat.    Diagnoses and all orders for this visit:    Sore throat  -     POC Rapid Strep A  Results for orders placed or performed in visit on 08/29/18   POC Rapid Strep A   Result Value Ref Range    Rapid Strep A Screen Negative Negative, VALID, INVALID, Not Performed    Internal Control Passed Passed    Lot Number [id number]     Expiration Date 12/2,019          Oral thrush  -     nystatin (MYCOSTATIN) 179304 UNIT/ML suspension; Swish and swallow 5 mL 4 (Four) Times a Day for 7 days.         Strep negative, discussed with patient.  Medications and plan of care reviewed with patient and teaching done on medication reactions/side effects.  Oral care as discussed.  If symptoms persist follow up with PCP, if worse go to urgent care or ER as discussed.  Patient verbalized understanding.    FRANCHESKA Oneal

## 2018-08-30 ENCOUNTER — OFFICE VISIT (OUTPATIENT)
Dept: OBSTETRICS AND GYNECOLOGY | Facility: CLINIC | Age: 28
End: 2018-08-30

## 2018-08-30 VITALS
BODY MASS INDEX: 35.96 KG/M2 | DIASTOLIC BLOOD PRESSURE: 80 MMHG | RESPIRATION RATE: 16 BRPM | SYSTOLIC BLOOD PRESSURE: 120 MMHG | WEIGHT: 203 LBS

## 2018-08-30 DIAGNOSIS — R10.2 PELVIC PAIN: Primary | ICD-10-CM

## 2018-08-30 DIAGNOSIS — R51.9 SEVERE FRONTAL HEADACHES: ICD-10-CM

## 2018-08-30 PROCEDURE — 96372 THER/PROPH/DIAG INJ SC/IM: CPT | Performed by: OBSTETRICS & GYNECOLOGY

## 2018-08-30 PROCEDURE — 99214 OFFICE O/P EST MOD 30 MIN: CPT | Performed by: OBSTETRICS & GYNECOLOGY

## 2018-08-30 RX ORDER — OXYBUTYNIN CHLORIDE 5 MG/1
TABLET, EXTENDED RELEASE ORAL
COMMUNITY
End: 2018-09-27

## 2018-08-30 RX ORDER — BUTALBITAL, ACETAMINOPHEN AND CAFFEINE 300; 40; 50 MG/1; MG/1; MG/1
1 CAPSULE ORAL EVERY 4 HOURS PRN
Qty: 15 CAPSULE | Refills: 1 | Status: SHIPPED | OUTPATIENT
Start: 2018-08-30 | End: 2021-04-21

## 2018-08-30 NOTE — PROGRESS NOTES
Subjective   Chief Complaint   Patient presents with   • Follow-up     lupron f/u 3rd lupron     Chen Galaviz is a 28 y.o. year old  presenting to be seen for follow up of pelvic pain/endometriosis on Lupron x 2 monbths.   Current birth control method: tubal ligation.  Her pain may be a little bit better.  She is more concerned about headaches would like something for these.  Discussed options of continuing for full 6 month course of Lupron for at least 1 more month and see if she sees improvement.  If she does we can complete 6 months if not consider hysterectomy as next option.  She has not had intercourse since she started the Lupron.     Past 6 month menstrual and contraceptive history:    No LMP recorded. Patient is not currently having periods (Reason: Other).  Cycle Frequency: absent                           The following portions of the patient's history were reviewed and updated as appropriate:problem list, current medications and allergies    Review of Systems headaches on Lupron - forehead hx migraines - different than her typical migraines.  She has used Maxalt in the past.     Objective   /80   Resp 16   Wt 92.1 kg (203 lb)   BMI 35.96 kg/m²     General:  well developed; well nourished  no acute distress  appears stated age  obese - Body mass index is 35.96 kg/m².   Skin:  No suspicious lesions seen   Thyroid: not examined   Lungs:  breathing is unlabored   Heart:  Not performed.       Abdomen: no umbilical or inginual hernias are present  no hepato-splenomegaly  Slightly tender upper abdomen   Pelvis: Clinical staff was present for exam  External genitalia:  normal appearance of the external genitalia including Bartholin's and Thurman's glands.  :  urethral meatus normal;  Uterus:  normal size, shape and consistency. anteverted; tenderness to palpation is present;  Adnexa:  tenderness of the bilateral adnexa to  deep palpation;  Rectal:  digital rectal exam not performed; anus  visually normal appearing.     Lab Review   No data reviewed    Imaging   No data reviewed       Assessment   1. History of endometriosis.  Recent laparoscopy lysis of adhesions April 2018 also endometrial ablation.  That is working well with no essentially no menses.  Options were discussed and she'll get Lupron shot #3 today if no improvement in a month she may want to consider hysterectomy if improvement she'll go with a 6 month course.  2. Headaches which are different than her history of migraines.  We'll try Fiorinal.     Plan   Lupron No. 3 today.  Hopefully she'll build complete coursing good pain relief.  Fiorinal for headaches.    Medications Rx this encounter:  No orders of the defined types were placed in this encounter.         This note was electronically signed.    Amador Richey MD  August 30, 2018

## 2018-09-08 DIAGNOSIS — R11.0 CHRONIC NAUSEA: ICD-10-CM

## 2018-09-10 RX ORDER — PROMETHAZINE HYDROCHLORIDE 25 MG/1
TABLET ORAL
Qty: 30 TABLET | Refills: 3 | OUTPATIENT
Start: 2018-09-10

## 2018-09-11 ENCOUNTER — HOSPITAL ENCOUNTER (EMERGENCY)
Facility: HOSPITAL | Age: 28
Discharge: HOME | End: 2018-09-12
Attending: EMERGENCY MEDICINE | Admitting: EMERGENCY MEDICINE

## 2018-09-11 ENCOUNTER — APPOINTMENT (OUTPATIENT)
Dept: CT IMAGING | Facility: HOSPITAL | Age: 28
End: 2018-09-11

## 2018-09-11 DIAGNOSIS — R19.7 NAUSEA, VOMITING, AND DIARRHEA: Primary | ICD-10-CM

## 2018-09-11 DIAGNOSIS — R11.2 NAUSEA, VOMITING, AND DIARRHEA: Primary | ICD-10-CM

## 2018-09-11 DIAGNOSIS — E86.9 VOLUME DEPLETION: ICD-10-CM

## 2018-09-11 DIAGNOSIS — R10.10 UPPER ABDOMINAL PAIN: ICD-10-CM

## 2018-09-11 LAB
ALBUMIN SERPL-MCNC: 4.74 G/DL (ref 3.2–4.8)
ALBUMIN/GLOB SERPL: 2.2 G/DL (ref 1.5–2.5)
ALP SERPL-CCNC: 137 U/L (ref 25–100)
ALT SERPL W P-5'-P-CCNC: 28 U/L (ref 7–40)
ANION GAP SERPL CALCULATED.3IONS-SCNC: 10 MMOL/L (ref 3–11)
AST SERPL-CCNC: 34 U/L (ref 0–33)
B-HCG UR QL: NEGATIVE
BACTERIA UR QL AUTO: ABNORMAL /HPF
BASOPHILS # BLD AUTO: 0.02 10*3/MM3 (ref 0–0.2)
BASOPHILS NFR BLD AUTO: 0.2 % (ref 0–1)
BILIRUB SERPL-MCNC: 0.3 MG/DL (ref 0.3–1.2)
BILIRUB UR QL STRIP: ABNORMAL
BUN BLD-MCNC: 13 MG/DL (ref 9–23)
BUN/CREAT SERPL: 15.3 (ref 7–25)
CALCIUM SPEC-SCNC: 8.5 MG/DL (ref 8.7–10.4)
CHLORIDE SERPL-SCNC: 104 MMOL/L (ref 99–109)
CLARITY UR: CLEAR
CO2 SERPL-SCNC: 27 MMOL/L (ref 20–31)
COLOR UR: ABNORMAL
CREAT BLD-MCNC: 0.85 MG/DL (ref 0.6–1.3)
DEPRECATED RDW RBC AUTO: 43.3 FL (ref 37–54)
EOSINOPHIL # BLD AUTO: 0 10*3/MM3 (ref 0–0.3)
EOSINOPHIL NFR BLD AUTO: 0 % (ref 0–3)
ERYTHROCYTE [DISTWIDTH] IN BLOOD BY AUTOMATED COUNT: 14.3 % (ref 11.3–14.5)
GFR SERPL CREATININE-BSD FRML MDRD: 80 ML/MIN/1.73
GLOBULIN UR ELPH-MCNC: 2.2 GM/DL
GLUCOSE BLD-MCNC: 90 MG/DL (ref 70–100)
GLUCOSE UR STRIP-MCNC: NEGATIVE MG/DL
HCT VFR BLD AUTO: 42 % (ref 34.5–44)
HGB BLD-MCNC: 14 G/DL (ref 11.5–15.5)
HGB UR QL STRIP.AUTO: NEGATIVE
HOLD SPECIMEN: NORMAL
HOLD SPECIMEN: NORMAL
HYALINE CASTS UR QL AUTO: ABNORMAL /LPF
IMM GRANULOCYTES # BLD: 0.02 10*3/MM3 (ref 0–0.03)
IMM GRANULOCYTES NFR BLD: 0.2 % (ref 0–0.6)
INTERNAL NEGATIVE CONTROL: NEGATIVE
INTERNAL POSITIVE CONTROL: POSITIVE
KETONES UR QL STRIP: ABNORMAL
LEUKOCYTE ESTERASE UR QL STRIP.AUTO: ABNORMAL
LIPASE SERPL-CCNC: 28 U/L (ref 6–51)
LYMPHOCYTES # BLD AUTO: 2.02 10*3/MM3 (ref 0.6–4.8)
LYMPHOCYTES NFR BLD AUTO: 21 % (ref 24–44)
Lab: NORMAL
MCH RBC QN AUTO: 27.8 PG (ref 27–31)
MCHC RBC AUTO-ENTMCNC: 33.3 G/DL (ref 32–36)
MCV RBC AUTO: 83.3 FL (ref 80–99)
MONOCYTES # BLD AUTO: 0.67 10*3/MM3 (ref 0–1)
MONOCYTES NFR BLD AUTO: 7 % (ref 0–12)
NEUTROPHILS # BLD AUTO: 6.89 10*3/MM3 (ref 1.5–8.3)
NEUTROPHILS NFR BLD AUTO: 71.8 % (ref 41–71)
NITRITE UR QL STRIP: NEGATIVE
PH UR STRIP.AUTO: 7 [PH] (ref 5–8)
PLATELET # BLD AUTO: 296 10*3/MM3 (ref 150–450)
PMV BLD AUTO: 11.1 FL (ref 6–12)
POTASSIUM BLD-SCNC: 3.6 MMOL/L (ref 3.5–5.5)
PROT SERPL-MCNC: 6.9 G/DL (ref 5.7–8.2)
PROT UR QL STRIP: ABNORMAL
RBC # BLD AUTO: 5.04 10*6/MM3 (ref 3.89–5.14)
RBC # UR: ABNORMAL /HPF
REF LAB TEST METHOD: ABNORMAL
SODIUM BLD-SCNC: 141 MMOL/L (ref 132–146)
SP GR UR STRIP: 1.04 (ref 1–1.03)
SQUAMOUS #/AREA URNS HPF: ABNORMAL /HPF
UROBILINOGEN UR QL STRIP: ABNORMAL
WBC NRBC COR # BLD: 9.6 10*3/MM3 (ref 3.5–10.8)
WBC UR QL AUTO: ABNORMAL /HPF
WHOLE BLOOD HOLD SPECIMEN: NORMAL
WHOLE BLOOD HOLD SPECIMEN: NORMAL

## 2018-09-11 PROCEDURE — 96374 THER/PROPH/DIAG INJ IV PUSH: CPT

## 2018-09-11 PROCEDURE — 25010000002 DICYCLOMINE PER 20 MG: Performed by: EMERGENCY MEDICINE

## 2018-09-11 PROCEDURE — 83690 ASSAY OF LIPASE: CPT

## 2018-09-11 PROCEDURE — 99284 EMERGENCY DEPT VISIT MOD MDM: CPT

## 2018-09-11 PROCEDURE — 25010000002 IOPAMIDOL 61 % SOLUTION: Performed by: EMERGENCY MEDICINE

## 2018-09-11 PROCEDURE — 81025 URINE PREGNANCY TEST: CPT | Performed by: EMERGENCY MEDICINE

## 2018-09-11 PROCEDURE — 85025 COMPLETE CBC W/AUTO DIFF WBC: CPT

## 2018-09-11 PROCEDURE — 80053 COMPREHEN METABOLIC PANEL: CPT

## 2018-09-11 PROCEDURE — 81001 URINALYSIS AUTO W/SCOPE: CPT | Performed by: EMERGENCY MEDICINE

## 2018-09-11 PROCEDURE — 25010000002 MORPHINE PER 10 MG: Performed by: EMERGENCY MEDICINE

## 2018-09-11 PROCEDURE — 25010000002 PROCHLORPERAZINE EDISYLATE PER 10 MG: Performed by: EMERGENCY MEDICINE

## 2018-09-11 PROCEDURE — 96375 TX/PRO/DX INJ NEW DRUG ADDON: CPT

## 2018-09-11 PROCEDURE — 96372 THER/PROPH/DIAG INJ SC/IM: CPT

## 2018-09-11 PROCEDURE — 74177 CT ABD & PELVIS W/CONTRAST: CPT

## 2018-09-11 RX ORDER — ONDANSETRON 8 MG/1
8 TABLET, ORALLY DISINTEGRATING ORAL EVERY 8 HOURS PRN
Qty: 15 TABLET | Refills: 0 | Status: SHIPPED | OUTPATIENT
Start: 2018-09-11 | End: 2018-12-11

## 2018-09-11 RX ORDER — SODIUM CHLORIDE 0.9 % (FLUSH) 0.9 %
10 SYRINGE (ML) INJECTION AS NEEDED
Status: DISCONTINUED | OUTPATIENT
Start: 2018-09-11 | End: 2018-09-12 | Stop reason: HOSPADM

## 2018-09-11 RX ORDER — TRAMADOL HYDROCHLORIDE 50 MG/1
50 TABLET ORAL EVERY 6 HOURS PRN
Qty: 12 TABLET | Refills: 0 | Status: SHIPPED | OUTPATIENT
Start: 2018-09-11 | End: 2018-12-11

## 2018-09-11 RX ORDER — DICYCLOMINE HYDROCHLORIDE 10 MG/ML
20 INJECTION INTRAMUSCULAR ONCE
Status: COMPLETED | OUTPATIENT
Start: 2018-09-11 | End: 2018-09-11

## 2018-09-11 RX ORDER — HEPARIN SODIUM 1000 [USP'U]/ML
80 INJECTION, SOLUTION INTRAVENOUS; SUBCUTANEOUS ONCE
Status: DISCONTINUED | OUTPATIENT
Start: 2018-09-11 | End: 2018-09-11

## 2018-09-11 RX ORDER — MORPHINE SULFATE 4 MG/ML
4 INJECTION, SOLUTION INTRAMUSCULAR; INTRAVENOUS ONCE
Status: COMPLETED | OUTPATIENT
Start: 2018-09-11 | End: 2018-09-11

## 2018-09-11 RX ADMIN — IOPAMIDOL 85 ML: 612 INJECTION, SOLUTION INTRAVENOUS at 22:47

## 2018-09-11 RX ADMIN — DICYCLOMINE HYDROCHLORIDE 20 MG: 20 INJECTION, SOLUTION INTRAMUSCULAR at 23:02

## 2018-09-11 RX ADMIN — SODIUM CHLORIDE 2000 ML: 9 INJECTION, SOLUTION INTRAVENOUS at 22:31

## 2018-09-11 RX ADMIN — MORPHINE SULFATE 4 MG: 4 INJECTION INTRAVENOUS at 22:36

## 2018-09-11 RX ADMIN — PROCHLORPERAZINE EDISYLATE 10 MG: 5 INJECTION INTRAMUSCULAR; INTRAVENOUS at 22:35

## 2018-09-12 VITALS
DIASTOLIC BLOOD PRESSURE: 64 MMHG | TEMPERATURE: 98.3 F | HEART RATE: 87 BPM | WEIGHT: 202 LBS | HEIGHT: 63 IN | RESPIRATION RATE: 16 BRPM | BODY MASS INDEX: 35.79 KG/M2 | SYSTOLIC BLOOD PRESSURE: 112 MMHG | OXYGEN SATURATION: 97 %

## 2018-09-12 NOTE — ED PROVIDER NOTES
Subjective   Ms. Chen Galaviz is a 28-year-old female presenting to the emergency department with complaints of diffuse sharp abdominal pain with onset two days ago. She also complains of a sharp pain between the shoulder blades, nausea, vomiting, and diarrhea, but denies fever or any urinary symptoms. The patient also denies any recent sick contacts, IVDA, or alcohol use, but she does report smoking approximately one half of a pack of cigarettes per day. She has a hx of cholecystectomy. There are no other acute complaints at this time.        History provided by:  Patient  Abdominal Pain   Pain location:  Generalized  Pain quality: sharp    Pain radiates to:  Back  Pain severity:  Moderate  Onset quality:  Sudden  Duration:  2 days  Timing:  Constant  Progression:  Unchanged  Chronicity:  New  Relieved by:  None tried  Worsened by:  Nothing  Ineffective treatments:  None tried  Associated symptoms: diarrhea, nausea and vomiting    Associated symptoms: no dysuria, no fever and no hematuria    Risk factors: obesity        Review of Systems   Constitutional: Negative for fever.   Gastrointestinal: Positive for abdominal pain, diarrhea, nausea and vomiting.   Genitourinary: Negative for decreased urine volume, dysuria, hematuria and urgency.   All other systems reviewed and are negative.      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  at birth due to placenta previa   • Migraine    • Migraine    • Multiple gestation    • Organic hypersomnia    • Ovarian cyst    • Pain, upper back    • Pulmonary nodule    • Sinus tachycardia        Allergies   Allergen Reactions   • Bupropion Other (See Comments)     Seizure / wellbutrin    • Naproxen Rash   • Nsaids Rash   • Sulfa Antibiotics Rash       Past Surgical History:   Procedure Laterality Date   •  SECTION     •  SECTION WITH TUBAL N/A 2017    Procedure:  SECTION REPEAT WITH TUBAL;  Surgeon: Fabien Blake MD;  Location: [facility] LABOR DELIVERY;  Service:    • CHOLECYSTECTOMY      age 21   • DIAGNOSTIC LAPAROSCOPY     • ENDOMETRIAL ABLATION     • MOUTH SURGERY      A. History of wisdom teeth extraction, age 21   • PELVIC LAPAROSCOPY      age 21   • TONSILLECTOMY      age 23   • WISDOM TOOTH EXTRACTION         Family History   Problem Relation Age of Onset   • Asthma Mother    • Hypertension Mother    • Breast cancer Mother    • Hypertension Father    • Asthma Brother    • Colon cancer Maternal Grandfather    • Ovarian cancer Neg Hx    • Uterine cancer Neg Hx        Social History     Social History   • Marital status: Single     Social History Main Topics   • Smoking status: Current Every Day Smoker     Packs/day: 0.50     Years: 9.00     Types: Cigarettes   • Smokeless tobacco: Never Used      Comment: cutting back, encourage cessation   • Alcohol use No   • Drug use: No   • Sexual activity: Not Currently     Partners: Male     Birth control/ protection: OCP, Injection     Other Topics Concern   • Not on file         Objective   Physical Exam   Constitutional: She is oriented to person, place, and time. She appears well-developed and well-nourished. No distress.   HENT:   Head: Normocephalic and atraumatic.   Nose: Nose normal.   Eyes: Conjunctivae are normal. No scleral icterus.   Neck: Normal range of motion. Neck supple.   Cardiovascular: Normal rate, regular rhythm,  normal heart sounds and intact distal pulses.    No murmur heard.  Pulmonary/Chest: Effort normal and breath sounds normal. No respiratory distress.   Abdominal: Soft. Bowel sounds are normal. There is tenderness.   TTP to all four quadrants diffusely. No CVA TTP.   Musculoskeletal: Normal range of motion.   Neurological: She is alert and oriented to person, place, and time.   Skin: Skin is warm and dry.   Psychiatric: She has a normal mood and affect. Her behavior is normal.   Nursing note and vitals reviewed.      Procedures         ED Course  ED Course as of Sep 12 0404   Tue Sep 11, 2018   2348 Labs demonstrate no emergent findings with evidence of mild volume depletion.  CT scan demonstrates no emergent findings or surgical etiology.  We'll discharge the patient home symptomatically management follow-up with her doctor if symptoms persist return to ER for any concerns or worsening.  She voices understanding and agrees.  [RS]      ED Course User Index  [RS] David Ruiz MD     Recent Results (from the past 24 hour(s))   Comprehensive Metabolic Panel    Collection Time: 09/11/18  7:56 PM   Result Value Ref Range    Glucose 90 70 - 100 mg/dL    BUN 13 9 - 23 mg/dL    Creatinine 0.85 0.60 - 1.30 mg/dL    Sodium 141 132 - 146 mmol/L    Potassium 3.6 3.5 - 5.5 mmol/L    Chloride 104 99 - 109 mmol/L    CO2 27.0 20.0 - 31.0 mmol/L    Calcium 8.5 (L) 8.7 - 10.4 mg/dL    Total Protein 6.9 5.7 - 8.2 g/dL    Albumin 4.74 3.20 - 4.80 g/dL    ALT (SGPT) 28 7 - 40 U/L    AST (SGOT) 34 (H) 0 - 33 U/L    Alkaline Phosphatase 137 (H) 25 - 100 U/L    Total Bilirubin 0.3 0.3 - 1.2 mg/dL    eGFR Non African Amer 80 >60 mL/min/1.73    Globulin 2.2 gm/dL    A/G Ratio 2.2 1.5 - 2.5 g/dL    BUN/Creatinine Ratio 15.3 7.0 - 25.0    Anion Gap 10.0 3.0 - 11.0 mmol/L   Lipase    Collection Time: 09/11/18  7:56 PM   Result Value Ref Range    Lipase 28 6 - 51 U/L   Light Blue Top    Collection Time: 09/11/18  7:56 PM   Result Value  Ref Range    Extra Tube hold for add-on    Green Top (Gel)    Collection Time: 09/11/18  7:56 PM   Result Value Ref Range    Extra Tube Hold for add-ons.    Lavender Top    Collection Time: 09/11/18  7:56 PM   Result Value Ref Range    Extra Tube hold for add-on    Gold Top - SST    Collection Time: 09/11/18  7:56 PM   Result Value Ref Range    Extra Tube Hold for add-ons.    CBC Auto Differential    Collection Time: 09/11/18  7:56 PM   Result Value Ref Range    WBC 9.60 3.50 - 10.80 10*3/mm3    RBC 5.04 3.89 - 5.14 10*6/mm3    Hemoglobin 14.0 11.5 - 15.5 g/dL    Hematocrit 42.0 34.5 - 44.0 %    MCV 83.3 80.0 - 99.0 fL    MCH 27.8 27.0 - 31.0 pg    MCHC 33.3 32.0 - 36.0 g/dL    RDW 14.3 11.3 - 14.5 %    RDW-SD 43.3 37.0 - 54.0 fl    MPV 11.1 6.0 - 12.0 fL    Platelets 296 150 - 450 10*3/mm3    Neutrophil % 71.8 (H) 41.0 - 71.0 %    Lymphocyte % 21.0 (L) 24.0 - 44.0 %    Monocyte % 7.0 0.0 - 12.0 %    Eosinophil % 0.0 0.0 - 3.0 %    Basophil % 0.2 0.0 - 1.0 %    Immature Grans % 0.2 0.0 - 0.6 %    Neutrophils, Absolute 6.89 1.50 - 8.30 10*3/mm3    Lymphocytes, Absolute 2.02 0.60 - 4.80 10*3/mm3    Monocytes, Absolute 0.67 0.00 - 1.00 10*3/mm3    Eosinophils, Absolute 0.00 0.00 - 0.30 10*3/mm3    Basophils, Absolute 0.02 0.00 - 0.20 10*3/mm3    Immature Grans, Absolute 0.02 0.00 - 0.03 10*3/mm3   Urinalysis With Microscopic If Indicated (No Culture) - Urine, Clean Catch    Collection Time: 09/11/18  9:10 PM   Result Value Ref Range    Color, UA Dark Yellow (A) Yellow, Straw    Appearance, UA Clear Clear    pH, UA 7.0 5.0 - 8.0    Specific Gravity, UA 1.041 (H) 1.001 - 1.030    Glucose, UA Negative Negative    Ketones, UA 80 mg/dL (3+) (A) Negative    Bilirubin, UA Small (1+) (A) Negative    Blood, UA Negative Negative    Protein, UA 30 mg/dL (1+) (A) Negative    Leuk Esterase, UA Trace (A) Negative    Nitrite, UA Negative Negative    Urobilinogen, UA 1.0 E.U./dL 0.2 - 1.0 E.U./dL   Urinalysis, Microscopic Only -  Urine, Clean Catch    Collection Time: 09/11/18  9:10 PM   Result Value Ref Range    RBC, UA 3-6 (A) None Seen, 0-2 /HPF    WBC, UA 3-5 (A) None Seen, 0-2 /HPF    Bacteria, UA None Seen None Seen, Trace /HPF    Squamous Epithelial Cells, UA 7-12 (A) None Seen, 0-2 /HPF    Hyaline Casts, UA 13-20 0 - 6 /LPF    Methodology Manual Light Microscopy    POCT pregnancy, urine    Collection Time: 09/11/18  9:16 PM   Result Value Ref Range    HCG, Urine, QL Negative Negative    Lot Number [id number]     Internal Positive Control Positive     Internal Negative Control Negative      Note: In addition to lab results from this visit, the labs listed above may include labs taken at another facility or during a different encounter within the last 24 hours. Please correlate lab times with ED admission and discharge times for further clarification of the services performed during this visit.    CT Abdomen Pelvis With Contrast   Final Result      1. No acute findings.      2. Non-acute findings are described above.      THIS DOCUMENT HAS BEEN ELECTRONICALLY SIGNED BY GABBIE ALONZO MD        Vitals:    09/11/18 2110 09/11/18 2300 09/11/18 2306 09/12/18 0000   BP: 131/88 126/84  112/64   BP Location:       Patient Position:       Pulse: 96  87    Resp:       Temp:       TempSrc:       SpO2:  95%  97%   Weight:       Height:         Medications   sodium chloride 0.9 % bolus 2,000 mL (0 mL Intravenous Stopped 9/12/18 0015)   dicyclomine (BENTYL) injection 20 mg (20 mg Intramuscular Given 9/11/18 2302)   prochlorperazine (COMPAZINE) injection 10 mg (10 mg Intravenous Given 9/11/18 2235)   Morphine sulfate (PF) injection 4 mg (4 mg Intravenous Given 9/11/18 2236)   iopamidol (ISOVUE-300) 61 % injection 100 mL (85 mL Intravenous Given 9/11/18 2247)     ECG/EMG Results (last 24 hours)     ** No results found for the last 24 hours. **                        MDM  Number of Diagnoses or Management Options  Nausea, vomiting, and diarrhea:   Upper  abdominal pain:   Volume depletion:      Amount and/or Complexity of Data Reviewed  Clinical lab tests: reviewed  Tests in the radiology section of CPT®: reviewed  Review and summarize past medical records: yes  Independent visualization of images, tracings, or specimens: yes        Final diagnoses:   Nausea, vomiting, and diarrhea   Upper abdominal pain   Volume depletion       Documentation assistance provided by jame Streeter.  Information recorded by the scribe was done at my direction and has been verified and validated by me.     Leonard Streeter  09/11/18 5721       David Ruiz MD  09/12/18 1671

## 2018-09-14 ENCOUNTER — OFFICE VISIT (OUTPATIENT)
Dept: RETAIL CLINIC | Facility: CLINIC | Age: 28
End: 2018-09-14

## 2018-09-14 VITALS
SYSTOLIC BLOOD PRESSURE: 126 MMHG | TEMPERATURE: 98.6 F | BODY MASS INDEX: 36.5 KG/M2 | HEIGHT: 63 IN | RESPIRATION RATE: 20 BRPM | WEIGHT: 206 LBS | DIASTOLIC BLOOD PRESSURE: 84 MMHG | HEART RATE: 84 BPM | OXYGEN SATURATION: 98 %

## 2018-09-14 DIAGNOSIS — R35.0 FREQUENCY OF URINATION: Primary | ICD-10-CM

## 2018-09-14 LAB

## 2018-09-14 PROCEDURE — 81003 URINALYSIS AUTO W/O SCOPE: CPT | Performed by: NURSE PRACTITIONER

## 2018-09-14 PROCEDURE — 99214 OFFICE O/P EST MOD 30 MIN: CPT | Performed by: NURSE PRACTITIONER

## 2018-09-14 PROCEDURE — 81025 URINE PREGNANCY TEST: CPT | Performed by: NURSE PRACTITIONER

## 2018-09-14 RX ORDER — IBUPROFEN 800 MG/1
800 TABLET ORAL EVERY 6 HOURS PRN
Qty: 20 TABLET | Refills: 0 | Status: SHIPPED | OUTPATIENT
Start: 2018-09-14 | End: 2019-08-14 | Stop reason: SDUPTHER

## 2018-09-14 RX ORDER — NITROFURANTOIN 25; 75 MG/1; MG/1
100 CAPSULE ORAL EVERY 12 HOURS SCHEDULED
Qty: 14 CAPSULE | Refills: 0 | Status: SHIPPED | OUTPATIENT
Start: 2018-09-14 | End: 2018-09-21

## 2018-09-14 NOTE — PATIENT INSTRUCTIONS
Urinary Tract Infection, Adult  A urinary tract infection (UTI) is an infection of any part of the urinary tract. The urinary tract includes the:  · Kidneys.  · Ureters.  · Bladder.  · Urethra.    These organs make, store, and get rid of pee (urine) in the body.  Follow these instructions at home:  · Take over-the-counter and prescription medicines only as told by your doctor.  · If you were prescribed an antibiotic medicine, take it as told by your doctor. Do not stop taking the antibiotic even if you start to feel better.  · Avoid the following drinks:  ? Alcohol.  ? Caffeine.  ? Tea.  ? Carbonated drinks.  · Drink enough fluid to keep your pee clear or pale yellow.  · Keep all follow-up visits as told by your doctor. This is important.  · Make sure to:  ? Empty your bladder often and completely. Do not to hold pee for long periods of time.  ? Empty your bladder before and after sex.  ? Wipe from front to back after a bowel movement if you are female. Use each tissue one time when you wipe.  Contact a doctor if:  · You have back pain.  · You have a fever.  · You feel sick to your stomach (nauseous).  · You throw up (vomit).  · Your symptoms do not get better after 3 days.  · Your symptoms go away and then come back.  Get help right away if:  · You have very bad back pain.  · You have very bad lower belly (abdominal) pain.  · You are throwing up and cannot keep down any medicines or water.  This information is not intended to replace advice given to you by your health care provider. Make sure you discuss any questions you have with your health care provider.  Document Released: 06/05/2009 Document Revised: 05/25/2017 Document Reviewed: 11/07/2016  [employer] Patient Education © 2018 [employer]    Follow up with Crystal Moreno as previously recommended  Go to ER if pain worsens or you develop new symptoms  Drink plenty of water and other decaffeinated fluids  We will notify you with urine culture  results, in the meantime, begin antibiotics as directed

## 2018-09-15 NOTE — PROGRESS NOTES
"Subjective   Urinary Tract Infection    Chen Galaviz is a 28 y.o. female who reports \"I think I have a kidney infection\".  Patient was seen in ER on 9/11/18 for abd pain with n/v, but has now developed right flank pain. Had CT scan of the abdomen while in ER, which was negative for acute findings. According to patient, Urinalysis was abnormal at that visit. She is being treated with Lupron for endometriosis and ovarian cysts. She has had multiple radiographs (CT, US, plain film spine) recently for various complaints. She does not want to go to ER today and is says she is not able to get appt with her PCP.     Urinary Tract Infection    This is a new problem. The current episode started today. The problem occurs intermittently. The problem has been unchanged. The quality of the pain is described as aching. The pain is at a severity of 5/10. The pain is moderate. There has been no fever. She is sexually active. There is no history of pyelonephritis. Associated symptoms include flank pain, frequency and nausea. Pertinent negatives include no chills, discharge, hematuria, hesitancy, possible pregnancy, sweats, urgency or vomiting. She has tried increased fluids for the symptoms. The treatment provided no relief. There is no history of kidney stones or recurrent UTIs.        History Obtained from: Patient    Past Medical History:   Diagnosis Date   • Allergic rhinitis    • Anxiety    • Arthritis     since age 21 in her back   • Asthma     seasonal   • Back pain    • Bipolar disorder (CMS/HCC)     dx age 19   • Chest pain, pleuritic    • Depression    • Dyslipidemia    • Endometriosis    • Fatigue    • Febrile seizure (CMS/HCC)     FEBRILE SEIZURE AT 3YO AND AT 16YO D/T WELBUTRIN   • Frequent UTI    • GERD (gastroesophageal reflux disease)    • Gestational hypertension    • Headache    • Herpes zoster     denies this visit   • Hypertension     chronic since age 23   • Itching    • 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 History of wisdom teeth extraction, age 21   • PELVIC LAPAROSCOPY      age 21   • TONSILLECTOMY      age 23   • WISDOM TOOTH EXTRACTION       Social History     Social History   • Marital status: Single     Spouse name: N/A   • Number of children: N/A   • Years of education: N/A     Occupational History   • Not on file.     Social History Main Topics   • Smoking status: Current Every Day Smoker     Packs/day: 0.50     Years: 9.00     Types: Cigarettes   • Smokeless tobacco: Never Used      Comment: cutting back, encourage cessation   • Alcohol use No   • Drug use: No   • Sexual activity: Not Currently     Partners: Male     Birth control/ protection: OCP, Injection     Other Topics Concern   • Not on file     Social History Narrative   • No narrative on file     Family History   Problem Relation Age of Onset   • Asthma Mother    • Hypertension Mother    • Breast cancer Mother    • Hypertension Father    • Asthma Brother    • Colon cancer Maternal Grandfather    • Ovarian cancer Neg Hx    • Uterine cancer Neg Hx      Allergies   Allergen Reactions   • Bupropion Other (See Comments)     Seizure / wellbutrin    • Naproxen Rash   • Nsaids Rash   • Sulfa Antibiotics Rash     Current Outpatient Prescriptions   Medication Sig Dispense Refill   • busPIRone (BUSPAR) 7.5 MG tablet Take 1 tablet by mouth 2 (Two) Times a Day.  1   •  butalbital-acetaminophen-caffeine (FIORICET) -40 MG capsule capsule Take 1 capsule by mouth Every 4 (Four) Hours As Needed (Headache). 15 capsule 1   • FLUoxetine (PROzac) 10 MG capsule Take 1 capsule by mouth Daily.  1   • hyoscyamine (LEVSIN) 0.125 MG SL tablet Take 1 tablet by mouth Every 4 (Four) Hours As Needed for Cramping. 15 tablet 0   • lamoTRIgine (LaMICtal) 150 MG tablet Take 1 tablet by mouth Daily.  1   • leuprolide (LUPRON DEPOT, 1-MONTH,) 3.75 MG injection Inject 3.75 mg into the shoulder, thigh, or buttocks Every 28 (Twenty-Eight) Days. 1 each 5   • MYRBETRIQ 50 MG tablet sustained-release 24 hour 24 hr tablet Take 1 tablet by mouth Daily.  3   • norethindrone (AYGESTIN) 5 MG tablet Take 2 tablets by mouth Daily. 60 tablet 3   • ondansetron ODT (ZOFRAN-ODT) 8 MG disintegrating tablet Take 1 tablet by mouth Every 8 (Eight) Hours As Needed for Nausea or Vomiting. 15 tablet 0   • oxybutynin XL (DITROPAN-XL) 5 MG 24 hr tablet oxybutynin chloride ER 5 mg tablet,extended release 24 hr   TAKE 1 TABLET BY MOUTH EVERY DAY.     • PROAIR  (90 Base) MCG/ACT inhaler INHALE 1-2 PUFFS EVERY 4-6 HOURS AS NEEDED FOR SHORTNESS OF BREATH 8.5 inhaler 0   • promethazine (PHENERGAN) 25 MG tablet TAKE 1 TABLET BY MOUTH EVERY 6 (SIX) HOURS AS NEEDED FOR NAUSEA OR VOMITING. 30 tablet 3   • traMADol (ULTRAM) 50 MG tablet Take 1 tablet by mouth Every 6 (Six) Hours As Needed for Moderate Pain . 12 tablet 0   • traZODone (DESYREL) 50 MG tablet Take 1 tablet by mouth Every Night.  1   • ibuprofen (ADVIL,MOTRIN) 800 MG tablet Take 1 tablet by mouth Every 6 (Six) Hours As Needed for Moderate Pain . 20 tablet 0   • nitrofurantoin, macrocrystal-monohydrate, (MACROBID) 100 MG capsule Take 1 capsule by mouth Every 12 (Twelve) Hours for 7 days. 14 capsule 0     No current facility-administered medications for this visit.         The following portions of the patient's history were reviewed and updated as appropriate:  "allergies, current medications, past family history, past medical history, past social history and past surgical history.    Review of Systems   Constitutional: Positive for fatigue. Negative for chills and fever.   HENT: Negative.    Eyes: Negative.    Respiratory: Negative for cough, shortness of breath and wheezing.    Cardiovascular: Negative for chest pain and palpitations.   Gastrointestinal: Positive for nausea. Negative for abdominal pain, diarrhea and vomiting.   Genitourinary: Positive for flank pain and frequency. Negative for hematuria, hesitancy, urgency, vaginal bleeding and vaginal discharge.   Musculoskeletal: Negative for gait problem, myalgias, neck pain and neck stiffness.   Skin: Negative.    Allergic/Immunologic: Negative for immunocompromised state.   Neurological: Negative for dizziness, weakness, light-headedness and headaches.   Hematological: Negative for adenopathy. Does not bruise/bleed easily.   Psychiatric/Behavioral: Negative for agitation and dysphoric mood.       Objective     VITAL SIGNS:   Vitals:    09/14/18 1007   BP: 126/84   BP Location: Left arm   Patient Position: Sitting   Pulse: 84   Resp: 20   Temp: 98.6 °F (37 °C)   TempSrc: Oral   SpO2: 98%   Weight: 93.4 kg (206 lb)   Height: 160 cm (63\")   Body mass index is 36.49 kg/m².    Physical Exam   Constitutional: She is cooperative.  Non-toxic appearance. Distressed: uncomfortable appearing.   HENT:   Head: Normocephalic and atraumatic.   Right Ear: External ear normal.   Left Ear: External ear normal.   Nose: Nose normal.   Mouth/Throat: Uvula is midline, oropharynx is clear and moist and mucous membranes are normal.   Eyes: Pupils are equal, round, and reactive to light. EOM and lids are normal. No scleral icterus.   Neck: Normal range of motion. Neck supple. No tracheal deviation present.   Cardiovascular: Normal rate and regular rhythm.    No murmur heard.  Pulmonary/Chest: Effort normal and breath sounds normal. "   Abdominal: Soft. Bowel sounds are normal. There is no hepatosplenomegaly. There is no tenderness. There is CVA tenderness (mild, right sided).   Musculoskeletal: Normal range of motion. She exhibits no edema or deformity.   Lymphadenopathy:     She has no cervical adenopathy.        Right: No supraclavicular adenopathy present.        Left: No supraclavicular adenopathy present.   Neurological: She is alert. She is not disoriented. Gait normal.   Skin: Skin is warm and dry. Capillary refill takes less than 2 seconds. No cyanosis. No pallor.   Psychiatric: Her behavior is normal. She is attentive.       LABS:   Results for orders placed or performed in visit on 09/14/18   POC Urinalysis Dipstick, Automated   Result Value Ref Range    Color Yellow Yellow, Straw, Dark Yellow, [place]    Clarity, UA Clear Clear    Specific Gravity  1.015 1.005 - 1.030    pH, Urine 6.0 5.0 - 8.0    Leukocytes Negative Negative    Nitrite, UA Negative Negative    Protein, POC Negative Negative mg/dL    Glucose, UA Negative Negative, 1000 mg/dL (3+) mg/dL    Ketones, UA Negative Negative    Urobilinogen, UA Normal Normal    Bilirubin Negative Negative    Blood, UA Negative Negative   POCT pregnancy, urine   Result Value Ref Range    HCG, Urine, QL Negative Negative    Lot Number [id number]     Internal Positive Control Positive     Internal Negative Control Negative        CLINICAL QUALITY MEASURES:  Tobacco Screening & Intervention Tobacco user, received tobacco cessation counseling. Current every day smoker 3-10 mintues spent counseling Has reduced Tobbacos use   WEIGHT SCREENING/BMI  Not eligible, overweight & managed by other physician     Assessment/Plan     Chen was seen today for urinary tract infection.    Diagnoses and all orders for this visit:    Frequency of urination  -     POC Urinalysis Dipstick, Automated  -     POCT pregnancy, urine  -     Urine Culture - Urine, Urine, Clean Catch    Other orders  -     nitrofurantoin,  macrocrystal-monohydrate, (MACROBID) 100 MG capsule; Take 1 capsule by mouth Every 12 (Twelve) Hours for 7 days.  -     ibuprofen (ADVIL,MOTRIN) 800 MG tablet; Take 1 tablet by mouth Every 6 (Six) Hours As Needed for Moderate Pain .        PLAN:  Patient with recent dehydration with concentrated urine and hyaline casts seen with UA on 9/11/18. DD include nephrolithiasis. Will treat with NSAIDs and have patient follow with PCP. Will begin antibiotics until culture is available.  Patient should follow up with primary care provider next week. ER if symptoms worsen or for the development of new symptoms that need attention.    The patient voiced understanding and agreement to the patient treatment plan and instructions     FRANCHESKA Weston

## 2018-09-16 ENCOUNTER — TELEPHONE (OUTPATIENT)
Dept: RETAIL CLINIC | Facility: CLINIC | Age: 28
End: 2018-09-16

## 2018-09-16 LAB
BACTERIA UR CULT: NORMAL
BACTERIA UR CULT: NORMAL

## 2018-09-27 ENCOUNTER — OFFICE VISIT (OUTPATIENT)
Dept: OBSTETRICS AND GYNECOLOGY | Facility: CLINIC | Age: 28
End: 2018-09-27

## 2018-09-27 VITALS
DIASTOLIC BLOOD PRESSURE: 70 MMHG | WEIGHT: 204 LBS | SYSTOLIC BLOOD PRESSURE: 118 MMHG | RESPIRATION RATE: 16 BRPM | BODY MASS INDEX: 36.14 KG/M2

## 2018-09-27 DIAGNOSIS — R39.15 URINARY URGENCY: ICD-10-CM

## 2018-09-27 DIAGNOSIS — N39.3 SUI (STRESS URINARY INCONTINENCE, FEMALE): Primary | ICD-10-CM

## 2018-09-27 DIAGNOSIS — K59.01 SLOW TRANSIT CONSTIPATION: ICD-10-CM

## 2018-09-27 PROCEDURE — 99212 OFFICE O/P EST SF 10 MIN: CPT | Performed by: OBSTETRICS & GYNECOLOGY

## 2018-09-27 NOTE — PROGRESS NOTES
Subjective   Chief Complaint   Patient presents with   • Consult     wants to discuss surgery     Chen Galaviz is a 28 y.o. year old  presenting to be seen because of pelvic pain.  She said this occurs about 70% of the time.  Every other day possibly.  Asked what makes it worse and she said it's worse if she coughs last sneezes or so strain have a bowel movement or urination.  Bowels may move every other day she uses some MiraLAX about every other day.  I reviewed her medications and it appears she is on Myrbetriq 50 mg and also Ditropan.  I told her that they're both for urinary urgency.  She reports she does have urgency and also stress urinary incontinence.  Kegel exercises help some.  I want her to stop the Ditropan.  Discussed both of those may contribute to constipation.  It appears that Linzess is covered so give her a sample of an try this for constipation as well.  She thinks Lupron was calls her migraines get worse so she doesn't want to take the fourth shot.  Discussed that we'll probably just wait and see how she does off of the Lupron regarding her history of endometriosis.  She is status post  lap per Ben P 2018.  Discussed that it doesn't sound like it's her main cause of pain is necessarily related to endometriosis.  Sounds like it's associated with increased abdominal pressure with Valsalva.     Past 6 month menstrual and contraceptive history:    No LMP recorded. Patient is not currently having periods (Reason: Other).  The ablation has been effective                              The following portions of the patient's history were reviewed and updated as appropriate:problem list, current medications and allergies    Review of Systems no fever no vaginal discharge bladder and bowels as above     Objective   /70   Resp 16   Wt 92.5 kg (204 lb)   BMI 36.14 kg/m²     General:  well developed; well nourished  no acute distress  appears stated age   Skin:  No suspicious lesions  seen   Thyroid: not examined   Lungs:  breathing is unlabored   Heart:  Not performed.       Abdomen: soft, non-tender; no masses  no umbilical or inginual hernias are present  no hepato-splenomegaly   Pelvis: Not performed.     Lab Review   No data reviewed    Imaging   CT of abdomen/pelvis report  Pelvic ultrasound report       Assessment   1. She has abdominal pelvic pain but this sounds more like potentially have prolapse issue?  Necessarily related to endometriosis which was treated for months ago and also with 3 months of Lupron the last shot August 30.  2. Encourage her to continue or Kegel exercises and timed voiding for ZAINAB  3. Regarding constipation will stop one of her anticholinergics and at Linzess daily warned about side effects of cramping     Plan   1. As above we'll see her back in about 3 months and reassess things but as I mentioned will avoid surgery if a hysterectomy at this point.    Medications Rx this encounter:  New Medications Ordered This Visit   Medications   • linaclotide (LINZESS) 145 MCG capsule capsule     Sig: Take 1 capsule by mouth Every Morning Before Breakfast. Take 30 minutes before first daily meal.     Dispense:  30 capsule     Refill:  5          This note was electronically signed.    Amador Richey MD  September 27, 2018

## 2018-10-08 ENCOUNTER — TELEPHONE (OUTPATIENT)
Dept: OBSTETRICS AND GYNECOLOGY | Facility: CLINIC | Age: 28
End: 2018-10-08

## 2018-12-11 ENCOUNTER — OFFICE VISIT (OUTPATIENT)
Dept: OBSTETRICS AND GYNECOLOGY | Facility: CLINIC | Age: 28
End: 2018-12-11

## 2018-12-11 VITALS
DIASTOLIC BLOOD PRESSURE: 80 MMHG | WEIGHT: 204 LBS | BODY MASS INDEX: 36.14 KG/M2 | SYSTOLIC BLOOD PRESSURE: 120 MMHG | RESPIRATION RATE: 16 BRPM

## 2018-12-11 DIAGNOSIS — N91.2 AMENORRHEA: ICD-10-CM

## 2018-12-11 DIAGNOSIS — N94.10 DYSPAREUNIA, FEMALE: ICD-10-CM

## 2018-12-11 DIAGNOSIS — N80.9 ENDOMETRIOSIS DETERMINED BY LAPAROSCOPY: ICD-10-CM

## 2018-12-11 DIAGNOSIS — Z98.890 S/P ENDOMETRIAL ABLATION: ICD-10-CM

## 2018-12-11 DIAGNOSIS — N94.6 DYSMENORRHEA: Primary | ICD-10-CM

## 2018-12-11 PROBLEM — Z98.891 PREVIOUS CESAREAN SECTION: Status: RESOLVED | Noted: 2018-04-11 | Resolved: 2018-12-11

## 2018-12-11 PROBLEM — N92.0 MENORRHAGIA: Status: RESOLVED | Noted: 2018-04-11 | Resolved: 2018-12-11

## 2018-12-11 PROCEDURE — 99214 OFFICE O/P EST MOD 30 MIN: CPT | Performed by: OBSTETRICS & GYNECOLOGY

## 2018-12-11 RX ORDER — HYDROXYZINE HYDROCHLORIDE 25 MG/1
TABLET, FILM COATED ORAL
Refills: 2 | COMMUNITY
Start: 2018-11-27 | End: 2019-01-21

## 2018-12-11 RX ORDER — SODIUM CHLORIDE, SODIUM LACTATE, POTASSIUM CHLORIDE, CALCIUM CHLORIDE 600; 310; 30; 20 MG/100ML; MG/100ML; MG/100ML; MG/100ML
125 INJECTION, SOLUTION INTRAVENOUS CONTINUOUS
Status: CANCELLED | OUTPATIENT
Start: 2018-12-11

## 2018-12-11 RX ORDER — SODIUM CHLORIDE 0.9 % (FLUSH) 0.9 %
1-10 SYRINGE (ML) INJECTION AS NEEDED
Status: CANCELLED | OUTPATIENT
Start: 2018-12-11

## 2018-12-11 RX ORDER — VENLAFAXINE HYDROCHLORIDE 75 MG/1
75 CAPSULE, EXTENDED RELEASE ORAL EVERY MORNING
Refills: 2 | COMMUNITY
Start: 2018-12-04 | End: 2021-04-21

## 2018-12-11 RX ORDER — PROMETHAZINE HYDROCHLORIDE 12.5 MG/1
TABLET ORAL
Refills: 1 | COMMUNITY
Start: 2018-11-05 | End: 2019-01-21

## 2018-12-11 RX ORDER — SODIUM CHLORIDE 0.9 % (FLUSH) 0.9 %
3 SYRINGE (ML) INJECTION EVERY 12 HOURS SCHEDULED
Status: CANCELLED | OUTPATIENT
Start: 2018-12-11

## 2018-12-11 RX ORDER — VARENICLINE TARTRATE 1 MG/1
TABLET, FILM COATED ORAL SEE ADMIN INSTRUCTIONS
Refills: 1 | COMMUNITY
Start: 2018-11-30 | End: 2019-01-21

## 2018-12-11 RX ORDER — RISPERIDONE 1 MG/1
TABLET ORAL
Refills: 2 | COMMUNITY
Start: 2018-12-04 | End: 2019-01-21

## 2018-12-11 RX ORDER — GLYCOPYRROLATE 1 MG/1
1 TABLET ORAL 2 TIMES DAILY
Refills: 8 | COMMUNITY
Start: 2018-11-17 | End: 2021-04-21

## 2018-12-11 RX ORDER — OXYBUTYNIN CHLORIDE 5 MG/1
5 TABLET, EXTENDED RELEASE ORAL DAILY
Refills: 3 | COMMUNITY
Start: 2018-11-07 | End: 2021-04-21

## 2018-12-11 NOTE — PROGRESS NOTES
Subjective   Chief Complaint   Patient presents with   • Follow-up     on meds and desires surg     Chen Galaviz is a 28 y.o. year old  presenting to be seen for her annual exam.    Current birth control method: tubal ligation.     No LMP recorded. Patient is not currently having periods (Reason: Other). s/p ablation  If bleeding it lasts 3 days not heavy but she has bad cramps - Midol not helping neither does Motrin or Aleve  She is taking Lupron for 3 cycles or months and does not notice much of improvement taking no medication.  Getting sore frustrated with the pain she's having along with painful intercourse or lack of intercourse because of this pain.  She has been emergency room in August and September had ultrasound and CT scan of abdomen and pelvis both essentially normal.  The os and showed some cysts but CAT scan did not show any significant abnormalities.  This would be consistent with possible endometriosis not showing a mass.    She is sexually active.   Condoms are not typically used.          She exercises regularly: no. 3 young children 3 yo twins  Calcium intake: is not} adequate.  Caffeine intake:moderate  She performs self breast exam:no.  She has concerns about domestic violence: no.    The following portions of the patient's history were reviewed and updated as appropriate:problem list, current medications, allergies, past family history, past medical history, past social history and past surgical history.    Review of Systems    normal bladder and bowels - BM 1-3 x day and voids 6+ x daily; 2x at night  Objective   /80   Resp 16   Wt 92.5 kg (204 lb)   BMI 36.14 kg/m²      General:  well developed; well nourished  no acute distress  appears stated age   Skin:  No suspicious lesions seen   Thyroid:    Breasts:  Not performed.   Abdomen: no umbilical or inguinal hernias are present  no hepato-splenomegaly  soft but tender in lower half   Pelvis: Clinical staff was present for  exam  External genitalia:  normal appearance of the external genitalia including Bartholin's and [place]'s glands.  :  urethral meatus normal; Bladder slightly tender  Uterus:  normal size, shape and consistency. tenderness to palpation is present;  Adnexa:  Guarding bilaterally  Rectal:  digital rectal exam not performed; anus visually normal appearing.     Lab Review   No data reviewed    Imaging  CT of abdomen/pelvis report  Pelvic ultrasound report       Assessment   1. Chronic pelvic pain with history of endometriosis and laparoscopy 2018.  She also had lysis of adhesions from a dense scar associated with  section.  She is status post 3 months of Lupron with no improvement.  She has hypomenorrhea or amenorrhea associated with endometrial ablation but still has dysmenorrhea.  Also has dyspareunia.  Emergency room visits in August and September of this year.  Options discussed.  She would rather proceed with hysterectomy at this point.  I will like to preserve her ovaries.  She is status post tubal ligation.     Plan   1. 1000 mg calcium in divided doses ideally in diet; regular exercise  2. Self breast awareness and mammogram protocols discussed.  3. I will place orders for prep for surgery try to get T LH bilateral salpingectomy appropriate.  There may be adhesions and endometriosis to be treated at same time due to her young age it would like to preserve her ovaries.  We'll give her information regarding hysterectomy.      Medications Rx this encounter:  No orders of the defined types were placed in this encounter.         This note was electronically signed. Approximately 25 including risks, case planned hysterectomy which is her preference given failure of conservative medical and surgical management.    Amador Rihcey MD  2018

## 2018-12-12 PROBLEM — N94.10 DYSPAREUNIA, FEMALE: Status: ACTIVE | Noted: 2018-12-12

## 2018-12-17 ENCOUNTER — TELEPHONE (OUTPATIENT)
Dept: OBSTETRICS AND GYNECOLOGY | Facility: CLINIC | Age: 28
End: 2018-12-17

## 2018-12-17 NOTE — TELEPHONE ENCOUNTER
PT IS CALLING SHE IS SCHEDULED FOR SURGERY NEXT MONTH BUT SHE IS CALLING AND SHE IS HAVING PAIN - MORE THAN USUAL AND IS WANTING TO KNOW WHAT SHE CAN DO ABOUT IT - PLEASE CALL HER AT [phone][

## 2018-12-18 ENCOUNTER — TELEPHONE (OUTPATIENT)
Dept: OBSTETRICS AND GYNECOLOGY | Facility: CLINIC | Age: 28
End: 2018-12-18

## 2018-12-18 NOTE — TELEPHONE ENCOUNTER
The surgery scheduler looks fairly full with have to check with Romi.  I think she scheduled in January.  Lighted she canceled her appointment for tomorrow.  We'll need to use Motrin type drugs potentially Toradol for few days of intense and alternate with Tylenol.

## 2018-12-18 NOTE — TELEPHONE ENCOUNTER
PT IS CALLING BACK ABOUT PAIN MEDS- SHE IS HAVING MORE PAIN THAN NORMAL AS WELL SHE WANTS TO KNOW IF DR OBANDO HAS ANY CANCELLATIONS FOR SURGERY - SHE WOULD LIKE TO MOVE UP IF AT ALL POSSIBLE  PLEASE CALL HER [phone]

## 2018-12-19 ENCOUNTER — TELEPHONE (OUTPATIENT)
Dept: OBSTETRICS AND GYNECOLOGY | Facility: CLINIC | Age: 28
End: 2018-12-19

## 2018-12-19 RX ORDER — IBUPROFEN 800 MG/1
800 TABLET ORAL EVERY 6 HOURS PRN
Qty: 30 TABLET | Refills: 0 | Status: SHIPPED | OUTPATIENT
Start: 2018-12-19 | End: 2019-01-21

## 2018-12-19 NOTE — TELEPHONE ENCOUNTER
Called pt back re: moving up date for surgery, explained that there is availability before h1/23. She said she understands and appreciates any med that can be sent in to help her get through this period that is trying to start.

## 2018-12-19 NOTE — TELEPHONE ENCOUNTER
PT CALLED STATES THE TRAMADOL THAT DR OBANDO PRESCRIBED FOR HER PAIN HER INSURANCE WILL NOT COVER AND SHE IS CALLING ASKING FOR SOMETHING THAT HER INSURANCE WILL COVER - PLEASE CALL HER .[phone]

## 2018-12-23 ENCOUNTER — HOSPITAL ENCOUNTER (EMERGENCY)
Facility: HOSPITAL | Age: 28
Discharge: HOME | End: 2018-12-23
Attending: EMERGENCY MEDICINE | Admitting: EMERGENCY MEDICINE

## 2018-12-23 VITALS
TEMPERATURE: 97.3 F | HEART RATE: 95 BPM | OXYGEN SATURATION: 94 % | HEIGHT: 63 IN | WEIGHT: 200 LBS | SYSTOLIC BLOOD PRESSURE: 142 MMHG | RESPIRATION RATE: 16 BRPM | BODY MASS INDEX: 35.44 KG/M2 | DIASTOLIC BLOOD PRESSURE: 86 MMHG

## 2018-12-23 DIAGNOSIS — S46.912A MUSCLE STRAIN OF LEFT UPPER ARM, INITIAL ENCOUNTER: Primary | ICD-10-CM

## 2018-12-23 PROCEDURE — 99283 EMERGENCY DEPT VISIT LOW MDM: CPT

## 2018-12-23 RX ORDER — PREDNISONE 50 MG/1
50 TABLET ORAL DAILY
Qty: 5 TABLET | Refills: 0 | Status: SHIPPED | OUTPATIENT
Start: 2018-12-23 | End: 2019-01-21

## 2018-12-23 NOTE — ED PROVIDER NOTES
Subjective   Chen Galaviz is a 28 y.o.female who presents to the emergency department with complaints of left upper extremity pain. She states that she has developed this pain in the upper left arm and left shoulder which radiates to the left neck. She notes that this pain worsens with movement and stretching the area. 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 She denies any CP or SOB. Mrs. Galaviz denies a recent fall or prior neck injury. She has a history of smoking. There are no other acute complaints at this time.          History provided by:  Patient  Upper Extremity Issue   Location:  Arm and shoulder  Shoulder location:  L shoulder  Arm location:  L upper arm  Pain details:     Radiates to: left neck.    Severity:  Moderate    Onset quality:  Sudden    Timing:  Constant  Dislocation: no    Foreign body present:  No foreign bodies  Prior injury to area:  No  Relieved by:  None tried  Worsened by:  Movement and stretching area  Ineffective treatments:  None tried  Associated symptoms: neck pain    Associated symptoms: no fatigue and no fever        Review of Systems   Constitutional: Negative for fatigue and fever.   Respiratory: Negative for shortness of breath.    Cardiovascular: Negative for chest pain.   Musculoskeletal: Positive for myalgias and neck pain.   All other systems reviewed and are negative.      Past Medical History:   Diagnosis Date   • Allergic rhinitis    • Anxiety    • Arthritis     since age 21 in her back   • Asthma     seasonal   • Back pain    • Bipolar disorder (CMS/HCC)     dx age 19   • Chest pain, pleuritic    • Depression    • Dyslipidemia    • Endometriosis    • Fatigue    • Febrile seizure (CMS/HCC)     FEBRILE SEIZURE AT 3YO AND AT 16YO D/T WELBUTRIN   • Frequent UTI    • GERD (gastroesophageal reflux disease)    • Gestational hypertension    • Headache    • Herpes zoster     denies this visit   • Hypertension     chronic since age 23    • Itching    • Mental retardation     A. 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 birth due to placenta previa   • Migraine    • Migraine    • Multiple gestation    • Organic hypersomnia    • Ovarian cyst    • Pain, upper back    • Pulmonary nodule    • Schizophrenia (CMS/HCC)    • Sinus tachycardia        Allergies   Allergen Reactions   • Bupropion Other (See Comments)     Seizure / wellbutrin    • Naproxen Rash   • Nsaids Rash   • Sulfa Antibiotics Rash       Past Surgical History:   Procedure Laterality Date   •  SECTION     •  SECTION WITH TUBAL N/A 2017    Procedure:  SECTION REPEAT WITH TUBAL;  Surgeon: Fabien Blake MD;  Location: [facility] LABOR DELIVERY;  Service:    • CHOLECYSTECTOMY      age 21   • DIAGNOSTIC LAPAROSCOPY     • ENDOMETRIAL ABLATION     • MOUTH SURGERY      A. History of wisdom teeth extraction, age 21   • PELVIC LAPAROSCOPY      age 21   • TONSILLECTOMY      age 23   • WISDOM TOOTH EXTRACTION         Family History   Problem Relation Age of Onset   • Asthma Mother    • Hypertension Mother    • Breast cancer Mother    • Hypertension Father    • Asthma Brother    • Colon cancer Maternal Grandfather    • Ovarian cancer Neg Hx    • Uterine cancer Neg Hx        Social History     Socioeconomic History   • Marital status: Single     Spouse name: Not on file   • Number of children: Not on file   • Years of education: Not on file   • Highest education level: Not on file   Tobacco Use   • Smoking status: Current Every Day Smoker     Packs/day: 0.50     Years: 9.00     Pack years: 4.50     Types: Cigarettes   • Smokeless tobacco: Never Used   • Tobacco comment: cutting back, encourage cessation   Substance and Sexual Activity   • Alcohol use: No   • Drug use: No   • Sexual activity: Not Currently     Partners: Male     Birth control/protection: OCP, Injection         Objective   Physical Exam   Constitutional: She is oriented to person, place, and time. She appears  well-developed and well-nourished. No distress.   HENT:   Head: Normocephalic and atraumatic.   Nose: Nose normal.   Eyes: Conjunctivae are normal. No scleral icterus.   Neck: Normal range of motion. Neck supple. No muscular tenderness present.   Cardiovascular: Normal rate, regular rhythm and normal heart sounds.   Radial pulse was 4+.    Pulmonary/Chest: Effort normal and breath sounds normal. No respiratory distress.   Abdominal: Soft. There is no tenderness.   Musculoskeletal:        Right shoulder: She exhibits tenderness and pain. She exhibits no deformity.   Tender left deltoid. No deformity. Pain with abduction.   Neurological: She is alert and oriented to person, place, and time.   Radial, ulnar, median nerves are sensory and motor intact. Axillary sensory nerves intact.   Skin: Skin is warm and dry.   Psychiatric: She has a normal mood and affect. Her behavior is normal.   Nursing note and vitals reviewed.      Procedures         ED Course  ED Course as of Dec 23 2012   Sun Dec 23, 2018   1935 Patient denies trauma except packing children and left arm.  Given this and the lack of symptoms suggesting an etiology other than muscle strain, we will treat her inflammation and have her follow-up with her doctor.  [JI]   2009 She states that she can have steroids.  [JI]      ED Course User Index  [JI] Matt Herron PA       No results found for this or any previous visit (from the past 24 hour(s)).  Note: In addition to lab results from this visit, the labs listed above may include labs taken at another facility or during a different encounter within the last 24 hours. Please correlate lab times with ED admission and discharge times for further clarification of the services performed during this visit.    No orders to display     Vitals:    12/23/18 1645 12/23/18 1814   BP: 142/81 142/86   BP Location: Left arm Left arm   Patient Position: Sitting Sitting   Pulse: 110 95   Resp: 16 16   Temp: 97.3 °F (36.3 °C)   "  TempSrc: Oral    SpO2: 97% 94%   Weight: 90.7 kg (200 lb)    Height: 160 cm (63\")      Medications - No data to display  ECG/EMG Results (last 24 hours)     ** No results found for the last 24 hours. **          No results found for this or any previous visit (from the past 24 hour(s)).  Note: In addition to lab results from this visit, the labs listed above may include labs taken at another facility or during a different encounter within the last 24 hours. Please correlate lab times with ED admission and discharge times for further clarification of the services performed during this visit.    No orders to display     Vitals:    12/23/18 1645 12/23/18 1814   BP: 142/81 142/86   BP Location: Left arm Left arm   Patient Position: Sitting Sitting   Pulse: 110 95   Resp: 16 16   Temp: 97.3 °F (36.3 °C)    TempSrc: Oral    SpO2: 97% 94%   Weight: 90.7 kg (200 lb)    Height: 160 cm (63\")      Medications - No data to display  ECG/EMG Results (last 24 hours)     ** No results found for the last 24 hours. **                  [facility]    Final diagnoses:   Muscle strain of left upper arm, initial encounter       Documentation assistance provided by jame Vences.  Information recorded by the scribleana was done at my direction and has been verified and validated by me.     Tello Vences  12/23/18 1918       Matt Herron PA  12/23/18 2012    "

## 2018-12-24 ENCOUNTER — EPISODE CHANGES (OUTPATIENT)
Dept: CASE MANAGEMENT | Facility: OTHER | Age: 28
End: 2018-12-24

## 2018-12-26 ENCOUNTER — EPISODE CHANGES (OUTPATIENT)
Dept: CASE MANAGEMENT | Facility: OTHER | Age: 28
End: 2018-12-26

## 2018-12-26 ENCOUNTER — PATIENT OUTREACH (OUTPATIENT)
Dept: CASE MANAGEMENT | Facility: OTHER | Age: 28
End: 2018-12-26

## 2019-01-04 ENCOUNTER — PATIENT OUTREACH (OUTPATIENT)
Dept: CASE MANAGEMENT | Facility: OTHER | Age: 29
End: 2019-01-04

## 2019-01-04 NOTE — OUTREACH NOTE
Care Management Plan 1/4/2019   Lifestyle Goals Quit smoking   Barriers Other (See Comment)   Barriers Needing starter Chantix Rx   Self Management Educational materials provided;Decrease smoking   Annual Wellness Visit:  Patient Will Schedule   Annual Wellness Visit:  Pt agrees to receive information by mail.   Care Gaps Addressed Other (See Comment);Pneumonia Vaccine   Care Gaps Addressed Pt said she received the flu shot, but not the Pna vaccine.   Does patient have depression diagnosis? Yes   Does patient have depression diagnosis? on medication   Advanced Directives: Send Information   Ed Visits past 12 months: 3 or more   Hospitalizations past 12 months None   Medication Adherence Medications understood   Goal Progress Making Progress Toward Goal(s)     The main concerns and/or symptoms the patient would like to address are: see above notes. Spoke with Ms Galaviz for HRCM call.  States the left arm muscle strain has resolved since ED visit 12/23/18.  She has reduced cigarette smoking from 1 pack to 1/2 pack per day; plans to request a starter Chantrix Rx by calling PCP office. She is familiar with resource Quit Now [place] phone line.      Education/instruction provided by Care Coordinator:  Informed of care advisor role and contact number.  Informed of Health Dept's program for smoking cessation. Addressed AWV and advanced directives. Pt agrees to receive information my mail. MyChart is active.    Follow Up Outreach Due:  4-6 weeks or as needed    Chetan Sosa RN

## 2019-01-21 ENCOUNTER — APPOINTMENT (OUTPATIENT)
Dept: PREADMISSION TESTING | Facility: HOSPITAL | Age: 29
End: 2019-01-21

## 2019-01-21 ENCOUNTER — OFFICE VISIT (OUTPATIENT)
Dept: OBSTETRICS AND GYNECOLOGY | Facility: CLINIC | Age: 29
End: 2019-01-21

## 2019-01-21 VITALS
WEIGHT: 210 LBS | SYSTOLIC BLOOD PRESSURE: 130 MMHG | BODY MASS INDEX: 35.85 KG/M2 | HEIGHT: 64 IN | DIASTOLIC BLOOD PRESSURE: 80 MMHG

## 2019-01-21 VITALS — HEIGHT: 63 IN | WEIGHT: 208.56 LBS | BODY MASS INDEX: 36.95 KG/M2

## 2019-01-21 DIAGNOSIS — N94.10 DYSPAREUNIA, FEMALE: ICD-10-CM

## 2019-01-21 DIAGNOSIS — N80.9 ENDOMETRIOSIS DETERMINED BY LAPAROSCOPY: ICD-10-CM

## 2019-01-21 DIAGNOSIS — Z01.818 PRE-OP EXAM: ICD-10-CM

## 2019-01-21 DIAGNOSIS — N94.6 DYSMENORRHEA: ICD-10-CM

## 2019-01-21 DIAGNOSIS — N80.9 ENDOMETRIOSIS DETERMINED BY LAPAROSCOPY: Primary | ICD-10-CM

## 2019-01-21 LAB
B-HCG UR QL: NEGATIVE
BASOPHILS # BLD AUTO: 0.03 10*3/MM3 (ref 0–0.2)
BASOPHILS NFR BLD AUTO: 0.3 % (ref 0–1)
DEPRECATED RDW RBC AUTO: 44.8 FL (ref 37–54)
EOSINOPHIL # BLD AUTO: 0.04 10*3/MM3 (ref 0–0.3)
EOSINOPHIL NFR BLD AUTO: 0.4 % (ref 0–3)
ERYTHROCYTE [DISTWIDTH] IN BLOOD BY AUTOMATED COUNT: 14.5 % (ref 11.3–14.5)
HCT VFR BLD AUTO: 41.5 % (ref 34.5–44)
HGB BLD-MCNC: 14 G/DL (ref 11.5–15.5)
IMM GRANULOCYTES # BLD AUTO: 0.02 10*3/MM3 (ref 0–0.03)
IMM GRANULOCYTES NFR BLD AUTO: 0.2 % (ref 0–0.6)
LYMPHOCYTES # BLD AUTO: 2.12 10*3/MM3 (ref 0.6–4.8)
LYMPHOCYTES NFR BLD AUTO: 23.6 % (ref 24–44)
MCH RBC QN AUTO: 28.6 PG (ref 27–31)
MCHC RBC AUTO-ENTMCNC: 33.7 G/DL (ref 32–36)
MCV RBC AUTO: 84.7 FL (ref 80–99)
MONOCYTES # BLD AUTO: 0.54 10*3/MM3 (ref 0–1)
MONOCYTES NFR BLD AUTO: 6 % (ref 0–12)
NEUTROPHILS # BLD AUTO: 6.25 10*3/MM3 (ref 1.5–8.3)
NEUTROPHILS NFR BLD AUTO: 69.7 % (ref 41–71)
PLATELET # BLD AUTO: 283 10*3/MM3 (ref 150–450)
PMV BLD AUTO: 9.9 FL (ref 6–12)
RBC # BLD AUTO: 4.9 10*6/MM3 (ref 3.89–5.14)
WBC NRBC COR # BLD: 8.98 10*3/MM3 (ref 3.5–10.8)

## 2019-01-21 PROCEDURE — 36415 COLL VENOUS BLD VENIPUNCTURE: CPT

## 2019-01-21 PROCEDURE — 81025 URINE PREGNANCY TEST: CPT | Performed by: OBSTETRICS & GYNECOLOGY

## 2019-01-21 PROCEDURE — 85025 COMPLETE CBC W/AUTO DIFF WBC: CPT | Performed by: OBSTETRICS & GYNECOLOGY

## 2019-01-21 PROCEDURE — 99213 OFFICE O/P EST LOW 20 MIN: CPT | Performed by: OBSTETRICS & GYNECOLOGY

## 2019-01-21 RX ORDER — HYDROXYZINE PAMOATE 25 MG/1
CAPSULE ORAL
Refills: 2 | COMMUNITY
Start: 2019-01-15 | End: 2019-01-21

## 2019-01-21 RX ORDER — KETOROLAC TROMETHAMINE 10 MG/1
TABLET, FILM COATED ORAL
Refills: 0 | COMMUNITY
Start: 2018-12-21 | End: 2019-01-21

## 2019-01-21 RX ORDER — RISPERIDONE 1 MG/1
1 TABLET ORAL DAILY
COMMUNITY
End: 2021-04-21 | Stop reason: SDUPTHER

## 2019-01-21 RX ORDER — HYDROXYZINE PAMOATE 25 MG/1
25 CAPSULE ORAL 3 TIMES DAILY PRN
COMMUNITY
End: 2021-04-21

## 2019-01-21 RX ORDER — PROMETHAZINE HYDROCHLORIDE 12.5 MG/1
12.5 TABLET ORAL EVERY 8 HOURS PRN
COMMUNITY
End: 2021-04-21

## 2019-01-21 NOTE — PAT
Patient to apply Chlorhexadine wipes  to surgical area (as instructed) the night before procedure and the AM of procedure. Wipes provided.    Pt. States she has a history of gestational hypertension.  States her blood pressure is sometimes high but she does not take any medications.    No EKG due to no risk factors.

## 2019-01-21 NOTE — H&P
"Subjective   Chen Galaviz is a 28 y.o. year old  who is scheduled  for surgery due to chronic pelvic pain with a history of endometriosis.  She's had a laparoscopy in 2018 with lysis of adhesions from dense scar so she with probable  section.  She has been on Lupron for 3 months with no improvement.  She is presented on tubal ligation.  She's presented on endometrial ablation has hypomenorrhea associated with the endometrial ablation.  She has dyspareunia.  Options have been discussed and she would like to proceed with hysterectomy as more definitive therapy.  Discussed removal of her tubes to reduce tubal cancer.  Preserve her ovaries at her young age.  She voices understanding and agreement to do a \"partial hysterectomy\" had a question about how we would remove the uterus.  Discussed this would be removed through the vagina and that there are before incisions on her abdomen.  Discussed same-day discharge and she is agreeable.  She is Vasu on Motrin.  She does better with Lortab versus Percocet.    Past Medical History:   Diagnosis Date   • Allergic rhinitis    • Anxiety    • Arthritis     since age 21 in her back   • Asthma     seasonal   • Back pain    • Bipolar disorder (CMS/HCC)     dx age 19   • Chest pain, pleuritic    • Depression    • Dyslipidemia    • Endometriosis    • Fatigue    • Febrile seizure (CMS/HCC)     FEBRILE SEIZURE AT 3YO AND AT 16YO D/T WELBUTRIN   • Frequent UTI    • GERD (gastroesophageal reflux disease)    • Gestational hypertension    • Headache    • Herpes zoster     denies this visit   • Hypertension     chronic since age 23   • Itching    • Mental retardation     A. [place] to be related to hypoxia at birth due to placenta previa   • Migraine    • Migraine    • Multiple gestation    • Organic hypersomnia    • Ovarian cyst    • Pain, upper back    • Pulmonary nodule    • Schizophrenia (CMS/HCC)    • Sinus tachycardia      Past Surgical History:   Procedure " Laterality Date   •  SECTION     •  SECTION WITH TUBAL N/A 2017    Procedure:  SECTION REPEAT WITH TUBAL;  Surgeon: Fabien Blake MD;  Location: [facility] LABOR DELIVERY;  Service:    • CHOLECYSTECTOMY      age 21   • DIAGNOSTIC LAPAROSCOPY     • ENDOMETRIAL ABLATION     • MOUTH SURGERY      A. History of wisdom teeth extraction, age 21   • PELVIC LAPAROSCOPY      age 21   • TONSILLECTOMY      age 23   • WISDOM TOOTH EXTRACTION       Social History     Socioeconomic History   • Marital status: Single     Spouse name: Not on file   • Number of children: Not on file   • Years of education: Not on file   • Highest education level: Not on file   Tobacco Use   • Smoking status: Current Every Day Smoker     Packs/day: 0.50     Years: 9.00     Pack years: 4.50     Types: Cigarettes   • Smokeless tobacco: Never Used   • Tobacco comment: cutting back, encourage cessation   Substance and Sexual Activity   • Alcohol use: No   • Drug use: No   • Sexual activity: Not Currently     Partners: Male     Birth control/protection: OCP, Injection       Current Outpatient Medications:   •  butalbital-acetaminophen-caffeine (FIORICET) -40 MG capsule capsule, Take 1 capsule by mouth Every 4 (Four) Hours As Needed (Headache)., Disp: 15 capsule, Rfl: 1  •  glycopyrrolate (ROBINUL) 1 MG tablet, Take 1 mg by mouth 2 (Two) Times a Day., Disp: , Rfl: 8  •  hydrOXYzine (VISTARIL) 25 MG capsule, TAKE ONE CAPSULE BY MOUTH 3 TIMES A DAY AS NEEDED FOR ANXIETY, Disp: , Rfl: 2  •  ibuprofen (ADVIL,MOTRIN) 800 MG tablet, Take 1 tablet by mouth Every 6 (Six) Hours As Needed for Moderate Pain ., Disp: 20 tablet, Rfl: 0  •  lamoTRIgine (LaMICtal) 150 MG tablet, Take 1 tablet by mouth Daily., Disp: , Rfl: 1  •  linaclotide (LINZESS) 145 MCG capsule capsule, Take 1 capsule by mouth Every Morning Before Breakfast. Take 30 minutes before first daily meal., Disp: 30 capsule, Rfl: 5  •  oxybutynin XL (DITROPAN-XL) 5 MG 24  "hr tablet, Take 5 mg by mouth Daily., Disp: , Rfl: 3  •  promethazine (PHENERGAN) 12.5 MG tablet, TAKE 1 TABLET BY ORAL ROUTE AS NEEDED EVERY 8 HOURS AS NEEDED NAUSEA, Disp: , Rfl: 1  •  risperiDONE (risperDAL) 1 MG tablet, TAKE 1 TABLET BY MOUTH EVERY DAY MAY ADD A SECOND DOSE IF NEEDED, Disp: , Rfl: 2  •  traZODone (DESYREL) 50 MG tablet, Take 1 tablet by mouth Every Night., Disp: , Rfl: 1  •  venlafaxine XR (EFFEXOR-XR) 75 MG 24 hr capsule, Take 75 mg by mouth Every Morning., Disp: , Rfl: 2  •  predniSONE (DELTASONE) 50 MG tablet, Take 1 tablet by mouth Daily., Disp: 5 tablet, Rfl: 0    Allergies   Allergen Reactions   • Bupropion Other (See Comments)     Seizure / wellbutrin    • Naproxen Rash   • Nsaids Rash   • Sulfa Antibiotics Rash     Social History    Tobacco Use      Smoking status: Current Every Day Smoker        Packs/day: 0.50        Years: 9.00        Pack years: 4.5        Types: Cigarettes      Smokeless tobacco: Never Used      Tobacco comment: cutting back, encourage cessation    Review of Systems no cough cold's.  No bladder leakage      Objective   /80   Ht 161.3 cm (63.5\")   Wt 95.3 kg (210 lb)   LMP 01/19/2019   Breastfeeding? No   BMI 36.62 kg/m²   General: well developed; well nourished  no acute distress  appears stated age   Heart: regular rate and rhythm   Lungs: breathing is unlabored  clear to auscultation bilaterally  Slight wheeze left upper quadrant cleared with cough   Abdomen: soft, non-tender; no masses  no umbilical or inguinal hernias are present  no hepato-splenomegaly   Pelvis:: Not performed.  Uterus in the past has been multiparous in size, tender in general and past and anterior        Assessment   1. Chronic pelvic pain with history of endometriosis.  Failed cycle management failed conservative surgical management scheduled for T  BS.  2. Medical problems and surgeries as above  3. Smoker  4.   Discussed same-day discharge and postoperative limitations   "   Plan   1. Preadmission testing.  Nothing by mouth after midnight discussed.  Instruction sheet given  2. We'll schedule a postop appointment for 2 weeks      Amador Richey M.D.  1/21/2019

## 2019-01-21 NOTE — H&P (VIEW-ONLY)
"Subjective   Chen Galaviz is a 28 y.o. year old  who is scheduled  for surgery due to chronic pelvic pain with a history of endometriosis.  She's had a laparoscopy in 2018 with lysis of adhesions from dense scar so she with probable  section.  She has been on Lupron for 3 months with no improvement.  She is presented on tubal ligation.  She's presented on endometrial ablation has hypomenorrhea associated with the endometrial ablation.  She has dyspareunia.  Options have been discussed and she would like to proceed with hysterectomy as more definitive therapy.  Discussed removal of her tubes to reduce tubal cancer.  Preserve her ovaries at her young age.  She voices understanding and agreement to do a \"partial hysterectomy\" had a question about how we would remove the uterus.  Discussed this would be removed through the vagina and that there are before incisions on her abdomen.  Discussed same-day discharge and she is agreeable.  She is Vasu on Motrin.  She does better with Lortab versus Percocet.    Past Medical History:   Diagnosis Date   • Allergic rhinitis    • Anxiety    • Arthritis     since age 21 in her back   • Asthma     seasonal   • Back pain    • Bipolar disorder (CMS/HCC)     dx age 19   • Chest pain, pleuritic    • Depression    • Dyslipidemia    • Endometriosis    • Fatigue    • Febrile seizure (CMS/HCC)     FEBRILE SEIZURE AT 3YO AND AT 16YO D/T WELBUTRIN   • Frequent UTI    • GERD (gastroesophageal reflux disease)    • Gestational hypertension    • Headache    • Herpes zoster     denies this visit   • Hypertension     chronic since age 23   • Itching    • Mental retardation     A. Adams to be related to hypoxia at birth due to placenta previa   • Migraine    • Migraine    • Multiple gestation    • Organic hypersomnia    • Ovarian cyst    • Pain, upper back    • Pulmonary nodule    • Schizophrenia (CMS/HCC)    • Sinus tachycardia      Past Surgical History:   Procedure " Laterality Date   •  SECTION     •  SECTION WITH TUBAL N/A 2017    Procedure:  SECTION REPEAT WITH TUBAL;  Surgeon: Fabien Blake MD;  [facility]  Service:    • CHOLECYSTECTOMY      age 21   • DIAGNOSTIC LAPAROSCOPY     • ENDOMETRIAL ABLATION     • MOUTH SURGERY      A. History of wisdom teeth extraction, age 21   • PELVIC LAPAROSCOPY      age 21   • TONSILLECTOMY      age 23   • WISDOM TOOTH EXTRACTION       Social History     Socioeconomic History   • Marital status: Single     Spouse name: Not on file   • Number of children: Not on file   • Years of education: Not on file   • Highest education level: Not on file   Tobacco Use   • Smoking status: Current Every Day Smoker     Packs/day: 0.50     Years: 9.00     Pack years: 4.50     Types: Cigarettes   • Smokeless tobacco: Never Used   • Tobacco comment: cutting back, encourage cessation   Substance and Sexual Activity   • Alcohol use: No   • Drug use: No   • Sexual activity: Not Currently     Partners: Male     Birth control/protection: OCP, Injection       Current Outpatient Medications:   •  butalbital-acetaminophen-caffeine (FIORICET) -40 MG capsule capsule, Take 1 capsule by mouth Every 4 (Four) Hours As Needed (Headache)., Disp: 15 capsule, Rfl: 1  •  glycopyrrolate (ROBINUL) 1 MG tablet, Take 1 mg by mouth 2 (Two) Times a Day., Disp: , Rfl: 8  •  hydrOXYzine (VISTARIL) 25 MG capsule, TAKE ONE CAPSULE BY MOUTH 3 TIMES A DAY AS NEEDED FOR ANXIETY, Disp: , Rfl: 2  •  ibuprofen (ADVIL,MOTRIN) 800 MG tablet, Take 1 tablet by mouth Every 6 (Six) Hours As Needed for Moderate Pain ., Disp: 20 tablet, Rfl: 0  •  lamoTRIgine (LaMICtal) 150 MG tablet, Take 1 tablet by mouth Daily., Disp: , Rfl: 1  •  linaclotide (LINZESS) 145 MCG capsule capsule, Take 1 capsule by mouth Every Morning Before Breakfast. Take 30 minutes before first daily meal., Disp: 30 capsule, Rfl: 5  •  oxybutynin XL (DITROPAN-XL) 5 MG 24  "hr tablet, Take 5 mg by mouth Daily., Disp: , Rfl: 3  •  promethazine (PHENERGAN) 12.5 MG tablet, TAKE 1 TABLET BY ORAL ROUTE AS NEEDED EVERY 8 HOURS AS NEEDED NAUSEA, Disp: , Rfl: 1  •  risperiDONE (risperDAL) 1 MG tablet, TAKE 1 TABLET BY MOUTH EVERY DAY MAY ADD A SECOND DOSE IF NEEDED, Disp: , Rfl: 2  •  traZODone (DESYREL) 50 MG tablet, Take 1 tablet by mouth Every Night., Disp: , Rfl: 1  •  venlafaxine XR (EFFEXOR-XR) 75 MG 24 hr capsule, Take 75 mg by mouth Every Morning., Disp: , Rfl: 2  •  predniSONE (DELTASONE) 50 MG tablet, Take 1 tablet by mouth Daily., Disp: 5 tablet, Rfl: 0    Allergies   Allergen Reactions   • Bupropion Other (See Comments)     Seizure / wellbutrin    • Naproxen Rash   • Nsaids Rash   • Sulfa Antibiotics Rash     Social History    Tobacco Use      Smoking status: Current Every Day Smoker        Packs/day: 0.50        Years: 9.00        Pack years: 4.5        Types: Cigarettes      Smokeless tobacco: Never Used      Tobacco comment: cutting back, encourage cessation    Review of Systems no cough cold's.  No bladder leakage      Objective   /80   Ht 161.3 cm (63.5\")   Wt 95.3 kg (210 lb)   LMP 01/19/2019   Breastfeeding? No   BMI 36.62 kg/m²   General: well developed; well nourished  no acute distress  appears stated age   Heart: regular rate and rhythm   Lungs: breathing is unlabored  clear to auscultation bilaterally  Slight wheeze left upper quadrant cleared with cough   Abdomen: soft, non-tender; no masses  no umbilical or inguinal hernias are present  no hepato-splenomegaly   Pelvis:: Not performed.  Uterus in the past has been multiparous in size, tender in general and past and anterior        Assessment   1. Chronic pelvic pain with history of endometriosis.  Failed cycle management failed conservative surgical management scheduled for T  BS.  2. Medical problems and surgeries as above  3. Smoker  4.   Discussed same-day discharge and postoperative limitations   "   Plan   1. Preadmission testing.  Nothing by mouth after midnight discussed.  Instruction sheet given  2. We'll schedule a postop appointment for 2 weeks      Amador Richey M.D.  1/21/2019

## 2019-01-22 ENCOUNTER — ANESTHESIA EVENT (OUTPATIENT)
Dept: PERIOP | Facility: HOSPITAL | Age: 29
End: 2019-01-22

## 2019-01-23 ENCOUNTER — HOSPITAL ENCOUNTER (OUTPATIENT)
Facility: HOSPITAL | Age: 29
Discharge: HOME | End: 2019-01-23
Attending: OBSTETRICS & GYNECOLOGY | Admitting: OBSTETRICS & GYNECOLOGY

## 2019-01-23 ENCOUNTER — ANESTHESIA (OUTPATIENT)
Dept: PERIOP | Facility: HOSPITAL | Age: 29
End: 2019-01-23

## 2019-01-23 VITALS
RESPIRATION RATE: 16 BRPM | BODY MASS INDEX: 36.86 KG/M2 | WEIGHT: 208 LBS | HEART RATE: 115 BPM | SYSTOLIC BLOOD PRESSURE: 159 MMHG | HEIGHT: 63 IN | TEMPERATURE: 98 F | OXYGEN SATURATION: 94 % | DIASTOLIC BLOOD PRESSURE: 70 MMHG

## 2019-01-23 DIAGNOSIS — N80.9 ENDOMETRIOSIS DETERMINED BY LAPAROSCOPY: ICD-10-CM

## 2019-01-23 DIAGNOSIS — N94.6 DYSMENORRHEA: ICD-10-CM

## 2019-01-23 DIAGNOSIS — N94.10 DYSPAREUNIA, FEMALE: ICD-10-CM

## 2019-01-23 LAB
B-HCG UR QL: NEGATIVE
INTERNAL NEGATIVE CONTROL: NEGATIVE
INTERNAL POSITIVE CONTROL: POSITIVE
Lab: NORMAL

## 2019-01-23 PROCEDURE — 25010000003 CEFAZOLIN IN DEXTROSE 2-4 GM/100ML-% SOLUTION: Performed by: OBSTETRICS & GYNECOLOGY

## 2019-01-23 PROCEDURE — A9270 NON-COVERED ITEM OR SERVICE: HCPCS | Performed by: ANESTHESIOLOGY

## 2019-01-23 PROCEDURE — 88305 TISSUE EXAM BY PATHOLOGIST: CPT | Performed by: OBSTETRICS & GYNECOLOGY

## 2019-01-23 PROCEDURE — 25010000002 DEXAMETHASONE PER 1 MG: Performed by: NURSE ANESTHETIST, CERTIFIED REGISTERED

## 2019-01-23 PROCEDURE — 25010000002 PROPOFOL 10 MG/ML EMULSION: Performed by: NURSE ANESTHETIST, CERTIFIED REGISTERED

## 2019-01-23 PROCEDURE — 25010000002 ONDANSETRON PER 1 MG: Performed by: NURSE ANESTHETIST, CERTIFIED REGISTERED

## 2019-01-23 PROCEDURE — 25010000002 FENTANYL CITRATE (PF) 100 MCG/2ML SOLUTION: Performed by: NURSE ANESTHETIST, CERTIFIED REGISTERED

## 2019-01-23 PROCEDURE — 25010000002 NEOSTIGMINE 10 MG/10ML SOLUTION: Performed by: NURSE ANESTHETIST, CERTIFIED REGISTERED

## 2019-01-23 PROCEDURE — 25010000002 HYDROMORPHONE PER 4 MG: Performed by: NURSE ANESTHETIST, CERTIFIED REGISTERED

## 2019-01-23 PROCEDURE — 25010000002 PROMETHAZINE PER 50 MG: Performed by: NURSE ANESTHETIST, CERTIFIED REGISTERED

## 2019-01-23 PROCEDURE — 81025 URINE PREGNANCY TEST: CPT | Performed by: OBSTETRICS & GYNECOLOGY

## 2019-01-23 PROCEDURE — 25010000002 PROPOFOL 1000 MG/ML EMULSION: Performed by: NURSE ANESTHETIST, CERTIFIED REGISTERED

## 2019-01-23 PROCEDURE — 58570 TLH UTERUS 250 G OR LESS: CPT | Performed by: OBSTETRICS & GYNECOLOGY

## 2019-01-23 PROCEDURE — 63710000001 FAMOTIDINE 20 MG TABLET: Performed by: ANESTHESIOLOGY

## 2019-01-23 DEVICE — SEALANT FIBRIN TISSEEL FZ 4ML: Type: IMPLANTABLE DEVICE | Status: FUNCTIONAL

## 2019-01-23 RX ORDER — FENTANYL CITRATE 50 UG/ML
INJECTION, SOLUTION INTRAMUSCULAR; INTRAVENOUS AS NEEDED
Status: DISCONTINUED | OUTPATIENT
Start: 2019-01-23 | End: 2019-01-23 | Stop reason: SURG

## 2019-01-23 RX ORDER — SODIUM CHLORIDE 0.9 % (FLUSH) 0.9 %
1-10 SYRINGE (ML) INJECTION AS NEEDED
Status: DISCONTINUED | OUTPATIENT
Start: 2019-01-23 | End: 2019-01-23 | Stop reason: HOSPADM

## 2019-01-23 RX ORDER — HYDROCODONE BITARTRATE AND ACETAMINOPHEN 5; 325 MG/1; MG/1
1 TABLET ORAL EVERY 6 HOURS PRN
Qty: 18 TABLET | Refills: 0 | OUTPATIENT
Start: 2019-01-23 | End: 2019-02-10

## 2019-01-23 RX ORDER — VECURONIUM BROMIDE 1 MG/ML
INJECTION, POWDER, LYOPHILIZED, FOR SOLUTION INTRAVENOUS AS NEEDED
Status: DISCONTINUED | OUTPATIENT
Start: 2019-01-23 | End: 2019-01-23 | Stop reason: SURG

## 2019-01-23 RX ORDER — OXYCODONE AND ACETAMINOPHEN 10; 325 MG/1; MG/1
1 TABLET ORAL EVERY 4 HOURS PRN
Status: DISCONTINUED | OUTPATIENT
Start: 2019-01-23 | End: 2019-01-23 | Stop reason: HOSPADM

## 2019-01-23 RX ORDER — ONDANSETRON 2 MG/ML
INJECTION INTRAMUSCULAR; INTRAVENOUS AS NEEDED
Status: DISCONTINUED | OUTPATIENT
Start: 2019-01-23 | End: 2019-01-23 | Stop reason: SURG

## 2019-01-23 RX ORDER — PROPOFOL 10 MG/ML
VIAL (ML) INTRAVENOUS AS NEEDED
Status: DISCONTINUED | OUTPATIENT
Start: 2019-01-23 | End: 2019-01-23 | Stop reason: SURG

## 2019-01-23 RX ORDER — LIDOCAINE HYDROCHLORIDE 10 MG/ML
0.5 INJECTION, SOLUTION EPIDURAL; INFILTRATION; INTRACAUDAL; PERINEURAL ONCE AS NEEDED
Status: COMPLETED | OUTPATIENT
Start: 2019-01-23 | End: 2019-01-23

## 2019-01-23 RX ORDER — PROMETHAZINE HYDROCHLORIDE 25 MG/ML
6.25 INJECTION, SOLUTION INTRAMUSCULAR; INTRAVENOUS ONCE AS NEEDED
Status: COMPLETED | OUTPATIENT
Start: 2019-01-23 | End: 2019-01-23

## 2019-01-23 RX ORDER — FENTANYL CITRATE 50 UG/ML
50 INJECTION, SOLUTION INTRAMUSCULAR; INTRAVENOUS
Status: DISCONTINUED | OUTPATIENT
Start: 2019-01-23 | End: 2019-01-23 | Stop reason: HOSPADM

## 2019-01-23 RX ORDER — FAMOTIDINE 20 MG/1
20 TABLET, FILM COATED ORAL
Status: DISCONTINUED | OUTPATIENT
Start: 2019-01-23 | End: 2019-01-23 | Stop reason: HOSPADM

## 2019-01-23 RX ORDER — ONDANSETRON 2 MG/ML
4 INJECTION INTRAMUSCULAR; INTRAVENOUS ONCE AS NEEDED
Status: DISCONTINUED | OUTPATIENT
Start: 2019-01-23 | End: 2019-01-23 | Stop reason: HOSPADM

## 2019-01-23 RX ORDER — SODIUM CHLORIDE 9 MG/ML
INJECTION, SOLUTION INTRAVENOUS AS NEEDED
Status: DISCONTINUED | OUTPATIENT
Start: 2019-01-23 | End: 2019-01-23 | Stop reason: HOSPADM

## 2019-01-23 RX ORDER — LIDOCAINE HYDROCHLORIDE 10 MG/ML
INJECTION, SOLUTION EPIDURAL; INFILTRATION; INTRACAUDAL; PERINEURAL AS NEEDED
Status: DISCONTINUED | OUTPATIENT
Start: 2019-01-23 | End: 2019-01-23 | Stop reason: SURG

## 2019-01-23 RX ORDER — SODIUM CHLORIDE 0.9 % (FLUSH) 0.9 %
3 SYRINGE (ML) INJECTION EVERY 12 HOURS SCHEDULED
Status: DISCONTINUED | OUTPATIENT
Start: 2019-01-23 | End: 2019-01-23 | Stop reason: HOSPADM

## 2019-01-23 RX ORDER — BUPIVACAINE HYDROCHLORIDE AND EPINEPHRINE 5; 5 MG/ML; UG/ML
INJECTION, SOLUTION PERINEURAL AS NEEDED
Status: DISCONTINUED | OUTPATIENT
Start: 2019-01-23 | End: 2019-01-23 | Stop reason: HOSPADM

## 2019-01-23 RX ORDER — PROMETHAZINE HYDROCHLORIDE 25 MG/1
25 SUPPOSITORY RECTAL ONCE AS NEEDED
Status: COMPLETED | OUTPATIENT
Start: 2019-01-23 | End: 2019-01-23

## 2019-01-23 RX ORDER — DEXAMETHASONE SODIUM PHOSPHATE 4 MG/ML
INJECTION, SOLUTION INTRA-ARTICULAR; INTRALESIONAL; INTRAMUSCULAR; INTRAVENOUS; SOFT TISSUE AS NEEDED
Status: DISCONTINUED | OUTPATIENT
Start: 2019-01-23 | End: 2019-01-23 | Stop reason: SURG

## 2019-01-23 RX ORDER — ACETAMINOPHEN 325 MG/1
650 TABLET ORAL EVERY 6 HOURS PRN
Start: 2019-01-23

## 2019-01-23 RX ORDER — SODIUM CHLORIDE, SODIUM LACTATE, POTASSIUM CHLORIDE, CALCIUM CHLORIDE 600; 310; 30; 20 MG/100ML; MG/100ML; MG/100ML; MG/100ML
9 INJECTION, SOLUTION INTRAVENOUS CONTINUOUS PRN
Status: DISCONTINUED | OUTPATIENT
Start: 2019-01-23 | End: 2019-01-23 | Stop reason: HOSPADM

## 2019-01-23 RX ORDER — NEOSTIGMINE METHYLSULFATE 1 MG/ML
INJECTION, SOLUTION INTRAVENOUS AS NEEDED
Status: DISCONTINUED | OUTPATIENT
Start: 2019-01-23 | End: 2019-01-23 | Stop reason: SURG

## 2019-01-23 RX ORDER — CEFAZOLIN SODIUM 2 G/100ML
2 INJECTION, SOLUTION INTRAVENOUS
Status: DISCONTINUED | OUTPATIENT
Start: 2019-01-24 | End: 2019-01-23 | Stop reason: HOSPADM

## 2019-01-23 RX ORDER — PROMETHAZINE HYDROCHLORIDE 25 MG/1
25 TABLET ORAL ONCE AS NEEDED
Status: COMPLETED | OUTPATIENT
Start: 2019-01-23 | End: 2019-01-23

## 2019-01-23 RX ORDER — ACETAMINOPHEN 650 MG/1
650 SUPPOSITORY RECTAL ONCE AS NEEDED
Status: DISCONTINUED | OUTPATIENT
Start: 2019-01-23 | End: 2019-01-23 | Stop reason: HOSPADM

## 2019-01-23 RX ORDER — HYDROMORPHONE HYDROCHLORIDE 1 MG/ML
0.5 INJECTION, SOLUTION INTRAMUSCULAR; INTRAVENOUS; SUBCUTANEOUS
Status: DISCONTINUED | OUTPATIENT
Start: 2019-01-23 | End: 2019-01-23 | Stop reason: HOSPADM

## 2019-01-23 RX ORDER — ACETAMINOPHEN 325 MG/1
650 TABLET ORAL ONCE AS NEEDED
Status: DISCONTINUED | OUTPATIENT
Start: 2019-01-23 | End: 2019-01-23 | Stop reason: HOSPADM

## 2019-01-23 RX ORDER — OXYCODONE AND ACETAMINOPHEN 7.5; 325 MG/1; MG/1
1 TABLET ORAL ONCE AS NEEDED
Status: DISCONTINUED | OUTPATIENT
Start: 2019-01-23 | End: 2019-01-23 | Stop reason: HOSPADM

## 2019-01-23 RX ORDER — MEPERIDINE HYDROCHLORIDE 25 MG/ML
12.5 INJECTION INTRAMUSCULAR; INTRAVENOUS; SUBCUTANEOUS
Status: DISCONTINUED | OUTPATIENT
Start: 2019-01-23 | End: 2019-01-23 | Stop reason: HOSPADM

## 2019-01-23 RX ORDER — GLYCOPYRROLATE 0.2 MG/ML
INJECTION INTRAMUSCULAR; INTRAVENOUS AS NEEDED
Status: DISCONTINUED | OUTPATIENT
Start: 2019-01-23 | End: 2019-01-23 | Stop reason: SURG

## 2019-01-23 RX ORDER — IPRATROPIUM BROMIDE AND ALBUTEROL SULFATE 2.5; .5 MG/3ML; MG/3ML
3 SOLUTION RESPIRATORY (INHALATION) ONCE AS NEEDED
Status: DISCONTINUED | OUTPATIENT
Start: 2019-01-23 | End: 2019-01-23 | Stop reason: HOSPADM

## 2019-01-23 RX ORDER — MAGNESIUM HYDROXIDE 1200 MG/15ML
LIQUID ORAL AS NEEDED
Status: DISCONTINUED | OUTPATIENT
Start: 2019-01-23 | End: 2019-01-23 | Stop reason: HOSPADM

## 2019-01-23 RX ORDER — ROCURONIUM BROMIDE 10 MG/ML
INJECTION, SOLUTION INTRAVENOUS AS NEEDED
Status: DISCONTINUED | OUTPATIENT
Start: 2019-01-23 | End: 2019-01-23 | Stop reason: SURG

## 2019-01-23 RX ADMIN — LIDOCAINE HYDROCHLORIDE 0.2 ML: 10 INJECTION, SOLUTION EPIDURAL; INFILTRATION; INTRACAUDAL; PERINEURAL at 07:20

## 2019-01-23 RX ADMIN — FENTANYL CITRATE 50 MCG: 50 INJECTION, SOLUTION INTRAMUSCULAR; INTRAVENOUS at 08:00

## 2019-01-23 RX ADMIN — SODIUM CHLORIDE, POTASSIUM CHLORIDE, SODIUM LACTATE AND CALCIUM CHLORIDE: 600; 310; 30; 20 INJECTION, SOLUTION INTRAVENOUS at 10:56

## 2019-01-23 RX ADMIN — FENTANYL CITRATE 50 MCG: 50 INJECTION, SOLUTION INTRAMUSCULAR; INTRAVENOUS at 11:31

## 2019-01-23 RX ADMIN — VECURONIUM BROMIDE 2 MG: 1 INJECTION, POWDER, LYOPHILIZED, FOR SOLUTION INTRAVENOUS at 09:56

## 2019-01-23 RX ADMIN — HYDROMORPHONE HYDROCHLORIDE 0.5 MG: 1 INJECTION, SOLUTION INTRAMUSCULAR; INTRAVENOUS; SUBCUTANEOUS at 12:09

## 2019-01-23 RX ADMIN — FENTANYL CITRATE 50 MCG: 50 INJECTION, SOLUTION INTRAMUSCULAR; INTRAVENOUS at 08:07

## 2019-01-23 RX ADMIN — FENTANYL CITRATE 25 MCG: 50 INJECTION, SOLUTION INTRAMUSCULAR; INTRAVENOUS at 10:46

## 2019-01-23 RX ADMIN — VECURONIUM BROMIDE 1 MG: 1 INJECTION, POWDER, LYOPHILIZED, FOR SOLUTION INTRAVENOUS at 09:34

## 2019-01-23 RX ADMIN — NEOSTIGMINE METHYLSULFATE 3 MG: 1 INJECTION, SOLUTION INTRAVENOUS at 10:45

## 2019-01-23 RX ADMIN — FENTANYL CITRATE 50 MCG: 50 INJECTION, SOLUTION INTRAMUSCULAR; INTRAVENOUS at 08:25

## 2019-01-23 RX ADMIN — SODIUM CHLORIDE, POTASSIUM CHLORIDE, SODIUM LACTATE AND CALCIUM CHLORIDE 9 ML/HR: 600; 310; 30; 20 INJECTION, SOLUTION INTRAVENOUS at 07:20

## 2019-01-23 RX ADMIN — PROPOFOL 25 MCG/KG/MIN: 10 INJECTION, EMULSION INTRAVENOUS at 08:15

## 2019-01-23 RX ADMIN — CEFAZOLIN SODIUM 2 G: 2 INJECTION, SOLUTION INTRAVENOUS at 08:00

## 2019-01-23 RX ADMIN — ROCURONIUM BROMIDE 50 MG: 10 SOLUTION INTRAVENOUS at 08:07

## 2019-01-23 RX ADMIN — VECURONIUM BROMIDE 3 MG: 1 INJECTION, POWDER, LYOPHILIZED, FOR SOLUTION INTRAVENOUS at 09:07

## 2019-01-23 RX ADMIN — PROMETHAZINE HYDROCHLORIDE 6.25 MG: 25 INJECTION INTRAMUSCULAR; INTRAVENOUS at 11:17

## 2019-01-23 RX ADMIN — HYDROMORPHONE HYDROCHLORIDE 0.5 MG: 1 INJECTION, SOLUTION INTRAMUSCULAR; INTRAVENOUS; SUBCUTANEOUS at 12:02

## 2019-01-23 RX ADMIN — FAMOTIDINE 20 MG: 20 TABLET, FILM COATED ORAL at 07:27

## 2019-01-23 RX ADMIN — GLYCOPYRROLATE 0.4 MG: 0.2 INJECTION, SOLUTION INTRAMUSCULAR; INTRAVENOUS at 10:45

## 2019-01-23 RX ADMIN — FENTANYL CITRATE 25 MCG: 50 INJECTION, SOLUTION INTRAMUSCULAR; INTRAVENOUS at 10:33

## 2019-01-23 RX ADMIN — FENTANYL CITRATE 50 MCG: 50 INJECTION, SOLUTION INTRAMUSCULAR; INTRAVENOUS at 09:04

## 2019-01-23 RX ADMIN — LIDOCAINE HYDROCHLORIDE 50 MG: 10 INJECTION, SOLUTION EPIDURAL; INFILTRATION; INTRACAUDAL; PERINEURAL at 08:07

## 2019-01-23 RX ADMIN — PROPOFOL 200 MG: 10 INJECTION, EMULSION INTRAVENOUS at 08:07

## 2019-01-23 RX ADMIN — ONDANSETRON 4 MG: 2 INJECTION INTRAMUSCULAR; INTRAVENOUS at 10:38

## 2019-01-23 RX ADMIN — FENTANYL CITRATE 50 MCG: 50 INJECTION, SOLUTION INTRAMUSCULAR; INTRAVENOUS at 11:14

## 2019-01-23 RX ADMIN — DEXAMETHASONE SODIUM PHOSPHATE 8 MG: 4 INJECTION, SOLUTION INTRAMUSCULAR; INTRAVENOUS at 08:19

## 2019-01-23 RX ADMIN — FENTANYL CITRATE 50 MCG: 50 INJECTION, SOLUTION INTRAMUSCULAR; INTRAVENOUS at 11:45

## 2019-01-23 NOTE — ANESTHESIA PREPROCEDURE EVALUATION
Anesthesia Evaluation     Patient summary reviewed and Nursing notes reviewed   NPO Solid Status: > 8 hours  NPO Liquid Status: > 8 hours           Airway   Mallampati: II  TM distance: >3 FB  Neck ROM: full  No difficulty expected  Dental      Pulmonary    (+) a smoker Current, asthma,   (-) COPD, shortness of breath, recent URI  Cardiovascular     ECG reviewed    (+) hypertension, dysrhythmias (Wore holter no rx needed?PAC) PAC, hyperlipidemia,   (-) past MI, CAD, angina, cardiac stents    ROS comment: Sinus tachycardia  Nonspecific T wave abnormality    Neuro/Psych  (+) seizures (as child - fever), headaches (migraines), psychiatric history Schizophrenia and Bipolar,     (-) CVAWeakness: schizo.  GI/Hepatic/Renal/Endo    (+)  GERD,    (-) liver disease, no renal disease, diabetes, hypothyroidism    Musculoskeletal     (+) back pain,   Abdominal    Substance History      OB/GYN    (+) Pregnant (NOT PREGNANT ),     Comment: ENDOMETRIOSIS DYSMENNHOREA DYSPAREUNIA       Other   (+) arthritis     ROS/Med Hx Other: ALLERGIC TO NSAIDS BUT TAKES IBUPROFEN                   Anesthesia Plan    ASA 2     general   (Propofol Infusion as part of Anti PONV tech )  intravenous induction   Anesthetic plan, all risks, benefits, and alternatives have been provided, discussed and informed consent has been obtained with: patient.    Plan discussed with CRNA.

## 2019-01-23 NOTE — ANESTHESIA PROCEDURE NOTES
Airway  Urgency: elective    Date/Time: 1/23/2019 8:07 AM  End Time:1/23/2019 8:10 AM  Airway not difficult    General Information and Staff    Patient location during procedure: OR  CRNA: Marito Kulkarni CRNA    Indications and Patient Condition  Indications for airway management: airway protection    Preoxygenated: yes  MILS not maintained throughout  Mask difficulty assessment: 1 - vent by mask    Final Airway Details  Final airway type: endotracheal airway      Successful airway: ETT  Cuffed: yes   Successful intubation technique: direct laryngoscopy  Endotracheal tube insertion site: oral  Blade: Hermila  Blade size: 3  ETT size (mm): 7.0  Cormack-Lehane Classification: grade I - full view of glottis  Placement verified by: chest auscultation and capnometry   Measured from: lips  ETT to lips (cm): 21  Number of attempts at approach: 1    Additional Comments  Negative epigastric sounds, Breath sound equal bilaterally with symmetric chest rise and fall

## 2019-01-23 NOTE — OP NOTE
Procedure date: 2019    Preoperative diagnosis: Pelvic pain with dysmenorrhea and dyspareunia                                              History of endometriosis    Postoperative diagnosis: Same with adhesions-omental, left adnexal/uterine, right ovary                                                Mild endometriosis    Procedure: Da Brooke-assisted total laparoscopic hysterectomy bilateral salpingectomy    General anesthesia    Estimated blood loss: Minimal    Drains: None    Prophylactic antibiotics: Ancef    Brief history and indications for procedure: Chen Galaviz is a 28 y.o.  who has failed conservative medical and surgical surgical management of chronic pelvic pain.  She has undergone a prior laparoscopy with laser vaporization of endometriosis.  She is undergone a endometrial ablation.  She continues to have dysmenorrhea and dyspareunia.  She has been treated with Lupron with no improvement in her pain.  She is status post tubal ligation and desires more definitive therapy.  Discussed hysterectomy.  She understands risk complications options in her care and had the opportunity to ask and have questions answered both in the office and immediately preoperatively.    Procedure: Patient was taken to the operating room and placed in the dorsal lithotomy position.  She was tested for robotic surgery.  She was given prophylactic intravenous antibiotics.  A timeout was taken for the following planned procedure : Da Brooke-assisted total laparoscopic hysterectomy with removal of both tubes and possibly ovaries if abnormal.    Examination under anesthesia was done to determine uterine size, position and to assess the adnexa.  A Parker catheter was placed to straight drainage.  The uterus was sounded to 7 centimeters.  The cervix was dilated to allow placement of the appropriate size cannula.  The Small Sheridan ring, vaginal occlusion balloon and cannula were tied down to the cervix with a Vicryl  stitch.  The uterine insufflation balloon was inflated.  The vaginal occlusion balloon was inflated with 60 mL's of fluid.    After regloving attention was directed laparoscopically.  0.5% Marcaine was placed at all sites prior to incision and trocar placement.  A supraumbilical incision was made and Optiview trocar inserted.  There was her were omental adhesions below into the right of the umbilicus.  After placing the lateral da Brooke ports and the physician assistant port.  I used scissors and graspers with cautery to take down the omental adhesions.  Bleeding was minimal.  The omentum was irrigated and small clots suctioned away.  The omentum appeared hemostatic.  The abdomen and pelvis were inspected.  There were areas of endometriosis in the posterior cul-de-sac there were adhesions on the right ovary the left ovary likewise had adhesions in the left uterus was adherent to the sidewall.  There appeared to be some spots of endometriosis on the left tube and uterine serosa.  Liver surface was smooth and appendix appeared normal.  The bowel was packed out of the pelvis and the robot was brought in for docking.  Once successfully docked , I left for the console.    At the console attention was directed to the left adnexa where the distal tube was elevated and excised prior to being removed through the physician's assistant port.  This was repeated in a similar fashion on the right side.  Thick adhesions on the surface of the ovary were cauterized and removed.  One spot of endometriosis was fulgurated.  I proceeded dissection across the right utero-ovarian ligaments, round ligament and the broad ligament to the level of the cervix and uterine vessels.  These were cauterized prior to being cut.  A bladder flap was partially created.  On the left side there were dense adhesions that were taken down between the uterus and the sidewall and part of the bladder flap which was bulging out somewhat.  There have been some  adhesions which were mostly filmy between the ovary and the sidewall which had been taken down.  Identified the posterior Sheridan ring and dissected down to this for better orientation.  I proceeded with dissection  down alongside the uterus to the level of the cervical vessels which were cauterized prior to being cut.  The bladder flap was completed and taken down.  After identifying the Sheridan ring , monopolar scissors were used to make an incision anteriorly inside the Sheridan ring and continued circumferentially.  The posterior Sheridan ring was identified and scissors were used to dissect down to the interior aspect of the Sheridan ring.  Dissection continued circumferentially around the cervix staying inside the Sheridan ring to free the uterus and cervix.  These were removed vaginally.  The pelvis was irrigated and suctioned for blood clots after the vaginal occlusion balloon was replaced.  Hemostasis was acquired with the PK grasper where indicated on the right side.  A couple small blue spots of endometriosis in the posterior cul-de-sac were grasped and excised with the PK graspers.  Some other smaller areas were fulgurated.  A 2-0 Stratafix was used to close the vaginal cuff from the right angle to the left angle uterosacral ligaments were incorporated on the right and left sides.  3 bites were taken medially  prior to cutting the suture and removing the needle.  With adequate hemostasis achieved, the abdomen was allowed to deflate for 1 minute after the da Brooke instruments were removed.  It was reinflated, and with adequate hemostasis achieved, Tisseel was sprayed across the bilateral adnexa and vaginal cuff.  We had placed a long 12 mm trocar as a camera port.  This trocar was removed under direct visualization and a Bishnu-Erin device was used to close the incision with a Vicryl tie.  Adequate hemostasis was confirmed in the pelvis and upper abdomen.  The right da Brooke trochars and physician assistant port were  removed under direct visualization.  After CO2 gas was allowed to escape, the final left trocar was removed.  These incisions were closed subcuticularly with 3-0 plain suture and the skin closed with glue.  Vaginal instruments had been removed.  The Parker catheter was removed.  Sponge and needle counts were correct ×2.  The patient was taken to the postop recovery area in stable condition.      Amador Richey M.D.

## 2019-01-23 NOTE — BRIEF OP NOTE
TOTAL LAPAROSCOPIC HYSTERECTOMY WITH DAVINCI ROBOT  Progress Note    Chen Galaviz  1/23/2019    Pre-op Diagnosis:   Dysmenorrhea [N94.6]  Endometriosis determined by laparoscopy [N80.9]  Dyspareunia, female [N94.10]       Post-Op Diagnosis Codes:     * Dysmenorrhea [N94.6]     * Endometriosis determined by laparoscopy [N80.9]     * Dyspareunia, female [N94.10]    Procedure/CPT® Codes:      Procedure(s):  TOTAL LAPAROSCOPIC HYSTERECTOMY BILATERAL SALPINGECTOMY WITH DAVINCI ROBOT  LYSIS OF ADHESIONS , FULGURATION OF ENDOMETRIOSIS  Surgeon(s):  Amador Richey MD    Anesthesia: General    Staff:   Circulator: Peter Gresham RN; Enma Hope RN  Scrub Person: Licha Sosa Robin M  Assistant: Clayton Stevenson PA; Cecilia Melo PA    Estimated Blood Loss: minimal    Urine Voided: 100 ML    Specimens:                ID Type Source Tests Collected by Time   A :  Tissue Uterus, Cervix, Bilateral Fallopian Tubes  TISSUE PATHOLOGY EXAM Amador Richey MD 1/23/2019 1019         Drains:   Urethral Catheter 16 Fr. (Active)       Findings: omental adhesions , right ovary and  left adnexal / uterine adhesions; endometriosis    Complications: none      Amador Richey MD     Date: 1/23/2019  Time: 10:54 AM

## 2019-01-24 ENCOUNTER — TELEPHONE (OUTPATIENT)
Dept: OBSTETRICS AND GYNECOLOGY | Facility: CLINIC | Age: 29
End: 2019-01-24
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
## 2019-01-25 ENCOUNTER — TELEPHONE (OUTPATIENT)
Dept: OBSTETRICS AND GYNECOLOGY | Facility: CLINIC | Age: 29
End: 2019-01-25

## 2019-01-25 RX ORDER — ONDANSETRON 4 MG/1
4 TABLET, ORALLY DISINTEGRATING ORAL EVERY 8 HOURS PRN
Qty: 20 TABLET | Refills: 0 | OUTPATIENT
Start: 2019-01-25 | End: 2019-02-10

## 2019-01-25 NOTE — TELEPHONE ENCOUNTER
PT IS CALLING AND WANTS TO KNOW OF DR OBANDO CAN CALL HER IN [facility] FOR HER SHE IS STAYING NAUSEAS AFTER HER SURGERY  [facility]

## 2019-01-28 LAB
CYTO UR: NORMAL
LAB AP CASE REPORT: NORMAL
LAB AP CLINICAL INFORMATION: NORMAL
PATH REPORT.FINAL DX SPEC: NORMAL
PATH REPORT.GROSS SPEC: NORMAL

## 2019-01-29 ENCOUNTER — TELEPHONE (OUTPATIENT)
Dept: OBSTETRICS AND GYNECOLOGY | Facility: CLINIC | Age: 29
End: 2019-01-29

## 2019-01-29 NOTE — TELEPHONE ENCOUNTER
It is not unusual to have a burning sort of sensation around her incision.  As long as it is open and not draining or bleeding that should resolve.  She could try a heating pad.  That incision is not a da Brooke incision is typically a little more tender than the others.  Did she schedule a return to office appointment next week postop?  Needs to if she has not thank you

## 2019-01-29 NOTE — TELEPHONE ENCOUNTER
PT HAD SURGERY ON 1-23 WHEN SHE IS MOVING AROUND SHE IS HAVING BURNING AND RIPPING FEELING ABOVE HER INCISION -     PLEASE CALL HER

## 2019-01-30 ENCOUNTER — HOSPITAL ENCOUNTER (EMERGENCY)
Facility: HOSPITAL | Age: 29
Discharge: HOME | End: 2019-01-30
Attending: EMERGENCY MEDICINE | Admitting: EMERGENCY MEDICINE

## 2019-01-30 VITALS
HEIGHT: 63 IN | WEIGHT: 203 LBS | DIASTOLIC BLOOD PRESSURE: 68 MMHG | OXYGEN SATURATION: 98 % | SYSTOLIC BLOOD PRESSURE: 112 MMHG | TEMPERATURE: 98.6 F | RESPIRATION RATE: 16 BRPM | HEART RATE: 113 BPM | BODY MASS INDEX: 35.97 KG/M2

## 2019-01-30 DIAGNOSIS — T81.31XA DEHISCENCE OF INCISION, INITIAL ENCOUNTER: Primary | ICD-10-CM

## 2019-01-30 PROCEDURE — 99283 EMERGENCY DEPT VISIT LOW MDM: CPT

## 2019-02-01 ENCOUNTER — PATIENT OUTREACH (OUTPATIENT)
Dept: CASE MANAGEMENT | Facility: OTHER | Age: 29
End: 2019-02-01

## 2019-02-01 ENCOUNTER — EPISODE CHANGES (OUTPATIENT)
Dept: CASE MANAGEMENT | Facility: OTHER | Age: 29
End: 2019-02-01

## 2019-02-01 NOTE — OUTREACH NOTE
Called patient in follow up to ED visit 1-30-19.  Patient stated she is doing fine; the opened area of small incision is closing, no drainage noted, looks like it is healing.  Care Advisor instructed patient to make a f/u appt with Dr. Richey, her surgeon as soon as possible;  CA offered to make appt for her.  Phone call disconnected.  CA tried to call patient back; call went to voicemail, left Care Advisor contact information on voicemail.

## 2019-02-10 ENCOUNTER — HOSPITAL ENCOUNTER (EMERGENCY)
Facility: HOSPITAL | Age: 29
Discharge: HOME | End: 2019-02-10
Attending: EMERGENCY MEDICINE | Admitting: EMERGENCY MEDICINE

## 2019-02-10 VITALS
OXYGEN SATURATION: 96 % | RESPIRATION RATE: 20 BRPM | HEART RATE: 96 BPM | WEIGHT: 201 LBS | DIASTOLIC BLOOD PRESSURE: 62 MMHG | HEIGHT: 63 IN | BODY MASS INDEX: 35.61 KG/M2 | TEMPERATURE: 98.9 F | SYSTOLIC BLOOD PRESSURE: 118 MMHG

## 2019-02-10 DIAGNOSIS — Z90.710 STATUS POST HYSTERECTOMY: ICD-10-CM

## 2019-02-10 DIAGNOSIS — T88.8XXA FLUID COLLECTION AT SURGICAL SITE, INITIAL ENCOUNTER: Primary | ICD-10-CM

## 2019-02-10 DIAGNOSIS — D72.829 LEUKOCYTOSIS, UNSPECIFIED TYPE: ICD-10-CM

## 2019-02-10 LAB
BASOPHILS # BLD AUTO: 0.04 10*3/MM3 (ref 0–0.2)
BASOPHILS NFR BLD AUTO: 0.3 % (ref 0–1)
DEPRECATED RDW RBC AUTO: 43.9 FL (ref 37–54)
EOSINOPHIL # BLD AUTO: 0.62 10*3/MM3 (ref 0–0.3)
EOSINOPHIL NFR BLD AUTO: 4.8 % (ref 0–3)
ERYTHROCYTE [DISTWIDTH] IN BLOOD BY AUTOMATED COUNT: 14.1 % (ref 11.3–14.5)
HCT VFR BLD AUTO: 41.6 % (ref 34.5–44)
HGB BLD-MCNC: 14 G/DL (ref 11.5–15.5)
IMM GRANULOCYTES # BLD AUTO: 0.04 10*3/MM3 (ref 0–0.03)
IMM GRANULOCYTES NFR BLD AUTO: 0.3 % (ref 0–0.6)
LYMPHOCYTES # BLD AUTO: 2.82 10*3/MM3 (ref 0.6–4.8)
LYMPHOCYTES NFR BLD AUTO: 21.9 % (ref 24–44)
MCH RBC QN AUTO: 28.7 PG (ref 27–31)
MCHC RBC AUTO-ENTMCNC: 33.7 G/DL (ref 32–36)
MCV RBC AUTO: 85.2 FL (ref 80–99)
MONOCYTES # BLD AUTO: 0.68 10*3/MM3 (ref 0–1)
MONOCYTES NFR BLD AUTO: 5.3 % (ref 0–12)
NEUTROPHILS # BLD AUTO: 8.68 10*3/MM3 (ref 1.5–8.3)
NEUTROPHILS NFR BLD AUTO: 67.4 % (ref 41–71)
PLATELET # BLD AUTO: 291 10*3/MM3 (ref 150–450)
PMV BLD AUTO: 10.1 FL (ref 6–12)
PROCALCITONIN SERPL-MCNC: <0.05 NG/ML
RBC # BLD AUTO: 4.88 10*6/MM3 (ref 3.89–5.14)
WBC NRBC COR # BLD: 12.88 10*3/MM3 (ref 3.5–10.8)

## 2019-02-10 PROCEDURE — 84145 PROCALCITONIN (PCT): CPT | Performed by: EMERGENCY MEDICINE

## 2019-02-10 PROCEDURE — 99284 EMERGENCY DEPT VISIT MOD MDM: CPT

## 2019-02-10 PROCEDURE — 85025 COMPLETE CBC W/AUTO DIFF WBC: CPT | Performed by: EMERGENCY MEDICINE

## 2019-02-10 RX ORDER — ONDANSETRON 2 MG/ML
4 INJECTION INTRAMUSCULAR; INTRAVENOUS ONCE
Status: DISCONTINUED | OUTPATIENT
Start: 2019-02-10 | End: 2019-02-10

## 2019-02-10 RX ORDER — CEPHALEXIN 250 MG/1
500 CAPSULE ORAL ONCE
Status: COMPLETED | OUTPATIENT
Start: 2019-02-10 | End: 2019-02-10

## 2019-02-10 RX ORDER — ONDANSETRON 4 MG/1
4 TABLET, ORALLY DISINTEGRATING ORAL ONCE
Status: COMPLETED | OUTPATIENT
Start: 2019-02-10 | End: 2019-02-10

## 2019-02-10 RX ORDER — CEPHALEXIN 500 MG/1
500 CAPSULE ORAL 4 TIMES DAILY
Qty: 28 CAPSULE | Refills: 0 | OUTPATIENT
Start: 2019-02-10 | End: 2019-08-14

## 2019-02-10 RX ADMIN — ONDANSETRON 4 MG: 4 TABLET, ORALLY DISINTEGRATING ORAL at 21:04

## 2019-02-10 RX ADMIN — CEPHALEXIN 500 MG: 250 CAPSULE ORAL at 21:57

## 2019-02-11 ENCOUNTER — OFFICE VISIT (OUTPATIENT)
Dept: OBSTETRICS AND GYNECOLOGY | Facility: CLINIC | Age: 29
End: 2019-02-11

## 2019-02-11 ENCOUNTER — EPISODE CHANGES (OUTPATIENT)
Dept: CASE MANAGEMENT | Facility: OTHER | Age: 29
End: 2019-02-11

## 2019-02-11 VITALS
RESPIRATION RATE: 16 BRPM | SYSTOLIC BLOOD PRESSURE: 126 MMHG | WEIGHT: 202 LBS | BODY MASS INDEX: 35.78 KG/M2 | DIASTOLIC BLOOD PRESSURE: 80 MMHG

## 2019-02-11 DIAGNOSIS — Z09 SURGERY FOLLOW-UP: Primary | ICD-10-CM

## 2019-02-11 PROBLEM — N80.9 ENDOMETRIOSIS DETERMINED BY LAPAROSCOPY: Status: RESOLVED | Noted: 2018-04-11 | Resolved: 2019-02-11

## 2019-02-11 PROBLEM — Z98.890 S/P ENDOMETRIAL ABLATION: Status: RESOLVED | Noted: 2018-06-20 | Resolved: 2019-02-11

## 2019-02-11 PROBLEM — Z90.710 HISTORY OF ROBOT-ASSISTED LAPAROSCOPIC HYSTERECTOMY: Status: ACTIVE | Noted: 2019-02-11

## 2019-02-11 PROCEDURE — 99024 POSTOP FOLLOW-UP VISIT: CPT | Performed by: OBSTETRICS & GYNECOLOGY
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
## 2019-02-11 NOTE — PROGRESS NOTES
Subjective   Chief Complaint   Patient presents with   • Post-op     s/p TLH B&S 19     Chen Galaviz is a 28 y.o. year old  presenting to be seen for her post-operative visit.  She had a TLH.  The operative findings were reviewed.  Pathology report and operative pictures were reviewed if appropriate.  Currently she reports no problems with eating, bowel movements, voiding, or wound drainage and pain is well controlled.    She was poking and picking at her incisions because they were itching but some bag balm on and then went on became infected.  I am not sure if this was due to her leaking around at the incision or not.  She went to emergency room.  Show me a picture on her cell phone which looks like a couple of drops of pus at the incision site.  This will be the right upper quadrant site.    The following portions of the patient's history were reviewed and updated as appropriate:current medications and allergies    Review of Systems : Please see above     Objective   /80   Resp 16   Wt 91.6 kg (202 lb)   LMP 2019 Comment: Never had a mammogram due to age.  Breastfeeding? No   BMI 35.78 kg/m²     General:  well developed; well nourished  no acute distress  appears stated age   Abdomen: soft, non-tender; no masses  no umbilical or inguinal hernias are present  no hepato-splenomegaly  incision is clean, dry, intact, without drainage and erythematous   The umbilcical site is bleeding as she pulled bag balm off of it this is covered with sterile glue.   Pelvis: Clinical staff was present for exam  External genitalia:  normal appearance of the external genitalia including Bartholin's and [place]'s glands.  :  urethral meatus normal; urethral hypermobility is absent.  Vaginal:  normal pink mucosa without prolapse or lesions.  Cervix:  absent. Minimal amount of owens brown discharge at cuff  Uterus:  absent.  Adnexa:  normal bimanual exam of the adnexa.          Assessment   1. She is doing  "well with exception of some superficial infection at the right upper quadrant site that has since resolved.  She felt a \"knot\" under it initially.  I told her that was normal or suture in the subcuticular area.  Today the umbilical incision was bleeding slightly she pulled off a scab with some bag balm.  I covered this with glue and asked her not to do anything to any of her incisions.     No orders of the defined types were placed in this encounter.    1. Plan   Increase activity with the exception of no intercourse incisions as above potentially triple antibiotic ointment hydroperoxide only to the incisions.  Complete antibiotics from the ER.    Medications Rx this encounter:  No orders of the defined types were placed in this encounter.         This note was electronically signed.    Amador Richey MD  February 11, 2019    "

## 2019-02-11 NOTE — DISCHARGE INSTRUCTIONS
If you develop fever or increasing pain or have any other concerns of worsening condition please return to the emergency Department immediately.    Follow-up with Dr. Richey, next available.    Take Keflex as I have ordered.    Please review the medications you are supposed to be taking according to prior physician recommendations. I have not changed your home medications during this visit. If your discharge instructions indicate that I have changed your home medications, this is not the case, and you should disregard. If you have any questions about the medication you should be taking at home, please call your physician.

## 2019-02-12 ENCOUNTER — PATIENT OUTREACH (OUTPATIENT)
Dept: CASE MANAGEMENT | Facility: OTHER | Age: 29
End: 2019-02-12

## 2019-02-12 NOTE — OUTREACH NOTE
Talked with patient. Discussed 2/10/19 ED visit regarding fluid collection at surgical site S/P hysterectomy.  Patient states to be compliant with ED recommendations; taking Keflex as directed; and has had follow up visit with Dr. Richey. She states Dr. Richey applied glue to incision and incision intact; with no drainage or redness noted. Patient has been monitoring temperature and reports no fever and has appointment with Dr. Richey on 3/11/19. She states to be compliant with medications; and medical appointments.Patient reports no difficulty with chest pain; SOB; appetite or sleeping. Reviewed with patient 24/7 Nurse Line Telephone number. She verbalized understanding. She states PCP  is at  [facility] /unsure of last name). No further questions or concerns voiced at this time.

## 2019-02-26 ENCOUNTER — EPISODE CHANGES (OUTPATIENT)
Dept: CASE MANAGEMENT | Facility: OTHER | Age: 29
End: 2019-02-26

## 2019-03-10 RX ORDER — GLYCOPYRROLATE 1 MG/1
TABLET ORAL
Qty: 60 TABLET | Refills: 8 | OUTPATIENT
Start: 2019-03-10

## 2019-03-28 RX ORDER — LINACLOTIDE 145 UG/1
CAPSULE, GELATIN COATED ORAL
Qty: 30 CAPSULE | Refills: 5 | OUTPATIENT
Start: 2019-03-28

## 2019-08-13 ENCOUNTER — HOSPITAL ENCOUNTER (EMERGENCY)
Facility: HOSPITAL | Age: 29
Discharge: HOME | End: 2019-08-14
Attending: EMERGENCY MEDICINE | Admitting: EMERGENCY MEDICINE

## 2019-08-13 DIAGNOSIS — R10.9 FLANK PAIN: ICD-10-CM

## 2019-08-13 DIAGNOSIS — N83.201 RIGHT OVARIAN CYST: ICD-10-CM

## 2019-08-13 DIAGNOSIS — D72.829 LEUKOCYTOSIS, UNSPECIFIED TYPE: ICD-10-CM

## 2019-08-13 DIAGNOSIS — N12 PYELONEPHRITIS: Primary | ICD-10-CM

## 2019-08-13 LAB
ALBUMIN SERPL-MCNC: 3.6 G/DL (ref 3.5–5.2)
ALBUMIN/GLOB SERPL: 1.6 G/DL
ALP SERPL-CCNC: 68 U/L (ref 39–117)
ALT SERPL W P-5'-P-CCNC: 11 U/L (ref 1–33)
ANION GAP SERPL CALCULATED.3IONS-SCNC: 12 MMOL/L (ref 5–15)
AST SERPL-CCNC: 13 U/L (ref 1–32)
BACTERIA UR QL AUTO: ABNORMAL /HPF
BASOPHILS # BLD AUTO: 0.07 10*3/MM3 (ref 0–0.2)
BASOPHILS NFR BLD AUTO: 0.5 % (ref 0–1.5)
BILIRUB SERPL-MCNC: 0.2 MG/DL (ref 0.2–1.2)
BILIRUB UR QL STRIP: NEGATIVE
BUN BLD-MCNC: 8 MG/DL (ref 6–20)
BUN/CREAT SERPL: 10.4 (ref 7–25)
CALCIUM SPEC-SCNC: 8.5 MG/DL (ref 8.6–10.5)
CHLORIDE SERPL-SCNC: 106 MMOL/L (ref 98–107)
CLARITY UR: CLEAR
CO2 SERPL-SCNC: 23 MMOL/L (ref 22–29)
COLOR UR: YELLOW
CREAT BLD-MCNC: 0.77 MG/DL (ref 0.57–1)
DEPRECATED RDW RBC AUTO: 49.4 FL (ref 37–54)
EOSINOPHIL # BLD AUTO: 0.18 10*3/MM3 (ref 0–0.4)
EOSINOPHIL NFR BLD AUTO: 1.4 % (ref 0.3–6.2)
ERYTHROCYTE [DISTWIDTH] IN BLOOD BY AUTOMATED COUNT: 16 % (ref 12.3–15.4)
GFR SERPL CREATININE-BSD FRML MDRD: 89 ML/MIN/1.73
GLOBULIN UR ELPH-MCNC: 2.3 GM/DL
GLUCOSE BLD-MCNC: 87 MG/DL (ref 65–99)
GLUCOSE UR STRIP-MCNC: NEGATIVE MG/DL
HCT VFR BLD AUTO: 47 % (ref 34–46.6)
HGB BLD-MCNC: 15.4 G/DL (ref 12–15.9)
HGB UR QL STRIP.AUTO: NEGATIVE
HOLD SPECIMEN: NORMAL
HOLD SPECIMEN: NORMAL
HYALINE CASTS UR QL AUTO: ABNORMAL /LPF
IMM GRANULOCYTES # BLD AUTO: 0.04 10*3/MM3 (ref 0–0.05)
IMM GRANULOCYTES NFR BLD AUTO: 0.3 % (ref 0–0.5)
KETONES UR QL STRIP: NEGATIVE
LEUKOCYTE ESTERASE UR QL STRIP.AUTO: ABNORMAL
LIPASE SERPL-CCNC: 38 U/L (ref 13–60)
LYMPHOCYTES # BLD AUTO: 3.09 10*3/MM3 (ref 0.7–3.1)
LYMPHOCYTES NFR BLD AUTO: 23.7 % (ref 19.6–45.3)
MCH RBC QN AUTO: 27.8 PG (ref 26.6–33)
MCHC RBC AUTO-ENTMCNC: 32.8 G/DL (ref 31.5–35.7)
MCV RBC AUTO: 84.8 FL (ref 79–97)
MONOCYTES # BLD AUTO: 0.61 10*3/MM3 (ref 0.1–0.9)
MONOCYTES NFR BLD AUTO: 4.7 % (ref 5–12)
NEUTROPHILS # BLD AUTO: 9.05 10*3/MM3 (ref 1.7–7)
NEUTROPHILS NFR BLD AUTO: 69.4 % (ref 42.7–76)
NITRITE UR QL STRIP: NEGATIVE
NRBC BLD AUTO-RTO: 0 /100 WBC (ref 0–0.2)
PH UR STRIP.AUTO: 5.5 [PH] (ref 5–8)
PLATELET # BLD AUTO: 264 10*3/MM3 (ref 140–450)
PMV BLD AUTO: 11.2 FL (ref 6–12)
POTASSIUM BLD-SCNC: 3.8 MMOL/L (ref 3.5–5.2)
PROT SERPL-MCNC: 5.9 G/DL (ref 6–8.5)
PROT UR QL STRIP: NEGATIVE
RBC # BLD AUTO: 5.54 10*6/MM3 (ref 3.77–5.28)
RBC # UR: ABNORMAL /HPF
REF LAB TEST METHOD: ABNORMAL
SODIUM BLD-SCNC: 141 MMOL/L (ref 136–145)
SP GR UR STRIP: 1.02 (ref 1–1.03)
SQUAMOUS #/AREA URNS HPF: ABNORMAL /HPF
UROBILINOGEN UR QL STRIP: ABNORMAL
WBC NRBC COR # BLD: 13.04 10*3/MM3 (ref 3.4–10.8)
WBC UR QL AUTO: ABNORMAL /HPF
WHOLE BLOOD HOLD SPECIMEN: NORMAL
WHOLE BLOOD HOLD SPECIMEN: NORMAL

## 2019-08-13 PROCEDURE — 87086 URINE CULTURE/COLONY COUNT: CPT | Performed by: EMERGENCY MEDICINE

## 2019-08-13 PROCEDURE — 80053 COMPREHEN METABOLIC PANEL: CPT | Performed by: EMERGENCY MEDICINE

## 2019-08-13 PROCEDURE — 81001 URINALYSIS AUTO W/SCOPE: CPT | Performed by: EMERGENCY MEDICINE

## 2019-08-13 PROCEDURE — 96365 THER/PROPH/DIAG IV INF INIT: CPT

## 2019-08-13 PROCEDURE — 99284 EMERGENCY DEPT VISIT MOD MDM: CPT

## 2019-08-13 PROCEDURE — 85025 COMPLETE CBC W/AUTO DIFF WBC: CPT

## 2019-08-13 PROCEDURE — 36415 COLL VENOUS BLD VENIPUNCTURE: CPT

## 2019-08-13 PROCEDURE — 25010000002 CEFTRIAXONE PER 250 MG: Performed by: EMERGENCY MEDICINE

## 2019-08-13 PROCEDURE — 83690 ASSAY OF LIPASE: CPT | Performed by: EMERGENCY MEDICINE

## 2019-08-13 PROCEDURE — 0 DIATRIZOATE MEGLUMINE & SODIUM PER 1 ML: Performed by: EMERGENCY MEDICINE

## 2019-08-13 RX ORDER — SODIUM CHLORIDE 9 MG/ML
125 INJECTION, SOLUTION INTRAVENOUS CONTINUOUS
Status: DISCONTINUED | OUTPATIENT
Start: 2019-08-13 | End: 2019-08-14 | Stop reason: HOSPADM

## 2019-08-13 RX ORDER — SODIUM CHLORIDE 0.9 % (FLUSH) 0.9 %
10 SYRINGE (ML) INJECTION AS NEEDED
Status: DISCONTINUED | OUTPATIENT
Start: 2019-08-13 | End: 2019-08-14 | Stop reason: HOSPADM

## 2019-08-13 RX ADMIN — SODIUM CHLORIDE 1000 ML: 9 INJECTION, SOLUTION INTRAVENOUS at 23:18

## 2019-08-13 RX ADMIN — Medication 15 ML: at 23:19

## 2019-08-13 RX ADMIN — CEFTRIAXONE SODIUM 2 G: 2 INJECTION, POWDER, FOR SOLUTION INTRAMUSCULAR; INTRAVENOUS at 23:19

## 2019-08-14 ENCOUNTER — APPOINTMENT (OUTPATIENT)
Dept: CT IMAGING | Facility: HOSPITAL | Age: 29
End: 2019-08-14

## 2019-08-14 VITALS
BODY MASS INDEX: 32.96 KG/M2 | SYSTOLIC BLOOD PRESSURE: 142 MMHG | HEART RATE: 70 BPM | HEIGHT: 63 IN | OXYGEN SATURATION: 94 % | RESPIRATION RATE: 18 BRPM | TEMPERATURE: 98.1 F | WEIGHT: 186 LBS | DIASTOLIC BLOOD PRESSURE: 82 MMHG

## 2019-08-14 PROCEDURE — 25010000002 IOPAMIDOL 61 % SOLUTION: Performed by: EMERGENCY MEDICINE

## 2019-08-14 PROCEDURE — 25010000002 HYDROMORPHONE PER 4 MG: Performed by: EMERGENCY MEDICINE

## 2019-08-14 PROCEDURE — 25010000002 ONDANSETRON PER 1 MG: Performed by: EMERGENCY MEDICINE

## 2019-08-14 PROCEDURE — 74177 CT ABD & PELVIS W/CONTRAST: CPT

## 2019-08-14 PROCEDURE — 96375 TX/PRO/DX INJ NEW DRUG ADDON: CPT

## 2019-08-14 RX ORDER — NICOTINE 21 MG/24HR
1 PATCH, TRANSDERMAL 24 HOURS TRANSDERMAL EVERY 24 HOURS
Qty: 28 PATCH | Refills: 0 | Status: SHIPPED | OUTPATIENT
Start: 2019-08-14 | End: 2021-04-21

## 2019-08-14 RX ORDER — CEFDINIR 300 MG/1
300 CAPSULE ORAL 2 TIMES DAILY
Qty: 20 CAPSULE | Refills: 0 | OUTPATIENT
Start: 2019-08-14 | End: 2019-08-22

## 2019-08-14 RX ORDER — ONDANSETRON 2 MG/ML
4 INJECTION INTRAMUSCULAR; INTRAVENOUS ONCE
Status: COMPLETED | OUTPATIENT
Start: 2019-08-14 | End: 2019-08-14

## 2019-08-14 RX ORDER — IBUPROFEN 800 MG/1
800 TABLET ORAL EVERY 8 HOURS PRN
Qty: 15 TABLET | Refills: 0 | Status: SHIPPED | OUTPATIENT
Start: 2019-08-14 | End: 2021-04-21

## 2019-08-14 RX ORDER — PHENAZOPYRIDINE HYDROCHLORIDE 100 MG/1
200 TABLET, FILM COATED ORAL ONCE
Status: COMPLETED | OUTPATIENT
Start: 2019-08-14 | End: 2019-08-14

## 2019-08-14 RX ORDER — HYDROMORPHONE HYDROCHLORIDE 1 MG/ML
0.5 INJECTION, SOLUTION INTRAMUSCULAR; INTRAVENOUS; SUBCUTANEOUS ONCE
Status: COMPLETED | OUTPATIENT
Start: 2019-08-14 | End: 2019-08-14

## 2019-08-14 RX ORDER — NICOTINE 21 MG/24HR
1 PATCH, TRANSDERMAL 24 HOURS TRANSDERMAL ONCE
Status: DISCONTINUED | OUTPATIENT
Start: 2019-08-14 | End: 2019-08-14 | Stop reason: HOSPADM

## 2019-08-14 RX ORDER — PHENAZOPYRIDINE HYDROCHLORIDE 200 MG/1
200 TABLET, FILM COATED ORAL 3 TIMES DAILY PRN
Qty: 6 TABLET | Refills: 0 | Status: SHIPPED | OUTPATIENT
Start: 2019-08-14 | End: 2021-04-21

## 2019-08-14 RX ADMIN — PHENAZOPYRIDINE 200 MG: 100 TABLET ORAL at 02:47

## 2019-08-14 RX ADMIN — ONDANSETRON 4 MG: 2 INJECTION INTRAMUSCULAR; INTRAVENOUS at 00:19

## 2019-08-14 RX ADMIN — HYDROMORPHONE HYDROCHLORIDE 0.5 MG: 1 INJECTION, SOLUTION INTRAMUSCULAR; INTRAVENOUS; SUBCUTANEOUS at 00:19

## 2019-08-14 RX ADMIN — IOPAMIDOL 100 ML: 612 INJECTION, SOLUTION INTRAVENOUS at 00:25

## 2019-08-14 RX ADMIN — NICOTINE 1 PATCH: 21 PATCH, EXTENDED RELEASE TRANSDERMAL at 03:07

## 2019-08-14 NOTE — TELEPHONE ENCOUNTER
I sent in one Diflucan.  If itching persists she'll need to come in to the office.  This could very well be related to her ablation last month.  Not all that itches is yeast. This document is complete and the patient is ready for discharge.

## 2019-08-14 NOTE — ED PROVIDER NOTES
Subjective   Ms. Chen Galaviz is a 29 y.o. female who presents to the ED with complaints of bilateral flank pain. She reports that she went to [facility] 2 weeks ago for right flank pain and was diagnosed with a kidney infection before being given IV antibiotics and being discharged with a prescription for antibiotics. However, she finished her antibiotics a week ago and her pain has continued to worsen and now radiates into her left flank and pelvis, which prompted presentation to the ED. She also complains of nausea, vomiting, and diarrhea but she denies any hematochezia, vaginal discharge, fever, cough, congestion, runny nose, or dysuria. She tells us that she has not seen her primary care provider about her symptoms yet because when she called they she was told that they were too busy to see her and they couldn't schedule an appointment because it would be too far out. She notes that this is her second kidney infection in the past 3 weeks. No other acute complaints at this time.         History provided by:  Patient  Flank Pain   Pain location:  R flank  Pain radiates to:  L flank and groin  Pain severity:  Moderate  Duration:  2 weeks  Timing:  Constant  Progression:  Worsening  Chronicity:  New  Ineffective treatments: IV and oral antibiotics.  Associated symptoms: diarrhea, nausea and vomiting        Review of Systems   Gastrointestinal: Positive for diarrhea, nausea and vomiting.   Genitourinary: Positive for flank pain.   All other systems reviewed and are negative.      Past Medical History:   Diagnosis Date   • Allergic rhinitis    • Anxiety    • Arthritis     since age 21 in her back   • Asthma     seasonal   • Back pain    • Bipolar disorder (CMS/HCC)     dx age 19   • Chest pain, pleuritic    • Depression    • Dyslipidemia    • Endometriosis    • Fatigue    • Febrile seizure (CMS/HCC)     FEBRILE SEIZURE AT 3YO AND AT 14YO D/T WELBUTRIN   • Frequent UTI    • GERD (gastroesophageal reflux disease)     • Gestational hypertension     History of gestational hypertension. States blood pressure is sometimes high but does not take any medications.   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   • Migraine    • Migraine    • Multiple gestation    • Organic hypersomnia    • Ovarian cyst    • Pain, upper back    • Pulmonary nodule    • Schizophrenia (CMS/HCC)    • Sinus tachycardia    • Wears glasses        Allergies   Allergen Reactions   • Bupropion Other (See Comments)     Seizure / wellbutrin    • Naproxen Rash   • Nsaids Rash   • Sulfa Antibiotics Rash       Past Surgical History:   Procedure Laterality Date   •  SECTION     •  SECTION WITH TUBAL N/A 2017    Procedure:  SECTION REPEAT WITH TUBAL;  Surgeon: Fabien Blake MD;  Location:  [facility];  Service:    • CHOLECYSTECTOMY      age 21   • DIAGNOSTIC LAPAROSCOPY      X 4   • ENDOMETRIAL ABLATION     • PELVIC LAPAROSCOPY      age 21   • TONSILLECTOMY      age 23   • TOTAL LAPAROSCOPIC HYSTERECTOMY N/A 2019    Procedure: TOTAL LAPAROSCOPIC HYSTERECTOMY BILATERAL SALPINGECTOMY WITH DAVINCI ROBOT;  Surgeon: Amador Richey MD;  Location:  [facility];  Service: DaVinci   • WISDOM TOOTH EXTRACTION         Family History   Problem Relation Age of Onset   • Asthma Mother    • Hypertension Mother    • Breast cancer Mother    • Hypertension Father    • Asthma Brother    • Colon cancer Maternal Grandfather    • Ovarian cancer Neg Hx    • Uterine cancer Neg Hx        Social History     Socioeconomic History   • Marital status: Single     Spouse name: Not on file   • Number of children: Not on file   • Years of education: Not on file   • Highest education level: Not on file   Tobacco Use   • Smoking status: Current Every Day Smoker     Packs/day: 0.50     Years: 9.00     Pack  years: 4.50     Types: Cigarettes   • Smokeless tobacco: Never Used   • Tobacco comment: cutting back, encourage cessation   Substance and Sexual Activity   • Alcohol use: No   • Drug use: No   • Sexual activity: Not Currently     Partners: Male     Birth control/protection: OCP, Injection         Objective   Physical Exam   Constitutional: She is oriented to person, place, and time. She appears well-developed and well-nourished. No distress.   HENT:   Head: Normocephalic and atraumatic.   Nose: Nose normal.   Eyes: Conjunctivae are normal. No scleral icterus.   Neck: Normal range of motion. Neck supple.   Cardiovascular: Normal rate, regular rhythm and normal heart sounds.   No murmur heard.  Pulmonary/Chest: Effort normal and breath sounds normal. No respiratory distress.   Abdominal: Soft. She exhibits no mass. There is no tenderness. There is CVA tenderness. There is no rebound and no guarding.   Patient has left CVA tenderness. No abdominal tenderness. No guarding, rebound, rigidity, or palpable mass.    Musculoskeletal: Normal range of motion.   Neurological: She is alert and oriented to person, place, and time.   Skin: Skin is warm and dry. She is not diaphoretic.   Psychiatric: She has a normal mood and affect. Her behavior is normal.   Nursing note and vitals reviewed.      Procedures         ED Course  ED Course as of Aug 14 0313   Wed Aug 14, 2019   0258 Patient was serially reevaluated throughout ED course with last reevaluation now.  She is treated with IV fluids, analgesics, antiemetics, and 2 g of Rocephin IV in addition IV hydration.  White count is mildly elevated at 13,000, with significant pyuria with 21-30 white cells and 3-6 squamous epithelial cells on urinalysis.  Urine culture is in progress.  Renal function is preserved with a BUN of 8 and creatinine of 0.77Obtained records from [facility], but no culture was done with her last ED presentation.I discussed the findings with patient.  I  discussed need for oral outpatient treatment.  I discussed PCP follow-up and indications for immediate return.  The patient has a sulfa allergy and I will treat her with Ceftin for a full 10-day course but asked her to follow-up with her PCP in the interim and immediately after antibiotic treatment in view of her recurrent symptoms after her initial treatment.She is much improved now after hydration and analgesics.  She has had no peritoneal findings throughout the ED courseWe discussed indications for immediate return.  Very pleasant and cooperative patient verbalizes understanding agreement plan of care, need for follow-up, and indications for immediate return  []   0312 Radiology transmission of the finalized report electronically was not possible.  Report was faxed by Dr. Harrison, radiology with 4 cm right ovarian cyst otherwise normal CT of the abdomen and pelvis.  []      ED Course User Index  [] South Gan MD       Recent Results (from the past 24 hour(s))   Comprehensive Metabolic Panel    Collection Time: 08/13/19  9:53 PM   Result Value Ref Range    Glucose 87 65 - 99 mg/dL    BUN 8 6 - 20 mg/dL    Creatinine 0.77 0.57 - 1.00 mg/dL    Sodium 141 136 - 145 mmol/L    Potassium 3.8 3.5 - 5.2 mmol/L    Chloride 106 98 - 107 mmol/L    CO2 23.0 22.0 - 29.0 mmol/L    Calcium 8.5 (L) 8.6 - 10.5 mg/dL    Total Protein 5.9 (L) 6.0 - 8.5 g/dL    Albumin 3.60 3.50 - 5.20 g/dL    ALT (SGPT) 11 1 - 33 U/L    AST (SGOT) 13 1 - 32 U/L    Alkaline Phosphatase 68 39 - 117 U/L    Total Bilirubin 0.2 0.2 - 1.2 mg/dL    eGFR Non African Amer 89 >60 mL/min/1.73    Globulin 2.3 gm/dL    A/G Ratio 1.6 g/dL    BUN/Creatinine Ratio 10.4 7.0 - 25.0    Anion Gap 12.0 5.0 - 15.0 mmol/L   Lipase    Collection Time: 08/13/19  9:53 PM   Result Value Ref Range    Lipase 38 13 - 60 U/L   Light Blue Top    Collection Time: 08/13/19  9:53 PM   Result Value Ref Range    Extra Tube hold for add-on    Green Top (Gel)    Collection  Time: 08/13/19  9:53 PM   Result Value Ref Range    Extra Tube Hold for add-ons.    Lavender Top    Collection Time: 08/13/19  9:53 PM   Result Value Ref Range    Extra Tube hold for add-on    Gold Top - SST    Collection Time: 08/13/19  9:53 PM   Result Value Ref Range    Extra Tube Hold for add-ons.    CBC Auto Differential    Collection Time: 08/13/19  9:53 PM   Result Value Ref Range    WBC 13.04 (H) 3.40 - 10.80 10*3/mm3    RBC 5.54 (H) 3.77 - 5.28 10*6/mm3    Hemoglobin 15.4 12.0 - 15.9 g/dL    Hematocrit 47.0 (H) 34.0 - 46.6 %    MCV 84.8 79.0 - 97.0 fL    MCH 27.8 26.6 - 33.0 pg    MCHC 32.8 31.5 - 35.7 g/dL    RDW 16.0 (H) 12.3 - 15.4 %    RDW-SD 49.4 37.0 - 54.0 fl    MPV 11.2 6.0 - 12.0 fL    Platelets 264 140 - 450 10*3/mm3    Neutrophil % 69.4 42.7 - 76.0 %    Lymphocyte % 23.7 19.6 - 45.3 %    Monocyte % 4.7 (L) 5.0 - 12.0 %    Eosinophil % 1.4 0.3 - 6.2 %    Basophil % 0.5 0.0 - 1.5 %    Immature Grans % 0.3 0.0 - 0.5 %    Neutrophils, Absolute 9.05 (H) 1.70 - 7.00 10*3/mm3    Lymphocytes, Absolute 3.09 0.70 - 3.10 10*3/mm3    Monocytes, Absolute 0.61 0.10 - 0.90 10*3/mm3    Eosinophils, Absolute 0.18 0.00 - 0.40 10*3/mm3    Basophils, Absolute 0.07 0.00 - 0.20 10*3/mm3    Immature Grans, Absolute 0.04 0.00 - 0.05 10*3/mm3    nRBC 0.0 0.0 - 0.2 /100 WBC   Urinalysis With Microscopic If Indicated (No Culture) - Urine, Clean Catch    Collection Time: 08/13/19 10:29 PM   Result Value Ref Range    Color, UA Yellow Yellow, Straw    Appearance, UA Clear Clear    pH, UA 5.5 5.0 - 8.0    Specific Gravity, UA 1.020 1.001 - 1.030    Glucose, UA Negative Negative    Ketones, UA Negative Negative    Bilirubin, UA Negative Negative    Blood, UA Negative Negative    Protein, UA Negative Negative    Leuk Esterase, UA Moderate (2+) (A) Negative    Nitrite, UA Negative Negative    Urobilinogen, UA 1.0 E.U./dL 0.2 - 1.0 E.U./dL   Urinalysis, Microscopic Only - Urine, Clean Catch    Collection Time: 08/13/19 10:29 PM  "  Result Value Ref Range    RBC, UA 0-2 None Seen, 0-2 /HPF    WBC, UA 21-30 (A) None Seen, 0-2 /HPF    Bacteria, UA None Seen None Seen, Trace /HPF    Squamous Epithelial Cells, UA 3-6 (A) None Seen, 0-2 /HPF    Hyaline Casts, UA 7-12 0 - 6 /LPF    Methodology Automated Microscopy      Note: In addition to lab results from this visit, the labs listed above may include labs taken at another facility or during a different encounter within the last 24 hours. Please correlate lab times with ED admission and discharge times for further clarification of the services performed during this visit.    CT Abdomen Pelvis With Contrast   Final Result   Right ovarian 4.6 cm cyst, normal appendix. Otherwise negative.         Signer Name: Owen Barragan MD    Signed: 8/14/2019 3:10 AM    Workstation Name: [id number]     [facility]        Vitals:    08/13/19 2145 08/14/19 0130 08/14/19 0130   BP: 124/72  142/82   BP Location: Left arm     Patient Position: Sitting     Pulse: 88  70   Resp: 18     Temp: 98.1 °F (36.7 °C)     TempSrc: Oral     SpO2: 96% 94%    Weight: 84.4 kg (186 lb)     Height: 160 cm (63\")       Medications   sodium chloride 0.9 % flush 10 mL (not administered)   sodium chloride 0.9 % infusion (0 mL/hr Intravenous Hold 8/14/19 0022)   nicotine (NICODERM CQ) 21 MG/24HR patch 1 patch (1 patch Transdermal Medication Applied 8/14/19 0307)   sodium chloride 0.9 % bolus 1,000 mL (0 mL Intravenous Stopped 8/14/19 0247)   cefTRIAXone (ROCEPHIN) 2 g/100 mL 0.9% NS VTB (CHANO) (0 g Intravenous Stopped 8/14/19 0022)   diatrizoate meglumine-sodium (GASTROGRAFIN) 66-10 % solution 15 mL (15 mL Oral Given 8/13/19 2319)   HYDROmorphone (DILAUDID) injection 0.5 mg (0.5 mg Intravenous Given 8/14/19 0019)   ondansetron (ZOFRAN) injection 4 mg (4 mg Intravenous Given 8/14/19 0019)   iopamidol (ISOVUE-300) 61 % injection 100 mL (100 mL Intravenous Given 8/14/19 0025)   phenazopyridine (PYRIDIUM) tablet 200 " mg (200 mg Oral Given 8/14/19 1517)     ECG/EMG Results (last 24 hours)     ** No results found for the last 24 hours. **        No orders to display                     MDM    Final diagnoses:   Pyelonephritis   Flank pain   Leukocytosis, unspecified type   Right ovarian cyst       Documentation assistance provided by jame De La O.  Information recorded by the scribe was done at my direction and has been verified and validated by me.     Crystal De La O  08/13/19 4068       South Gan MD  08/14/19 0306       South Gan MD  08/14/19 4319

## 2019-08-14 NOTE — DISCHARGE INSTRUCTIONS
Rest and push plenty of fluids    Use ibuprofen as needed for pain    Take Pyridium for dysuria and flank discomfort    Take the antibiotics twice a day for the full 10-day course.  This is Omnicef similar to antibiotics that you have taken in the past    Push fluids    Follow-up with your primary care provider this week in the office in view of your complicated urinary course over the last several weeks.  Call tomorrow for an appointment.  It is important that you are seen promptly    Return to the ED if you have any acute, urgent, emergent, or progressive symptoms as discussed

## 2019-08-15 LAB — BACTERIA SPEC AEROBE CULT: NORMAL

## 2019-08-22 ENCOUNTER — HOSPITAL ENCOUNTER (EMERGENCY)
Facility: HOSPITAL | Age: 29
Discharge: HOME | End: 2019-08-22
Attending: EMERGENCY MEDICINE | Admitting: EMERGENCY MEDICINE

## 2019-08-22 VITALS
DIASTOLIC BLOOD PRESSURE: 89 MMHG | RESPIRATION RATE: 16 BRPM | BODY MASS INDEX: 32.96 KG/M2 | SYSTOLIC BLOOD PRESSURE: 134 MMHG | WEIGHT: 186 LBS | HEART RATE: 69 BPM | OXYGEN SATURATION: 99 % | HEIGHT: 63 IN | TEMPERATURE: 98.6 F

## 2019-08-22 DIAGNOSIS — R11.2 NON-INTRACTABLE VOMITING WITH NAUSEA, UNSPECIFIED VOMITING TYPE: Primary | ICD-10-CM

## 2019-08-22 LAB
ALBUMIN SERPL-MCNC: 3.8 G/DL (ref 3.5–5.2)
ALBUMIN/GLOB SERPL: 1.5 G/DL
ALP SERPL-CCNC: 82 U/L (ref 39–117)
ALT SERPL W P-5'-P-CCNC: 13 U/L (ref 1–33)
ANION GAP SERPL CALCULATED.3IONS-SCNC: 13 MMOL/L (ref 5–15)
AST SERPL-CCNC: 18 U/L (ref 1–32)
BACTERIA UR QL AUTO: ABNORMAL /HPF
BASOPHILS # BLD AUTO: 0.1 10*3/MM3 (ref 0–0.2)
BASOPHILS NFR BLD AUTO: 0.7 % (ref 0–1.5)
BILIRUB SERPL-MCNC: <0.2 MG/DL (ref 0.2–1.2)
BILIRUB UR QL STRIP: NEGATIVE
BUN BLD-MCNC: 10 MG/DL (ref 6–20)
BUN/CREAT SERPL: 12 (ref 7–25)
CALCIUM SPEC-SCNC: 8.6 MG/DL (ref 8.6–10.5)
CHLORIDE SERPL-SCNC: 105 MMOL/L (ref 98–107)
CLARITY UR: CLEAR
CO2 SERPL-SCNC: 23 MMOL/L (ref 22–29)
COLOR UR: YELLOW
CREAT BLD-MCNC: 0.83 MG/DL (ref 0.57–1)
DEPRECATED RDW RBC AUTO: 47.4 FL (ref 37–54)
EOSINOPHIL # BLD AUTO: 0.36 10*3/MM3 (ref 0–0.4)
EOSINOPHIL NFR BLD AUTO: 2.7 % (ref 0.3–6.2)
ERYTHROCYTE [DISTWIDTH] IN BLOOD BY AUTOMATED COUNT: 15.3 % (ref 12.3–15.4)
GFR SERPL CREATININE-BSD FRML MDRD: 82 ML/MIN/1.73
GLOBULIN UR ELPH-MCNC: 2.5 GM/DL
GLUCOSE BLD-MCNC: 117 MG/DL (ref 65–99)
GLUCOSE UR STRIP-MCNC: NEGATIVE MG/DL
HCT VFR BLD AUTO: 46.7 % (ref 34–46.6)
HGB BLD-MCNC: 15.3 G/DL (ref 12–15.9)
HGB UR QL STRIP.AUTO: NEGATIVE
HOLD SPECIMEN: NORMAL
HOLD SPECIMEN: NORMAL
HYALINE CASTS UR QL AUTO: ABNORMAL /LPF
IMM GRANULOCYTES # BLD AUTO: 0.05 10*3/MM3 (ref 0–0.05)
IMM GRANULOCYTES NFR BLD AUTO: 0.4 % (ref 0–0.5)
KETONES UR QL STRIP: ABNORMAL
LEUKOCYTE ESTERASE UR QL STRIP.AUTO: ABNORMAL
LIPASE SERPL-CCNC: 34 U/L (ref 13–60)
LYMPHOCYTES # BLD AUTO: 3.09 10*3/MM3 (ref 0.7–3.1)
LYMPHOCYTES NFR BLD AUTO: 23 % (ref 19.6–45.3)
MCH RBC QN AUTO: 28.1 PG (ref 26.6–33)
MCHC RBC AUTO-ENTMCNC: 32.8 G/DL (ref 31.5–35.7)
MCV RBC AUTO: 85.7 FL (ref 79–97)
MONOCYTES # BLD AUTO: 0.71 10*3/MM3 (ref 0.1–0.9)
MONOCYTES NFR BLD AUTO: 5.3 % (ref 5–12)
NEUTROPHILS # BLD AUTO: 9.13 10*3/MM3 (ref 1.7–7)
NEUTROPHILS NFR BLD AUTO: 67.9 % (ref 42.7–76)
NITRITE UR QL STRIP: NEGATIVE
NRBC BLD AUTO-RTO: 0 /100 WBC (ref 0–0.2)
PH UR STRIP.AUTO: 6.5 [PH] (ref 5–8)
PLATELET # BLD AUTO: 315 10*3/MM3 (ref 140–450)
PMV BLD AUTO: 11.1 FL (ref 6–12)
POTASSIUM BLD-SCNC: 4.1 MMOL/L (ref 3.5–5.2)
PROT SERPL-MCNC: 6.3 G/DL (ref 6–8.5)
PROT UR QL STRIP: NEGATIVE
RBC # BLD AUTO: 5.45 10*6/MM3 (ref 3.77–5.28)
RBC # UR: ABNORMAL /HPF
REF LAB TEST METHOD: ABNORMAL
SODIUM BLD-SCNC: 141 MMOL/L (ref 136–145)
SP GR UR STRIP: 1.02 (ref 1–1.03)
SQUAMOUS #/AREA URNS HPF: ABNORMAL /HPF
UROBILINOGEN UR QL STRIP: ABNORMAL
WBC NRBC COR # BLD: 13.44 10*3/MM3 (ref 3.4–10.8)
WBC UR QL AUTO: ABNORMAL /HPF
WHOLE BLOOD HOLD SPECIMEN: NORMAL
WHOLE BLOOD HOLD SPECIMEN: NORMAL

## 2019-08-22 PROCEDURE — 99284 EMERGENCY DEPT VISIT MOD MDM: CPT

## 2019-08-22 PROCEDURE — 83690 ASSAY OF LIPASE: CPT

## 2019-08-22 PROCEDURE — 85025 COMPLETE CBC W/AUTO DIFF WBC: CPT

## 2019-08-22 PROCEDURE — 80053 COMPREHEN METABOLIC PANEL: CPT

## 2019-08-22 PROCEDURE — 81001 URINALYSIS AUTO W/SCOPE: CPT

## 2019-08-22 RX ORDER — SODIUM CHLORIDE 0.9 % (FLUSH) 0.9 %
10 SYRINGE (ML) INJECTION AS NEEDED
Status: DISCONTINUED | OUTPATIENT
Start: 2019-08-22 | End: 2019-08-22 | Stop reason: HOSPADM

## 2019-08-22 RX ORDER — ONDANSETRON 4 MG/1
4 TABLET, ORALLY DISINTEGRATING ORAL EVERY 8 HOURS PRN
Qty: 14 TABLET | Refills: 0 | Status: SHIPPED | OUTPATIENT
Start: 2019-08-22 | End: 2021-04-21

## 2019-08-22 RX ADMIN — SODIUM CHLORIDE 1000 ML: 9 INJECTION, SOLUTION INTRAVENOUS at 18:59

## 2019-08-22 NOTE — ED PROVIDER NOTES
Subjective   Chen Galaviz is a 29 y.o.female who presents to the emergency department with complaints of flank pain. The patient reports right sided flank pain that began nine days ago and worsened today. At the time of onset, she presented to the emergency department and was diagnosed with suspected pyelonephritis. Upon being discharged, she was instructed to follow up with her primary care physician and prescribed antibiotics. Today, she reports she has been taking her antibiotics as instructed and followed up with her primary care physician yesterday, but her flank pain continues. Today, she reports two vomiting episodes and diarrhea. There are no other acute complaints at this time.        History provided by:  Patient  Flank Pain   Pain location:  R flank  Pain quality: aching    Pain radiates to:  Does not radiate  Pain severity:  Moderate  Onset quality:  Sudden  Duration:  9 days  Timing:  Intermittent  Progression:  Waxing and waning  Chronicity:  New  Associated symptoms: diarrhea and vomiting        Review of Systems   Gastrointestinal: Positive for diarrhea and vomiting.   Genitourinary: Positive for flank pain.   All other systems reviewed and are negative.      Past Medical History:   Diagnosis Date   • Allergic rhinitis    • Anxiety    • Arthritis     since age 21 in her back   • Asthma     seasonal   • Back pain    • Bipolar disorder (CMS/HCC)     dx age 19   • Chest pain, pleuritic    • Depression    • Dyslipidemia    • Endometriosis    • Fatigue    • Febrile seizure (CMS/HCC)     FEBRILE SEIZURE AT 3YO AND AT 16YO D/T WELBUTRIN   • Frequent UTI    • GERD (gastroesophageal reflux disease)    • Gestational hypertension     History of gestational hypertension. States blood pressure is sometimes high but does not take any medications.   • Headache    • Herpes zoster     denies this visit   • IBS (irritable bowel syndrome)    • Itching    • Mental retardation     A. [place] to be related to hypoxia at  birth due to placenta previa   • Migraine    • Migraine    • Multiple gestation    • Organic hypersomnia    • Ovarian cyst    • Pain, upper back    • Pulmonary nodule    • Schizophrenia (CMS/HCC)    • Sinus tachycardia    • Wears glasses        Allergies   Allergen Reactions   • Bupropion Other (See Comments)     Seizure / wellbutrin    • Naproxen Rash   • Nsaids Rash   • Sulfa Antibiotics Rash       Past Surgical History:   Procedure Laterality Date   •  SECTION     •  SECTION WITH TUBAL N/A 2017    Procedure:  SECTION REPEAT WITH TUBAL;  Surgeon: Fabien Blake MD;  Location:  [facility];  Service:    • CHOLECYSTECTOMY      age 21   • DIAGNOSTIC LAPAROSCOPY      X 4   • ENDOMETRIAL ABLATION     • PELVIC LAPAROSCOPY      age 21   • TONSILLECTOMY      age 23   • TOTAL LAPAROSCOPIC HYSTERECTOMY N/A 2019    Procedure: TOTAL LAPAROSCOPIC HYSTERECTOMY BILATERAL SALPINGECTOMY WITH DAVINCI ROBOT;  Surgeon: Amador Richey MD;  Location:  [facility];  Service: DaVinci   • WISDOM TOOTH EXTRACTION         Family History   Problem Relation Age of Onset   • Asthma Mother    • Hypertension Mother    • Breast cancer Mother    • Hypertension Father    • Asthma Brother    • Colon cancer Maternal Grandfather    • Ovarian cancer Neg Hx    • Uterine cancer Neg Hx        Social History     Socioeconomic History   • Marital status: Single     Spouse name: Not on file   • Number of children: Not on file   • Years of education: Not on file   • Highest education level: Not on file   Tobacco Use   • Smoking status: Current Every Day Smoker     Packs/day: 0.50     Years: 9.00     Pack years: 4.50     Types: Cigarettes   • Smokeless tobacco: Never Used   • Tobacco comment: cutting back, encourage cessation   Substance and Sexual Activity   • Alcohol use: No   • Drug use: No   • Sexual activity: Not Currently     Partners: Male     Birth control/protection: OCP, Injection         Objective    Physical Exam   Constitutional: She is oriented to person, place, and time. She appears well-developed and well-nourished. No distress.   HENT:   Head: Normocephalic and atraumatic.   Eyes: Conjunctivae are normal. No scleral icterus.   Neck: Normal range of motion. Neck supple.   Cardiovascular: Normal rate, regular rhythm and normal heart sounds.   Pulmonary/Chest: Effort normal and breath sounds normal. No respiratory distress.   Abdominal: Soft. There is no tenderness.   Musculoskeletal: Normal range of motion.   Neurological: She is alert and oriented to person, place, and time.   Skin: Skin is warm and dry.   Psychiatric: She has a normal mood and affect. Her behavior is normal.   Nursing note and vitals reviewed.      Procedures         ED Course  ED Course as of Aug 22 1930   Thu Aug 22, 2019   1928 Patient's urine culture from visit 9 days ago resulted with normal genital get.  Patient has no fever.  Her exam is negative for CVA tenderness.  Her back pain is reproduced with movement.  We have hydrated her due to her presence of vomiting yesterday and today.  She is feeling much better and concurs with outpatient follow-up.  [MS]      ED Course User Index  [MS] Saira Warren, FRANCHESKA     Recent Results (from the past 24 hour(s))   Comprehensive Metabolic Panel    Collection Time: 08/22/19  4:35 PM   Result Value Ref Range    Glucose 117 (H) 65 - 99 mg/dL    BUN 10 6 - 20 mg/dL    Creatinine 0.83 0.57 - 1.00 mg/dL    Sodium 141 136 - 145 mmol/L    Potassium 4.1 3.5 - 5.2 mmol/L    Chloride 105 98 - 107 mmol/L    CO2 23.0 22.0 - 29.0 mmol/L    Calcium 8.6 8.6 - 10.5 mg/dL    Total Protein 6.3 6.0 - 8.5 g/dL    Albumin 3.80 3.50 - 5.20 g/dL    ALT (SGPT) 13 1 - 33 U/L    AST (SGOT) 18 1 - 32 U/L    Alkaline Phosphatase 82 39 - 117 U/L    Total Bilirubin <0.2 (L) 0.2 - 1.2 mg/dL    eGFR Non African Amer 82 >60 mL/min/1.73    Globulin 2.5 gm/dL    A/G Ratio 1.5 g/dL    BUN/Creatinine Ratio 12.0 7.0 - 25.0     Anion Gap 13.0 5.0 - 15.0 mmol/L   Lipase    Collection Time: 08/22/19  4:35 PM   Result Value Ref Range    Lipase 34 13 - 60 U/L   Light Blue Top    Collection Time: 08/22/19  4:35 PM   Result Value Ref Range    Extra Tube hold for add-on    Green Top (Gel)    Collection Time: 08/22/19  4:35 PM   Result Value Ref Range    Extra Tube Hold for add-ons.    Lavender Top    Collection Time: 08/22/19  4:35 PM   Result Value Ref Range    Extra Tube hold for add-on    Gold Top - SST    Collection Time: 08/22/19  4:35 PM   Result Value Ref Range    Extra Tube Hold for add-ons.    CBC Auto Differential    Collection Time: 08/22/19  4:35 PM   Result Value Ref Range    WBC 13.44 (H) 3.40 - 10.80 10*3/mm3    RBC 5.45 (H) 3.77 - 5.28 10*6/mm3    Hemoglobin 15.3 12.0 - 15.9 g/dL    Hematocrit 46.7 (H) 34.0 - 46.6 %    MCV 85.7 79.0 - 97.0 fL    MCH 28.1 26.6 - 33.0 pg    MCHC 32.8 31.5 - 35.7 g/dL    RDW 15.3 12.3 - 15.4 %    RDW-SD 47.4 37.0 - 54.0 fl    MPV 11.1 6.0 - 12.0 fL    Platelets 315 140 - 450 10*3/mm3    Neutrophil % 67.9 42.7 - 76.0 %    Lymphocyte % 23.0 19.6 - 45.3 %    Monocyte % 5.3 5.0 - 12.0 %    Eosinophil % 2.7 0.3 - 6.2 %    Basophil % 0.7 0.0 - 1.5 %    Immature Grans % 0.4 0.0 - 0.5 %    Neutrophils, Absolute 9.13 (H) 1.70 - 7.00 10*3/mm3    Lymphocytes, Absolute 3.09 0.70 - 3.10 10*3/mm3    Monocytes, Absolute 0.71 0.10 - 0.90 10*3/mm3    Eosinophils, Absolute 0.36 0.00 - 0.40 10*3/mm3    Basophils, Absolute 0.10 0.00 - 0.20 10*3/mm3    Immature Grans, Absolute 0.05 0.00 - 0.05 10*3/mm3    nRBC 0.0 0.0 - 0.2 /100 WBC   Urinalysis With Microscopic If Indicated (No Culture) - Urine, Clean Catch    Collection Time: 08/22/19  4:39 PM   Result Value Ref Range    Color, UA Yellow Yellow, Straw    Appearance, UA Clear Clear    pH, UA 6.5 5.0 - 8.0    Specific Gravity, UA 1.017 1.001 - 1.030    Glucose, UA Negative Negative    Ketones, UA Trace (A) Negative    Bilirubin, UA Negative Negative    Blood, UA  "Negative Negative    Protein, UA Negative Negative    Leuk Esterase, UA Trace (A) Negative    Nitrite, UA Negative Negative    Urobilinogen, UA 0.2 E.U./dL 0.2 - 1.0 E.U./dL   Urinalysis, Microscopic Only - Urine, Clean Catch    Collection Time: 08/22/19  4:39 PM   Result Value Ref Range    RBC, UA 0-2 None Seen, 0-2 /HPF    WBC, UA 3-5 (A) None Seen, 0-2 /HPF    Bacteria, UA None Seen None Seen, Trace /HPF    Squamous Epithelial Cells, UA 0-2 None Seen, 0-2 /HPF    Hyaline Casts, UA 0-6 0 - 6 /LPF    Methodology Automated Microscopy      Note: In addition to lab results from this visit, the labs listed above may include labs taken at another facility or during a different encounter within the last 24 hours. Please correlate lab times with ED admission and discharge times for further clarification of the services performed during this visit.    No orders to display     Vitals:    08/22/19 1623 08/22/19 1816 08/22/19 1900   BP: 135/81 131/83 131/82   Pulse: 77  68   Resp: 16     Temp: 98.6 °F (37 °C)     SpO2: 98%  96%   Weight: 84.4 kg (186 lb)     Height: 160 cm (63\")       Medications   sodium chloride 0.9 % flush 10 mL (not administered)   sodium chloride 0.9 % flush 10 mL (not administered)   sodium chloride 0.9 % bolus 1,000 mL (1,000 mL Intravenous New Bag 8/22/19 1859)     ECG/EMG Results (last 24 hours)     ** No results found for the last 24 hours. **        No orders to display                       MDM    Final diagnoses:   Non-intractable vomiting with nausea, unspecified vomiting type       Documentation assistance provided by jame Carranza.  Information recorded by the jame was done at my direction and has been verified and validated by me.     Daysi Carranza  08/22/19 1845       Saira Warren APRN  08/22/19 1931    "

## 2019-08-22 NOTE — DISCHARGE INSTRUCTIONS
Discontinue the antibiotic for urine infection.  Drink ample clear fluids.  Nausea medication provided as needed.  Follow-up with your primary care provider to monitor your recovery.  Thank you

## 2020-11-12 NOTE — ANESTHESIA POSTPROCEDURE EVALUATION
Artificial Tears:  Systane, Refresh, Thera Tears or Genteal    Never use products that say \"get the red out.\" No Visine.    Stop Polymxin.    Heavily lubricate eyes at least 4 x daily and more if needed.    Patanol twice daily.      Patient: Chen Galaviz    Procedure Summary     Date:  01/23/19 Room / [facility]:   [facility]    Anesthesia Start:  0800 Anesthesia Stop:  1107    Procedure:  TOTAL LAPAROSCOPIC HYSTERECTOMY BILATERAL SALPINGECTOMY WITH DAVINCI ROBOT (N/A Abdomen) Diagnosis:       Dysmenorrhea      Endometriosis determined by laparoscopy      Dyspareunia, female      (Dysmenorrhea [N94.6])      (Endometriosis determined by laparoscopy [N80.9])      (Dyspareunia, female [N94.10])    Surgeon:  Amador Richey MD Provider:  David Reid MD    Anesthesia Type:  general ASA Status:  2          Anesthesia Type: general  Last vitals  BP   122/82 (01/23/19 0717)   Temp   97.7 °F (36.5 °C) (01/23/19 0717)   Pulse   108 (01/23/19 0717)   Resp   18 (01/23/19 0717)     SpO2   96 % (01/23/19 0717)     Post Anesthesia Care and Evaluation    Patient location during evaluation: PACU  Patient participation: complete - patient participated  Level of consciousness: awake and alert  Pain score: 0  Pain management: adequate  Airway patency: patent  Anesthetic complications: No anesthetic complications  PONV Status: none  Cardiovascular status: hemodynamically stable and acceptable  Respiratory status: nonlabored ventilation, acceptable and nasal cannula  Hydration status: acceptable

## 2021-04-21 ENCOUNTER — OFFICE VISIT (OUTPATIENT)
Dept: INTERNAL MEDICINE | Facility: CLINIC | Age: 31
End: 2021-04-21

## 2021-04-21 ENCOUNTER — TELEPHONE (OUTPATIENT)
Dept: INTERNAL MEDICINE | Facility: CLINIC | Age: 31
End: 2021-04-21

## 2021-04-21 VITALS
DIASTOLIC BLOOD PRESSURE: 80 MMHG | HEART RATE: 109 BPM | HEIGHT: 63 IN | SYSTOLIC BLOOD PRESSURE: 142 MMHG | BODY MASS INDEX: 36.89 KG/M2 | TEMPERATURE: 97.3 F | WEIGHT: 208.2 LBS | RESPIRATION RATE: 16 BRPM | OXYGEN SATURATION: 96 %

## 2021-04-21 DIAGNOSIS — M54.42 CHRONIC BILATERAL LOW BACK PAIN WITH BILATERAL SCIATICA: ICD-10-CM

## 2021-04-21 DIAGNOSIS — E78.5 HYPERLIPIDEMIA, UNSPECIFIED HYPERLIPIDEMIA TYPE: ICD-10-CM

## 2021-04-21 DIAGNOSIS — Z80.3 FAMILY HISTORY OF BREAST CANCER IN MOTHER: ICD-10-CM

## 2021-04-21 DIAGNOSIS — J45.40 MODERATE PERSISTENT ASTHMA WITHOUT COMPLICATION: ICD-10-CM

## 2021-04-21 DIAGNOSIS — R92.8 ABNORMAL MAMMOGRAM OF LEFT BREAST: ICD-10-CM

## 2021-04-21 DIAGNOSIS — G89.29 CHRONIC BILATERAL LOW BACK PAIN WITH BILATERAL SCIATICA: ICD-10-CM

## 2021-04-21 DIAGNOSIS — Z13.1 SCREENING FOR DIABETES MELLITUS: ICD-10-CM

## 2021-04-21 DIAGNOSIS — M54.41 CHRONIC BILATERAL LOW BACK PAIN WITH BILATERAL SCIATICA: ICD-10-CM

## 2021-04-21 DIAGNOSIS — F31.32 BIPOLAR AFFECTIVE DISORDER, CURRENTLY DEPRESSED, MODERATE (HCC): ICD-10-CM

## 2021-04-21 DIAGNOSIS — G89.29 CHRONIC MIDLINE THORACIC BACK PAIN: ICD-10-CM

## 2021-04-21 DIAGNOSIS — M54.6 CHRONIC MIDLINE THORACIC BACK PAIN: ICD-10-CM

## 2021-04-21 DIAGNOSIS — I10 ESSENTIAL HYPERTENSION: ICD-10-CM

## 2021-04-21 DIAGNOSIS — R79.9 ABNORMAL FINDING OF BLOOD CHEMISTRY, UNSPECIFIED: ICD-10-CM

## 2021-04-21 DIAGNOSIS — Z00.00 HEALTHCARE MAINTENANCE: ICD-10-CM

## 2021-04-21 DIAGNOSIS — R73.9 HYPERGLYCEMIA: ICD-10-CM

## 2021-04-21 DIAGNOSIS — R91.1 SOLITARY LUNG NODULE: Primary | ICD-10-CM

## 2021-04-21 DIAGNOSIS — G25.81 RESTLESS LEGS: ICD-10-CM

## 2021-04-21 DIAGNOSIS — F41.9 ANXIETY: ICD-10-CM

## 2021-04-21 DIAGNOSIS — Z72.0 TOBACCO USE: ICD-10-CM

## 2021-04-21 PROBLEM — Z90.49 S/P CHOLECYSTECTOMY: Status: RESOLVED | Noted: 2018-04-11 | Resolved: 2021-04-21

## 2021-04-21 PROBLEM — N94.10 DYSPAREUNIA, FEMALE: Status: RESOLVED | Noted: 2018-12-12 | Resolved: 2021-04-21

## 2021-04-21 PROBLEM — R51.9 SEVERE FRONTAL HEADACHES: Status: RESOLVED | Noted: 2018-08-30 | Resolved: 2021-04-21

## 2021-04-21 PROBLEM — Z90.710 HISTORY OF ROBOT-ASSISTED LAPAROSCOPIC HYSTERECTOMY: Status: RESOLVED | Noted: 2019-02-11 | Resolved: 2021-04-21

## 2021-04-21 PROCEDURE — 99204 OFFICE O/P NEW MOD 45 MIN: CPT | Performed by: INTERNAL MEDICINE

## 2021-04-21 RX ORDER — MONTELUKAST SODIUM 10 MG/1
10 TABLET ORAL NIGHTLY
Qty: 30 TABLET | Refills: 2 | Status: SHIPPED | OUTPATIENT
Start: 2021-04-21 | End: 2021-07-14

## 2021-04-21 RX ORDER — ARIPIPRAZOLE 5 MG/1
5 TABLET ORAL DAILY
COMMUNITY
Start: 2021-04-18 | End: 2022-01-03 | Stop reason: SDUPTHER

## 2021-04-21 RX ORDER — QUETIAPINE FUMARATE 50 MG/1
100 TABLET, FILM COATED ORAL NIGHTLY
COMMUNITY
Start: 2021-03-17 | End: 2022-01-03 | Stop reason: SDUPTHER

## 2021-04-21 RX ORDER — TIZANIDINE 2 MG/1
2 TABLET ORAL 2 TIMES DAILY PRN
Qty: 60 TABLET | Refills: 1 | Status: SHIPPED | OUTPATIENT
Start: 2021-04-21 | End: 2021-06-14

## 2021-04-21 RX ORDER — FLUTICASONE PROPIONATE 220 UG/1
1 AEROSOL, METERED RESPIRATORY (INHALATION)
Qty: 12 G | Refills: 2 | Status: SHIPPED | OUTPATIENT
Start: 2021-04-21 | End: 2021-06-14

## 2021-04-21 RX ORDER — LAMOTRIGINE 200 MG/1
200 TABLET ORAL DAILY
COMMUNITY
Start: 2021-01-21 | End: 2022-01-03 | Stop reason: SDUPTHER

## 2021-04-21 RX ORDER — BUSPIRONE HYDROCHLORIDE 7.5 MG/1
7.5 TABLET ORAL 3 TIMES DAILY
COMMUNITY
Start: 2021-04-18 | End: 2022-01-03 | Stop reason: SDUPTHER

## 2021-04-21 RX ORDER — LISINOPRIL 10 MG/1
10 TABLET ORAL DAILY
COMMUNITY
End: 2022-01-03 | Stop reason: SDUPTHER

## 2021-04-21 RX ORDER — ALBUTEROL SULFATE 90 UG/1
AEROSOL, METERED RESPIRATORY (INHALATION)
COMMUNITY
Start: 2021-03-02 | End: 2022-01-03 | Stop reason: SDUPTHER

## 2021-04-21 NOTE — PROGRESS NOTES
Internal Medicine New Patient  Chen Galaviz is a 30 y.o. female who presents today to establish care and with concerns as outlined below.    Chief Complaint  Chief Complaint   Patient presents with   • Establish Care     New Pt        HPI  Ms. Galaviz is here today with her mother to establish care. Her last PCP was at [facility] and she saw them last week for follow up of ED visit on 4/5/2021. She had been in the ED with sinusitis and fever that had failed outpatient abx x2. She had elevated d dimer and CTA chest without PE but with a 15mm noncalcified RUL lung nodule. This was felt to be postinfectious by radiologist. She also had head CT without acute findings. She was discharged with levaquin x7d which she has completed. Now has residual SOA with exertion such as walking room to room. She has asthma that affects her seasonally typically in the fall. She uses albuterol as needed, recently 3-4 times daily. She is a current 7.5 pack year smoker. Trying to quit by cutting back. She additionally reports recent L breast mass on mammogram. This was not biopsied. She will have genetic testing and follow up mammogram 3 months. Her mother has a hx of breast cancer. These were done at the [facility]. She has HTN for several years and has been recently started on lisinopril 10mg daily. She does not regularly monitor her BP at home. She has bipolar disorder and anxiety and takes abilify 5mg daily, lamictal 200mg daily, seroquel 100mg qhs, and buspar 7.5mg TID. She sees a psychiatrist, Mana Hampton. She has a history of HLD. She also has had chronic low back pain with bilateral sciatica worst on the left side.       Review of Systems  Review of Systems   Constitutional: Positive for fatigue. Negative for fever.   Eyes: Negative.    Respiratory: Positive for cough and shortness of breath.    Cardiovascular: Negative.    Gastrointestinal: Negative.    Genitourinary: Positive for breast lump.    Musculoskeletal: Positive for back pain.   Skin: Negative.    Neurological: Negative.    Psychiatric/Behavioral: Positive for decreased concentration, sleep disturbance and depressed mood. Negative for self-injury and suicidal ideas. The patient is nervous/anxious.         Past Medical History  Past Medical History:   Diagnosis Date   • Allergic rhinitis    • Anxiety    • Arthritis     since age 21 in her back   • Asthma     seasonal   • Back pain    • Bipolar disorder (CMS/HCC)     dx age 19   • Chest pain, pleuritic    • Depression    • Dyslipidemia    • Endometriosis    • Fatigue    • Febrile seizure (CMS/HCC)     FEBRILE SEIZURE AT 3YO AND AT 14YO D/T WELBUTRIN   • Frequent UTI    • GERD (gastroesophageal reflux disease)    • Gestational hypertension     History of gestational hypertension. States blood pressure is sometimes high but does not take any medications.   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 due to placenta previa   • Migraine    • Migraine    • Multiple gestation    • Organic hypersomnia    • Ovarian cyst    • Pain, upper back    • Pulmonary nodule    • Schizophrenia (CMS/HCC)    • Sinus tachycardia    • Wears glasses         Surgical History  Past Surgical History:   Procedure Laterality Date   •  SECTION     •  SECTION WITH TUBAL N/A 2017    Procedure:  SECTION REPEAT WITH TUBAL;  Surgeon: Fabien Blake MD;  Location: [facility] LABOR DELIVERY;  Service:    • CHOLECYSTECTOMY      age 21   • DIAGNOSTIC LAPAROSCOPY      X 4   • ENDOMETRIAL ABLATION     • PELVIC LAPAROSCOPY      age 21   • TONSILLECTOMY      age 23   • TOTAL LAPAROSCOPIC HYSTERECTOMY N/A 2019    Procedure: TOTAL LAPAROSCOPIC HYSTERECTOMY BILATERAL SALPINGECTOMY WITH DAVINCI ROBOT;  Surgeon: Amador Richey MD;  Location: [facility] OR;  Service: DaVinci   • WISDOM TOOTH  EXTRACTION          Family History  Family History   Problem Relation Age of Onset   • Asthma Mother    • Hypertension Mother    • Breast cancer Mother    • Hypertension Father    • Asthma Brother    • Colon cancer Maternal Grandfather    • Ovarian cancer Neg Hx    • Uterine cancer Neg Hx         Social History  Social History     Socioeconomic History   • Marital status: Single     Spouse name: Not on file   • Number of children: Not on file   • Years of education: Not on file   • Highest education level: Not on file   Tobacco Use   • Smoking status: Current Every Day Smoker     Packs/day: 0.50     Years: 9.00     Pack years: 4.50     Types: Cigarettes   • Smokeless tobacco: Never Used   • Tobacco comment: cutting back, encourage cessation   Vaping Use   • Vaping Use: Never used   Substance and Sexual Activity   • Alcohol use: No   • Drug use: No   • Sexual activity: Not Currently     Partners: Male     Birth control/protection: OCP, Injection        Current Medications  Current Outpatient Medications on File Prior to Visit   Medication Sig Dispense Refill   • acetaminophen (TYLENOL) 325 MG tablet Take 2 tablets by mouth Every 6 (Six) Hours As Needed for Mild Pain .     • albuterol sulfate  (90 Base) MCG/ACT inhaler INHALE 2 PUFFS BY MOUTH EVERY 6 HOURS AS NEEDED FOR FOR SHORTNESS OF BREATH AND/OR WHEEZING     • ARIPiprazole (ABILIFY) 5 MG tablet Take 5 mg by mouth Daily.     • busPIRone (BUSPAR) 7.5 MG tablet Take 7.5 mg by mouth Daily.     • lamoTRIgine (LaMICtal) 200 MG tablet Take 200 mg by mouth Daily.     • QUEtiapine (SEROquel) 50 MG tablet Take 50 mg by mouth Every Night.     • traZODone (DESYREL) 50 MG tablet Take 1 tablet by mouth Every Night.  1   • [DISCONTINUED] lamoTRIgine (LaMICtal) 150 MG tablet Take 1 tablet by mouth Daily.  1   • butalbital-acetaminophen-caffeine (FIORICET) -40 MG capsule capsule Take 1 capsule by mouth Every 4 (Four) Hours As Needed (Headache). 15 capsule 1   •  "glycopyrrolate (ROBINUL) 1 MG tablet Take 1 mg by mouth 2 (Two) Times a Day.  8   • hydrOXYzine (VISTARIL) 25 MG capsule Take 25 mg by mouth 3 (Three) Times a Day As Needed for Anxiety.     • ibuprofen (ADVIL,MOTRIN) 800 MG tablet Take 1 tablet by mouth Every 8 (Eight) Hours As Needed for Moderate Pain . 15 tablet 0   • linaclotide (LINZESS) 145 MCG capsule capsule Take 1 capsule by mouth Every Morning Before Breakfast. Take 30 minutes before first daily meal. 30 capsule 5   • nicotine (NICODERM CQ) 21 MG/24HR patch Place 1 patch on the skin as directed by provider Daily. 28 patch 0   • ondansetron ODT (ZOFRAN-ODT) 4 MG disintegrating tablet Take 1 tablet by mouth Every 8 (Eight) Hours As Needed for Nausea or Vomiting. 14 tablet 0   • oxybutynin XL (DITROPAN-XL) 5 MG 24 hr tablet Take 5 mg by mouth Daily.  3   • phenazopyridine (PYRIDIUM) 200 MG tablet Take 1 tablet by mouth 3 (Three) Times a Day As Needed for bladder spasms. 6 tablet 0   • promethazine (PHENERGAN) 12.5 MG tablet Take 12.5 mg by mouth Every 8 (Eight) Hours As Needed for Nausea or Vomiting.     • venlafaxine XR (EFFEXOR-XR) 75 MG 24 hr capsule Take 75 mg by mouth Every Morning.  2   • [DISCONTINUED] risperiDONE (risperDAL) 1 MG tablet Take 1 mg by mouth Daily. Take 1 tablet by mouth daily. May add a second dose if needed.       No current facility-administered medications on file prior to visit.       Allergies  Allergies   Allergen Reactions   • Bupropion Other (See Comments)     Seizure / wellbutrin    • Naproxen Rash   • Nsaids Rash   • Sulfa Antibiotics Rash        Objective  Visit Vitals  /80   Pulse 109   Temp 97.3 °F (36.3 °C)   Resp 16   Ht 160 cm (62.99\")   Wt 94.4 kg (208 lb 3.2 oz)   LMP 01/19/2019 Comment: Never had a mammogram due to age.   SpO2 96%   BMI 36.89 kg/m²        Physical Exam  Physical Exam  Vitals and nursing note reviewed.   Constitutional:       General: She is not in acute distress.     Appearance: She is " well-developed. She is obese. She is not ill-appearing or diaphoretic.   HENT:      Head: Normocephalic and atraumatic.      Right Ear: External ear normal.      Left Ear: External ear normal.      Nose: Nose normal.   Eyes:      General: No scleral icterus.     Conjunctiva/sclera: Conjunctivae normal.   Cardiovascular:      Rate and Rhythm: Normal rate and regular rhythm.      Heart sounds: Normal heart sounds. No murmur heard.     Pulmonary:      Effort: Pulmonary effort is normal. No respiratory distress.      Breath sounds: Normal breath sounds. No wheezing.   Abdominal:      General: Bowel sounds are normal. There is no distension.      Palpations: Abdomen is soft.      Tenderness: There is no abdominal tenderness.   Musculoskeletal:         General: Tenderness (midline thoracic spine, lumbar spine across paraspinal muscles bilaterally) present. No deformity.      Cervical back: Neck supple.      Right lower leg: No edema.      Left lower leg: No edema.   Skin:     General: Skin is warm and dry.      Findings: No rash.   Neurological:      General: No focal deficit present.      Mental Status: She is alert and oriented to person, place, and time.      Gait: Gait normal.   Psychiatric:         Mood and Affect: Mood normal.         Behavior: Behavior normal.          Results  No results associated with this encounter.     Assessment and Plan  Diagnoses and all orders for this visit:    Solitary lung nodule  - Hx of LLL lung nodule in 2014. Now with 15mm RUL noncalcified nodule.  - This is likely postinfectious given context and appearance  - Plan for repeat CT in 3 months    Tobacco use  - Smoking 0.5ppd x15y.   - Could not use patches due to inability to keep them on her skin.  - Currently trying to cut back.    Moderate persistent asthma without complication  - Notes dyspnea on exertion and use of albuterol several times daily. Likely due to recent URI, seasonal allergies, smoking.  - Lungs clear on exam today,  SPO2 appropriate.  - Will add flovent and singulair to albuterol PRN. Discussed black box warned for behavioral disturbance related to singulair.  - Needs to quit smoking    Restless legs  - Noted leg movements at night which may be consistent with RLS  - Will check ferritin  - Recommend weighted blanket  - Defer pharmacotherapy today given several other medication changes    Chronic bilateral low back pain with bilateral sciatica  Chronic midline thoracic back pain  - Chronic low back pain with bilateral sciatica and upper mid thoracic pain.  - Prior lumbar MRI without abnormality and thoracic xray with minimal scoliosis. No appreciable deformity on exam.  - Suspect thoracic back pain due to poor posture, low back pain due to muscle strain  - Recommend brace for posture, tizanidine 2mg BID PRN, and PT    Bipolar affective disorder, currently depressed, moderate (CMS/HCC)  - Managed by psychiatry, on lamictal 200mg daily, abilify 5mg daily, and seroquel 100mg qhs.  - Endorses depressed mood and associated anhedonia, fatigue, sleep disturbance, and appetite changes  - Keep follow up with psychiatrist    Anxiety  - Managed by psychiatry, on buspar 7.5mg TID    Hyperlipidemia, unspecified hyperlipidemia type  - Noted prior lipid panels with elevated LDL and triglycerides. None within the last year.  - No statin or fibrate  - Will order lipid panel today    Essential hypertension  - recently started on lisinopril 10mg daily  - BP today above goal  - For now will monitor BP, may need dose adjustment at future visit    Abnormal mammogram of left breast and Family history of breast cancer in mother/DCIS - 30s/recurrence in 50s  - Recent mammogram at [place] with L sided abnormality described by patient as a mass. No biopsy and plan for follow up mammogram in 3 months as well as genetic testing given family hx of breast cancer in mom.  - Will request records    Healthcare maintenance  - CBC and CMP ordered    Screening for  diabetes mellitus  - Obese, will screen with A1c       Health Maintenance   Topic Date Due   • Pneumococcal Vaccine 0-64 (1 of 1 - PPSV23) Never done   • COVID-19 Vaccine (1) Never done   • ANNUAL WELLNESS VISIT  Never done   • PAP SMEAR  Never done   • LIPID PANEL  05/09/2017   • INFLUENZA VACCINE  08/01/2021   • TDAP/TD VACCINES (3 - Td) 08/07/2030   • HEPATITIS C SCREENING  Completed     Health Maintenance  - Pap smear: s/p hysterectomy  - Mammogram: recent abnl mammogram. Repeat planned 3m.  - Colonoscopy: Start screening at age 46.  - HCV: negative  - Immunizations: Needs COVID and pneumovax. Tdap 2020.  - Depression screening: PHQ9 =14 4/2021    Return in about 12 weeks (around 7/14/2021) for Follow up asthma, lung nodule, back pain.

## 2021-04-26 ENCOUNTER — APPOINTMENT (OUTPATIENT)
Dept: PREADMISSION TESTING | Facility: HOSPITAL | Age: 31
End: 2021-04-26

## 2021-04-26 PROCEDURE — U0004 COV-19 TEST NON-CDC HGH THRU: HCPCS

## 2021-04-26 PROCEDURE — C9803 HOPD COVID-19 SPEC COLLECT: HCPCS

## 2021-04-26 PROCEDURE — U0005 INFEC AGEN DETEC AMPLI PROBE: HCPCS

## 2021-04-27 LAB — SARS-COV-2 RNA PNL SPEC NAA+PROBE: NOT DETECTED

## 2021-04-29 ENCOUNTER — HOSPITAL ENCOUNTER (OUTPATIENT)
Dept: PULMONOLOGY | Facility: HOSPITAL | Age: 31
Discharge: HOME | End: 2021-04-29
Admitting: INTERNAL MEDICINE

## 2021-04-29 DIAGNOSIS — J45.40 MODERATE PERSISTENT ASTHMA WITHOUT COMPLICATION: ICD-10-CM

## 2021-04-29 PROCEDURE — 94727 GAS DIL/WSHOT DETER LNG VOL: CPT

## 2021-04-29 PROCEDURE — 94729 DIFFUSING CAPACITY: CPT | Performed by: INTERNAL MEDICINE

## 2021-04-29 PROCEDURE — 94729 DIFFUSING CAPACITY: CPT

## 2021-04-29 PROCEDURE — 94010 BREATHING CAPACITY TEST: CPT | Performed by: INTERNAL MEDICINE

## 2021-04-29 PROCEDURE — 94727 GAS DIL/WSHOT DETER LNG VOL: CPT | Performed by: INTERNAL MEDICINE

## 2021-04-29 PROCEDURE — 94010 BREATHING CAPACITY TEST: CPT

## 2021-05-02 ENCOUNTER — PATIENT MESSAGE (OUTPATIENT)
Dept: INTERNAL MEDICINE | Facility: CLINIC | Age: 31
End: 2021-05-02

## 2021-05-02 DIAGNOSIS — G25.81 RESTLESS LEGS: Primary | ICD-10-CM

## 2021-05-10 RX ORDER — ROPINIROLE 0.25 MG/1
0.25 TABLET, FILM COATED ORAL NIGHTLY
Qty: 30 TABLET | Refills: 2 | Status: SHIPPED | OUTPATIENT
Start: 2021-05-10 | End: 2022-01-03 | Stop reason: SDUPTHER

## 2021-05-11 NOTE — TELEPHONE ENCOUNTER
From: Chen Galaviz  To: Selma Dupree MD  Sent: 5/2/2021 9:37 PM EDT  Subject: Visit Follow-Up Question    Okay thank you. So what's next. I'm not sleeping very well. Maybe 2-3 hours a night. Because I'm constantly jerking my legs. Then when I wake up it's waking my kids up. I've been staying up 10-12 hours a day going nonstop. I have very little energy. But it's like my body is tired but my brain won't shut down!!!!

## 2021-05-14 DIAGNOSIS — J45.40 MODERATE PERSISTENT ASTHMA WITHOUT COMPLICATION: ICD-10-CM

## 2021-05-15 RX ORDER — FLUTICASONE PROPIONATE 220 UG/1
AEROSOL, METERED RESPIRATORY (INHALATION)
Qty: 12 G | Refills: 2 | OUTPATIENT
Start: 2021-05-15

## 2021-05-15 NOTE — TELEPHONE ENCOUNTER
Last Office Visit: 04/21/21  Next Office Visit:07/21/21    Labs completed in past 6 months? yes  Labs completed in past year? yes    Last Refill Date: 04/21/21  Quantity:1  Refills:2    Pharmacy:     Please review pended refill request for any changes needed on refills or quantities. Thank you!

## 2021-06-14 DIAGNOSIS — M54.42 CHRONIC BILATERAL LOW BACK PAIN WITH BILATERAL SCIATICA: ICD-10-CM

## 2021-06-14 DIAGNOSIS — J45.40 MODERATE PERSISTENT ASTHMA WITHOUT COMPLICATION: ICD-10-CM

## 2021-06-14 DIAGNOSIS — G89.29 CHRONIC MIDLINE THORACIC BACK PAIN: ICD-10-CM

## 2021-06-14 DIAGNOSIS — M54.41 CHRONIC BILATERAL LOW BACK PAIN WITH BILATERAL SCIATICA: ICD-10-CM

## 2021-06-14 DIAGNOSIS — M54.6 CHRONIC MIDLINE THORACIC BACK PAIN: ICD-10-CM

## 2021-06-14 DIAGNOSIS — G89.29 CHRONIC BILATERAL LOW BACK PAIN WITH BILATERAL SCIATICA: ICD-10-CM

## 2021-06-14 RX ORDER — FLUTICASONE PROPIONATE 220 UG/1
AEROSOL, METERED RESPIRATORY (INHALATION)
Qty: 12 G | Refills: 11 | Status: SHIPPED | OUTPATIENT
Start: 2021-06-14 | End: 2022-01-03 | Stop reason: SDUPTHER

## 2021-06-14 RX ORDER — TIZANIDINE 2 MG/1
2 TABLET ORAL 2 TIMES DAILY PRN
Qty: 60 TABLET | Refills: 1 | Status: SHIPPED | OUTPATIENT
Start: 2021-06-14 | End: 2021-07-06 | Stop reason: SDUPTHER

## 2021-06-14 NOTE — TELEPHONE ENCOUNTER
Last Office Visit: 4/21/21  Next Office Visit: 7/23/21    Labs completed in past 6 months? yes  Labs completed in past year? yes    Last Refill Date: 4/21/21  Quantity: 12 g  Refills: 2    Pharmacy: On File    Please review pended refill request for any changes needed on refills or quantities. Thank you!

## 2021-06-14 NOTE — TELEPHONE ENCOUNTER
Last Office Visit: 4/21/21  Next Office Visit: 7/23/21    Labs completed in past 6 months? yes  Labs completed in past year? yes    Last Refill Date: 4/21/21  Quantity: 60 tab  Refills: 1    Pharmacy: [id number] - [place], [place] - [address] [phone]  - [phone]    [address] [id number]     Please review pended refill request for any changes needed on refills or quantities. Thank you!

## 2021-07-06 DIAGNOSIS — M54.6 CHRONIC MIDLINE THORACIC BACK PAIN: ICD-10-CM

## 2021-07-06 DIAGNOSIS — G89.29 CHRONIC MIDLINE THORACIC BACK PAIN: ICD-10-CM

## 2021-07-06 DIAGNOSIS — M54.41 CHRONIC BILATERAL LOW BACK PAIN WITH BILATERAL SCIATICA: ICD-10-CM

## 2021-07-06 DIAGNOSIS — G89.29 CHRONIC BILATERAL LOW BACK PAIN WITH BILATERAL SCIATICA: ICD-10-CM

## 2021-07-06 DIAGNOSIS — M54.42 CHRONIC BILATERAL LOW BACK PAIN WITH BILATERAL SCIATICA: ICD-10-CM

## 2021-07-06 RX ORDER — TIZANIDINE 2 MG/1
2 TABLET ORAL 2 TIMES DAILY PRN
Qty: 180 TABLET | Refills: 0 | Status: SHIPPED | OUTPATIENT
Start: 2021-07-06 | End: 2021-10-20

## 2021-07-06 NOTE — TELEPHONE ENCOUNTER
Pharmacy is requesting 3 month supply for Tizanidine HCL 2 mg tab    Last Office Visit: 4/21/21  Next Office Visit: 7/23/21    Labs completed in past 6 months? yes  Labs completed in past year? yes    Last Refill Date: 6/14/21  Quantity: 60 tab  Refills: 1    Pharmacy: [id number] - [place], [place] - [address] - [phone]  - [phone]    [address] [id number]     Please review pended refill request for any changes needed on refills or quantities. Thank you!

## 2021-07-10 PROBLEM — R59.0 SUBMANDIBULAR LYMPHADENOPATHY: Status: ACTIVE | Noted: 2021-07-10

## 2021-07-13 ENCOUNTER — TELEPHONE (OUTPATIENT)
Dept: INTERNAL MEDICINE | Facility: CLINIC | Age: 31
End: 2021-07-13

## 2021-07-13 DIAGNOSIS — F41.9 ANXIETY: ICD-10-CM

## 2021-07-13 DIAGNOSIS — F31.32 BIPOLAR AFFECTIVE DISORDER, CURRENTLY DEPRESSED, MODERATE (HCC): ICD-10-CM

## 2021-07-14 DIAGNOSIS — J45.40 MODERATE PERSISTENT ASTHMA WITHOUT COMPLICATION: ICD-10-CM

## 2021-07-14 RX ORDER — BUSPIRONE HYDROCHLORIDE 7.5 MG/1
7.5 TABLET ORAL 3 TIMES DAILY
OUTPATIENT
Start: 2021-07-14

## 2021-07-14 RX ORDER — MONTELUKAST SODIUM 10 MG/1
TABLET ORAL
Qty: 90 TABLET | Refills: 0 | Status: SHIPPED | OUTPATIENT
Start: 2021-07-14 | End: 2022-01-03 | Stop reason: SDUPTHER

## 2021-07-14 RX ORDER — ARIPIPRAZOLE 5 MG/1
5 TABLET ORAL DAILY
OUTPATIENT
Start: 2021-07-14

## 2021-07-14 RX ORDER — LAMOTRIGINE 200 MG/1
200 TABLET ORAL DAILY
OUTPATIENT
Start: 2021-07-14

## 2021-07-14 NOTE — TELEPHONE ENCOUNTER
Last Office Visit: 4/21/21  Next Office Visit: 7/23/21    Labs completed in past 6 months? yes  Labs completed in past year? yes    Last Refill Date: 4/21/21  Quantity: 30 tabs  Refills: 2    Pharmacy: [id number] [address] [phone] [facility] [phone]     Please review pended refill request for any changes needed on refills or quantities. Thank you!

## 2021-07-14 NOTE — TELEPHONE ENCOUNTER
Please let patient know that she needs to continue to get this medication from her psychiatrist and keep psychiatry follow up.

## 2021-08-03 DIAGNOSIS — G25.81 RESTLESS LEGS: ICD-10-CM

## 2021-08-03 RX ORDER — ROPINIROLE 0.25 MG/1
0.25 TABLET, FILM COATED ORAL NIGHTLY
Qty: 90 TABLET | Refills: 0 | OUTPATIENT
Start: 2021-08-03

## 2021-10-20 DIAGNOSIS — M54.41 CHRONIC BILATERAL LOW BACK PAIN WITH BILATERAL SCIATICA: ICD-10-CM

## 2021-10-20 DIAGNOSIS — G89.29 CHRONIC MIDLINE THORACIC BACK PAIN: ICD-10-CM

## 2021-10-20 DIAGNOSIS — M54.42 CHRONIC BILATERAL LOW BACK PAIN WITH BILATERAL SCIATICA: ICD-10-CM

## 2021-10-20 DIAGNOSIS — G89.29 CHRONIC BILATERAL LOW BACK PAIN WITH BILATERAL SCIATICA: ICD-10-CM

## 2021-10-20 DIAGNOSIS — M54.6 CHRONIC MIDLINE THORACIC BACK PAIN: ICD-10-CM

## 2021-10-20 RX ORDER — TIZANIDINE 2 MG/1
2 TABLET ORAL 2 TIMES DAILY PRN
Qty: 180 TABLET | Refills: 0 | Status: SHIPPED | OUTPATIENT
Start: 2021-10-20 | End: 2022-01-03 | Stop reason: SDUPTHER

## 2021-10-20 NOTE — TELEPHONE ENCOUNTER
Last Office Visit: 4/21/21  Next Office Visit: None Scheduled    Labs completed in past 6 months? yes  Labs completed in past year? no    Last Refill Date: 7/6/21  Quantity: 180 tabs  Refills: 0    Pharmacy: [id number] [address] [phone] [facility] [phone]     Please review pended refill request for any changes needed on refills or quantities. Thank you!

## 2021-10-22 NOTE — TELEPHONE ENCOUNTER
Sent Pt a My Chart message to schedule a follow up appointment for refills of Tizanidine. Office number given

## 2022-01-03 ENCOUNTER — OFFICE VISIT (OUTPATIENT)
Dept: INTERNAL MEDICINE | Facility: CLINIC | Age: 32
End: 2022-01-03

## 2022-01-03 VITALS
OXYGEN SATURATION: 99 % | WEIGHT: 210.6 LBS | TEMPERATURE: 97.1 F | HEIGHT: 63 IN | RESPIRATION RATE: 16 BRPM | DIASTOLIC BLOOD PRESSURE: 74 MMHG | SYSTOLIC BLOOD PRESSURE: 142 MMHG | BODY MASS INDEX: 37.32 KG/M2 | HEART RATE: 90 BPM

## 2022-01-03 DIAGNOSIS — G89.29 CHRONIC BILATERAL LOW BACK PAIN WITH BILATERAL SCIATICA: ICD-10-CM

## 2022-01-03 DIAGNOSIS — M54.42 CHRONIC BILATERAL LOW BACK PAIN WITH BILATERAL SCIATICA: ICD-10-CM

## 2022-01-03 DIAGNOSIS — G25.81 RESTLESS LEGS: ICD-10-CM

## 2022-01-03 DIAGNOSIS — J45.40 MODERATE PERSISTENT ASTHMA WITHOUT COMPLICATION: ICD-10-CM

## 2022-01-03 DIAGNOSIS — M54.41 CHRONIC BILATERAL LOW BACK PAIN WITH BILATERAL SCIATICA: ICD-10-CM

## 2022-01-03 DIAGNOSIS — F41.9 ANXIETY: ICD-10-CM

## 2022-01-03 DIAGNOSIS — R91.1 SOLITARY LUNG NODULE: Primary | ICD-10-CM

## 2022-01-03 DIAGNOSIS — M54.6 CHRONIC MIDLINE THORACIC BACK PAIN: ICD-10-CM

## 2022-01-03 DIAGNOSIS — I10 ESSENTIAL HYPERTENSION: ICD-10-CM

## 2022-01-03 DIAGNOSIS — G89.29 CHRONIC MIDLINE THORACIC BACK PAIN: ICD-10-CM

## 2022-01-03 DIAGNOSIS — F31.32 BIPOLAR AFFECTIVE DISORDER, CURRENTLY DEPRESSED, MODERATE: ICD-10-CM

## 2022-01-03 DIAGNOSIS — Z23 NEED FOR INFLUENZA VACCINATION: ICD-10-CM

## 2022-01-03 PROCEDURE — G0008 ADMIN INFLUENZA VIRUS VAC: HCPCS | Performed by: INTERNAL MEDICINE

## 2022-01-03 PROCEDURE — 99214 OFFICE O/P EST MOD 30 MIN: CPT | Performed by: INTERNAL MEDICINE

## 2022-01-03 PROCEDURE — 90686 IIV4 VACC NO PRSV 0.5 ML IM: CPT | Performed by: INTERNAL MEDICINE

## 2022-01-03 RX ORDER — TIZANIDINE 2 MG/1
2 TABLET ORAL 2 TIMES DAILY PRN
Qty: 180 TABLET | Refills: 0 | Status: SHIPPED | OUTPATIENT
Start: 2022-01-03 | End: 2022-03-29

## 2022-01-03 RX ORDER — LAMOTRIGINE 200 MG/1
200 TABLET ORAL DAILY
Qty: 30 TABLET | Refills: 1 | Status: SHIPPED | OUTPATIENT
Start: 2022-01-03 | End: 2022-03-01 | Stop reason: SDUPTHER

## 2022-01-03 RX ORDER — BUSPIRONE HYDROCHLORIDE 7.5 MG/1
7.5 TABLET ORAL 3 TIMES DAILY
Qty: 90 TABLET | Refills: 1 | Status: SHIPPED | OUTPATIENT
Start: 2022-01-03 | End: 2022-02-02

## 2022-01-03 RX ORDER — QUETIAPINE FUMARATE 50 MG/1
100 TABLET, FILM COATED ORAL NIGHTLY
Qty: 60 TABLET | Refills: 1 | Status: SHIPPED | OUTPATIENT
Start: 2022-01-03 | End: 2022-02-27

## 2022-01-03 RX ORDER — LISINOPRIL 10 MG/1
10 TABLET ORAL DAILY
Qty: 90 TABLET | Refills: 1 | Status: SHIPPED | OUTPATIENT
Start: 2022-01-03 | End: 2022-08-17

## 2022-01-03 RX ORDER — CETIRIZINE HYDROCHLORIDE 10 MG/1
10 TABLET ORAL DAILY
COMMUNITY
Start: 2021-07-26

## 2022-01-03 RX ORDER — ARIPIPRAZOLE 5 MG/1
5 TABLET ORAL DAILY
Qty: 30 TABLET | Refills: 1 | Status: SHIPPED | OUTPATIENT
Start: 2022-01-03 | End: 2022-02-02 | Stop reason: DRUGHIGH

## 2022-01-03 RX ORDER — ACETAMINOPHEN 325 MG/1
650 TABLET ORAL EVERY 6 HOURS PRN
Status: CANCELLED
Start: 2022-01-03

## 2022-01-03 RX ORDER — MONTELUKAST SODIUM 10 MG/1
10 TABLET ORAL
Qty: 90 TABLET | Refills: 1 | Status: SHIPPED | OUTPATIENT
Start: 2022-01-03 | End: 2023-01-06

## 2022-01-03 RX ORDER — ALBUTEROL SULFATE 90 UG/1
2 AEROSOL, METERED RESPIRATORY (INHALATION) EVERY 6 HOURS PRN
Qty: 18 G | Refills: 11 | Status: SHIPPED | OUTPATIENT
Start: 2022-01-03

## 2022-01-03 RX ORDER — FLUOXETINE HYDROCHLORIDE 20 MG/1
20 CAPSULE ORAL DAILY
Qty: 30 CAPSULE | Refills: 1 | Status: SHIPPED | OUTPATIENT
Start: 2022-01-03 | End: 2022-02-27

## 2022-01-03 RX ORDER — FLUTICASONE PROPIONATE 220 UG/1
1 AEROSOL, METERED RESPIRATORY (INHALATION) 2 TIMES DAILY
Qty: 12 G | Refills: 11 | Status: SHIPPED | OUTPATIENT
Start: 2022-01-03

## 2022-01-03 RX ORDER — FLUOXETINE HYDROCHLORIDE 20 MG/1
20 CAPSULE ORAL DAILY
COMMUNITY
Start: 2021-07-26 | End: 2022-01-03 | Stop reason: SDUPTHER

## 2022-01-03 RX ORDER — ROPINIROLE 0.25 MG/1
0.25 TABLET, FILM COATED ORAL NIGHTLY
Qty: 90 TABLET | Refills: 1 | Status: SHIPPED | OUTPATIENT
Start: 2022-01-03

## 2022-01-03 NOTE — PROGRESS NOTES
Internal Medicine Follow Up    Chief Complaint  Chen Galaviz is a 31 y.o. female who presents today for follow up of chronic medical conditions outlined below.    Chief Complaint   Patient presents with   • Depression   • Insomnia        HPI  Ms. Galaviz comes in today to follow up. Since last visit in 4/2021 she reports suicide attempt by slitting her wrists. She was taken to  ED and placed on a 72 hour hold. She then went to rehab at the [facility] for meth and cocaine use in August for a little over a month and notes that subsequently she was arrested due to an outstanding warrant. She was released in mid December. She has lost contact with her psychiatrist during this time. While in custodial she was on trazodone, buspar, and low dose wellbutrin which was not tolerated due to elevated BP. She has not been checking BP since release but is back on lisinopril. She is also taking prozac, abilify, lamictal, buspar, singulair which she was provided after release from the [facility]. She has been without seroquel and not sleeping well. She denies SI and overall feels mood has improved. She is not currently using flovent and notes need for albuterol every other day due to cold weather.       Review of Systems  Review of Systems   Constitutional: Negative.    Respiratory: Negative.    Cardiovascular: Negative.    Psychiatric/Behavioral: Positive for decreased concentration, sleep disturbance and depressed mood. Negative for self-injury and suicidal ideas.        Current Medications  Current Outpatient Medications on File Prior to Visit   Medication Sig Dispense Refill   • acetaminophen (TYLENOL) 325 MG tablet Take 2 tablets by mouth Every 6 (Six) Hours As Needed for Mild Pain .     • cetirizine (zyrTEC) 10 MG tablet Take 10 mg by mouth Daily.     • [DISCONTINUED] albuterol sulfate  (90 Base) MCG/ACT inhaler INHALE 2 PUFFS BY MOUTH EVERY 6 HOURS AS NEEDED FOR FOR SHORTNESS OF BREATH AND/OR WHEEZING     •  "[DISCONTINUED] ARIPiprazole (ABILIFY) 5 MG tablet Take 5 mg by mouth Daily.     • [DISCONTINUED] busPIRone (BUSPAR) 7.5 MG tablet Take 7.5 mg by mouth 3 (Three) Times a Day.     • [DISCONTINUED] Flovent  MCG/ACT inhaler TAKE 1 PUFF BY MOUTH TWICE A DAY 12 g 11   • [DISCONTINUED] FLUoxetine (PROzac) 20 MG capsule Take 20 mg by mouth Daily.     • [DISCONTINUED] lamoTRIgine (LaMICtal) 200 MG tablet Take 200 mg by mouth Daily.     • [DISCONTINUED] lisinopril (PRINIVIL,ZESTRIL) 10 MG tablet Take 10 mg by mouth Daily.     • [DISCONTINUED] montelukast (SINGULAIR) 10 MG tablet TAKE 1 TABLET BY MOUTH EVERY DAY AT NIGHT 90 tablet 0   • [DISCONTINUED] predniSONE (DELTASONE) 10 MG tablet Daily taper - 6/5/4/3/2/1 21 tablet 0   • [DISCONTINUED] QUEtiapine (SEROquel) 50 MG tablet Take 100 mg by mouth Every Night.     • [DISCONTINUED] rOPINIRole (REQUIP) 0.25 MG tablet Take 1 tablet by mouth Every Night. Take 1 hour before bedtime. 30 tablet 2   • [DISCONTINUED] tiZANidine (ZANAFLEX) 2 MG tablet TAKE 1 TABLET BY MOUTH 2 (TWO) TIMES A DAY AS NEEDED FOR MUSCLE SPASMS. 180 tablet 0     No current facility-administered medications on file prior to visit.       Allergies  Allergies   Allergen Reactions   • Bupropion Other (See Comments)     Seizure / wellbutrin    • Naproxen Rash   • Nsaids Rash   • Sulfa Antibiotics Rash       Objective  Visit Vitals  /74   Pulse 90   Temp 97.1 °F (36.2 °C)   Resp 16   Ht 160 cm (62.99\")   Wt 95.5 kg (210 lb 9.6 oz)   LMP 01/19/2019 Comment: Never had a mammogram due to age.   SpO2 99%   BMI 37.32 kg/m²        Physical Exam  Physical Exam  Vitals and nursing note reviewed.   Constitutional:       General: She is not in acute distress.     Appearance: She is well-developed. She is obese. She is not ill-appearing or toxic-appearing.   HENT:      Head: Normocephalic and atraumatic.   Eyes:      Conjunctiva/sclera: Conjunctivae normal.   Pulmonary:      Effort: Pulmonary effort is normal. No " respiratory distress.   Skin:     General: Skin is warm and dry.   Neurological:      Mental Status: She is alert and oriented to person, place, and time. Mental status is at baseline.   Psychiatric:         Mood and Affect: Mood normal.         Behavior: Behavior normal.         Thought Content: Thought content normal.         Judgment: Judgment normal.         Results  Results for orders placed or performed in visit on 04/26/21   COVID-19, M,T,W, [facility] IN-HOUSE NP/OP SWAB IN UTM/VTM/[facility] TRANSPORT MEDIA 24HR TAT - Swab, Nasopharynx    Specimen: Nasopharynx; Swab   Result Value Ref Range    COVID19 Not Detected Not Detected - Ref. Range        Assessment and Plan  Diagnoses and all orders for this visit:    Solitary lung nodule  - Hx of LLL lung nodule in 2014. Most recent CT 4/2021 with 15mm RUL noncalcified nodule.  - Favored to be postinfectious given context and appearance but she has a 20 pack year smoking hx  - Repeat CT chest ordered    Moderate persistent asthma without complication  - Using albuterol every other day on average due to cold weather. Not using flovent.  - Advised to resume flovent BID, continue singulair and albuterol PRN    Bipolar affective disorder, currently depressed, moderate (HCC)  - Managed previously by psychiatry but she has been lost to follow up due to incarceration. She would like a new psychiatrist.  - Currently mood stable on lamictal 200mg daily, abilify 5mg daily, prozac 20mg daily and seroquel 100mg qhs.  - She has hx of suicide attempt but denies active SI  - Meds refilled x30d and referring to psychiatry    Anxiety  - Stable on buspar 7.5mg TID and prozac 20mg daily. Referring to psychiatry as noted above.    Essential hypertension  - BP above goal today  - Continue lisinopril 10mg daily  - Check home BP and bring log to follow up. May need to increase lisinopril to 20mg daily.    Restless legs  - Improved. Continue ropinirole 0.25mg qhs    Chronic bilateral  low back pain with bilateral sciatica  Chronic midline thoracic back pain  - Chronic low back pain with bilateral sciatica and upper mid thoracic pain.  - Prior lumbar MRI without abnormality and thoracic xray with minimal scoliosis.   - Suspect thoracic back pain due to poor posture, low back pain due to muscle strain   - Continue tizanidine 2mg BID PRN    Health Maintenance  - Pap smear: s/p hysterectomy  - Mammogram: recent abnl mammogram 4/2021. Repeat planned 3m. Discuss next visit.  - Colonoscopy: Start screening at age 46.  - HCV: negative  - Immunizations: Needs COVID #2, discussed. Tdap 2020. Flu today.  - Depression screening: PHQ9 =14 1/2022       Return in about 3 months (around 4/3/2022) for Follow up HTN.  Answers for HPI/ROS submitted by the patient on 1/2/2022  Please describe your symptoms.: Cry all the time. No energy or interst in things. Want to sleep during the day because im having a hard time sleeping at night.  Have you had these symptoms before?: Yes  How long have you been having these symptoms?: Greater than 2 weeks  Please list any medications you are currently taking for this condition.: Prozac and lamital  Please describe any probable cause for these symptoms. : None  What is the primary reason for your visit?: Other

## 2022-01-03 NOTE — PROGRESS NOTES
Immunization  Immunization performed in Left deltoid by Anjelica Mejias MA. Pt tolerated flu vaccine with no adverse effects  01/03/22   Anjelica Mejias MA

## 2022-01-11 ENCOUNTER — PATIENT MESSAGE (OUTPATIENT)
Dept: INTERNAL MEDICINE | Facility: CLINIC | Age: 32
End: 2022-01-11

## 2022-01-11 DIAGNOSIS — Z01.419 WELL WOMAN EXAM WITH ROUTINE GYNECOLOGICAL EXAM: ICD-10-CM

## 2022-01-11 DIAGNOSIS — N80.9 ENDOMETRIOSIS: Primary | ICD-10-CM

## 2022-01-12 NOTE — TELEPHONE ENCOUNTER
From: Chen Galaviz  To: Selma Dupree MD  Sent: 1/11/2022 1:05 PM EST  Subject: Referral request     Hi. I’m having really bad cramps an pain in the bottom of my stomach. I feel like my Endometriosis has returned on my Ovaries. The pain is similar to the pain I had before my Hysterectomy. I can’t remember the drs name but he works at [facility].

## 2022-01-26 ENCOUNTER — HOSPITAL ENCOUNTER (OUTPATIENT)
Dept: CT IMAGING | Facility: HOSPITAL | Age: 32
Discharge: HOME | End: 2022-01-26
Admitting: INTERNAL MEDICINE

## 2022-01-26 DIAGNOSIS — R91.1 SOLITARY LUNG NODULE: ICD-10-CM

## 2022-01-26 PROCEDURE — 71250 CT THORAX DX C-: CPT

## 2022-01-27 DIAGNOSIS — F31.32 BIPOLAR AFFECTIVE DISORDER, CURRENTLY DEPRESSED, MODERATE: ICD-10-CM

## 2022-01-27 RX ORDER — LAMOTRIGINE 200 MG/1
TABLET ORAL
Qty: 30 TABLET | Refills: 1 | OUTPATIENT
Start: 2022-01-27

## 2022-01-27 NOTE — TELEPHONE ENCOUNTER
Hub staff attempted to follow warm transfer process and was unsuccessful     Caller: Chen Galaviz    Relationship to patient: Self    Best call back number: [phone]    Patient is needing:  PATIENT RETURNING CALL PLEASE CALL BACK     Lyn Bowen, [facility] Medical Centeredical AssistantSigned  [phone]                    Patient contacted and a voicemail was left for her to return our call. Office number provided.

## 2022-01-27 NOTE — TELEPHONE ENCOUNTER
Rx Refill Note  Requested Prescriptions     Pending Prescriptions Disp Refills   • lamoTRIgine (LaMICtal) 200 MG tablet [Pharmacy Med Name: LAMOTRIGINE 200 MG TABLET] 30 tablet 1     Sig: TAKE 1 TABLET BY MOUTH EVERY DAY      Last office visit with prescribing clinician: 1/3/2022      Next office visit with prescribing clinician: 4/4/2022            Lyn Bowen MA  01/27/22, 14:03 EST     Please advise.

## 2022-02-02 ENCOUNTER — OFFICE VISIT (OUTPATIENT)
Dept: BEHAVIORAL HEALTH | Facility: CLINIC | Age: 32
End: 2022-02-02

## 2022-02-02 VITALS
SYSTOLIC BLOOD PRESSURE: 136 MMHG | HEIGHT: 63 IN | BODY MASS INDEX: 37.56 KG/M2 | WEIGHT: 212 LBS | DIASTOLIC BLOOD PRESSURE: 94 MMHG

## 2022-02-02 DIAGNOSIS — F31.60 BIPOLAR AFFECTIVE DISORDER, MIXED: Primary | ICD-10-CM

## 2022-02-02 DIAGNOSIS — F99 INSOMNIA DUE TO OTHER MENTAL DISORDER: ICD-10-CM

## 2022-02-02 DIAGNOSIS — F15.21: ICD-10-CM

## 2022-02-02 DIAGNOSIS — F51.05 INSOMNIA DUE TO OTHER MENTAL DISORDER: ICD-10-CM

## 2022-02-02 PROCEDURE — 90792 PSYCH DIAG EVAL W/MED SRVCS: CPT

## 2022-02-02 RX ORDER — ARIPIPRAZOLE 10 MG/1
10 TABLET ORAL DAILY
Qty: 30 TABLET | Refills: 2 | Status: SHIPPED | OUTPATIENT
Start: 2022-02-02 | End: 2022-05-31

## 2022-02-02 NOTE — PROGRESS NOTES
New Patient Office Visit      Patient Name: Chen Galaviz  : 1990   MRN: [id number]     Referring Provider: Selma Dupree MD    Chief Complaint:  Psychiatric evaluation related to:     ICD-10-CM ICD-9-CM   1. Bipolar affective disorder, mixed (HCC)  F31.60 296.60   2. Insomnia due to other mental disorder  F51.05 300.9    F99 327.02   3. Amphetamine-type substance use disorder, moderate, in early remission ([facility])  F15.21 305.73        History of Present Illness:   Chen Galaviz is a 31 y.o. female who is here today for psychiatric evaluation on referral from primary care provider.  The patient presents today complaining of depression, anger/irritability and insomnia.  The patient reports she was diagnosed with bipolar disorder, PTSD, anxiety and insomnia approximately 7 years ago.  She reports mental health symptoms started around 15 years old with depression and suicidal ideation.  She has had 4 inpatient psychiatric admissions to  which when she was 15 years old for suicidal ideation, 4 years ago for suicidal attempt (cutting) and most recently in 2021 she was placed on a 72-hour hold following suicidal attempt (cutting her wrists).  The patient states that she was addicted to methamphetamine and cocaine for a period of 2 years which led to CPS putting her children in foster care.  She reports this is what led to her suicidal attempt last July.  She went to drug rehabilitation in 2021 and then into sober living house.  She has free of substance use since that time..  She has been on her current medication regimen for the past 7 years: Fluoxetine 20 mg daily, Lamictal 200 mg daily, Abilify 5 mg daily, Seroquel 100 mg nightly, buspirone 7.5 mg 3 times daily.    Currently, she reports she is still experiencing anger/irritability approximately 3 to 4 days/week.  She reports excessive irritability, anger outbursts with yelling/crying, and sometimes getting to the point where she wants  to hit something.  She denies history of physical violence.  She does have a history of punching walls when she is angry.  She reports the anger comes and goes sometimes last 3 days.  She also reports insomnia with frequent nighttime awakenings.  She is getting approximately 4 hours per night of interrupted sleep.  She is taking Seroquel around 11:50 at night which is ineffective and is causing her to be drowsy during the day.  She reports no current suicidal ideation, Mally ideation or thoughts of self-harm.      Subjective      Review of Systems:   Review of Systems   Psychiatric/Behavioral: Positive for agitation, dysphoric mood and sleep disturbance. Negative for hallucinations, self-injury and suicidal ideas.       PHQ-9 Depression Screening Score: 1     Past Psychiatric History:   Patient has been treated by outpatient psychiatry at [facility] and [facility].  Was recently [facility] x1 year.  She has had 4 inpatient admissions since the age of 15, most recent on 721 (see above).  History of 2 suicidal attempts.  Previous medications: Paxil, trazodone (ineffective), Wellbutrin(led to seizure)    Social History:   Patient is living in [place] with her aunt and grandmother.  Her kids are with her mother.  She has no contact order with her children.  She recently started a job at the [employer].  She was incarcerated last September for a period of 3 months for failing to appear in court while in treatment.  Has a history of meth and cocaine use from 2019 to August 2021.  Denies current substance use.    Past Medical History:   Past Medical History:   Diagnosis Date   • Abnormal liver enzymes 5/9/2016   • Allergic rhinitis    • Anxiety    • Arthritis     since age 21 in her back   • Asthma     seasonal   • Back pain    • Bacterial vaginosis 5/9/2016   • Bipolar disorder (HCC)     dx age 19   • Chest pain, pleuritic    • Common migraine with intractable migraine 5/9/2016   •  Depression    • Dyslipidemia    • Dysmenorrhea 2016   • Dyspareunia, female 2018    Added automatically from request for surgery [id number]   • Endometriosis    • Fatigue    • Febrile seizure (HCC)     FEBRILE SEIZURE AT 3YO AND AT 16YO D/T WELBUTRIN   • Frequent UTI    • Gastroesophageal reflux disease 2016   • GERD (gastroesophageal reflux disease)    • Gestational hypertension     History of gestational hypertension. States blood pressure is sometimes high but does not take any medications.   • Headache    • Herpes zoster     denies this visit   • History of robot-assisted laparoscopic hysterectomy/BS 2019   • IBS (irritable bowel syndrome)    • Itching    • Lung mass 2016    Description: A.  CT scan of the chest 2014 reports that the previously seen nodule of the left lower lobe is no longer visualized and the micronodular densities along the lateral aspect of the right lower lobe are stable.  There is no change in the appearance of the right axillary lymph nodes.   • Mental retardation     A. Force to be related to hypoxia at birth due to placenta previa   • Migraine    • Migraine    • Multiple gestation    • Obesity    • Onychomycosis of toenail 2016    Impression: 2016 - Refer to podiatry.;    • Organic hypersomnia    • Ovarian cyst    • Pain, upper back    • Pulmonary nodule    • S/P 2013 cholecystectomy 2018   • Schizophrenia (HCC)    • Severe frontal headaches 2018   • Sinus tachycardia    • Wears glasses        Past Surgical History:   Past Surgical History:   Procedure Laterality Date   •  SECTION     •  SECTION WITH TUBAL N/A 2017    Procedure:  SECTION REPEAT WITH TUBAL;  Surgeon: Fabien Blake MD;  Location: [facility] LABOR DELIVERY;  Service:    • CHOLECYSTECTOMY      age 21   • DIAGNOSTIC LAPAROSCOPY      X 4   • ENDOMETRIAL ABLATION     • PELVIC LAPAROSCOPY      age 21   • TONSILLECTOMY      age 23   • TOTAL  LAPAROSCOPIC HYSTERECTOMY N/A 1/23/2019    Procedure: TOTAL LAPAROSCOPIC HYSTERECTOMY BILATERAL SALPINGECTOMY WITH DAVINCI ROBOT;  Surgeon: Amador Richey MD;  Location: [facility];  Service: DaVinci   • WISDOM TOOTH EXTRACTION         Family History:   Family History   Problem Relation Age of Onset   • Asthma Mother    • Hypertension Mother    • Breast cancer Mother    • Diabetes Mother    • Colon polyps Mother    • Hypertension Father    • Asthma Brother    • Drug abuse Brother    • Melanoma Maternal Grandfather    • Colon polyps Maternal Grandmother    • Diabetes Maternal Grandmother    • Stomach cancer Paternal Grandmother    • Hypertension Paternal Grandmother    • Heart attack Paternal Grandfather 36   • Diabetes Paternal Grandfather    • Hypertension Brother    • Ovarian cancer Neg Hx    • Uterine cancer Neg Hx        Family Psychiatric History:  Mother: Anxiety, bipolar disorder  Father: Depression/anxiety  Brother: Bipolar disorder    Medications:     Current Outpatient Medications:   •  acetaminophen (TYLENOL) 325 MG tablet, Take 2 tablets by mouth Every 6 (Six) Hours As Needed for Mild Pain ., Disp: , Rfl:   •  albuterol sulfate  (90 Base) MCG/ACT inhaler, Inhale 2 puffs Every 6 (Six) Hours As Needed for Wheezing., Disp: 18 g, Rfl: 11  •  cetirizine (zyrTEC) 10 MG tablet, Take 10 mg by mouth Daily., Disp: , Rfl:   •  FLUoxetine (PROzac) 20 MG capsule, Take 1 capsule by mouth Daily., Disp: 30 capsule, Rfl: 1  •  fluticasone (Flovent HFA) 220 MCG/ACT inhaler, Inhale 1 puff 2 (Two) Times a Day., Disp: 12 g, Rfl: 11  •  lamoTRIgine (LaMICtal) 200 MG tablet, Take 1 tablet by mouth Daily., Disp: 30 tablet, Rfl: 1  •  lisinopril (PRINIVIL,ZESTRIL) 10 MG tablet, Take 1 tablet by mouth Daily., Disp: 90 tablet, Rfl: 1  •  montelukast (SINGULAIR) 10 MG tablet, Take 1 tablet by mouth every night at bedtime., Disp: 90 tablet, Rfl: 1  •  QUEtiapine (SEROquel) 50 MG tablet, Take 2 tablets by mouth Every Night.,  "Disp: 60 tablet, Rfl: 1  •  rOPINIRole (REQUIP) 0.25 MG tablet, Take 1 tablet by mouth Every Night. Take 1 hour before bedtime., Disp: 90 tablet, Rfl: 1  •  tiZANidine (ZANAFLEX) 2 MG tablet, Take 1 tablet by mouth 2 (Two) Times a Day As Needed for Muscle Spasms., Disp: 180 tablet, Rfl: 0  •  ARIPiprazole (ABILIFY) 10 MG tablet, Take 1 tablet by mouth Daily., Disp: 30 tablet, Rfl: 2    Allergies:   Allergies   Allergen Reactions   • Bupropion Other (See Comments)     Seizure / wellbutrin    • Naproxen Rash   • Nsaids Rash   • Sulfa Antibiotics Rash         Objective     Physical Exam:  Vital Signs:   Vitals:    02/02/22 0908   BP: 136/94  Comment: hr 98   Weight: 96.2 kg (212 lb)   Height: 160 cm (62.99\")     Body mass index is 37.57 kg/m².     Physical Exam    Mental Status Exam:   Appearance: Obese adult female, appears stated age, dressed appropriately to situation weather  Hygiene: Good  Cooperation: Good  Eye Contact: Within normal limits  Psychomotor Behavior: Within normal limits  Mood: Irritable  Affect: Full range of affect   Speech: normal rate, tone and prosody  Thought Process: Linear, goal-directed  Thought Content: In normal limits  Suicidal: Denies  Homicidal: Denies  Hallucinations: Denies  Delusion: Denies  Memory: Within normal limits  Orientation: X4  Reliability: Good  Insight: Good  Judgement: Concern for recent suicidal attempt  Impulse Control: No apparent issues    Assessment / Plan      Visit Diagnosis/Orders Placed This Visit:  Diagnoses and all orders for this visit:    1. Bipolar affective disorder, mixed (HCC) (Primary)  -     ARIPiprazole (ABILIFY) 10 MG tablet; Take 1 tablet by mouth Daily.  Dispense: 30 tablet; Refill: 2  -     Ambulatory Referral to Behavioral Health    2. Insomnia due to other mental disorder    3. Amphetamine-type substance use disorder, moderate, in early remission (HCC)         Differential:   Rule out intermittent explosive disorder  Rule out major depressive " disorder    Plan:   1. Decrease BuSpar to 7.5 mg daily for 7 days then stop  2. Increase Abilify to 10 mg daily  3. Continue Lamictal 200 mg daily  4. Continue Prozac 20 mg daily  5. Continue Seroquel 50 to 100 mg nightly(instructed patient to take earlier in evening around 7:30 PM to address ineffectiveness and daytime grogginess)    Continue supportive psychotherapy efforts and medications as indicated. Treatment and medication options discussed during today's visit. Patient ackowledged and verbally consented to continue with current treatment plan and was educated on the importance of compliance with treatment and follow-up appointments. Patient seems reasonably able to adhere to treatment plan.      Medication Considerations:  Discussed medication options and treatment plan of prescribed medication(s) as well as the risks, benefits, and potential side effects. Patient is agreeable to call the office with any worsening of symptoms or onset of side effects. Patient is agreeable to call 911 or go to the nearest ER should he/she begin having SI/HI.    Treatment Goals:    Patient will experience improvement in anger/irritability with discontinuation of BuSpar and increase in Abilify.  Patient will experience improvement in insomnia with earlier timing of Seroquel.    Quality Measures:   Current every day smoker less than 3 minutes spent counseling Not agreeable to stopping    I advised Chen Galaviz of the risks of tobacco use.     Follow Up:   Return in about 4 weeks (around 3/2/2022) for Medication Mangement Follow Up.      FRANCHESKA Rhodes, PMHNP-BC

## 2022-02-04 ENCOUNTER — TELEPHONE (OUTPATIENT)
Dept: OBSTETRICS AND GYNECOLOGY | Facility: CLINIC | Age: 32
End: 2022-02-04

## 2022-02-04 NOTE — TELEPHONE ENCOUNTER
Pt called stating she has been having very bad pelvic pain and would like an appt sooner than what we have scheduled. I was unsure of how serious this is to be able to put her on sooner. Wanted nurse or provider to review.

## 2022-02-09 ENCOUNTER — OFFICE VISIT (OUTPATIENT)
Dept: OBSTETRICS AND GYNECOLOGY | Facility: CLINIC | Age: 32
End: 2022-02-09

## 2022-02-09 VITALS
DIASTOLIC BLOOD PRESSURE: 80 MMHG | HEIGHT: 63 IN | WEIGHT: 209 LBS | BODY MASS INDEX: 37.03 KG/M2 | SYSTOLIC BLOOD PRESSURE: 122 MMHG

## 2022-02-09 DIAGNOSIS — N80.9 ENDOMETRIOSIS: ICD-10-CM

## 2022-02-09 DIAGNOSIS — R10.2 PELVIC PAIN: Primary | ICD-10-CM

## 2022-02-09 PROCEDURE — 99203 OFFICE O/P NEW LOW 30 MIN: CPT | Performed by: OBSTETRICS & GYNECOLOGY

## 2022-02-09 RX ORDER — ELAGOLIX 150 MG/1
1 TABLET, FILM COATED ORAL DAILY
Qty: 30 TABLET | Refills: 12 | Status: SHIPPED | OUTPATIENT
Start: 2022-02-09 | End: 2022-03-01 | Stop reason: SDUPTHER

## 2022-02-09 RX ORDER — DICYCLOMINE HYDROCHLORIDE 10 MG/1
20 CAPSULE ORAL 3 TIMES DAILY PRN
Qty: 30 CAPSULE | Refills: 3 | Status: SHIPPED | OUTPATIENT
Start: 2022-02-09 | End: 2022-02-19

## 2022-02-09 NOTE — PROGRESS NOTES
Chief Complaint   Patient presents with   • Gynecologic Exam     NGYN/Pelvic pain          Subjective   HPI  Chen Galaviz is a 31 y.o. female, , who presents with evaluation of pelvic pain.      She states she has acute pelvic pain.  She states she has experienced this problem for 3 duration: weeks.  She describes the severity as severe.  She rates her pain score as a 9/10.  She states that the problem is worsening.  She states the pain is located in the low pelvic pain  and it does radiate.  Patient notes aggravating factors include bowel movement, urination, and intercourse and alleviating factors are heating pads and resting.  The patient reports additional symptoms as gyn prob: none.  The patient HAS/HAS NOT: has not previously been evaluated for pelvic pain. She had hysterectomy with Dr. Richey in 2019. Patient has both ovaries. She was told that she had endometriosis around ovaries, but removed as much as he could without damaging ovaries. Patient states that the pain is worse with bowel movements and urination.     Her last LMP was Patient's last menstrual period was 2019..  Periods are absent due to hysterectomy in 2019 with Dr. Richey. She does still have both ovaries.  Partner Status: Marital Status: single.  New Partners since last visit: YES/NO/Other: no.  Desires STD Screening: YES/NO/Other: no.    Additional OB/GYN History   Last Pap :   Last Completed Pap Smear          Discontinued - PAP SMEAR  Discontinued    No completion history exists for this topic.              History of abnormal Pap smear: no  Exercises Regularly: no  Feelings of Anxiety or Depression: no  Tobacco Usage?: Yes Chen Galaviz  reports that she has been smoking cigarettes. She started smoking about 19 years ago. She has a 20.00 pack-year smoking history. She has never used smokeless tobacco.. I have educated her on the risk of diseases from using tobacco products such as cancer, COPD and heart disease.     I advised  "her to quit and she is not willing to quit.    I spent 3  minutes counseling the patient.        OB History        2    Para   2    Term   1       1    AB   0    Living   3       SAB   0    IAB   0    Ectopic   0    Molar        Multiple   1    Live Births   3          Obstetric Comments   Same fob.              The additional following portions of the patient's history were reviewed and updated as appropriate: allergies, current medications, past family history, past medical history, past social history, past surgical history and problem list.    Review of Systems   Constitutional: Negative.    HENT: Negative.    Eyes: Negative.    Respiratory: Negative.    Cardiovascular: Negative.    Gastrointestinal: Negative.    Endocrine: Negative.    Genitourinary: Positive for flank pain and pelvic pain.   Skin: Negative.    Allergic/Immunologic: Negative.    Neurological: Negative.    Hematological: Negative.    Psychiatric/Behavioral: Negative.        I have reviewed and agree with the HPI, ROS, and historical information as entered above. Rafa Liang MD    Objective   /80   Ht 160 cm (62.99\")   Wt 94.8 kg (209 lb)   LMP 2019 Comment: Never had a mammogram due to age.  BMI 37.03 kg/m²     Physical Exam  Vitals reviewed. Exam conducted with a chaperone present.   Constitutional:       Appearance: Normal appearance. She is normal weight.   HENT:      Head: Normocephalic and atraumatic.   Cardiovascular:      Rate and Rhythm: Normal rate and regular rhythm.   Abdominal:      General: Abdomen is flat. Bowel sounds are normal.      Palpations: Abdomen is soft.   Genitourinary:     General: Normal vulva.      Vagina: Normal.      Adnexa:         Right: Tenderness present. No mass.          Left: Tenderness present. No mass.        Comments: Uterus surgically absent  Skin:     General: Skin is warm and dry.   Neurological:      Mental Status: She is alert and oriented to person, place, and " time.   Psychiatric:         Mood and Affect: Mood normal.         Behavior: Behavior normal.         Assessment/Plan     Assessment     Problem List Items Addressed This Visit        Gastrointestinal Abdominal     Pelvic pain - Primary    Relevant Orders    US Non-ob Transvaginal       Genitourinary and Reproductive     Endometriosis    Relevant Orders    US Non-ob Transvaginal          1. Pelvic pain  2. Endometriosis    Plan     1. Pelvic pain likely due to endometriosis. Will plan for US to evaluated bilateral ovaries. She is s/p hysterectomy and will start on Orilissa for endometriosis and bentyl as needed for pain.       Rafa Liang MD  02/09/2022

## 2022-02-09 NOTE — PATIENT INSTRUCTIONS
Endometriosis    Endometriosis is a condition in which a tissue similar to the endometrium grows in places outside the uterus. The endometrium is a tissue that forms the lining of the uterus. This tissue can grow in the organs that create the eggs (ovaries), the tubes that carry the eggs to the uterus (fallopian tubes), the vagina, and the bowel. This tissue most often grows on the ovaries and inner lining of the pelvic cavity (peritoneum).  When the uterus sheds the endometrium every menstrual cycle, there is bleeding wherever these types of tissue are located. This can cause pain because blood is irritating to tissues that are not normally exposed to it. Endometriosis can also make it harder for a woman to get pregnant.  What are the causes?  The cause of this condition is not known.  What increases the risk?  The following factors may make you more likely to develop this condition:  · Having a family history of endometriosis.  · Having never given birth.  · Starting your period at 10 years of age or younger.  What are the signs or symptoms?  Often, there are no symptoms of this condition. If you do have symptoms, they may:  · Vary depending on where the abnormal tissue is growing.  · Occur during your menstrual period (most often) or at the middle of your cycle.  · Come and go. You may have no symptoms during some months.  · Stop when you no longer have your monthly periods (menopause).  Symptoms may include:  · Pain in the area between your hip bones (pelvis).  · Heavier bleeding during periods.  · Menstrual periods that happen more than once a month.  · Pain during sex.  · Pain in the back or abdomen.  · Painful bowel movements.  · Not being able to get pregnant.  How is this diagnosed?  This condition is diagnosed based on your symptoms and a physical exam. You may have tests, such as:  · Blood tests and urine tests to help rule out other causes.  · Ultrasound to look for tissues that are not normal. This is  often done over your skin. It is sometimes done through the vagina (transvaginal).  · X-ray of the lower bowel (barium enema).  · CT scan.  · MRI.  To confirm the diagnosis, your health care provider may use a device with a small camera to check tissue inside your abdomen (laparoscopy). Abnormal tissue may be removed and checked in a lab (biopsy).  How is this treated?  There is no cure for this condition. Treatment focuses on controlling your symptoms. The type of treatment also depends on whether you want to become pregnant in the future. This condition may be treated with:  · Medicines. These may include:  ? Medicines to relieve pain, including NSAIDs such as ibuprofen.  ? Hormone therapy. This uses artificial hormones to slow the growth of the abnormal tissue. This may include hormonal birth control, such as pills.  · Surgery to remove the abnormal tissue. During surgery:  ? Tissue may be removed using a laparoscope and a laser (laparoscopic laser treatment).  ? The fallopian tubes, uterus, and ovaries may be removed (hysterectomy). This is done in very severe cases.  Follow these instructions at home:  · Get regular exercise.  · Limit alcohol use.  · Eat a balanced diet.  · Avoid caffeine.  · Take over-the-counter and prescription medicines only as told by your health care provider.  · Keep all follow-up visits as told by your health care provider. This is important.  Where to find more information  · American College of Obstetricians and Gynecologists: https://www.acog.org/  · Office on Women's Health: https://www.womenshealth.gov/  Contact a health care provider if:  · You are having new pain or trouble controlling pain.  · You have problems getting pregnant.  · You have a fever.  Get help right away if you have:  · Severe pain that does not get better with medicine.  · Severe nausea and vomiting, or if you cannot eat or drink without vomiting.  · Pain that affects your abdomen only on the lower, right  side.  · Pain in your abdomen that gets worse.  · Swelling in your abdomen.  · Blood in your stool (feces).  Summary  · Endometriosis is a condition in which a tissue similar to the endometrium grows in places outside the uterus. The endometrium is a tissue that forms the lining of the uterus.  · The cause of this condition is not known.  · This condition may be treated with medicines to relieve pain, hormone therapy, or surgery.  · If you have this condition, get regular exercise, limit alcohol use, and avoid caffeine.  · Get help right away if you have severe pain that does not get better with medicine, or if you have severe nausea and vomiting or blood in your stool.  This information is not intended to replace advice given to you by your health care provider. Make sure you discuss any questions you have with your health care provider.  Document Revised: 02/03/2021 Document Reviewed: 02/03/2021  Elsevier Patient Education © 2021 [employer]

## 2022-02-10 ENCOUNTER — DOCUMENTATION (OUTPATIENT)
Dept: OBSTETRICS AND GYNECOLOGY | Facility: CLINIC | Age: 32
End: 2022-02-10

## 2022-02-11 PROCEDURE — U0005 INFEC AGEN DETEC AMPLI PROBE: HCPCS | Performed by: FAMILY MEDICINE

## 2022-02-11 PROCEDURE — U0004 COV-19 TEST NON-CDC HGH THRU: HCPCS | Performed by: FAMILY MEDICINE

## 2022-02-17 ENCOUNTER — TELEPHONE (OUTPATIENT)
Dept: OBSTETRICS AND GYNECOLOGY | Facility: CLINIC | Age: 32
End: 2022-02-17

## 2022-02-17 DIAGNOSIS — N80.9 ENDOMETRIOSIS: Primary | ICD-10-CM

## 2022-02-17 NOTE — TELEPHONE ENCOUNTER
Orlissa is not covered by Medicaid, and when tried to be ran through Medicare part D, it was not on formulary.     Per Dr. Liang, we will give one sample box of Orlissa 200mg to patient, only taking one tablet per day. Once this is finished, if the Orlissa does not get approved through insurance, then we can try patient on Lupron IM injection 11.25mg.

## 2022-02-17 NOTE — TELEPHONE ENCOUNTER
Attempted to call patient regarding Orlissa prescription. Unable to leave VM as mailbox was full.

## 2022-02-27 DIAGNOSIS — F41.9 ANXIETY: ICD-10-CM

## 2022-02-27 DIAGNOSIS — F31.32 BIPOLAR AFFECTIVE DISORDER, CURRENTLY DEPRESSED, MODERATE: ICD-10-CM

## 2022-02-27 RX ORDER — ARIPIPRAZOLE 5 MG/1
TABLET ORAL
Qty: 30 TABLET | Refills: 1 | OUTPATIENT
Start: 2022-02-27

## 2022-02-27 RX ORDER — FLUOXETINE HYDROCHLORIDE 20 MG/1
CAPSULE ORAL
Qty: 30 CAPSULE | Refills: 1 | Status: SHIPPED | OUTPATIENT
Start: 2022-02-27 | End: 2022-03-03 | Stop reason: DRUGHIGH

## 2022-02-27 RX ORDER — QUETIAPINE FUMARATE 50 MG/1
100 TABLET, FILM COATED ORAL NIGHTLY
Qty: 60 TABLET | Refills: 1 | Status: SHIPPED | OUTPATIENT
Start: 2022-02-27 | End: 2022-03-03 | Stop reason: DRUGHIGH

## 2022-02-27 RX ORDER — BUSPIRONE HYDROCHLORIDE 7.5 MG/1
TABLET ORAL
Qty: 90 TABLET | Refills: 1 | OUTPATIENT
Start: 2022-02-27

## 2022-03-01 ENCOUNTER — OFFICE VISIT (OUTPATIENT)
Dept: OBSTETRICS AND GYNECOLOGY | Facility: CLINIC | Age: 32
End: 2022-03-01

## 2022-03-01 VITALS
SYSTOLIC BLOOD PRESSURE: 122 MMHG | HEIGHT: 63 IN | DIASTOLIC BLOOD PRESSURE: 62 MMHG | WEIGHT: 208.4 LBS | BODY MASS INDEX: 36.93 KG/M2

## 2022-03-01 DIAGNOSIS — F31.32 BIPOLAR AFFECTIVE DISORDER, CURRENTLY DEPRESSED, MODERATE: ICD-10-CM

## 2022-03-01 DIAGNOSIS — N80.9 ENDOMETRIOSIS: ICD-10-CM

## 2022-03-01 DIAGNOSIS — R10.2 PELVIC PAIN: Primary | ICD-10-CM

## 2022-03-01 PROCEDURE — 99213 OFFICE O/P EST LOW 20 MIN: CPT | Performed by: OBSTETRICS & GYNECOLOGY

## 2022-03-01 RX ORDER — DICYCLOMINE HYDROCHLORIDE 10 MG/1
20 CAPSULE ORAL 3 TIMES DAILY PRN
Qty: 60 CAPSULE | Refills: 5 | Status: SHIPPED | OUTPATIENT
Start: 2022-03-01 | End: 2022-11-10

## 2022-03-01 RX ORDER — LAMOTRIGINE 200 MG/1
TABLET ORAL
Qty: 30 TABLET | Refills: 1 | Status: SHIPPED | OUTPATIENT
Start: 2022-03-01 | End: 2022-10-04 | Stop reason: SDUPTHER

## 2022-03-01 RX ORDER — ELAGOLIX 150 MG/1
1 TABLET, FILM COATED ORAL DAILY
Qty: 30 TABLET | Refills: 12 | Status: SHIPPED | OUTPATIENT
Start: 2022-03-01 | End: 2022-03-31

## 2022-03-01 NOTE — PROGRESS NOTES
Chief Complaint   Patient presents with   • Follow-up   endometriosis    Subjective   HPI  Chen Galaviz is a 31 y.o. female, , who presents for Pelvic Pain.  She has had a hysterectomy, dx lap for endometriosis & 2 c-sections.     She states she has experienced this problem for 1 month.  She describes the severity as severe and localized to the left side more then anywhere else. She states that the problem is worsening.  The patient reports additional symptoms as pelvic pain.      US today- small functional cyst seen on left ovary. These findings discussed and all questions answered.     Her last LMP was Patient's last menstrual period was 2019..  Periods are absent Partner Status: Marital Status: single.  New Partners since last visit: no.  Desires STD Screening: no.    Additional OB/GYN History   Current contraception: contraceptive methods: hysterectomy   Desires to: do not start contraception  Last Pap :   Last Completed Pap Smear          Discontinued - PAP SMEAR  Discontinued    No completion history exists for this topic.              History of abnormal Pap smear: no  Last mammogram: 2022  Last Completed Mammogram          MAMMOGRAM (Yearly) Next due on 2022  SCANNED - MAMMO    2021  Outside Claim: [facility] US BREAST UNILATERAL LIMITED,[facility] MAMMOGRAM DIAGNOSTIC UNILAT DIGITAL W CAD,[facility] TOMOSYNTHESIS MAMMOGRAPHY    2021  Outside Claim: [facility] SCREENING DIGITAL BREAST TOMOSYNTHESIS BI,[facility] MAMMOGRAM SCREENING BILAT DIGITAL W CAD              Tobacco Usage?: No   OB History        2    Para   2    Term   1       1    AB   0    Living   3       SAB   0    IAB   0    Ectopic   0    Molar        Multiple   1    Live Births   3          Obstetric Comments   Same fob.              Health Maintenance   Topic Date Due   • Annual Gynecologic Pelvic and Breast Exam  Never done   • Pneumococcal Vaccine 0-64 (1 of 2 - PPSV23) Never done   • ANNUAL WELLNESS VISIT  Never  "done   • LIPID PANEL  05/09/2017   • COVID-19 Vaccine (2 - Moderna 3-dose series) 08/03/2021   • MAMMOGRAM  01/12/2023   • TDAP/TD VACCINES (3 - Td or Tdap) 08/07/2030   • HEPATITIS C SCREENING  Completed   • INFLUENZA VACCINE  Completed   • PAP SMEAR  Discontinued       The additional following portions of the patient's history were reviewed and updated as appropriate: allergies, current medications, past family history, past medical history, past social history, past surgical history and problem list.    Review of Systems   Constitutional: Negative.    Respiratory: Negative.    Cardiovascular: Negative.    Gastrointestinal: Negative.    Genitourinary: Positive for pelvic pain.   Neurological: Negative.    Psychiatric/Behavioral: Negative.        I have reviewed and agree with the HPI, ROS, and historical information as entered above. Rafa Liang MD    Objective   /62   Ht 160 cm (63\")   Wt 94.5 kg (208 lb 6.4 oz)   LMP 01/19/2019 Comment: Never had a mammogram due to age.  BMI 36.92 kg/m²     Physical Exam  Vitals reviewed.   Constitutional:       Appearance: Normal appearance.   Abdominal:      General: Abdomen is flat. Bowel sounds are normal.      Palpations: Abdomen is soft.   Skin:     General: Skin is warm and dry.   Neurological:      Mental Status: She is alert and oriented to person, place, and time.   Psychiatric:         Mood and Affect: Mood normal.         Behavior: Behavior normal.         Assessment/Plan     Assessment     Problem List Items Addressed This Visit        Gastrointestinal Abdominal     Pelvic pain - Primary       Genitourinary and Reproductive     Endometriosis          1. Pelvic pain  2. Endometriosis    Plan     Return in about 6 months (around 9/1/2022) for Reevaluation.  1. Continue Orilissa for endometriosis with NSAIDs and Bentyl as needed.       Rafa Liang MD  03/01/2022  "

## 2022-03-01 NOTE — TELEPHONE ENCOUNTER
Last Office Visit: 1/3/22  Next Office Visit: 3/2/22    Labs completed in past 6 months? Yes  Labs completed in past year? No    Last Refill Date: 1/3/22  Quantity: 30 tabs  Refills: 1    Pharmacy: [id number] - [address] [phone] [facility] [phone] FX

## 2022-03-03 ENCOUNTER — OFFICE VISIT (OUTPATIENT)
Dept: BEHAVIORAL HEALTH | Facility: CLINIC | Age: 32
End: 2022-03-03

## 2022-03-03 VITALS
SYSTOLIC BLOOD PRESSURE: 144 MMHG | DIASTOLIC BLOOD PRESSURE: 92 MMHG | HEIGHT: 63 IN | BODY MASS INDEX: 36.86 KG/M2 | WEIGHT: 208 LBS

## 2022-03-03 DIAGNOSIS — F99 INSOMNIA DUE TO OTHER MENTAL DISORDER: ICD-10-CM

## 2022-03-03 DIAGNOSIS — F41.1 GENERALIZED ANXIETY DISORDER: ICD-10-CM

## 2022-03-03 DIAGNOSIS — F51.05 INSOMNIA DUE TO OTHER MENTAL DISORDER: ICD-10-CM

## 2022-03-03 DIAGNOSIS — F31.60 BIPOLAR AFFECTIVE DISORDER, MIXED: Primary | ICD-10-CM

## 2022-03-03 PROCEDURE — 99214 OFFICE O/P EST MOD 30 MIN: CPT

## 2022-03-03 RX ORDER — QUETIAPINE FUMARATE 100 MG/1
200 TABLET, FILM COATED ORAL NIGHTLY
Qty: 60 TABLET | Refills: 2 | Status: SHIPPED | OUTPATIENT
Start: 2022-03-03 | End: 2022-05-31 | Stop reason: SDUPTHER

## 2022-03-03 RX ORDER — NALTREXONE 380 MG
KIT INTRAMUSCULAR
COMMUNITY
Start: 2022-02-22 | End: 2022-05-23

## 2022-03-03 RX ORDER — ONDANSETRON 8 MG/1
TABLET, ORALLY DISINTEGRATING ORAL
COMMUNITY
Start: 2022-02-22 | End: 2022-05-23

## 2022-03-03 RX ORDER — HYDROXYZINE PAMOATE 25 MG/1
25-50 CAPSULE ORAL 2 TIMES DAILY PRN
Qty: 60 CAPSULE | Refills: 2 | Status: SHIPPED | OUTPATIENT
Start: 2022-03-03 | End: 2022-05-23

## 2022-03-03 RX ORDER — AMOXICILLIN 875 MG/1
TABLET, COATED ORAL
COMMUNITY
Start: 2022-02-22 | End: 2022-05-23

## 2022-03-03 RX ORDER — FLUOXETINE 10 MG/1
10 CAPSULE ORAL DAILY
Qty: 30 CAPSULE | Refills: 2 | Status: SHIPPED | OUTPATIENT
Start: 2022-03-03 | End: 2022-05-31 | Stop reason: SDUPTHER

## 2022-03-03 NOTE — PROGRESS NOTES
"     Office  Follow Up Visit      Patient Name: Chen Galaviz  : 1990   MRN: [id number]     Referring Provider: Selma Dupree MD    Chief Complaint: Follow-up related to:    ICD-10-CM ICD-9-CM   1. Bipolar affective disorder, mixed (HCC)  F31.60 296.60   2. Generalized anxiety disorder  F41.1 300.02   3. Insomnia due to other mental disorder  F51.05 300.9    F99 327.02        History of Present Illness:   Chen Galaviz is a 31 y.o. female who is here today for follow up related to bipolar disorder, anxiety and insomnia.  The patient reports that since last visit she has been experiencing some symptoms of depression including fatigue and lack of interest.  She reports she is still having difficulty relaxing and sleeping secondary to racing thoughts.  She reports she is tired during the day.  Additionally, the patient reports symptoms of akathisia including a feeling of inner restlessness stating \"if I do not move I feel like my insides are going to burst\" as well as an inability to sit still.  She reports this is been worse since increasing dose of Abilify to 10 mg.  She denies suicidal ideation.      Subjective      Review of Systems:   Review of Systems   Psychiatric/Behavioral: Positive for dysphoric mood, sleep disturbance and depressed mood. Negative for self-injury and suicidal ideas. The patient is nervous/anxious.        PHQ-9 Depression Screening Score: 15     Patient History:   The following portions of the patient's history were reviewed and updated as appropriate: allergies, current medications, past family history, past medical history, past social history, past surgical history and problem list.     Social:  No significant update    Medications:     Current Outpatient Medications:   •  acetaminophen (TYLENOL) 325 MG tablet, Take 2 tablets by mouth Every 6 (Six) Hours As Needed for Mild Pain ., Disp: , Rfl:   •  albuterol sulfate  (90 Base) MCG/ACT inhaler, Inhale 2 puffs Every 6 " (Six) Hours As Needed for Wheezing., Disp: 18 g, Rfl: 11  •  amoxicillin (AMOXIL) 875 MG tablet, TAKE 1 TABLET BY MOUTH EVERY 12 HOURS FOR 7 DAYS, Disp: , Rfl:   •  ARIPiprazole (ABILIFY) 10 MG tablet, Take 1 tablet by mouth Daily., Disp: 30 tablet, Rfl: 2  •  cetirizine (zyrTEC) 10 MG tablet, Take 10 mg by mouth Daily., Disp: , Rfl:   •  dicyclomine (Bentyl) 10 MG capsule, Take 2 capsules by mouth 3 (Three) Times a Day As Needed (cramping)., Disp: 60 capsule, Rfl: 5  •  fluticasone (Flovent HFA) 220 MCG/ACT inhaler, Inhale 1 puff 2 (Two) Times a Day., Disp: 12 g, Rfl: 11  •  lamoTRIgine (LaMICtal) 200 MG tablet, TAKE 1 TABLET BY MOUTH EVERY DAY, Disp: 30 tablet, Rfl: 1  •  lisinopril (PRINIVIL,ZESTRIL) 10 MG tablet, Take 1 tablet by mouth Daily., Disp: 90 tablet, Rfl: 1  •  montelukast (SINGULAIR) 10 MG tablet, Take 1 tablet by mouth every night at bedtime., Disp: 90 tablet, Rfl: 1  •  ondansetron ODT (ZOFRAN-ODT) 8 MG disintegrating tablet, TAKE 1 TABLET BY MOUTH THREE TIMES A DAY AS NEEDED NAUSEA FOR 5 DAYS, Disp: , Rfl:   •  rOPINIRole (REQUIP) 0.25 MG tablet, Take 1 tablet by mouth Every Night. Take 1 hour before bedtime., Disp: 90 tablet, Rfl: 1  •  tiZANidine (ZANAFLEX) 2 MG tablet, Take 1 tablet by mouth 2 (Two) Times a Day As Needed for Muscle Spasms., Disp: 180 tablet, Rfl: 0  •  Vivitrol 380 MG reconstituted suspension IM suspension, , Disp: , Rfl:   •  Elagolix Sodium (Orilissa) 150 MG tablet, Take 1 tablet by mouth Daily for 30 days., Disp: 30 tablet, Rfl: 12  •  FLUoxetine (PROzac) 10 MG capsule, Take 1 capsule by mouth Daily., Disp: 30 capsule, Rfl: 2  •  hydrOXYzine pamoate (Vistaril) 25 MG capsule, Take 1-2 capsules by mouth 2 (Two) Times a Day As Needed for Anxiety., Disp: 60 capsule, Rfl: 2  •  QUEtiapine (SEROquel) 100 MG tablet, Take 2 tablets by mouth Every Night., Disp: 60 tablet, Rfl: 2    Objective     Physical Exam:  Vital Signs:   Vitals:    03/03/22 0900   BP: 144/92  Comment: HR 99  "  Weight: 94.3 kg (208 lb)   Height: 160 cm (63\")     Body mass index is 36.85 kg/m².     Mental Status Exam:   Appearance: Overweight adult female, appears stated age, dressed appropriately to situation weather  Hygiene: Good  Cooperation: Good  Eye Contact: Within normal limits  Psychomotor Behavior: Bouncing leg, shifting in seat  Mood: Depressed,  Affect: Congruent, restless  Speech: Normal rate, tone and prosody  Thought Process: Linear, goal-directed in session  Thought Content: Normal limits  Suicidal: Denies  Homicidal: Denies  Hallucinations: Denies  Delusion: Denies  Memory: Within normal limits  Orientation: X4  Reliability: Good  Insight: Good  Judgement: Good  Impulse Control: Some concern for verbal outbursts    Assessment / Plan      Visit Diagnosis/Orders Placed This Visit:  Diagnoses and all orders for this visit:    1. Bipolar affective disorder, mixed (HCC) (Primary)  -     FLUoxetine (PROzac) 10 MG capsule; Take 1 capsule by mouth Daily.  Dispense: 30 capsule; Refill: 2  -     QUEtiapine (SEROquel) 100 MG tablet; Take 2 tablets by mouth Every Night.  Dispense: 60 tablet; Refill: 2    2. Generalized anxiety disorder  -     hydrOXYzine pamoate (Vistaril) 25 MG capsule; Take 1-2 capsules by mouth 2 (Two) Times a Day As Needed for Anxiety.  Dispense: 60 capsule; Refill: 2    3. Insomnia due to other mental disorder         Differential:   None current    Plan:   1. Decrease Abilify to 5 mg daily (may discontinue at subsequent visits)  2. Decrease Prozac to 10 mg daily (plan to discontinue at subsequent visits)  3. Increase Seroquel to 200 mg nightly  4. Continue Lamictal 200 mg daily    Continue supportive psychotherapy efforts and medications as indicated. Treatment and medication options discussed during today's visit. Patient ackowledged and verbally consented to continue with current treatment plan and was educated on the importance of compliance with treatment and follow-up appointments. Patient " seems reasonably able to adhere to treatment plan.      Medication Considerations:  Discussed medication options and treatment plan of prescribed medication(s) as well as the risks, benefits, and potential side effects. Patient is agreeable to call the office with any worsening of symptoms or onset of side effects. Patient is agreeable to call 911 or go to the nearest ER should he/she begin having SI/HI.    Quality Measures:   Current every day smoker less than 3 minutes spent counseling Not agreeable to stopping    I advised Chen Galaviz of the risks of tobacco use.     Follow Up:   Return in about 4 weeks (around 3/31/2022) for Medication Mangement Follow Up.      FRANCHESKA Rhodes, PMHNP-BC

## 2022-03-25 DIAGNOSIS — F31.32 BIPOLAR AFFECTIVE DISORDER, CURRENTLY DEPRESSED, MODERATE: ICD-10-CM

## 2022-03-25 RX ORDER — LAMOTRIGINE 200 MG/1
TABLET ORAL
Qty: 30 TABLET | Refills: 1 | OUTPATIENT
Start: 2022-03-25

## 2022-03-29 DIAGNOSIS — M54.6 CHRONIC MIDLINE THORACIC BACK PAIN: ICD-10-CM

## 2022-03-29 DIAGNOSIS — G89.29 CHRONIC BILATERAL LOW BACK PAIN WITH BILATERAL SCIATICA: ICD-10-CM

## 2022-03-29 DIAGNOSIS — G89.29 CHRONIC MIDLINE THORACIC BACK PAIN: ICD-10-CM

## 2022-03-29 DIAGNOSIS — M54.42 CHRONIC BILATERAL LOW BACK PAIN WITH BILATERAL SCIATICA: ICD-10-CM

## 2022-03-29 DIAGNOSIS — M54.41 CHRONIC BILATERAL LOW BACK PAIN WITH BILATERAL SCIATICA: ICD-10-CM

## 2022-03-29 RX ORDER — TIZANIDINE 2 MG/1
TABLET ORAL
Qty: 180 TABLET | Refills: 0 | Status: SHIPPED | OUTPATIENT
Start: 2022-03-29 | End: 2022-05-26

## 2022-04-23 DIAGNOSIS — F41.9 ANXIETY: ICD-10-CM

## 2022-04-23 DIAGNOSIS — F31.32 BIPOLAR AFFECTIVE DISORDER, CURRENTLY DEPRESSED, MODERATE: ICD-10-CM

## 2022-04-24 RX ORDER — BUSPIRONE HYDROCHLORIDE 7.5 MG/1
TABLET ORAL
Qty: 90 TABLET | Refills: 1 | OUTPATIENT
Start: 2022-04-24

## 2022-04-24 RX ORDER — ARIPIPRAZOLE 5 MG/1
TABLET ORAL
Qty: 30 TABLET | Refills: 1 | OUTPATIENT
Start: 2022-04-24

## 2022-05-19 DIAGNOSIS — F31.32 BIPOLAR AFFECTIVE DISORDER, CURRENTLY DEPRESSED, MODERATE: ICD-10-CM

## 2022-05-19 RX ORDER — LAMOTRIGINE 200 MG/1
TABLET ORAL
Qty: 30 TABLET | Refills: 1 | OUTPATIENT
Start: 2022-05-19

## 2022-05-23 ENCOUNTER — LAB (OUTPATIENT)
Dept: LAB | Facility: HOSPITAL | Age: 32
End: 2022-05-23

## 2022-05-23 ENCOUNTER — OFFICE VISIT (OUTPATIENT)
Dept: INTERNAL MEDICINE | Facility: CLINIC | Age: 32
End: 2022-05-23

## 2022-05-23 ENCOUNTER — PATIENT ROUNDING (BHMG ONLY) (OUTPATIENT)
Dept: INTERNAL MEDICINE | Facility: CLINIC | Age: 32
End: 2022-05-23

## 2022-05-23 VITALS
DIASTOLIC BLOOD PRESSURE: 82 MMHG | OXYGEN SATURATION: 97 % | BODY MASS INDEX: 33.24 KG/M2 | SYSTOLIC BLOOD PRESSURE: 120 MMHG | WEIGHT: 187.6 LBS | HEART RATE: 90 BPM | RESPIRATION RATE: 18 BRPM | TEMPERATURE: 97.3 F | HEIGHT: 63 IN

## 2022-05-23 DIAGNOSIS — F31.32 BIPOLAR AFFECTIVE DISORDER, CURRENTLY DEPRESSED, MODERATE: ICD-10-CM

## 2022-05-23 DIAGNOSIS — R31.9 URINARY TRACT INFECTION WITH HEMATURIA, SITE UNSPECIFIED: Primary | ICD-10-CM

## 2022-05-23 DIAGNOSIS — F41.9 ANXIETY: ICD-10-CM

## 2022-05-23 DIAGNOSIS — N39.0 URINARY TRACT INFECTION WITH HEMATURIA, SITE UNSPECIFIED: ICD-10-CM

## 2022-05-23 DIAGNOSIS — N39.0 URINARY TRACT INFECTION WITH HEMATURIA, SITE UNSPECIFIED: Primary | ICD-10-CM

## 2022-05-23 DIAGNOSIS — R31.9 URINARY TRACT INFECTION WITH HEMATURIA, SITE UNSPECIFIED: ICD-10-CM

## 2022-05-23 LAB
ALBUMIN SERPL-MCNC: 4.5 G/DL (ref 3.5–5.2)
ALBUMIN/GLOB SERPL: 1.8 G/DL
ALP SERPL-CCNC: 102 U/L (ref 39–117)
ALT SERPL W P-5'-P-CCNC: 10 U/L (ref 1–33)
ANION GAP SERPL CALCULATED.3IONS-SCNC: 10.2 MMOL/L (ref 5–15)
AST SERPL-CCNC: 14 U/L (ref 1–32)
BASOPHILS # BLD AUTO: 0.08 10*3/MM3 (ref 0–0.2)
BASOPHILS NFR BLD AUTO: 0.8 % (ref 0–1.5)
BILIRUB BLD-MCNC: NEGATIVE MG/DL
BILIRUB SERPL-MCNC: 0.2 MG/DL (ref 0–1.2)
BUN SERPL-MCNC: 12 MG/DL (ref 6–20)
BUN/CREAT SERPL: 13 (ref 7–25)
CALCIUM SPEC-SCNC: 9.3 MG/DL (ref 8.6–10.5)
CHLORIDE SERPL-SCNC: 103 MMOL/L (ref 98–107)
CLARITY, POC: CLEAR
CO2 SERPL-SCNC: 22.8 MMOL/L (ref 22–29)
COLOR UR: YELLOW
CREAT SERPL-MCNC: 0.92 MG/DL (ref 0.57–1)
DEPRECATED RDW RBC AUTO: 45.1 FL (ref 37–54)
EGFRCR SERPLBLD CKD-EPI 2021: 86.1 ML/MIN/1.73
EOSINOPHIL # BLD AUTO: 0.26 10*3/MM3 (ref 0–0.4)
EOSINOPHIL NFR BLD AUTO: 2.5 % (ref 0.3–6.2)
ERYTHROCYTE [DISTWIDTH] IN BLOOD BY AUTOMATED COUNT: 14.5 % (ref 12.3–15.4)
EXPIRATION DATE: NORMAL
GLOBULIN UR ELPH-MCNC: 2.5 GM/DL
GLUCOSE SERPL-MCNC: 87 MG/DL (ref 65–99)
GLUCOSE UR STRIP-MCNC: NEGATIVE MG/DL
HCT VFR BLD AUTO: 46.8 % (ref 34–46.6)
HGB BLD-MCNC: 15.6 G/DL (ref 12–15.9)
IMM GRANULOCYTES # BLD AUTO: 0.04 10*3/MM3 (ref 0–0.05)
IMM GRANULOCYTES NFR BLD AUTO: 0.4 % (ref 0–0.5)
KETONES UR QL: NEGATIVE
LEUKOCYTE EST, POC: NEGATIVE
LYMPHOCYTES # BLD AUTO: 1.91 10*3/MM3 (ref 0.7–3.1)
LYMPHOCYTES NFR BLD AUTO: 18.4 % (ref 19.6–45.3)
Lab: NORMAL
MCH RBC QN AUTO: 28.4 PG (ref 26.6–33)
MCHC RBC AUTO-ENTMCNC: 33.3 G/DL (ref 31.5–35.7)
MCV RBC AUTO: 85.2 FL (ref 79–97)
MONOCYTES # BLD AUTO: 0.68 10*3/MM3 (ref 0.1–0.9)
MONOCYTES NFR BLD AUTO: 6.6 % (ref 5–12)
NEUTROPHILS NFR BLD AUTO: 7.39 10*3/MM3 (ref 1.7–7)
NEUTROPHILS NFR BLD AUTO: 71.3 % (ref 42.7–76)
NITRITE UR-MCNC: NEGATIVE MG/ML
NRBC BLD AUTO-RTO: 0 /100 WBC (ref 0–0.2)
PH UR: 6 [PH] (ref 5–8)
PLATELET # BLD AUTO: 272 10*3/MM3 (ref 140–450)
PMV BLD AUTO: 11.1 FL (ref 6–12)
POTASSIUM SERPL-SCNC: 4.4 MMOL/L (ref 3.5–5.2)
PROT SERPL-MCNC: 7 G/DL (ref 6–8.5)
PROT UR STRIP-MCNC: NEGATIVE MG/DL
RBC # BLD AUTO: 5.49 10*6/MM3 (ref 3.77–5.28)
RBC # UR STRIP: NEGATIVE /UL
SODIUM SERPL-SCNC: 136 MMOL/L (ref 136–145)
SP GR UR: 1.01 (ref 1–1.03)
UROBILINOGEN UR QL: NORMAL
WBC NRBC COR # BLD: 10.36 10*3/MM3 (ref 3.4–10.8)

## 2022-05-23 PROCEDURE — 85025 COMPLETE CBC W/AUTO DIFF WBC: CPT

## 2022-05-23 PROCEDURE — 80053 COMPREHEN METABOLIC PANEL: CPT | Performed by: PHYSICIAN ASSISTANT

## 2022-05-23 PROCEDURE — 87186 SC STD MICRODIL/AGAR DIL: CPT

## 2022-05-23 PROCEDURE — 81003 URINALYSIS AUTO W/O SCOPE: CPT | Performed by: PHYSICIAN ASSISTANT

## 2022-05-23 PROCEDURE — 36415 COLL VENOUS BLD VENIPUNCTURE: CPT

## 2022-05-23 PROCEDURE — 87077 CULTURE AEROBIC IDENTIFY: CPT

## 2022-05-23 PROCEDURE — 99213 OFFICE O/P EST LOW 20 MIN: CPT | Performed by: PHYSICIAN ASSISTANT

## 2022-05-23 PROCEDURE — 87086 URINE CULTURE/COLONY COUNT: CPT

## 2022-05-23 RX ORDER — QUETIAPINE FUMARATE 50 MG/1
100 TABLET, FILM COATED ORAL NIGHTLY
Qty: 60 TABLET | Refills: 1 | OUTPATIENT
Start: 2022-05-23

## 2022-05-23 RX ORDER — FLUOXETINE HYDROCHLORIDE 20 MG/1
CAPSULE ORAL
Qty: 30 CAPSULE | Refills: 1 | OUTPATIENT
Start: 2022-05-23

## 2022-05-23 NOTE — PROGRESS NOTES
[facility] PRIMARY CARE  [address]  [facility] [address]  Dept: [phone]  Dept Fax: [phone]  Loc: [phone]  Loc Fax: [phone]    Chen Galaviz  1990    New Patient Office Note    History of Present Illness:  Patient in today for dark urine and for chronic UTIs.  Patient recently went to urgent care was given antibiotic for foul-smelling and dark urine as she was diagnosed with urinary tract infection.  Patient took antibiotic as directed but states the urine is still darkly colored.  Not drinking very much water.  Patient does report the burning has gone away when she urinates.  Denies any fever chills.  Denies any flank pain.  Denies any nausea, vomiting, or diarrhea.      The following portions of the patient's history were reviewed and updated as appropriate: allergies, current medications, past family history, past medical history, past social history, past surgical history, and problem list.    Medications:    Current Outpatient Medications:   •  acetaminophen (TYLENOL) 325 MG tablet, Take 2 tablets by mouth Every 6 (Six) Hours As Needed for Mild Pain ., Disp: , Rfl:   •  albuterol sulfate  (90 Base) MCG/ACT inhaler, Inhale 2 puffs Every 6 (Six) Hours As Needed for Wheezing., Disp: 18 g, Rfl: 11  •  cetirizine (zyrTEC) 10 MG tablet, Take 10 mg by mouth Daily., Disp: , Rfl:   •  dicyclomine (Bentyl) 10 MG capsule, Take 2 capsules by mouth 3 (Three) Times a Day As Needed (cramping)., Disp: 60 capsule, Rfl: 5  •  FLUoxetine (PROzac) 10 MG capsule, Take 1 capsule by mouth Daily., Disp: 30 capsule, Rfl: 2  •  fluticasone (Flovent HFA) 220 MCG/ACT inhaler, Inhale 1 puff 2 (Two) Times a Day., Disp: 12 g, Rfl: 11  •  lamoTRIgine (LaMICtal) 200 MG tablet, TAKE 1 TABLET BY MOUTH EVERY DAY, Disp: 30 tablet, Rfl: 1  •  lisinopril (PRINIVIL,ZESTRIL) 10 MG tablet, Take 1 tablet by mouth Daily., Disp: 90 tablet, Rfl: 1  •  montelukast (SINGULAIR)  10 MG tablet, Take 1 tablet by mouth every night at bedtime., Disp: 90 tablet, Rfl: 1  •  QUEtiapine (SEROquel) 100 MG tablet, Take 2 tablets by mouth Every Night., Disp: 60 tablet, Rfl: 2  •  rOPINIRole (REQUIP) 0.25 MG tablet, Take 1 tablet by mouth Every Night. Take 1 hour before bedtime., Disp: 90 tablet, Rfl: 1  •  tiZANidine (ZANAFLEX) 2 MG tablet, TAKE 1 TABLET BY MOUTH 2 TIMES A DAY AS NEEDED FOR MUSCLE SPASMS., Disp: 180 tablet, Rfl: 0  •  ARIPiprazole (ABILIFY) 10 MG tablet, Take 1 tablet by mouth Daily., Disp: 30 tablet, Rfl: 2    Subjective  Allergies   Allergen Reactions   • Bupropion Other (See Comments) and Provider Review Needed     Seizure / wellbutrin    • Naproxen Rash   • Nsaids Rash   • Sulfa Antibiotics Rash        Past Medical History:   Diagnosis Date   • Abnormal liver enzymes 5/9/2016   • Allergic rhinitis    • Anxiety    • Arthritis     since age 21 in her back   • Asthma     seasonal   • Back pain    • Bacterial vaginosis 5/9/2016   • Bipolar disorder (HCC)     dx age 19   • Chest pain, pleuritic    • Common migraine with intractable migraine 5/9/2016   • Depression    • Dyslipidemia    • Dysmenorrhea 5/9/2016   • Dyspareunia, female 12/12/2018    Added automatically from request for surgery [id number]   • Endometriosis    • Fatigue    • Febrile seizure (HCC)     FEBRILE SEIZURE AT 3YO AND AT 14YO D/T WELBUTRIN   • Frequent UTI    • Gastroesophageal reflux disease 5/9/2016   • GERD (gastroesophageal reflux disease)    • Gestational hypertension     History of gestational hypertension. States blood pressure is sometimes high but does not take any medications.   • Headache    • Herpes zoster     denies this visit   • History of robot-assisted laparoscopic hysterectomy/BS 1/23/2019 2/11/2019   • IBS (irritable bowel syndrome)    • Itching    • Lung mass 5/9/2016    Description: A.  CT scan of the chest 05/05/2014 reports that the previously seen nodule of the left lower lobe is no longer  visualized and the micronodular densities along the lateral aspect of the right lower lobe are stable.  There is no change in the appearance of the right axillary lymph nodes.   • Mental retardation     A. [place] to be related to hypoxia at birth due to placenta previa   • Migraine    • Migraine    • Multiple gestation    • Obesity    • Onychomycosis of toenail 2016    Impression: 2016 - Refer to podiatry.;    • Organic hypersomnia    • Ovarian cyst    • Pain, upper back    • PMS (premenstrual syndrome)    • Pulmonary nodule    • S/P 2013 cholecystectomy 2018   • Schizophrenia (HCC)    • Severe frontal headaches 2018   • Sinus tachycardia    • Wears glasses        Past Surgical History:   Procedure Laterality Date   •  SECTION     •  SECTION WITH TUBAL N/A 2017    Procedure:  SECTION REPEAT WITH TUBAL;  Surgeon: Fabien Blake MD;  Location:  [facility] DELIVERY;  Service:    • CHOLECYSTECTOMY      age 21   • DIAGNOSTIC LAPAROSCOPY      X 4   • ENDOMETRIAL ABLATION     • PELVIC LAPAROSCOPY      age 21   • TONSILLECTOMY      age 23   • TOTAL LAPAROSCOPIC HYSTERECTOMY N/A 2019    Procedure: TOTAL LAPAROSCOPIC HYSTERECTOMY BILATERAL SALPINGECTOMY WITH DAVINCI ROBOT;  Surgeon: Amador Richey MD;  Location:  [facility] OR;  Service: DaVinci   • WISDOM TOOTH EXTRACTION         Family History   Problem Relation Age of Onset   • Asthma Mother    • Hypertension Mother    • Breast cancer Mother    • Diabetes Mother    • Colon polyps Mother    • Hypertension Father    • Asthma Brother    • Drug abuse Brother    • Melanoma Maternal Grandfather    • Colon polyps Maternal Grandmother    • Diabetes Maternal Grandmother    • Stomach cancer Paternal Grandmother    • Hypertension Paternal Grandmother    • Heart attack Paternal Grandfather 36   • Diabetes Paternal Grandfather    • Hypertension Brother    • Ovarian cancer Neg Hx    • Uterine cancer Neg Hx         Social History  "    Socioeconomic History   • Marital status: Single   Tobacco Use   • Smoking status: Current Every Day Smoker     Packs/day: 1.00     Years: 10.00     Pack years: 10.00     Types: Cigarettes     Start date: 10/9/2002   • Smokeless tobacco: Never Used   • Tobacco comment: cutting back, encourage cessation   Vaping Use   • Vaping Use: Never used   Substance and Sexual Activity   • Alcohol use: No   • Drug use: Not Currently     Frequency: 5.0 times per week     Types: Cocaine(coke), Methamphetamines   • Sexual activity: Not Currently     Partners: Male     Birth control/protection: Other       Review of Systems   Constitutional: Negative for activity change, chills, fatigue, fever and unexpected weight change.   HENT: Negative for congestion, ear pain, postnasal drip, sinus pressure and sore throat.    Eyes: Negative for pain, discharge and redness.   Respiratory: Negative for cough, shortness of breath and wheezing.    Cardiovascular: Negative for chest pain, palpitations and leg swelling.   Gastrointestinal: Negative for diarrhea, nausea and vomiting.   Endocrine: Negative for cold intolerance and heat intolerance.   Genitourinary: Negative for decreased urine volume and dysuria.   Musculoskeletal: Negative for arthralgias and myalgias.   Skin: Negative for rash and wound.   Neurological: Negative for dizziness, light-headedness and headaches.   Hematological: Does not bruise/bleed easily.   Psychiatric/Behavioral: Negative for confusion, dysphoric mood and sleep disturbance. The patient is not nervous/anxious.        Objective  Vitals:    05/23/22 1451   BP: 120/82   BP Location: Left arm   Patient Position: Sitting   Pulse: 90   Resp: 18   Temp: 97.3 °F (36.3 °C)   SpO2: 97%   Weight: 85.1 kg (187 lb 9.6 oz)   Height: 160 cm (63\")       Physical Exam  Physical Exam  Vitals and nursing note reviewed.   Constitutional:       General: She is not in acute distress.     Appearance: She is not ill-appearing.   HENT: "      Head: Normocephalic.      Right Ear: Tympanic membrane, ear canal and external ear normal. There is no impacted cerumen.      Left Ear: Tympanic membrane, ear canal and external ear normal. There is no impacted cerumen.      Nose: No congestion or rhinorrhea.      Mouth/Throat:      Mouth: Mucous membranes are moist.      Pharynx: Oropharynx is clear. No oropharyngeal exudate or posterior oropharyngeal erythema.   Eyes:      General:         Right eye: No discharge.         Left eye: No discharge.      Extraocular Movements: Extraocular movements intact.      Conjunctiva/sclera: Conjunctivae normal.      Pupils: Pupils are equal, round, and reactive to light.   Cardiovascular:      Rate and Rhythm: Normal rate and regular rhythm.      Heart sounds: Normal heart sounds. No murmur heard.    No friction rub. No gallop.   Pulmonary:      Effort: Pulmonary effort is normal. No respiratory distress.      Breath sounds: Normal breath sounds. No wheezing.   Abdominal:      General: Bowel sounds are normal. There is no distension.      Palpations: Abdomen is soft. There is no mass.      Tenderness: There is no abdominal tenderness.   Musculoskeletal:         General: No swelling. Normal range of motion.      Cervical back: Normal range of motion. No tenderness.      Right lower leg: No edema.      Left lower leg: No edema.   Lymphadenopathy:      Cervical: No cervical adenopathy.   Skin:     Findings: No bruising, erythema or rash.   Neurological:      Mental Status: She is oriented to person, place, and time.      Gait: Gait normal.   Psychiatric:         Mood and Affect: Mood normal.         Behavior: Behavior normal.         Thought Content: Thought content normal.         Judgment: Judgment normal.         Diagnostic Data  Procedures    Assessment  Diagnoses and all orders for this visit:    1. Urinary tract infection with hematuria, site unspecified (Primary)  -     CBC w AUTO Differential; Future  -      Comprehensive Metabolic Panel  -     Urine Culture - Urine, Urine, Clean Catch; Future  -     POCT urinalysis dipstick, automated  -     Ambulatory Referral to Urology        Plan    1. Urinary tract infection with hematuria, site unspecified (Primary)- UA in office negative for UTI.  Obtain culture and sensitivity.  Obtain labs.  Referred to urology.      Return in about 2 weeks (around 6/6/2022) for Recheck.    Charles Ferrera PA-C  05/23/2022

## 2022-05-23 NOTE — PROGRESS NOTES
A  my chart message has been sent to the patient for patient rounding with [facility].

## 2022-05-25 LAB — BACTERIA SPEC AEROBE CULT: ABNORMAL

## 2022-05-25 RX ORDER — NITROFURANTOIN 25; 75 MG/1; MG/1
100 CAPSULE ORAL 2 TIMES DAILY
Qty: 14 CAPSULE | Refills: 0 | Status: SHIPPED | OUTPATIENT
Start: 2022-05-25 | End: 2022-05-26

## 2022-05-26 ENCOUNTER — TELEPHONE (OUTPATIENT)
Dept: INTERNAL MEDICINE | Facility: CLINIC | Age: 32
End: 2022-05-26

## 2022-05-26 RX ORDER — FLUCONAZOLE 150 MG/1
150 TABLET ORAL DAILY
Qty: 2 TABLET | Refills: 0 | Status: SHIPPED | OUTPATIENT
Start: 2022-05-26 | End: 2022-09-16

## 2022-05-26 RX ORDER — CIPROFLOXACIN 250 MG/1
250 TABLET, FILM COATED ORAL 2 TIMES DAILY
Qty: 6 TABLET | Refills: 0 | Status: SHIPPED | OUTPATIENT
Start: 2022-05-26 | End: 2022-05-29

## 2022-05-27 NOTE — TELEPHONE ENCOUNTER
Spoke with patient about her UA results showing positive for Ecoli with ESBL and that Charles recommends pt to stop the macrobid and that he has sent her in cipro 250 mg bid x 3 days. Please advise her not to exercise for ten days as this class of abx can cause musculoskeletal issues. Have her come back and see me next week to follow up. Patient expressed understanding.

## 2022-05-29 DIAGNOSIS — F31.60 BIPOLAR AFFECTIVE DISORDER, MIXED: ICD-10-CM

## 2022-05-29 DIAGNOSIS — I10 ESSENTIAL HYPERTENSION: ICD-10-CM

## 2022-05-29 DIAGNOSIS — F41.9 ANXIETY: ICD-10-CM

## 2022-05-29 DIAGNOSIS — F31.32 BIPOLAR AFFECTIVE DISORDER, CURRENTLY DEPRESSED, MODERATE: ICD-10-CM

## 2022-05-29 DIAGNOSIS — G25.81 RESTLESS LEGS: ICD-10-CM

## 2022-05-29 DIAGNOSIS — J45.40 MODERATE PERSISTENT ASTHMA WITHOUT COMPLICATION: ICD-10-CM

## 2022-05-29 RX ORDER — ROPINIROLE 0.25 MG/1
0.25 TABLET, FILM COATED ORAL NIGHTLY
Qty: 90 TABLET | Refills: 1 | OUTPATIENT
Start: 2022-05-29

## 2022-05-29 RX ORDER — LISINOPRIL 10 MG/1
TABLET ORAL
Qty: 90 TABLET | Refills: 1 | OUTPATIENT
Start: 2022-05-29

## 2022-05-29 RX ORDER — BUSPIRONE HYDROCHLORIDE 7.5 MG/1
TABLET ORAL
Qty: 90 TABLET | Refills: 1 | OUTPATIENT
Start: 2022-05-29

## 2022-05-29 RX ORDER — ARIPIPRAZOLE 5 MG/1
TABLET ORAL
Qty: 30 TABLET | Refills: 1 | OUTPATIENT
Start: 2022-05-29

## 2022-05-29 RX ORDER — MONTELUKAST SODIUM 10 MG/1
TABLET ORAL
Qty: 90 TABLET | Refills: 1 | OUTPATIENT
Start: 2022-05-29

## 2022-05-30 RX ORDER — QUETIAPINE FUMARATE 100 MG/1
200 TABLET, FILM COATED ORAL NIGHTLY
Qty: 60 TABLET | Refills: 2 | OUTPATIENT
Start: 2022-05-30

## 2022-05-30 RX ORDER — FLUOXETINE 10 MG/1
CAPSULE ORAL
Qty: 30 CAPSULE | Refills: 2 | OUTPATIENT
Start: 2022-05-30

## 2022-05-30 NOTE — TELEPHONE ENCOUNTER
Last Office Visit: 03/03/2022  Next Office Visit:    Labs completed in past 6 months? yes  Labs completed in past year? yes    Last Refill Date: 03/03/2022  Quantity:60  Refills:2    Pharmacy:     Please review pended refill request for any changes needed on refills or quantities. Thank you!

## 2022-05-31 RX ORDER — FLUOXETINE 10 MG/1
10 CAPSULE ORAL DAILY
Qty: 30 CAPSULE | Refills: 0 | OUTPATIENT
Start: 2022-05-31 | End: 2023-05-31

## 2022-05-31 RX ORDER — FLUOXETINE 10 MG/1
10 CAPSULE ORAL DAILY
Qty: 30 CAPSULE | Refills: 2 | Status: SHIPPED | OUTPATIENT
Start: 2022-05-31 | End: 2022-09-02 | Stop reason: SDUPTHER

## 2022-05-31 RX ORDER — QUETIAPINE FUMARATE 100 MG/1
200 TABLET, FILM COATED ORAL NIGHTLY
Qty: 60 TABLET | Refills: 2 | Status: SHIPPED | OUTPATIENT
Start: 2022-05-31 | End: 2022-09-02 | Stop reason: SDUPTHER

## 2022-05-31 RX ORDER — QUETIAPINE FUMARATE 100 MG/1
200 TABLET, FILM COATED ORAL NIGHTLY
Qty: 60 TABLET | Refills: 0 | OUTPATIENT
Start: 2022-05-31

## 2022-07-14 DIAGNOSIS — I10 ESSENTIAL HYPERTENSION: ICD-10-CM

## 2022-07-14 RX ORDER — LISINOPRIL 10 MG/1
TABLET ORAL
Qty: 90 TABLET | Refills: 1 | OUTPATIENT
Start: 2022-07-14

## 2022-07-30 DIAGNOSIS — I10 ESSENTIAL HYPERTENSION: ICD-10-CM

## 2022-07-30 RX ORDER — LISINOPRIL 10 MG/1
TABLET ORAL
Qty: 90 TABLET | Refills: 1 | OUTPATIENT
Start: 2022-07-30

## 2022-08-14 DIAGNOSIS — I10 ESSENTIAL HYPERTENSION: ICD-10-CM

## 2022-08-14 RX ORDER — LISINOPRIL 10 MG/1
TABLET ORAL
Qty: 90 TABLET | Refills: 1 | OUTPATIENT
Start: 2022-08-14

## 2022-08-17 ENCOUNTER — LAB (OUTPATIENT)
Dept: LAB | Facility: HOSPITAL | Age: 32
End: 2022-08-17

## 2022-08-17 ENCOUNTER — OFFICE VISIT (OUTPATIENT)
Dept: INTERNAL MEDICINE | Facility: CLINIC | Age: 32
End: 2022-08-17

## 2022-08-17 VITALS
SYSTOLIC BLOOD PRESSURE: 132 MMHG | BODY MASS INDEX: 31.75 KG/M2 | OXYGEN SATURATION: 97 % | HEART RATE: 105 BPM | TEMPERATURE: 97.1 F | HEIGHT: 63 IN | WEIGHT: 179.2 LBS | DIASTOLIC BLOOD PRESSURE: 92 MMHG

## 2022-08-17 DIAGNOSIS — N39.0 RECURRENT UTI: ICD-10-CM

## 2022-08-17 DIAGNOSIS — I10 ESSENTIAL HYPERTENSION: ICD-10-CM

## 2022-08-17 DIAGNOSIS — G56.03 BILATERAL CARPAL TUNNEL SYNDROME: Primary | ICD-10-CM

## 2022-08-17 LAB
BILIRUB BLD-MCNC: NEGATIVE MG/DL
CLARITY, POC: ABNORMAL
COLOR UR: ABNORMAL
EXPIRATION DATE: ABNORMAL
GLUCOSE UR STRIP-MCNC: NEGATIVE MG/DL
KETONES UR QL: NEGATIVE
LEUKOCYTE EST, POC: NEGATIVE
Lab: ABNORMAL
NITRITE UR-MCNC: POSITIVE MG/ML
PH UR: 6 [PH] (ref 5–8)
PROT UR STRIP-MCNC: NEGATIVE MG/DL
RBC # UR STRIP: NEGATIVE /UL
SP GR UR: 1.02 (ref 1–1.03)
UROBILINOGEN UR QL: NORMAL

## 2022-08-17 PROCEDURE — 87088 URINE BACTERIA CULTURE: CPT | Performed by: PHYSICIAN ASSISTANT

## 2022-08-17 PROCEDURE — 81003 URINALYSIS AUTO W/O SCOPE: CPT | Performed by: PHYSICIAN ASSISTANT

## 2022-08-17 PROCEDURE — 87086 URINE CULTURE/COLONY COUNT: CPT | Performed by: PHYSICIAN ASSISTANT

## 2022-08-17 PROCEDURE — 87186 SC STD MICRODIL/AGAR DIL: CPT | Performed by: PHYSICIAN ASSISTANT

## 2022-08-17 PROCEDURE — 99214 OFFICE O/P EST MOD 30 MIN: CPT | Performed by: PHYSICIAN ASSISTANT

## 2022-08-17 RX ORDER — NITROFURANTOIN 25; 75 MG/1; MG/1
100 CAPSULE ORAL 2 TIMES DAILY
Qty: 14 CAPSULE | Refills: 0 | Status: SHIPPED | OUTPATIENT
Start: 2022-08-17 | End: 2022-08-24

## 2022-08-17 RX ORDER — LISINOPRIL 20 MG/1
20 TABLET ORAL DAILY
Qty: 30 TABLET | Refills: 0 | Status: SHIPPED | OUTPATIENT
Start: 2022-08-17 | End: 2022-09-02

## 2022-08-17 NOTE — PROGRESS NOTES
[facility] PRIMARY CARE  [address]  [facility] [address]  Dept: [phone]  Dept Fax: [phone]  Loc: [phone]  Loc Fax: [phone]    Chen Galaviz  1990    Follow Up Office Visit Note    History of Present Illness:  Patient is a 32-year-old female in today for urinary symptoms, bilateral hand pain, and elevated blood pressure.  Patient taking 10 mg lisinopril daily for hypertension.  Taking as directed without any problems or side effects.  Blood pressure has been elevated more at home lately.    Patient continues to struggle with bilateral hand pain that starts in her elbows and radiates down into her hands and fingers.  Patient reports tingling sensation as well.    Patient continued to have dark urine and urinary symptoms.  Has multiple  recurrent UTIs.  Was supposed to see urology but missed this appointment.  Needing referral back at this point.  Denies any fever or chills.  Denies any flank pain.    Hand Pain   Pertinent negatives include no chest pain.       The following portions of the patient's history were reviewed and updated as appropriate: allergies, current medications, past family history, past medical history, past social history, past surgical history, and problem list.    Medications:    Current Outpatient Medications:   •  acetaminophen (TYLENOL) 325 MG tablet, Take 2 tablets by mouth Every 6 (Six) Hours As Needed for Mild Pain ., Disp: , Rfl:   •  albuterol sulfate  (90 Base) MCG/ACT inhaler, Inhale 2 puffs Every 6 (Six) Hours As Needed for Wheezing., Disp: 18 g, Rfl: 11  •  cetirizine (zyrTEC) 10 MG tablet, Take 10 mg by mouth Daily., Disp: , Rfl:   •  dicyclomine (Bentyl) 10 MG capsule, Take 2 capsules by mouth 3 (Three) Times a Day As Needed (cramping)., Disp: 60 capsule, Rfl: 5  •  fluconazole (Diflucan) 150 MG tablet, Take 1 tablet by mouth Daily., Disp: 2 tablet, Rfl: 0  •  FLUoxetine (PROzac) 10 MG capsule, Take 1  capsule by mouth Daily., Disp: 30 capsule, Rfl: 2  •  fluticasone (Flovent HFA) 220 MCG/ACT inhaler, Inhale 1 puff 2 (Two) Times a Day., Disp: 12 g, Rfl: 11  •  lamoTRIgine (LaMICtal) 200 MG tablet, TAKE 1 TABLET BY MOUTH EVERY DAY, Disp: 30 tablet, Rfl: 1  •  lisinopril (PRINIVIL,ZESTRIL) 20 MG tablet, Take 1 tablet by mouth Daily., Disp: 30 tablet, Rfl: 0  •  montelukast (SINGULAIR) 10 MG tablet, Take 1 tablet by mouth every night at bedtime., Disp: 90 tablet, Rfl: 1  •  QUEtiapine (SEROquel) 100 MG tablet, Take 2 tablets by mouth Every Night., Disp: 60 tablet, Rfl: 2  •  rOPINIRole (REQUIP) 0.25 MG tablet, Take 1 tablet by mouth Every Night. Take 1 hour before bedtime., Disp: 90 tablet, Rfl: 1    Subjective  Allergies   Allergen Reactions   • Bupropion Other (See Comments) and Provider Review Needed     Seizure / wellbutrin    • Naproxen Rash   • Nsaids Rash   • Sulfa Antibiotics Rash        Past Medical History:   Diagnosis Date   • Abnormal liver enzymes 5/9/2016   • Allergic rhinitis    • Anxiety    • Arthritis     since age 21 in her back   • Asthma     seasonal   • Back pain    • Bacterial vaginosis 5/9/2016   • Bipolar disorder (HCC)     dx age 19   • Chest pain, pleuritic    • Common migraine with intractable migraine 5/9/2016   • Depression    • Dyslipidemia    • Dysmenorrhea 5/9/2016   • Dyspareunia, female 12/12/2018    Added automatically from request for surgery [id number]   • Endometriosis    • Fatigue    • Febrile seizure (HCC)     FEBRILE SEIZURE AT 3YO AND AT 16YO D/T WELBUTRIN   • Frequent UTI    • Gastroesophageal reflux disease 5/9/2016   • GERD (gastroesophageal reflux disease)    • Gestational hypertension     History of gestational hypertension. States blood pressure is sometimes high but does not take any medications.   • Headache    • Herpes zoster     denies this visit   • History of robot-assisted laparoscopic hysterectomy/BS 1/23/2019 2/11/2019   • IBS (irritable bowel syndrome)    •  Itching    • Lung mass 2016    Description: A.  CT scan of the chest 2014 reports that the previously seen nodule of the left lower lobe is no longer visualized and the micronodular densities along the lateral aspect of the right lower lobe are stable.  There is no change in the appearance of the right axillary lymph nodes.   • Mental retardation     A. [place] to be related to hypoxia at birth due to placenta previa   • Migraine    • Migraine    • Multiple gestation    • Obesity    • Onychomycosis of toenail 2016    Impression: 2016 - Refer to podiatry.;    • Organic hypersomnia    • Ovarian cyst    • Pain, upper back    • PMS (premenstrual syndrome)    • Pulmonary nodule    • S/P 2013 cholecystectomy 2018   • Schizophrenia (HCC)    • Severe frontal headaches 2018   • Sinus tachycardia    • Wears glasses        Past Surgical History:   Procedure Laterality Date   •  SECTION     •  SECTION WITH TUBAL N/A 2017    Procedure:  SECTION REPEAT WITH TUBAL;  Surgeon: Fabien Blake MD;  Location: [facility];  Service:    • CHOLECYSTECTOMY      age 21   • DIAGNOSTIC LAPAROSCOPY      X 4   • ENDOMETRIAL ABLATION     • PELVIC LAPAROSCOPY      age 21   • TONSILLECTOMY      age 23   • TOTAL LAPAROSCOPIC HYSTERECTOMY N/A 2019    Procedure: TOTAL LAPAROSCOPIC HYSTERECTOMY BILATERAL SALPINGECTOMY WITH DAVINCI ROBOT;  Surgeon: Amador Richey MD;  Location: [facility] OR;  Service: DaVinci   • WISDOM TOOTH EXTRACTION         Family History   Problem Relation Age of Onset   • Asthma Mother    • Hypertension Mother    • Breast cancer Mother    • Diabetes Mother    • Colon polyps Mother    • Hypertension Father    • Asthma Brother    • Drug abuse Brother    • Melanoma Maternal Grandfather    • Colon polyps Maternal Grandmother    • Diabetes Maternal Grandmother    • Stomach cancer Paternal Grandmother    • Hypertension Paternal Grandmother    • Heart attack  Paternal Grandfather 36   • Diabetes Paternal Grandfather    • Hypertension Brother    • Ovarian cancer Neg Hx    • Uterine cancer Neg Hx         Social History     Socioeconomic History   • Marital status: Single   Tobacco Use   • Smoking status: Current Every Day Smoker     Packs/day: 1.00     Years: 10.00     Pack years: 10.00     Types: Cigarettes     Start date: 10/9/2002   • Smokeless tobacco: Never Used   • Tobacco comment: cutting back, encourage cessation   Vaping Use   • Vaping Use: Never used   Substance and Sexual Activity   • Alcohol use: No   • Drug use: Not Currently     Frequency: 5.0 times per week     Types: Cocaine(coke), Methamphetamines   • Sexual activity: Not Currently     Partners: Male     Birth control/protection: Other       Review of Systems   Constitutional: Negative for activity change, chills, fatigue, fever and unexpected weight change.   HENT: Negative for congestion, ear pain, postnasal drip, sinus pressure and sore throat.    Eyes: Negative for pain, discharge and redness.   Respiratory: Negative for cough, shortness of breath and wheezing.    Cardiovascular: Negative for chest pain, palpitations and leg swelling.   Gastrointestinal: Negative for diarrhea, nausea and vomiting.   Endocrine: Negative for cold intolerance and heat intolerance.   Genitourinary: Positive for dysuria, frequency and urgency. Negative for decreased urine volume.   Musculoskeletal: Positive for arthralgias. Negative for myalgias.   Skin: Negative for rash and wound.   Neurological: Negative for dizziness, light-headedness and headaches.   Hematological: Does not bruise/bleed easily.   Psychiatric/Behavioral: Negative for confusion, dysphoric mood and sleep disturbance. The patient is not nervous/anxious.          Objective  Vitals:    08/17/22 1419   BP: 132/92   BP Location: Left arm   Patient Position: Sitting   Pulse: 105   Temp: 97.1 °F (36.2 °C)   TempSrc: Temporal   SpO2: 97%   Weight: 81.3 kg (179 lb  "3.2 oz)   Height: 160 cm (62.99\")   PainSc:   4     Body mass index is 31.75 kg/m².     Physical Exam  Physical Exam  Vitals and nursing note reviewed.   Constitutional:       General: She is not in acute distress.     Appearance: She is not ill-appearing.   HENT:      Head: Normocephalic.      Right Ear: Tympanic membrane, ear canal and external ear normal. There is no impacted cerumen.      Left Ear: Tympanic membrane, ear canal and external ear normal. There is no impacted cerumen.      Nose: No congestion or rhinorrhea.      Mouth/Throat:      Mouth: Mucous membranes are moist.      Pharynx: Oropharynx is clear. No oropharyngeal exudate or posterior oropharyngeal erythema.   Eyes:      General:         Right eye: No discharge.         Left eye: No discharge.      Extraocular Movements: Extraocular movements intact.      Conjunctiva/sclera: Conjunctivae normal.      Pupils: Pupils are equal, round, and reactive to light.   Cardiovascular:      Rate and Rhythm: Normal rate and regular rhythm.      Heart sounds: Normal heart sounds. No murmur heard.    No friction rub. No gallop.   Pulmonary:      Effort: Pulmonary effort is normal. No respiratory distress.      Breath sounds: Normal breath sounds. No wheezing.   Abdominal:      General: Bowel sounds are normal. There is no distension.      Palpations: Abdomen is soft. There is no mass.      Tenderness: There is no abdominal tenderness.   Musculoskeletal:         General: No swelling. Normal range of motion.      Cervical back: Normal range of motion. No tenderness.      Right lower leg: No edema.      Left lower leg: No edema.   Lymphadenopathy:      Cervical: No cervical adenopathy.   Skin:     Findings: No bruising, erythema or rash.   Neurological:      Mental Status: She is oriented to person, place, and time.      Gait: Gait normal.   Psychiatric:         Mood and Affect: Mood normal.         Behavior: Behavior normal.         Thought Content: Thought content " normal.         Judgment: Judgment normal.         Diagnostic Data  Procedures    Assessment  Diagnoses and all orders for this visit:    1. Bilateral carpal tunnel syndrome (Primary)  -     Ambulatory Referral to Hand Surgery    2. Essential hypertension  -     lisinopril (PRINIVIL,ZESTRIL) 20 MG tablet; Take 1 tablet by mouth Daily.  Dispense: 30 tablet; Refill: 0    3. Recurrent UTI  -     Ambulatory Referral to Urology        Plan    1. Bilateral carpal tunnel syndrome (Primary)- worse pain, referred to hand surgery.    2. Essential hypertension-uncontrolled, increase lisinopril to 20 mg daily. Advised patient to take blood pressure readings at home 2-3 times daily. Advised if blood pressure higher than 160/100 or lower than 100/60 to call the office immediately. If symptomatic, go to the ER. Patient verbalized understanding of all instructions given and complied.    3. Recurrent UTI- start Macrobid 100 mg twice daily x7 days preemptively.  Obtain culture and sensitivity.  Referred back to urology.      Return in about 2 weeks (around 8/31/2022) for Recheck.    Charles Ferrera PA-C  08/17/2022

## 2022-08-19 LAB — BACTERIA SPEC AEROBE CULT: ABNORMAL

## 2022-09-02 ENCOUNTER — TELEPHONE (OUTPATIENT)
Dept: INTERNAL MEDICINE | Facility: CLINIC | Age: 32
End: 2022-09-02

## 2022-09-02 ENCOUNTER — OFFICE VISIT (OUTPATIENT)
Dept: INTERNAL MEDICINE | Facility: CLINIC | Age: 32
End: 2022-09-02

## 2022-09-02 VITALS
HEIGHT: 63 IN | SYSTOLIC BLOOD PRESSURE: 130 MMHG | TEMPERATURE: 97.1 F | HEART RATE: 102 BPM | WEIGHT: 173.8 LBS | OXYGEN SATURATION: 98 % | DIASTOLIC BLOOD PRESSURE: 88 MMHG | BODY MASS INDEX: 30.79 KG/M2

## 2022-09-02 DIAGNOSIS — Z00.00 HEALTH CARE MAINTENANCE: ICD-10-CM

## 2022-09-02 DIAGNOSIS — I10 ESSENTIAL HYPERTENSION: Primary | ICD-10-CM

## 2022-09-02 DIAGNOSIS — F31.60 BIPOLAR AFFECTIVE DISORDER, MIXED: ICD-10-CM

## 2022-09-02 DIAGNOSIS — G56.03 BILATERAL CARPAL TUNNEL SYNDROME: ICD-10-CM

## 2022-09-02 DIAGNOSIS — N39.0 RECURRENT UTI: ICD-10-CM

## 2022-09-02 LAB
BILIRUB BLD-MCNC: NEGATIVE MG/DL
CLARITY, POC: ABNORMAL
COLOR UR: YELLOW
EXPIRATION DATE: ABNORMAL
GLUCOSE UR STRIP-MCNC: NEGATIVE MG/DL
KETONES UR QL: NEGATIVE
LEUKOCYTE EST, POC: NEGATIVE
Lab: ABNORMAL
NITRITE UR-MCNC: POSITIVE MG/ML
PH UR: 6 [PH] (ref 5–8)
PROT UR STRIP-MCNC: ABNORMAL MG/DL
RBC # UR STRIP: NEGATIVE /UL
SP GR UR: 1.03 (ref 1–1.03)
UROBILINOGEN UR QL: NORMAL

## 2022-09-02 PROCEDURE — 87086 URINE CULTURE/COLONY COUNT: CPT | Performed by: PHYSICIAN ASSISTANT

## 2022-09-02 PROCEDURE — 81003 URINALYSIS AUTO W/O SCOPE: CPT | Performed by: PHYSICIAN ASSISTANT

## 2022-09-02 PROCEDURE — 87186 SC STD MICRODIL/AGAR DIL: CPT | Performed by: PHYSICIAN ASSISTANT

## 2022-09-02 PROCEDURE — 99214 OFFICE O/P EST MOD 30 MIN: CPT | Performed by: PHYSICIAN ASSISTANT

## 2022-09-02 PROCEDURE — 87077 CULTURE AEROBIC IDENTIFY: CPT | Performed by: PHYSICIAN ASSISTANT

## 2022-09-02 RX ORDER — FLUOXETINE 10 MG/1
10 CAPSULE ORAL DAILY
Qty: 30 CAPSULE | Refills: 2 | Status: SHIPPED | OUTPATIENT
Start: 2022-09-02 | End: 2022-09-30 | Stop reason: SDUPTHER

## 2022-09-02 RX ORDER — LISINOPRIL 40 MG/1
40 TABLET ORAL DAILY
Qty: 30 TABLET | Refills: 1 | Status: SHIPPED | OUTPATIENT
Start: 2022-09-02 | End: 2022-09-06

## 2022-09-02 RX ORDER — QUETIAPINE FUMARATE 100 MG/1
200 TABLET, FILM COATED ORAL NIGHTLY
Qty: 60 TABLET | Refills: 2 | Status: SHIPPED | OUTPATIENT
Start: 2022-09-02 | End: 2022-09-30 | Stop reason: SDUPTHER

## 2022-09-02 RX ORDER — CEFDINIR 300 MG/1
300 CAPSULE ORAL 2 TIMES DAILY
Qty: 20 CAPSULE | Refills: 0 | Status: SHIPPED | OUTPATIENT
Start: 2022-09-02 | End: 2022-09-12

## 2022-09-02 NOTE — PROGRESS NOTES
[facility] PRIMARY [facility]ARE  [address]  [facility] [address]  Dept: [phone]  Dept Fax: [phone]  Loc: [phone]  Loc Fax: [phone]    Chen Galaviz  1990    Follow Up Office Visit Note    History of Present Illness:  Patient is a 32-year-old female in today to follow-up for hypertension, carpal tunnel, recurrent UTI, and bipolar disorder.  Patient on lisinopril 20 mg daily but blood pressure still running a bit elevated.  Denies any symptoms.    Patient due to see orthopedics for discussion for options for treatment of carpal tunnel syndrome.    Patient again presents today with urinary tract symptoms.  Dysuria and dark-colored urine.  Foul smelling urine as well.  Unclear as to status of neurology referral.  Patient has had multiple positive cultures a microscopic urinalysis in her chart.    Patient out of bipolar medication.  Needing refill of Prozac 10 mg daily and Seroquel 100 mg nightly.  Also on Lamictal but okay on this medicine as far as refills go.  Also needing referral back to psychiatry for management of condition.      The following portions of the patient's history were reviewed and updated as appropriate: allergies, current medications, past family history, past medical history, past social history, past surgical history, and problem list.    Medications:    Current Outpatient Medications:   •  acetaminophen (TYLENOL) 325 MG tablet, Take 2 tablets by mouth Every 6 (Six) Hours As Needed for Mild Pain ., Disp: , Rfl:   •  albuterol sulfate  (90 Base) MCG/ACT inhaler, Inhale 2 puffs Every 6 (Six) Hours As Needed for Wheezing., Disp: 18 g, Rfl: 11  •  cetirizine (zyrTEC) 10 MG tablet, Take 10 mg by mouth Daily., Disp: , Rfl:   •  dicyclomine (Bentyl) 10 MG capsule, Take 2 capsules by mouth 3 (Three) Times a Day As Needed (cramping)., Disp: 60 capsule, Rfl: 5  •  fluconazole (Diflucan) 150 MG tablet, Take 1 tablet by mouth Daily.,  Disp: 2 tablet, Rfl: 0  •  FLUoxetine (PROzac) 10 MG capsule, Take 1 capsule by mouth Daily., Disp: 30 capsule, Rfl: 2  •  fluticasone (Flovent HFA) 220 MCG/ACT inhaler, Inhale 1 puff 2 (Two) Times a Day., Disp: 12 g, Rfl: 11  •  lamoTRIgine (LaMICtal) 200 MG tablet, TAKE 1 TABLET BY MOUTH EVERY DAY, Disp: 30 tablet, Rfl: 1  •  lisinopril (PRINIVIL,ZESTRIL) 40 MG tablet, Take 1 tablet by mouth Daily., Disp: 30 tablet, Rfl: 1  •  montelukast (SINGULAIR) 10 MG tablet, Take 1 tablet by mouth every night at bedtime., Disp: 90 tablet, Rfl: 1  •  QUEtiapine (SEROquel) 100 MG tablet, Take 2 tablets by mouth Every Night., Disp: 60 tablet, Rfl: 2  •  rOPINIRole (REQUIP) 0.25 MG tablet, Take 1 tablet by mouth Every Night. Take 1 hour before bedtime., Disp: 90 tablet, Rfl: 1  •  cefdinir (OMNICEF) 300 MG capsule, Take 1 capsule by mouth 2 (Two) Times a Day for 10 days., Disp: 20 capsule, Rfl: 0    Subjective  Allergies   Allergen Reactions   • Bupropion Other (See Comments) and Provider Review Needed     Seizure / wellbutrin    • Naproxen Rash   • Nsaids Rash   • Sulfa Antibiotics Rash        Past Medical History:   Diagnosis Date   • Abnormal liver enzymes 5/9/2016   • Allergic rhinitis    • Anxiety    • Arthritis     since age 21 in her back   • Asthma     seasonal   • Back pain    • Bacterial vaginosis 5/9/2016   • Bipolar disorder (HCC)     dx age 19   • Chest pain, pleuritic    • Common migraine with intractable migraine 5/9/2016   • Depression    • Dyslipidemia    • Dysmenorrhea 5/9/2016   • Dyspareunia, female 12/12/2018    Added automatically from request for surgery [id number]   • Endometriosis    • Fatigue    • Febrile seizure (HCC)     FEBRILE SEIZURE AT 3YO AND AT 16YO D/T WELBUTRIN   • Frequent UTI    • Gastroesophageal reflux disease 5/9/2016   • GERD (gastroesophageal reflux disease)    • Gestational hypertension     History of gestational hypertension. States blood pressure is sometimes high but does not take any  medications.   • Headache    • Herpes zoster     denies this visit   • History of robot-assisted laparoscopic hysterectomy/BS 2019   • IBS (irritable bowel syndrome)    • Itching    • Lung mass 2016    Description: A.  CT scan of the chest 2014 reports that the previously seen nodule of the left lower lobe is no longer visualized and the micronodular densities along the lateral aspect of the right lower lobe are stable.  There is no change in the appearance of the right axillary lymph nodes.   • Mental retardation     A. [place] to be related to hypoxia at birth due to placenta previa   • Migraine    • Migraine    • Multiple gestation    • Obesity    • Onychomycosis of toenail 2016    Impression: 2016 - Refer to podiatry.;    • Organic hypersomnia    • Ovarian cyst    • Pain, upper back    • PMS (premenstrual syndrome)    • Pulmonary nodule    • S/P 2013 cholecystectomy 2018   • Schizophrenia (HCC)    • Severe frontal headaches 2018   • Sinus tachycardia    • Wears glasses        Past Surgical History:   Procedure Laterality Date   •  SECTION     •  SECTION WITH TUBAL N/A 2017    Procedure:  SECTION REPEAT WITH TUBAL;  Surgeon: Fabien Blake MD;  Location:  [facility];  Service:    • CHOLECYSTECTOMY      age 21   • DIAGNOSTIC LAPAROSCOPY      X 4   • ENDOMETRIAL ABLATION     • PELVIC LAPAROSCOPY      age 21   • TONSILLECTOMY      age 23   • TOTAL LAPAROSCOPIC HYSTERECTOMY N/A 2019    Procedure: TOTAL LAPAROSCOPIC HYSTERECTOMY BILATERAL SALPINGECTOMY WITH DAVINCI ROBOT;  Surgeon: Amador Richey MD;  Location:  [facility];  Service: DaVinci   • WISDOM TOOTH EXTRACTION         Family History   Problem Relation Age of Onset   • Asthma Mother    • Hypertension Mother    • Breast cancer Mother    • Diabetes Mother    • Colon polyps Mother    • Hypertension Father    • Asthma Brother    • Drug abuse Brother    • Melanoma Maternal  Grandfather    • Colon polyps Maternal Grandmother    • Diabetes Maternal Grandmother    • Stomach cancer Paternal Grandmother    • Hypertension Paternal Grandmother    • Heart attack Paternal Grandfather 36   • Diabetes Paternal Grandfather    • Hypertension Brother    • Ovarian cancer Neg Hx    • Uterine cancer Neg Hx         Social History     Socioeconomic History   • Marital status: Single   Tobacco Use   • Smoking status: Current Every Day Smoker     Packs/day: 1.00     Years: 10.00     Pack years: 10.00     Types: Cigarettes     Start date: 10/9/2002   • Smokeless tobacco: Never Used   • Tobacco comment: cutting back, encourage cessation   Vaping Use   • Vaping Use: Never used   Substance and Sexual Activity   • Alcohol use: No   • Drug use: Not Currently     Frequency: 5.0 times per week     Types: Cocaine(coke), Methamphetamines   • Sexual activity: Not Currently     Partners: Male     Birth control/protection: Other       Review of Systems   Constitutional: Negative for activity change, chills, fatigue, fever and unexpected weight change.   HENT: Negative for congestion, ear pain, postnasal drip, sinus pressure and sore throat.    Eyes: Negative for pain, discharge and redness.   Respiratory: Negative for cough, shortness of breath and wheezing.    Cardiovascular: Negative for chest pain, palpitations and leg swelling.   Gastrointestinal: Negative for diarrhea, nausea and vomiting.   Endocrine: Negative for cold intolerance and heat intolerance.   Genitourinary: Positive for dysuria, frequency and urgency. Negative for decreased urine volume.   Musculoskeletal: Negative for arthralgias and myalgias.   Skin: Negative for rash and wound.   Neurological: Negative for dizziness, light-headedness and headaches.   Hematological: Does not bruise/bleed easily.   Psychiatric/Behavioral: Negative for confusion, dysphoric mood and sleep disturbance. The patient is not nervous/anxious.          Objective  Vitals:     "09/02/22 1422   BP: 130/88   BP Location: Right arm   Patient Position: Sitting   Pulse: 102   Temp: 97.1 °F (36.2 °C)   TempSrc: Temporal   SpO2: 98%   Weight: 78.8 kg (173 lb 12.8 oz)   Height: 160 cm (62.99\")     Body mass index is 30.8 kg/m².     Physical Exam  Physical Exam  Vitals and nursing note reviewed.   Constitutional:       General: She is not in acute distress.     Appearance: She is not ill-appearing.   HENT:      Head: Normocephalic.      Right Ear: Tympanic membrane, ear canal and external ear normal. There is no impacted cerumen.      Left Ear: Tympanic membrane, ear canal and external ear normal. There is no impacted cerumen.      Nose: No congestion or rhinorrhea.      Mouth/Throat:      Mouth: Mucous membranes are moist.      Pharynx: Oropharynx is clear. No oropharyngeal exudate or posterior oropharyngeal erythema.   Eyes:      General:         Right eye: No discharge.         Left eye: No discharge.      Extraocular Movements: Extraocular movements intact.      Conjunctiva/sclera: Conjunctivae normal.      Pupils: Pupils are equal, round, and reactive to light.   Cardiovascular:      Rate and Rhythm: Normal rate and regular rhythm.      Heart sounds: Normal heart sounds. No murmur heard.    No friction rub. No gallop.   Pulmonary:      Effort: Pulmonary effort is normal. No respiratory distress.      Breath sounds: Normal breath sounds. No wheezing.   Abdominal:      General: Bowel sounds are normal. There is no distension.      Palpations: Abdomen is soft. There is no mass.      Tenderness: There is no abdominal tenderness.   Musculoskeletal:         General: No swelling. Normal range of motion.      Cervical back: Normal range of motion. No tenderness.      Right lower leg: No edema.      Left lower leg: No edema.   Lymphadenopathy:      Cervical: No cervical adenopathy.   Skin:     Findings: No bruising, erythema or rash.   Neurological:      Mental Status: She is oriented to person, place, " and time.      Gait: Gait normal.   Psychiatric:         Mood and Affect: Mood normal.         Behavior: Behavior normal.         Thought Content: Thought content normal.         Judgment: Judgment normal.         Diagnostic Data  Procedures    Assessment  Diagnoses and all orders for this visit:    1. Essential hypertension (Primary)  -     lisinopril (PRINIVIL,ZESTRIL) 40 MG tablet; Take 1 tablet by mouth Daily.  Dispense: 30 tablet; Refill: 1    2. Bilateral carpal tunnel syndrome    3. Recurrent UTI  -     Urine Culture - Urine, Urine, Clean Catch; Future  -     Urine Culture - Urine, Urine, Clean Catch  -     cefdinir (OMNICEF) 300 MG capsule; Take 1 capsule by mouth 2 (Two) Times a Day for 10 days.  Dispense: 20 capsule; Refill: 0  -     Urine Culture - Urine, Urine, Clean Catch; Future    4. Bipolar affective disorder, mixed (HCC)  -     FLUoxetine (PROzac) 10 MG capsule; Take 1 capsule by mouth Daily.  Dispense: 30 capsule; Refill: 2  -     Ambulatory Referral to Psychiatry  -     QUEtiapine (SEROquel) 100 MG tablet; Take 2 tablets by mouth Every Night.  Dispense: 60 tablet; Refill: 2    5. Health care maintenance  -     CBC w AUTO Differential; Future  -     Comprehensive Metabolic Panel  -     TSH; Future  -     Hemoglobin A1c; Future  -     Lipid Panel        Plan    1. Essential hypertension (Primary)- better but still uncontrolled on lisinopril 20 mg daily.  Increase to 40 mg daily.Advised patient to take blood pressure readings at home 2-3 times daily.  Bring readings back to clinic at follow-up.  Advised if blood pressure higher than 160/100 or lower than 100/60 to call the office immediately. If symptomatic, go to the ER. Patient verbalized understanding of all instructions given and complied.    2. Bilateral carpal tunnel syndrome- worse, follow-up with orthopedics in the coming weeks.    3. Recurrent UTI- omnicef 300 mg twice daily x10 days.  Repeat culture and sensitivity.  Follow-up with urology  referral.    4. Bipolar affective disorder, mixed (HCC)- refilled meds.  Referred to psych.    5. Health care maintenance- pt to come back fasting for labs.      Return in about 2 weeks (around 9/16/2022) for Recheck.    Charles eFrrera PA-C  09/02/2022  Answers for HPI/ROS submitted by the patient on 9/1/2022  Please describe your symptoms.: Still dark pee, and still has an oder  Have you had these symptoms before?: Yes  How long have you been having these symptoms?: Greater than 2 weeks  What is the primary reason for your visit?: Other

## 2022-09-02 NOTE — TELEPHONE ENCOUNTER
HUB TO READ AND CAN RESCHEDULE APPT IF PATIENT IS WILLING  Attempted to reach patient no answer. called patient to see if she would be willing do a mychart video visit with lei at 11:00 am? If not that's fine we will see her at 2:00 pm today.

## 2022-09-04 LAB — BACTERIA SPEC AEROBE CULT: ABNORMAL

## 2022-09-06 ENCOUNTER — TELEPHONE (OUTPATIENT)
Dept: INTERNAL MEDICINE | Facility: CLINIC | Age: 32
End: 2022-09-06

## 2022-09-06 DIAGNOSIS — N39.0 ACUTE UTI: Primary | ICD-10-CM

## 2022-09-06 DIAGNOSIS — I10 ESSENTIAL HYPERTENSION: ICD-10-CM

## 2022-09-06 RX ORDER — NITROFURANTOIN 25; 75 MG/1; MG/1
100 CAPSULE ORAL 2 TIMES DAILY
Qty: 14 CAPSULE | Refills: 0 | Status: SHIPPED | OUTPATIENT
Start: 2022-09-06 | End: 2022-09-13

## 2022-09-06 RX ORDER — LISINOPRIL 40 MG/1
40 TABLET ORAL DAILY
Qty: 90 TABLET | Refills: 1 | Status: SHIPPED | OUTPATIENT
Start: 2022-09-06 | End: 2022-12-15

## 2022-09-06 NOTE — TELEPHONE ENCOUNTER
Stave with  labs called and stated the clean urine catch the pt did tested positive for ecoli ESBL, please advise.

## 2022-09-15 ENCOUNTER — LAB (OUTPATIENT)
Dept: LAB | Facility: HOSPITAL | Age: 32
End: 2022-09-15

## 2022-09-15 ENCOUNTER — OFFICE VISIT (OUTPATIENT)
Dept: ORTHOPEDIC SURGERY | Facility: CLINIC | Age: 32
End: 2022-09-15

## 2022-09-15 VITALS
DIASTOLIC BLOOD PRESSURE: 70 MMHG | BODY MASS INDEX: 30.65 KG/M2 | WEIGHT: 173 LBS | HEIGHT: 63 IN | SYSTOLIC BLOOD PRESSURE: 118 MMHG

## 2022-09-15 DIAGNOSIS — R20.0 BILATERAL HAND NUMBNESS: ICD-10-CM

## 2022-09-15 DIAGNOSIS — Z00.00 HEALTH CARE MAINTENANCE: ICD-10-CM

## 2022-09-15 DIAGNOSIS — M79.641 BILATERAL HAND PAIN: Primary | ICD-10-CM

## 2022-09-15 DIAGNOSIS — M79.642 BILATERAL HAND PAIN: Primary | ICD-10-CM

## 2022-09-15 DIAGNOSIS — N39.0 RECURRENT UTI: ICD-10-CM

## 2022-09-15 LAB
ALBUMIN SERPL-MCNC: 4.4 G/DL (ref 3.5–5.2)
ALBUMIN/GLOB SERPL: 1.9 G/DL
ALP SERPL-CCNC: 112 U/L (ref 39–117)
ALT SERPL W P-5'-P-CCNC: 10 U/L (ref 1–33)
ANION GAP SERPL CALCULATED.3IONS-SCNC: 12 MMOL/L (ref 5–15)
AST SERPL-CCNC: 14 U/L (ref 1–32)
BASOPHILS # BLD AUTO: 0.08 10*3/MM3 (ref 0–0.2)
BASOPHILS NFR BLD AUTO: 0.8 % (ref 0–1.5)
BILIRUB SERPL-MCNC: 0.2 MG/DL (ref 0–1.2)
BUN SERPL-MCNC: 11 MG/DL (ref 6–20)
BUN/CREAT SERPL: 12.4 (ref 7–25)
CALCIUM SPEC-SCNC: 9.5 MG/DL (ref 8.6–10.5)
CHLORIDE SERPL-SCNC: 101 MMOL/L (ref 98–107)
CHOLEST SERPL-MCNC: 194 MG/DL (ref 0–200)
CO2 SERPL-SCNC: 26 MMOL/L (ref 22–29)
CREAT SERPL-MCNC: 0.89 MG/DL (ref 0.57–1)
DEPRECATED RDW RBC AUTO: 42.8 FL (ref 37–54)
EGFRCR SERPLBLD CKD-EPI 2021: 89 ML/MIN/1.73
EOSINOPHIL # BLD AUTO: 0.27 10*3/MM3 (ref 0–0.4)
EOSINOPHIL NFR BLD AUTO: 2.6 % (ref 0.3–6.2)
ERYTHROCYTE [DISTWIDTH] IN BLOOD BY AUTOMATED COUNT: 13.3 % (ref 12.3–15.4)
GLOBULIN UR ELPH-MCNC: 2.3 GM/DL
GLUCOSE SERPL-MCNC: 98 MG/DL (ref 65–99)
HBA1C MFR BLD: 5.2 % (ref 4.8–5.6)
HCT VFR BLD AUTO: 45.4 % (ref 34–46.6)
HDLC SERPL-MCNC: 39 MG/DL (ref 40–60)
HGB BLD-MCNC: 15.3 G/DL (ref 12–15.9)
IMM GRANULOCYTES # BLD AUTO: 0.03 10*3/MM3 (ref 0–0.05)
IMM GRANULOCYTES NFR BLD AUTO: 0.3 % (ref 0–0.5)
LDLC SERPL CALC-MCNC: 131 MG/DL (ref 0–100)
LDLC/HDLC SERPL: 3.29 {RATIO}
LYMPHOCYTES # BLD AUTO: 1.87 10*3/MM3 (ref 0.7–3.1)
LYMPHOCYTES NFR BLD AUTO: 18 % (ref 19.6–45.3)
MCH RBC QN AUTO: 29.5 PG (ref 26.6–33)
MCHC RBC AUTO-ENTMCNC: 33.7 G/DL (ref 31.5–35.7)
MCV RBC AUTO: 87.6 FL (ref 79–97)
MONOCYTES # BLD AUTO: 0.42 10*3/MM3 (ref 0.1–0.9)
MONOCYTES NFR BLD AUTO: 4 % (ref 5–12)
NEUTROPHILS NFR BLD AUTO: 7.73 10*3/MM3 (ref 1.7–7)
NEUTROPHILS NFR BLD AUTO: 74.3 % (ref 42.7–76)
NRBC BLD AUTO-RTO: 0 /100 WBC (ref 0–0.2)
PLATELET # BLD AUTO: 324 10*3/MM3 (ref 140–450)
PMV BLD AUTO: 10.8 FL (ref 6–12)
POTASSIUM SERPL-SCNC: 4.1 MMOL/L (ref 3.5–5.2)
PROT SERPL-MCNC: 6.7 G/DL (ref 6–8.5)
RBC # BLD AUTO: 5.18 10*6/MM3 (ref 3.77–5.28)
SODIUM SERPL-SCNC: 139 MMOL/L (ref 136–145)
TRIGL SERPL-MCNC: 133 MG/DL (ref 0–150)
TSH SERPL DL<=0.05 MIU/L-ACNC: 1.63 UIU/ML (ref 0.27–4.2)
VLDLC SERPL-MCNC: 24 MG/DL (ref 5–40)
WBC NRBC COR # BLD: 10.4 10*3/MM3 (ref 3.4–10.8)

## 2022-09-15 PROCEDURE — 36415 COLL VENOUS BLD VENIPUNCTURE: CPT | Performed by: PHYSICIAN ASSISTANT

## 2022-09-15 PROCEDURE — 80061 LIPID PANEL: CPT | Performed by: PHYSICIAN ASSISTANT

## 2022-09-15 PROCEDURE — 85025 COMPLETE CBC W/AUTO DIFF WBC: CPT

## 2022-09-15 PROCEDURE — 80053 COMPREHEN METABOLIC PANEL: CPT | Performed by: PHYSICIAN ASSISTANT

## 2022-09-15 PROCEDURE — 84443 ASSAY THYROID STIM HORMONE: CPT

## 2022-09-15 PROCEDURE — 99213 OFFICE O/P EST LOW 20 MIN: CPT | Performed by: PHYSICIAN ASSISTANT

## 2022-09-15 PROCEDURE — 83036 HEMOGLOBIN GLYCOSYLATED A1C: CPT

## 2022-09-15 NOTE — PROGRESS NOTES
[facility]opa[facility] Note        Subjective     Pain of the Right Hand and Pain of the Left Hand      HPI    Chen Galaviz is a 32 y.o. female.  This is a very pleasant right-hand-dominant female coming today discuss her bilateral hand and wrist numbness and tingling and pain.  She reports right is worse than left.  She complains of swelling and stiffness in the morning and will swell.  This is been going on for 2 to 3 weeks.  She works as a  and is having to have someone help clean her table secondary to the pain.  She does not have a family history of inflammatory disease.  She is here for further evaluation,.    Past Medical History:   Diagnosis Date   • Abnormal liver enzymes 05/09/2016   • Allergic rhinitis    • Anxiety    • Arthritis     since age 21 in her back   • Arthritis of back N/a   • Asthma     seasonal   • Back pain    • Bacterial vaginosis 05/09/2016   • Bipolar disorder (HCC)     dx age 19   • Chest pain, pleuritic    • Common migraine with intractable migraine 05/09/2016   • CTS (carpal tunnel syndrome) 8/20/22   • Depression    • Dyslipidemia    • Dysmenorrhea 05/09/2016   • Dyspareunia, female 12/12/2018    Added automatically from request for surgery [id number]   • Endometriosis    • Fatigue    • Febrile seizure (HCC)     FEBRILE SEIZURE AT 3YO AND AT 16YO D/T WELBUTRIN   • Frequent UTI    • Gastroesophageal reflux disease 05/09/2016   • GERD (gastroesophageal reflux disease)    • Gestational hypertension     History of gestational hypertension. States blood pressure is sometimes high but does not take any medications.   • Headache    • Herpes zoster     denies this visit   • History of robot-assisted laparoscopic hysterectomy/BS 1/23/2019 02/11/2019   • IBS (irritable bowel syndrome)    • Itching    • Knee swelling N/A   • Lung mass 05/09/2016    Description: A.  CT scan of the chest 05/05/2014 reports that the previously seen nodule of the left lower lobe is no longer  visualized and the micronodular densities along the lateral aspect of the right lower lobe are stable.  There is no change in the appearance of the right axillary lymph nodes.   • Mental retardation     A. [place] to be related to hypoxia at birth due to placenta previa   • Migraine    • Migraine    • Multiple gestation    • Obesity    • Onychomycosis of toenail 2016    Impression: 2016 - Refer to podiatry.;    • Organic hypersomnia    • Ovarian cyst    • Pain, upper back    • PMS (premenstrual syndrome)    • Pulmonary nodule    • S/P 2013 cholecystectomy 2018   • Schizophrenia (HCC)    • Severe frontal headaches 2018   • Sinus tachycardia    • Wears glasses       Past Surgical History:   Procedure Laterality Date   •  SECTION     •  SECTION WITH TUBAL N/A 2017    Procedure:  SECTION REPEAT WITH TUBAL;  Surgeon: Fabien Blake MD;  Location:  [facility];  Service:    • CHOLECYSTECTOMY      age 21   • DIAGNOSTIC LAPAROSCOPY      X 4   • ENDOMETRIAL ABLATION     • PELVIC LAPAROSCOPY      age 21   • TONSILLECTOMY      age 23   • TOTAL LAPAROSCOPIC HYSTERECTOMY N/A 2019    Procedure: TOTAL LAPAROSCOPIC HYSTERECTOMY BILATERAL SALPINGECTOMY WITH DAVINCI ROBOT;  Surgeon: Amador Richey MD;  Location:  [facility];  Service: DaVinci   • WISDOM TOOTH EXTRACTION        Family History   Problem Relation Age of Onset   • Asthma Mother    • Hypertension Mother    • Breast cancer Mother    • Diabetes Mother    • Colon polyps Mother    • Cancer Mother    • Hypertension Father    • Asthma Brother    • Drug abuse Brother    • Melanoma Maternal Grandfather    • Cancer Maternal Grandfather    • Colon polyps Maternal Grandmother    • Diabetes Maternal Grandmother    • Cancer Maternal Grandmother    • Stomach cancer Paternal Grandmother    • Hypertension Paternal Grandmother    • Osteoporosis Paternal Grandmother    • Heart attack Paternal Grandfather 36   • Diabetes  Paternal Grandfather    • Hypertension Brother    • Ovarian cancer Neg Hx    • Uterine cancer Neg Hx      Social History     Socioeconomic History   • Marital status: Single   Tobacco Use   • Smoking status: Current Every Day Smoker     Packs/day: 1.00     Years: 10.00     Pack years: 10.00     Types: Cigarettes     Start date: 10/9/2002   • Smokeless tobacco: Never Used   • Tobacco comment: cutting back, encourage cessation   Vaping Use   • Vaping Use: Never used   Substance and Sexual Activity   • Alcohol use: Yes     Alcohol/week: 1.0 standard drink     Types: 1 Drinks containing 0.5 oz of alcohol per week   • Drug use: Not Currently     Frequency: 5.0 times per week     Types: Cocaine(coke), Methamphetamines   • Sexual activity: Not Currently     Partners: Male     Birth control/protection: None      Current Outpatient Medications on File Prior to Visit   Medication Sig Dispense Refill   • acetaminophen (TYLENOL) 325 MG tablet Take 2 tablets by mouth Every 6 (Six) Hours As Needed for Mild Pain .     • albuterol sulfate  (90 Base) MCG/ACT inhaler Inhale 2 puffs Every 6 (Six) Hours As Needed for Wheezing. 18 g 11   • cetirizine (zyrTEC) 10 MG tablet Take 10 mg by mouth Daily.     • dicyclomine (Bentyl) 10 MG capsule Take 2 capsules by mouth 3 (Three) Times a Day As Needed (cramping). 60 capsule 5   • FLUoxetine (PROzac) 10 MG capsule Take 1 capsule by mouth Daily. 30 capsule 2   • fluticasone (Flovent HFA) 220 MCG/ACT inhaler Inhale 1 puff 2 (Two) Times a Day. 12 g 11   • lamoTRIgine (LaMICtal) 200 MG tablet TAKE 1 TABLET BY MOUTH EVERY DAY 30 tablet 1   • lisinopril (PRINIVIL,ZESTRIL) 40 MG tablet Take 1 tablet by mouth Daily. 90 tablet 1   • montelukast (SINGULAIR) 10 MG tablet Take 1 tablet by mouth every night at bedtime. 90 tablet 1   • QUEtiapine (SEROquel) 100 MG tablet Take 2 tablets by mouth Every Night. 60 tablet 2   • rOPINIRole (REQUIP) 0.25 MG tablet Take 1 tablet by mouth Every Night. Take 1  "hour before bedtime. 90 tablet 1     No current facility-administered medications on file prior to visit.      Allergies   Allergen Reactions   • Bupropion Other (See Comments) and Provider Review Needed     Seizure / wellbutrin    • Naproxen Rash   • Nsaids Rash   • Sulfa Antibiotics Rash          Review of Systems   Constitutional: Negative.    HENT: Negative.    Eyes: Negative.    Respiratory: Negative.    Cardiovascular: Negative.    Gastrointestinal: Negative.    Endocrine: Negative.    Genitourinary: Negative.    Musculoskeletal: Positive for arthralgias.   Skin: Negative.    Allergic/Immunologic: Negative.    Neurological: Negative.    Hematological: Negative.    Psychiatric/Behavioral: Negative.         I reviewed the patient's chief complaint, history of present illness, review of systems, past medical history, surgical history, family history, social history, medications and allergy list.        Objective      Physical Exam  /70   Ht 160 cm (62.99\")   Wt 78.5 kg (173 lb)   LMP 01/19/2019 Comment: Never had a mammogram due to age.  BMI 30.65 kg/m²     Body mass index is 30.65 kg/m².    General  Mental Status - alert  General Appearance - cooperative, well groomed, not in acute distress  Orientation - Oriented X3  Build & Nutrition - well developed and well nourished  Posture - normal posture  Gait - normal       Ortho Exam  Peripheral Vascular   Bilateral Upper Extremity    No cyanotic nail beds    Pink nail beds and rapid capillary refill   Palpation    Radial Pulse - Bilaterally normal    Neurologic   Sensory: Light touch intact- Right and left hand    Left Upper Extremity    Left wrist extensors: 5/5    Left wrist flexors: 5/5    Left intrinsics: 5/5   Right Upper Extremity    Right wrist extensors: 5/5    Right wrist flexors: 5/5    Right intrinsics: 5/5    Musculoskeletal   Left Elbow    Forearm supination: AROM - 90 degrees    Forearm pronation: AROM - 90 degrees   Right Elbow    Forearm " supination: AROM - 90 degrees    Forearm pronation: AROM - 90 degrees     Inspection and Palpation   Right Wrist      Tenderness - none    Swelling - none    Crepitus - none    Muscle tone - no atrophy   Left Wrist    Tenderness - none    Swelling - none    Crepitus - none    Muscle tone - no atrophy     ROJM:   Left Wrist    Flexion: AROM - 90 degrees    Extension: AROM - 90 degrees   Right Wrist    Flexion: AROM - 90 degrees    Extension: AROM - 90 degrees     Deformities, Malalignments, Discrepancies    None     Functional Testing   Right Wrist    Tinel's Sign positive at wrist, negative at elbow    Phalen's Sign negative    Carpal Compression Test negative   Left Wrist    Tinel's Sign negative    Phalen's Sign negative    Carpal Compression Test negative       Strength and Tone    Right  strength: good    Left  strength: good     Hand Exam:    Full range of motion of the thumb MCPJ and IPJ bilaterally    Full range of motion of the index finger MCPJ, PIPJ and DIPJ  bilaterally    Full range of motion of the middle finger MCPJ, PIPJ and DIPJ bilaterally    Full range of motion of the ring finger MCPJ, PIPJ and DIPJ bilaterally    Full range of motion of the small finger MCPJ, PIPJ and DIPJ bialterally    FDS, FDP and extensor tendons are intact in the index, middle, ring and small fingers bilaterally    FPJ and extensor tendons are intact in the thumb.    No palpable triggering          Assessment    Assessment:  1. Bilateral hand pain    2. Bilateral hand numbness          Plan:  1. Recommend over-the-counter medication as needed for discomfort  2.  bilateral hand pain and numbness, right worse than left.  I reviewed today's x-rays clinical findings past and current treatment the patient.  X-rays are negative for any acute or chronic bony findings.  I discussed further treatment including trying bracing at night to see if this resolves her symptoms.  Neck step would be to get an EMG.  I also offered to  do both today.  Patient will like to try with braces.  We will get her to cock up wrist splints and I will see her back in 6 weeks or sooner if needed.    History, diagnosis and treatment plan discussed with Dr. Johnson.          Sumaya Salcedo PA-C  09/19/22  18:15 EDT

## 2022-09-16 ENCOUNTER — LAB (OUTPATIENT)
Dept: LAB | Facility: HOSPITAL | Age: 32
End: 2022-09-16

## 2022-09-16 ENCOUNTER — OFFICE VISIT (OUTPATIENT)
Dept: INTERNAL MEDICINE | Facility: CLINIC | Age: 32
End: 2022-09-16

## 2022-09-16 VITALS
HEIGHT: 63 IN | SYSTOLIC BLOOD PRESSURE: 120 MMHG | HEART RATE: 107 BPM | WEIGHT: 174.6 LBS | OXYGEN SATURATION: 97 % | DIASTOLIC BLOOD PRESSURE: 80 MMHG | BODY MASS INDEX: 30.94 KG/M2

## 2022-09-16 DIAGNOSIS — I10 PRIMARY HYPERTENSION: ICD-10-CM

## 2022-09-16 DIAGNOSIS — N39.0 RECURRENT UTI: Primary | ICD-10-CM

## 2022-09-16 LAB
BILIRUB BLD-MCNC: NEGATIVE MG/DL
CLARITY, POC: CLEAR
COLOR UR: YELLOW
EXPIRATION DATE: ABNORMAL
GLUCOSE UR STRIP-MCNC: NEGATIVE MG/DL
KETONES UR QL: NEGATIVE
LEUKOCYTE EST, POC: NEGATIVE
Lab: ABNORMAL
NITRITE UR-MCNC: NEGATIVE MG/ML
PH UR: 6 [PH] (ref 5–8)
PROT UR STRIP-MCNC: ABNORMAL MG/DL
RBC # UR STRIP: NEGATIVE /UL
SP GR UR: 1.03 (ref 1–1.03)
UROBILINOGEN UR QL: NORMAL

## 2022-09-16 PROCEDURE — 99213 OFFICE O/P EST LOW 20 MIN: CPT | Performed by: PHYSICIAN ASSISTANT

## 2022-09-16 PROCEDURE — 87086 URINE CULTURE/COLONY COUNT: CPT

## 2022-09-16 PROCEDURE — 81003 URINALYSIS AUTO W/O SCOPE: CPT | Performed by: PHYSICIAN ASSISTANT

## 2022-09-16 RX ORDER — NITROFURANTOIN 25; 75 MG/1; MG/1
100 CAPSULE ORAL 2 TIMES DAILY
Qty: 14 CAPSULE | Refills: 0 | Status: SHIPPED | OUTPATIENT
Start: 2022-09-16 | End: 2022-09-23

## 2022-09-16 NOTE — PROGRESS NOTES
[facility]  [address]  [facility] [address]  Dept: [phone]  Dept Fax: [phone]  Loc: [phone]  Loc Fax: [phone]    Chen GALLOWAY Guillaume  1990    Follow Up Office Visit Note    History of Present Illness:  Patient is a 32-year-old female in today to follow-up for recurrent UTI and hypertension.  On lisinopril 40 mg daily.  Taking as directed without problems or side effects.  Blood pressure running normal now.    Patient feels like she has yet another UTI.  Needing to follow-up and see when referral to urology is.  Patient has not received an appointment yet.      The following portions of the patient's history were reviewed and updated as appropriate: allergies, current medications, past family history, past medical history, past social history, past surgical history, and problem list.    Medications:    Current Outpatient Medications:   •  acetaminophen (TYLENOL) 325 MG tablet, Take 2 tablets by mouth Every 6 (Six) Hours As Needed for Mild Pain ., Disp: , Rfl:   •  albuterol sulfate  (90 Base) MCG/ACT inhaler, Inhale 2 puffs Every 6 (Six) Hours As Needed for Wheezing., Disp: 18 g, Rfl: 11  •  cetirizine (zyrTEC) 10 MG tablet, Take 10 mg by mouth Daily., Disp: , Rfl:   •  dicyclomine (Bentyl) 10 MG capsule, Take 2 capsules by mouth 3 (Three) Times a Day As Needed (cramping)., Disp: 60 capsule, Rfl: 5  •  FLUoxetine (PROzac) 10 MG capsule, Take 1 capsule by mouth Daily., Disp: 30 capsule, Rfl: 2  •  fluticasone (Flovent HFA) 220 MCG/ACT inhaler, Inhale 1 puff 2 (Two) Times a Day., Disp: 12 g, Rfl: 11  •  lamoTRIgine (LaMICtal) 200 MG tablet, TAKE 1 TABLET BY MOUTH EVERY DAY, Disp: 30 tablet, Rfl: 1  •  lisinopril (PRINIVIL,ZESTRIL) 40 MG tablet, Take 1 tablet by mouth Daily., Disp: 90 tablet, Rfl: 1  •  montelukast (SINGULAIR) 10 MG tablet, Take 1 tablet by mouth every night at bedtime., Disp: 90 tablet, Rfl: 1  •  QUEtiapine  (SEROquel) 100 MG tablet, Take 2 tablets by mouth Every Night., Disp: 60 tablet, Rfl: 2  •  rOPINIRole (REQUIP) 0.25 MG tablet, Take 1 tablet by mouth Every Night. Take 1 hour before bedtime., Disp: 90 tablet, Rfl: 1  •  nitrofurantoin, macrocrystal-monohydrate, (Macrobid) 100 MG capsule, Take 1 capsule by mouth 2 (Two) Times a Day for 7 days., Disp: 14 capsule, Rfl: 0    Subjective  Allergies   Allergen Reactions   • Bupropion Other (See Comments) and Provider Review Needed     Seizure / wellbutrin    • Naproxen Rash   • Nsaids Rash   • Sulfa Antibiotics Rash        Past Medical History:   Diagnosis Date   • Abnormal liver enzymes 05/09/2016   • Allergic rhinitis    • Anxiety    • Arthritis     since age 21 in her back   • Arthritis of back N/a   • Asthma     seasonal   • Back pain    • Bacterial vaginosis 05/09/2016   • Bipolar disorder (HCC)     dx age 19   • Chest pain, pleuritic    • Common migraine with intractable migraine 05/09/2016   • CTS (carpal tunnel syndrome) 8/20/22   • Depression    • Dyslipidemia    • Dysmenorrhea 05/09/2016   • Dyspareunia, female 12/12/2018    Added automatically from request for surgery [id number]   • Endometriosis    • Fatigue    • Febrile seizure (HCC)     FEBRILE SEIZURE AT 3YO AND AT 16YO D/T WELBUTRIN   • Frequent UTI    • Gastroesophageal reflux disease 05/09/2016   • GERD (gastroesophageal reflux disease)    • Gestational hypertension     History of gestational hypertension. States blood pressure is sometimes high but does not take any medications.   • Headache    • Herpes zoster     denies this visit   • History of robot-assisted laparoscopic hysterectomy/BS 1/23/2019 02/11/2019   • IBS (irritable bowel syndrome)    • Itching    • Knee swelling N/A   • Lung mass 05/09/2016    Description: A.  CT scan of the chest 05/05/2014 reports that the previously seen nodule of the left lower lobe is no longer visualized and the micronodular densities along the lateral aspect of the  right lower lobe are stable.  There is no change in the appearance of the right axillary lymph nodes.   • Mental retardation     A. Valera to be related to hypoxia at birth due to placenta previa   • Migraine    • Migraine    • Multiple gestation    • Obesity    • Onychomycosis of toenail 2016    Impression: 2016 - Refer to podiatry.;    • Organic hypersomnia    • Ovarian cyst    • Pain, upper back    • PMS (premenstrual syndrome)    • Pulmonary nodule    • S/P 2013 cholecystectomy 2018   • Schizophrenia (HCC)    • Severe frontal headaches 2018   • Sinus tachycardia    • Wears glasses        Past Surgical History:   Procedure Laterality Date   •  SECTION     •  SECTION WITH TUBAL N/A 2017    Procedure:  SECTION REPEAT WITH TUBAL;  Surgeon: Fabien Blake MD;  Location:  [facility];  Service:    • CHOLECYSTECTOMY      age 21   • DIAGNOSTIC LAPAROSCOPY      X 4   • ENDOMETRIAL ABLATION     • PELVIC LAPAROSCOPY      age 21   • TONSILLECTOMY      age 23   • TOTAL LAPAROSCOPIC HYSTERECTOMY N/A 2019    Procedure: TOTAL LAPAROSCOPIC HYSTERECTOMY BILATERAL SALPINGECTOMY WITH DAVINCI ROBOT;  Surgeon: Amador Richey MD;  Location:  [facility] OR;  Service: DaVinci   • WISDOM TOOTH EXTRACTION         Family History   Problem Relation Age of Onset   • Asthma Mother    • Hypertension Mother    • Breast cancer Mother    • Diabetes Mother    • Colon polyps Mother    • Cancer Mother    • Hypertension Father    • Asthma Brother    • Drug abuse Brother    • Melanoma Maternal Grandfather    • Cancer Maternal Grandfather    • Colon polyps Maternal Grandmother    • Diabetes Maternal Grandmother    • Cancer Maternal Grandmother    • Stomach cancer Paternal Grandmother    • Hypertension Paternal Grandmother    • Osteoporosis Paternal Grandmother    • Heart attack Paternal Grandfather 36   • Diabetes Paternal Grandfather    • Hypertension Brother    • Ovarian cancer Neg Hx   "  • Uterine cancer Neg Hx         Social History     Socioeconomic History   • Marital status: Single   Tobacco Use   • Smoking status: Current Every Day Smoker     Packs/day: 1.00     Years: 10.00     Pack years: 10.00     Types: Cigarettes     Start date: 10/9/2002   • Smokeless tobacco: Never Used   • Tobacco comment: cutting back, encourage cessation   Vaping Use   • Vaping Use: Never used   Substance and Sexual Activity   • Alcohol use: Yes     Alcohol/week: 1.0 standard drink     Types: 1 Drinks containing 0.5 oz of alcohol per week   • Drug use: Not Currently     Frequency: 5.0 times per week     Types: Cocaine(coke), Methamphetamines   • Sexual activity: Not Currently     Partners: Male     Birth control/protection: None       Review of Systems   Constitutional: Positive for fatigue. Negative for activity change, chills, fever and unexpected weight change.   HENT: Negative for congestion, ear pain, postnasal drip, sinus pressure and sore throat.    Eyes: Negative for pain, discharge and redness.   Respiratory: Negative for cough, shortness of breath and wheezing.    Cardiovascular: Negative for chest pain, palpitations and leg swelling.   Gastrointestinal: Negative for diarrhea, nausea and vomiting.   Endocrine: Negative for cold intolerance and heat intolerance.   Genitourinary: Positive for dysuria, frequency and urgency. Negative for decreased urine volume.   Musculoskeletal: Negative for arthralgias and myalgias.   Skin: Negative for rash and wound.   Neurological: Negative for dizziness, light-headedness and headaches.   Hematological: Does not bruise/bleed easily.   Psychiatric/Behavioral: Negative for confusion, dysphoric mood and sleep disturbance. The patient is not nervous/anxious.          Objective  Vitals:    09/16/22 1506   BP: 120/80   Pulse: 107   SpO2: 97%   Weight: 79.2 kg (174 lb 9.6 oz)   Height: 160 cm (62.99\")     Body mass index is 30.94 kg/m².     Physical Exam  Physical Exam  Vitals " and nursing note reviewed.   Constitutional:       General: She is not in acute distress.     Appearance: She is not ill-appearing.   HENT:      Head: Normocephalic.      Right Ear: Tympanic membrane, ear canal and external ear normal. There is no impacted cerumen.      Left Ear: Tympanic membrane, ear canal and external ear normal. There is no impacted cerumen.      Nose: No congestion or rhinorrhea.      Mouth/Throat:      Mouth: Mucous membranes are moist.      Pharynx: Oropharynx is clear. No oropharyngeal exudate or posterior oropharyngeal erythema.   Eyes:      General:         Right eye: No discharge.         Left eye: No discharge.      Extraocular Movements: Extraocular movements intact.      Conjunctiva/sclera: Conjunctivae normal.      Pupils: Pupils are equal, round, and reactive to light.   Cardiovascular:      Rate and Rhythm: Normal rate and regular rhythm.      Heart sounds: Normal heart sounds. No murmur heard.    No friction rub. No gallop.   Pulmonary:      Effort: Pulmonary effort is normal. No respiratory distress.      Breath sounds: Normal breath sounds. No wheezing.   Abdominal:      General: Bowel sounds are normal. There is no distension.      Palpations: Abdomen is soft. There is no mass.      Tenderness: There is no abdominal tenderness.   Musculoskeletal:         General: No swelling. Normal range of motion.      Cervical back: Normal range of motion. No tenderness.      Right lower leg: No edema.      Left lower leg: No edema.   Lymphadenopathy:      Cervical: No cervical adenopathy.   Skin:     Findings: No bruising, erythema or rash.   Neurological:      Mental Status: She is oriented to person, place, and time.      Gait: Gait normal.   Psychiatric:         Mood and Affect: Mood normal.         Behavior: Behavior normal.         Thought Content: Thought content normal.         Judgment: Judgment normal.         Diagnostic Data  Procedures    Assessment  Diagnoses and all orders for  this visit:    1. Primary hypertension (Primary)    2. Recurrent UTI    Other orders  -     nitrofurantoin, macrocrystal-monohydrate, (Macrobid) 100 MG capsule; Take 1 capsule by mouth 2 (Two) Times a Day for 7 days.  Dispense: 14 capsule; Refill: 0        Plan:    1. Primary hypertension (Primary)- well controlled on lisinopril 40 mg daily.  Keep same.  Continue to monitor.    2. Recurrent UTI- UA in office showed some protein.  Treat with Macrobid preemptively.  Obtain culture and sensitivity.      Return in about 4 weeks (around 10/14/2022).    Charles Ferrera PA-C  09/16/2022  Answers for HPI/ROS submitted by the patient on 9/14/2022  Please describe your symptoms.: Follow up on u.t.i  Have you had these symptoms before?: Yes  How long have you been having these symptoms?: 1-4 days  What is the primary reason for your visit?: Other

## 2022-09-17 LAB — BACTERIA SPEC AEROBE CULT: NO GROWTH

## 2022-09-19 ENCOUNTER — TELEPHONE (OUTPATIENT)
Dept: INTERNAL MEDICINE | Facility: CLINIC | Age: 32
End: 2022-09-19

## 2022-09-19 NOTE — TELEPHONE ENCOUNTER
----- Message from Charles Ferrera PA-C sent at 9/19/2022 11:18 AM EDT -----  Cholesterol worse, please advise on diet and exercise.  If patient cannot keep cholesterol down we will need to start her on cholesterol medicine.  Thank you.

## 2022-09-30 DIAGNOSIS — F31.60 BIPOLAR AFFECTIVE DISORDER, MIXED: ICD-10-CM

## 2022-09-30 RX ORDER — FLUOXETINE 10 MG/1
10 CAPSULE ORAL DAILY
Qty: 30 CAPSULE | Refills: 2 | Status: SHIPPED | OUTPATIENT
Start: 2022-09-30 | End: 2022-10-04 | Stop reason: SDUPTHER

## 2022-09-30 RX ORDER — QUETIAPINE FUMARATE 100 MG/1
200 TABLET, FILM COATED ORAL NIGHTLY
Qty: 60 TABLET | Refills: 2 | Status: SHIPPED | OUTPATIENT
Start: 2022-09-30 | End: 2022-10-04 | Stop reason: ALTCHOICE

## 2022-10-04 ENCOUNTER — TELEMEDICINE (OUTPATIENT)
Dept: PSYCHIATRY | Facility: CLINIC | Age: 32
End: 2022-10-04

## 2022-10-04 DIAGNOSIS — F31.32 BIPOLAR AFFECTIVE DISORDER, CURRENTLY DEPRESSED, MODERATE: Primary | ICD-10-CM

## 2022-10-04 DIAGNOSIS — F41.1 GENERALIZED ANXIETY DISORDER: ICD-10-CM

## 2022-10-04 DIAGNOSIS — F51.05 INSOMNIA DUE TO MENTAL CONDITION: ICD-10-CM

## 2022-10-04 PROBLEM — F31.9 BIPOLAR DISORDER (HCC): Status: ACTIVE | Noted: 2022-02-02

## 2022-10-04 PROCEDURE — 90792 PSYCH DIAG EVAL W/MED SRVCS: CPT

## 2022-10-04 RX ORDER — LAMOTRIGINE 200 MG/1
200 TABLET ORAL DAILY
Qty: 30 TABLET | Refills: 0 | Status: SHIPPED | OUTPATIENT
Start: 2022-10-04 | End: 2022-11-10 | Stop reason: SDUPTHER

## 2022-10-04 RX ORDER — FLUOXETINE HYDROCHLORIDE 20 MG/1
20 CAPSULE ORAL DAILY
Qty: 30 CAPSULE | Refills: 0 | Status: SHIPPED | OUTPATIENT
Start: 2022-10-04 | End: 2022-11-10 | Stop reason: SDUPTHER

## 2022-10-04 RX ORDER — MIRTAZAPINE 15 MG/1
15 TABLET, FILM COATED ORAL NIGHTLY
Qty: 30 TABLET | Refills: 0 | Status: SHIPPED | OUTPATIENT
Start: 2022-10-04 | End: 2022-11-10 | Stop reason: SDUPTHER

## 2022-10-04 NOTE — PROGRESS NOTES
This provider is located at [place], [place]. The Patient is seen remotely using Video. Patient is being seen via telehealth and confirm that they are in a secure environment for this session. Patient is located in [place], [place] in her car. The patient's condition being diagnosed/treated is appropriate for telemedicine. Provider identified as Maribell Hernandez as well as credentials APRN MSN PMHNP-BC.   The client/patient gave consent to be seen remotely, and when consent is given they understand that the consent allows for patient identifiable information to be sent to a third party as needed.  They may refuse to be seen remotely at any time. The electronic data is encrypted and password protected, and the patient has been advised of the potential risks to privacy not withstanding such measures.    Subjective     Chen Galaviz is a 32 y.o. female who presents today for initial evaluation     Chief Complaint: Bipolar disorder and anxiety    History of Present Illness: This the first encounter for this APRN with the patient.  Patient is a referral from her primary care physician for bipolar disorder and anxiety.  Patient states that she has been on medication since she was 18 years old.  States at that time she was experiencing a lot of depression.  States she wanted to sleep all the time.  She states she had been maintained on Lamictal, Prozac for a period of time.  States she was placed in [facility] for 3 months on 7 wanton endangerment charges concerning her children.  States at that time she was using drugs.  States she has been sober since August 3, 2021.  She states she has lost custody of her kids and her mother is raising them now.  She is hopeful to reunite with them in the future, but now has no contact with them.  She states after she got out of [facility] she had trouble finding ongoing care.  States she had been going to [facility] prior to getting arrested, but was discharged from that clinic.   "States she had a supply of medications and she maintained on those until late August 2022.  She states her physician just started back last week on her Prozac.  States she went about 3 weeks without any medication for depression and anxiety and noticed a recurrence of her symptoms.  She describes her depression is sleeping too much, isolating herself, crying spells, decreased energy, and decreased interest in doing things.  She denies any suicidal or homicidal ideation.  Currently rates her depression as 6-7 on a 1-10 scale with 10 being the worst.  Patient endorses also having a history of hypomanic type episodes.  She states \"I could not slow down\".  States these would last 3 to 4 days at a time.  She would have a decreased need for sleep, increased sex drive, talk fast, have racing thoughts, and more productive during these episodes.  She states the last episode of this that she has had is in June 2022.  States in the past she had average having these at least once per month.  She states she has maintained on Lamictal 200 mg daily for her bipolar disorder.  States she has never run out of that medication.  She denies any history of any auditory or visual hallucinations.  Denies any history of paranoia.  Patient states she also suffers with anxiety.  Currently rates her anxiety as 6 on a 1-10 scale with 10 being the worst.  She states her symptoms were well controlled until she ran out of Prozac.  States she has a lot of worry and over thinking.  States she has had panic attacks in the past.  States she does not like to be around crowds or social situations.  States if she is in those situations that she will get sweaty, her heart rate will increase, and she will feel flush.  Patient states prior to running out of the Prozac she would have rated her depression a 3 on a 1-10 scale with 10 being the worst, and her anxiety at 2-3 on a 1-10 scale with 10 being the worst.  She is requesting to be placed back on a " larger dose of Prozac.  Patient states she also takes Seroquel at night, but still wakes up during the night.  Appetite is good.  Patient denies ever having any increase in manic type episodes associated with antidepressant medication.    The following portions of the patient's history were reviewed and updated as appropriate: allergies, current medications, past family history, past medical history, past social history, past surgical history and problem list.    Past Psychiatric History: Patient has been on medication since she was 18 years old.  She had 2 inpatient psychiatric hospitalizations between age 16 and 17 at [facility].  She has a history of 2 suicide attempts via cutting her wrist.  She had adult admissions at [facility] and [facility].     Family Psychiatric History: Father was an alcoholic.  Brother has alcohol and heroin issues.  This brother is also schizophrenic and has PTSD.  Mother has anxiety and depression.  No suicides among first-degree relatives.    Substance Use History: Patient states she has been sober since August 3, 2021.  She does smoke half pack cigarettes daily.    Past Medical History:  Past Medical History:   Diagnosis Date   • Abnormal liver enzymes 05/09/2016   • Allergic rhinitis    • Anxiety    • Arthritis     since age 21 in her back   • Arthritis of back N/a   • Asthma     seasonal   • Back pain    • Bacterial vaginosis 05/09/2016   • Bipolar disorder (HCC)     dx age 19   • Chest pain, pleuritic    • Common migraine with intractable migraine 05/09/2016   • CTS (carpal tunnel syndrome) 8/20/22   • Depression    • Dyslipidemia    • Dysmenorrhea 05/09/2016   • Dyspareunia, female 12/12/2018    Added automatically from request for surgery [id number]   • Endometriosis    • Fatigue    • Febrile seizure (HCC)     FEBRILE SEIZURE AT 3YO AND AT 14YO D/T WELBUTRIN   • Frequent UTI    • Gastroesophageal reflux disease 05/09/2016   • GERD (gastroesophageal  reflux disease)    • Gestational hypertension     History of gestational hypertension. States blood pressure is sometimes high but does not take any medications.   • Headache    • Herpes zoster     denies this visit   • History of robot-assisted laparoscopic hysterectomy/BS 1/23/2019 02/11/2019   • IBS (irritable bowel syndrome)    • Itching    • Knee swelling N/A   • Lung mass 05/09/2016    Description: A.  CT scan of the chest 05/05/2014 reports that the previously seen nodule of the left lower lobe is no longer visualized and the micronodular densities along the lateral aspect of the right lower lobe are stable.  There is no change in the appearance of the right axillary lymph nodes.   • Mental retardation     A. [place] to be related to hypoxia at birth due to placenta previa   • Migraine    • Migraine    • Multiple gestation    • Obesity    • Onychomycosis of toenail 05/09/2016    Impression: 02/12/2016 - Refer to podiatry.;    • Organic hypersomnia    • Ovarian cyst    • Pain, upper back    • PMS (premenstrual syndrome)    • Pulmonary nodule    • S/P 2013 cholecystectomy 04/11/2018   • Schizophrenia (HCC)    • Severe frontal headaches 08/30/2018   • Sinus tachycardia    • Wears glasses        Social History: Patient was born and raised in [place], [place].  She was raised by her mother and father.  She has 1 brother.  She denies any history of abuse.  She has ninth grade educated and literate.  She works at the [employer] as a .  She is single.  She has 3 children, twins age 6 and her youngest is 5 years old.  She lost custody of her children on July 2021 due to 7 wanton endangerment charges.  Patient's mother has custody of the children at this time and patient has no contact with them.  She is hopeful to apply to have her parental rights reinstated in the future.  She has spent 3 months in correction on the charges mentioned.  Hobbies include fishing and working.  Social History     Socioeconomic History   •  Marital status: Single   Tobacco Use   • Smoking status: Current Every Day Smoker     Packs/day: 1.00     Years: 10.00     Pack years: 10.00     Types: Cigarettes     Start date: 10/9/2002   • Smokeless tobacco: Never Used   • Tobacco comment: cutting back, encourage cessation   Vaping Use   • Vaping Use: Never used   Substance and Sexual Activity   • Alcohol use: Yes     Alcohol/week: 1.0 standard drink     Types: 1 Drinks containing 0.5 oz of alcohol per week   • Drug use: Not Currently     Frequency: 5.0 times per week     Types: Cocaine(coke), Methamphetamines   • Sexual activity: Not Currently     Partners: Male     Birth control/protection: None       Family History:  Family History   Problem Relation Age of Onset   • Asthma Mother    • Hypertension Mother    • Breast cancer Mother    • Diabetes Mother    • Colon polyps Mother    • Cancer Mother    • Hypertension Father    • Asthma Brother    • Drug abuse Brother    • Melanoma Maternal Grandfather    • Cancer Maternal Grandfather    • Colon polyps Maternal Grandmother    • Diabetes Maternal Grandmother    • Cancer Maternal Grandmother    • Stomach cancer Paternal Grandmother    • Hypertension Paternal Grandmother    • Osteoporosis Paternal Grandmother    • Heart attack Paternal Grandfather 36   • Diabetes Paternal Grandfather    • Hypertension Brother    • Ovarian cancer Neg Hx    • Uterine cancer Neg Hx        Past Surgical History:  Past Surgical History:   Procedure Laterality Date   •  SECTION     •  SECTION WITH TUBAL N/A 2017    Procedure:  SECTION REPEAT WITH TUBAL;  Surgeon: Fabien Blake MD;  Location: [facility] DELIV[facility];  Service:    • CHOLECYSTECTOMY      age 21   • DIAGNOSTIC LAPAROSCOPY      X 4   • ENDOMETRIAL ABLATION     • PELVIC LAPAROSCOPY      age 21   • TONSILLECTOMY      age 23   • TOTAL LAPAROSCOPIC HYSTERECTOMY N/A 2019    Procedure: TOTAL LAPAROSCOPIC HYSTERECTOMY BILATERAL SALPINGECTOMY  WITH DAVINCI ROBOT;  Surgeon: Amador Richey MD;  Location: [facility];  Service: DaVinci   • WISDOM TOOTH EXTRACTION         Problem List:  Patient Active Problem List   Diagnosis   • Atopic rhinitis   • Anxiety   • Back pain   • Hyperlipidemia   • Hypertension   • Mental retardation   • Family history of breast cancer in mother/DCIS - 30s/recurrence in 50s   • Tobacco use   • Moderate persistent asthma without complication   • Restless legs   • Solitary lung nodule - needs repeat CT 4/2022   • Bipolar affective disorder, currently depressed, moderate ([facility])   • Abnormal mammogram of left breast   • Submandibular lymphadenopathy   • Bipolar affective disorder, mixed (HCC)   • Insomnia due to other mental disorder   • Amphetamine-type substance use disorder, moderate, in early remission ([facility])   • Pelvic pain   • Endometriosis       Allergy:   Allergies   Allergen Reactions   • Bupropion Other (See Comments) and Provider Review Needed     Seizure / wellbutrin    • Naproxen Rash   • Nsaids Rash   • Sulfa Antibiotics Rash        Current Medications:   Current Outpatient Medications   Medication Sig Dispense Refill   • acetaminophen (TYLENOL) 325 MG tablet Take 2 tablets by mouth Every 6 (Six) Hours As Needed for Mild Pain .     • albuterol sulfate  (90 Base) MCG/ACT inhaler Inhale 2 puffs Every 6 (Six) Hours As Needed for Wheezing. 18 g 11   • cetirizine (zyrTEC) 10 MG tablet Take 10 mg by mouth Daily.     • dicyclomine (Bentyl) 10 MG capsule Take 2 capsules by mouth 3 (Three) Times a Day As Needed (cramping). 60 capsule 5   • FLUoxetine (PROzac) 20 MG capsule Take 1 capsule by mouth Daily. 30 capsule 0   • fluticasone (Flovent HFA) 220 MCG/ACT inhaler Inhale 1 puff 2 (Two) Times a Day. 12 g 11   • lamoTRIgine (LaMICtal) 200 MG tablet Take 1 tablet by mouth Daily. 30 tablet 0   • lisinopril (PRINIVIL,ZESTRIL) 40 MG tablet Take 1 tablet by mouth Daily. 90 tablet 1   • mirtazapine (Remeron) 15 MG tablet Take 1 tablet  by mouth Every Night for 30 days. 30 tablet 0   • montelukast (SINGULAIR) 10 MG tablet Take 1 tablet by mouth every night at bedtime. 90 tablet 1   • rOPINIRole (REQUIP) 0.25 MG tablet Take 1 tablet by mouth Every Night. Take 1 hour before bedtime. 90 tablet 1     No current facility-administered medications for this visit.       Review of Symptoms:    Review of Systems   Constitutional: Negative.    HENT: Negative.    Eyes: Negative.    Respiratory:        Asthma   Cardiovascular:        Hypertension   Gastrointestinal: Positive for GERD.   Endocrine: Negative.    Genitourinary: Negative.    Musculoskeletal: Negative.    Skin: Negative.    Allergic/Immunologic: Negative.    Neurological: Negative.    Hematological: Negative.    Psychiatric/Behavioral: Positive for sleep disturbance and depressed mood. The patient is nervous/anxious.          Physical Exam:   Last menstrual period 01/19/2019, not currently breastfeeding.    Appearance: Normal  Gait, Station, Strength: Within normal limits    Mental Status Exam:   Hygiene:   good  Cooperation:  Cooperative  Eye Contact:  Good  Psychomotor Behavior:  Appropriate  Affect:  Full range  Mood: depressed and anxious  Hopelessness: Denies  Speech:  Normal  Thought Process:  Goal directed  Thought Content:  Normal  Suicidal:  None  Homicidal:  None  Hallucinations:  None  Delusion:  None  Memory:  Intact  Orientation:  Person, Place, Time and Situation  Reliability:  good  Insight:  Good  Judgement:  Good  Impulse Control:  Good    PHQ-9 Depression Screening  Little interest or pleasure in doing things?     Feeling down, depressed, or hopeless?     Trouble falling or staying asleep, or sleeping too much?     Feeling tired or having little energy?     Poor appetite or overeating?     Feeling bad about yourself - or that you are a failure or have let yourself or your family down?     Trouble concentrating on things, such as reading the newspaper or watching television?      Moving or speaking so slowly that other people could have noticed? Or the opposite - being so fidgety or restless that you have been moving around a lot more than usual?     Thoughts that you would be better off dead, or of hurting yourself in some way?     PHQ-9 Total Score     If you checked off any problems, how difficult have these problems made it for you to do your work, take care of things at home, or get along with other people?          PHQ-9 Total Score:       ERENDIRA 7 anxiety screening tool that patient filled out virtually reviewed by this APRN at today's encounter.    PROMIS scale screening tool that patient filled out virtually reviewed by this APRN at today's encounter.    [facility] request number [id number] reviewed by this APRN at today's encounter.    Previous Provider notes and available records reviewed by this APRN today.     Lab Results:   Office Visit on 09/16/2022   Component Date Value Ref Range Status   • Color 09/16/2022 Yellow  Yellow, Straw, Dark Yellow, Carmita Final   • Clarity, UA 09/16/2022 Clear  Clear Final   • Specific Gravity  09/16/2022 1.030  1.005 - 1.030 Final   • pH, Urine 09/16/2022 6.0  5.0 - 8.0 Final   • Leukocytes 09/16/2022 Negative  Negative Final   • Nitrite, UA 09/16/2022 Negative  Negative Final   • Protein, POC 09/16/2022 Trace (A) Negative mg/dL Final   • Glucose, UA 09/16/2022 Negative  Negative mg/dL Final   • Ketones, UA 09/16/2022 Negative  Negative Final   • Urobilinogen, UA 09/16/2022 Normal  Normal, 0.2 E.U./dL Final   • Bilirubin 09/16/2022 Negative  Negative Final   • Blood, UA 09/16/2022 Negative  Negative Final   • Lot Number 09/16/2022 [id number]   Final   • Expiration Date 09/16/2022 08/02/2024   Final   Lab on 09/15/2022   Component Date Value Ref Range Status   • TSH 09/15/2022 1.630  0.270 - 4.200 uIU/mL Final   • Hemoglobin A1C 09/15/2022 5.20  4.80 - 5.60 % Final   • Urine Culture 09/16/2022 No growth   Final   • WBC 09/15/2022 10.40  3.40 - 10.80  10*3/mm3 Final   • RBC 09/15/2022 5.18  3.77 - 5.28 10*6/mm3 Final   • Hemoglobin 09/15/2022 15.3  12.0 - 15.9 g/dL Final   • Hematocrit 09/15/2022 45.4  34.0 - 46.6 % Final   • MCV 09/15/2022 87.6  79.0 - 97.0 fL Final   • MCH 09/15/2022 29.5  26.6 - 33.0 pg Final   • MCHC 09/15/2022 33.7  31.5 - 35.7 g/dL Final   • RDW 09/15/2022 13.3  12.3 - 15.4 % Final   • RDW-SD 09/15/2022 42.8  37.0 - 54.0 fl Final   • MPV 09/15/2022 10.8  6.0 - 12.0 fL Final   • Platelets 09/15/2022 324  140 - 450 10*3/mm3 Final   • Neutrophil % 09/15/2022 74.3  42.7 - 76.0 % Final   • Lymphocyte % 09/15/2022 18.0 (A) 19.6 - 45.3 % Final   • Monocyte % 09/15/2022 4.0 (A) 5.0 - 12.0 % Final   • Eosinophil % 09/15/2022 2.6  0.3 - 6.2 % Final   • Basophil % 09/15/2022 0.8  0.0 - 1.5 % Final   • Immature Grans % 09/15/2022 0.3  0.0 - 0.5 % Final   • Neutrophils, Absolute 09/15/2022 7.73 (A) 1.70 - 7.00 10*3/mm3 Final   • Lymphocytes, Absolute 09/15/2022 1.87  0.70 - 3.10 10*3/mm3 Final   • Monocytes, Absolute 09/15/2022 0.42  0.10 - 0.90 10*3/mm3 Final   • Eosinophils, Absolute 09/15/2022 0.27  0.00 - 0.40 10*3/mm3 Final   • Basophils, Absolute 09/15/2022 0.08  0.00 - 0.20 10*3/mm3 Final   • Immature Grans, Absolute 09/15/2022 0.03  0.00 - 0.05 10*3/mm3 Final   • nRBC 09/15/2022 0.0  0.0 - 0.2 /100 WBC Final   Office Visit on 09/02/2022   Component Date Value Ref Range Status   • Glucose 09/15/2022 98  65 - 99 mg/dL Final   • BUN 09/15/2022 11  6 - 20 mg/dL Final   • Creatinine 09/15/2022 0.89  0.57 - 1.00 mg/dL Final   • Sodium 09/15/2022 139  136 - 145 mmol/L Final   • Potassium 09/15/2022 4.1  3.5 - 5.2 mmol/L Final   • Chloride 09/15/2022 101  98 - 107 mmol/L Final   • CO2 09/15/2022 26.0  22.0 - 29.0 mmol/L Final   • Calcium 09/15/2022 9.5  8.6 - 10.5 mg/dL Final   • Total Protein 09/15/2022 6.7  6.0 - 8.5 g/dL Final   • Albumin 09/15/2022 4.40  3.50 - 5.20 g/dL Final   • ALT (SGPT) 09/15/2022 10  1 - 33 U/L Final   • AST (SGOT) 09/15/2022  14  1 - 32 U/L Final   • Alkaline Phosphatase 09/15/2022 112  39 - 117 U/L Final   • Total Bilirubin 09/15/2022 0.2  0.0 - 1.2 mg/dL Final   • Globulin 09/15/2022 2.3  gm/dL Final   • A/G Ratio 09/15/2022 1.9  g/dL Final   • BUN/Creatinine Ratio 09/15/2022 12.4  7.0 - 25.0 Final   • Anion Gap 09/15/2022 12.0  5.0 - 15.0 mmol/L Final   • eGFR 09/15/2022 89.0  >60.0 mL/min/1.73 Final    National Kidney Foundation and American Society of Nephrology (ASN) Task Force recommended calculation based on the Chronic Kidney Disease Epidemiology Collaboration (CKD-EPI) equation refit without adjustment for race.   • Total Cholesterol 09/15/2022 194  0 - 200 mg/dL Final   • Triglycerides 09/15/2022 133  0 - 150 mg/dL Final   • HDL Cholesterol 09/15/2022 39 (A) 40 - 60 mg/dL Final   • LDL Cholesterol  09/15/2022 131 (A) 0 - 100 mg/dL Final   • VLDL Cholesterol 09/15/2022 24  5 - 40 mg/dL Final   • LDL/HDL Ratio 09/15/2022 3.29   Final   • Urine Culture 09/02/2022 >100,000 CFU/mL Escherichia coli ESBL (A)  Final    Consider infectious disease consult.  Susceptibility results may not correlate to clinical outcomes.   • Color 09/02/2022 Yellow  Yellow, Straw, Dark Yellow, Carmita Final   • Clarity, UA 09/02/2022 Slightly Cloudy (A) Clear Final   • Specific Gravity  09/02/2022 1.030  1.005 - 1.030 Final   • pH, Urine 09/02/2022 6.0  5.0 - 8.0 Final   • Leukocytes 09/02/2022 Negative  Negative Final   • Nitrite, UA 09/02/2022 Positive (A) Negative Final   • Protein, POC 09/02/2022 Trace (A) Negative mg/dL Final    15 mg/dLn   • Glucose, UA 09/02/2022 Negative  Negative mg/dL Final   • Ketones, UA 09/02/2022 Negative  Negative Final   • Urobilinogen, UA 09/02/2022 Normal  Normal, 0.2 E.U./dL Final    0.2 mg/dL   • Bilirubin 09/02/2022 Negative  Negative Final   • Blood, UA 09/02/2022 Negative  Negative Final   • Lot Number 09/02/2022 [id number]   Final   • Expiration Date 09/02/2022 12/17/2023   Final   Office Visit on 08/17/2022    Component Date Value Ref Range Status   • Urine Culture 08/17/2022 >100,000 CFU/mL Escherichia coli ESBL (A)  Final    Consider infectious disease consult.  Susceptibility results may not correlate to clinical outcomes.   • Color 08/17/2022 Orange (A) Yellow, Straw, Dark Yellow, Carmita Final   • Clarity, UA 08/17/2022 Cloudy (A) Clear Final   • Specific Gravity  08/17/2022 1.025  1.005 - 1.030 Final   • pH, Urine 08/17/2022 6.0  5.0 - 8.0 Final   • Leukocytes 08/17/2022 Negative  Negative Final   • Nitrite, UA 08/17/2022 Positive (A) Negative Final   • Protein, POC 08/17/2022 Negative  Negative mg/dL Final   • Glucose, UA 08/17/2022 Negative  Negative mg/dL Final   • Ketones, UA 08/17/2022 Negative  Negative Final   • Urobilinogen, UA 08/17/2022 Normal  Normal Final    0.2 mg/dL   • Bilirubin 08/17/2022 Negative  Negative Final   • Blood, UA 08/17/2022 Negative  Negative Final   • Lot Number 08/17/2022 [id number]   Final   • Expiration Date 08/17/2022 12/17/2023   Final   Lab on 05/23/2022   Component Date Value Ref Range Status   • WBC 05/23/2022 10.36  3.40 - 10.80 10*3/mm3 Final   • RBC 05/23/2022 5.49 (A) 3.77 - 5.28 10*6/mm3 Final   • Hemoglobin 05/23/2022 15.6  12.0 - 15.9 g/dL Final   • Hematocrit 05/23/2022 46.8 (A) 34.0 - 46.6 % Final   • MCV 05/23/2022 85.2  79.0 - 97.0 fL Final   • MCH 05/23/2022 28.4  26.6 - 33.0 pg Final   • MCHC 05/23/2022 33.3  31.5 - 35.7 g/dL Final   • RDW 05/23/2022 14.5  12.3 - 15.4 % Final   • RDW-SD 05/23/2022 45.1  37.0 - 54.0 fl Final   • MPV 05/23/2022 11.1  6.0 - 12.0 fL Final   • Platelets 05/23/2022 272  140 - 450 10*3/mm3 Final   • Neutrophil % 05/23/2022 71.3  42.7 - 76.0 % Final   • Lymphocyte % 05/23/2022 18.4 (A) 19.6 - 45.3 % Final   • Monocyte % 05/23/2022 6.6  5.0 - 12.0 % Final   • Eosinophil % 05/23/2022 2.5  0.3 - 6.2 % Final   • Basophil % 05/23/2022 0.8  0.0 - 1.5 % Final   • Immature Grans % 05/23/2022 0.4  0.0 - 0.5 % Final   • Neutrophils, Absolute  05/23/2022 7.39 (A) 1.70 - 7.00 10*3/mm3 Final   • Lymphocytes, Absolute 05/23/2022 1.91  0.70 - 3.10 10*3/mm3 Final   • Monocytes, Absolute 05/23/2022 0.68  0.10 - 0.90 10*3/mm3 Final   • Eosinophils, Absolute 05/23/2022 0.26  0.00 - 0.40 10*3/mm3 Final   • Basophils, Absolute 05/23/2022 0.08  0.00 - 0.20 10*3/mm3 Final   • Immature Grans, Absolute 05/23/2022 0.04  0.00 - 0.05 10*3/mm3 Final   • nRBC 05/23/2022 0.0  0.0 - 0.2 /100 WBC Final   • Urine Culture 05/23/2022 >100,000 CFU/mL Escherichia coli ESBL (A)  Final    Consider infectious disease consult.  Susceptibility results may not correlate to clinical outcomes.   Office Visit on 05/23/2022   Component Date Value Ref Range Status   • Glucose 05/23/2022 87  65 - 99 mg/dL Final   • BUN 05/23/2022 12  6 - 20 mg/dL Final   • Creatinine 05/23/2022 0.92  0.57 - 1.00 mg/dL Final   • Sodium 05/23/2022 136  136 - 145 mmol/L Final   • Potassium 05/23/2022 4.4  3.5 - 5.2 mmol/L Final   • Chloride 05/23/2022 103  98 - 107 mmol/L Final   • CO2 05/23/2022 22.8  22.0 - 29.0 mmol/L Final   • Calcium 05/23/2022 9.3  8.6 - 10.5 mg/dL Final   • Total Protein 05/23/2022 7.0  6.0 - 8.5 g/dL Final   • Albumin 05/23/2022 4.50  3.50 - 5.20 g/dL Final   • ALT (SGPT) 05/23/2022 10  1 - 33 U/L Final   • AST (SGOT) 05/23/2022 14  1 - 32 U/L Final   • Alkaline Phosphatase 05/23/2022 102  39 - 117 U/L Final   • Total Bilirubin 05/23/2022 0.2  0.0 - 1.2 mg/dL Final   • Globulin 05/23/2022 2.5  gm/dL Final   • A/G Ratio 05/23/2022 1.8  g/dL Final   • BUN/Creatinine Ratio 05/23/2022 13.0  7.0 - 25.0 Final   • Anion Gap 05/23/2022 10.2  5.0 - 15.0 mmol/L Final   • eGFR 05/23/2022 86.1  >60.0 mL/min/1.73 Final    National Kidney Foundation and American Society of Nephrology (ASN) Task Force recommended calculation based on the Chronic Kidney Disease Epidemiology Collaboration (CKD-EPI) equation refit without adjustment for race.   • Color 05/23/2022 Yellow  Yellow, Straw, Dark Yellow, Carmita  Final   • Clarity, UA 05/23/2022 Clear  Clear Final   • Specific Gravity  05/23/2022 1.015  1.005 - 1.030 Final   • pH, Urine 05/23/2022 6.0  5.0 - 8.0 Final   • Leukocytes 05/23/2022 Negative  Negative Final   • Nitrite, UA 05/23/2022 Negative  Negative Final   • Protein, POC 05/23/2022 Negative  Negative mg/dL Final   • Glucose, UA 05/23/2022 Negative  Negative, 1000 mg/dL (3+) mg/dL Final   • Ketones, UA 05/23/2022 Negative  Negative Final   • Urobilinogen, UA 05/23/2022 Normal  Normal Final   • Bilirubin 05/23/2022 Negative  Negative Final   • Blood, UA 05/23/2022 Negative  Negative Final   • Lot Number 05/23/2022 [id number]   Final   • Expiration Date 05/23/2022 12/17/2023   Final       Assessment & Plan   Problems Addressed this Visit        Mental Health    Bipolar affective disorder, currently depressed, moderate (HCC) - Primary    Relevant Medications    FLUoxetine (PROzac) 20 MG capsule    lamoTRIgine (LaMICtal) 200 MG tablet    mirtazapine (Remeron) 15 MG tablet      Other Visit Diagnoses     Generalized anxiety disorder        Relevant Medications    FLUoxetine (PROzac) 20 MG capsule    mirtazapine (Remeron) 15 MG tablet    Insomnia due to mental condition        Relevant Medications    FLUoxetine (PROzac) 20 MG capsule    mirtazapine (Remeron) 15 MG tablet      Diagnoses       Codes Comments    Bipolar affective disorder, currently depressed, moderate (HCC)    -  Primary ICD-10-CM: F31.32  ICD-9-CM: 296.52     Generalized anxiety disorder     ICD-10-CM: F41.1  ICD-9-CM: 300.02     Insomnia due to mental condition     ICD-10-CM: F51.05  ICD-9-CM: 300.9, 327.02           Visit Diagnoses:    ICD-10-CM ICD-9-CM   1. Bipolar affective disorder, currently depressed, moderate (HCC)  F31.32 296.52   2. Generalized anxiety disorder  F41.1 300.02   3. Insomnia due to mental condition  F51.05 300.9     327.02     Discussed treatment options with patient.  Discussed with her we can increase her Prozac.   Discussed with her that we should increase it slowly to control symptoms with the minimum dose that is necessary.  Patient agreeable.  Increase Prozac to 20 mg daily for depression and anxiety.  Continue Lamictal 200 mg daily for bipolar disorder.  Patient states she has never had any issues with rash or any other type of side effects with Lamictal.  States she has not missed any doses of the Lamictal.  Discussed Seroquel with patient how it can cause weight gain and metabolic syndrome.  Patient states she was not aware of that.  Discontinue Seroquel.  Discussed Remeron with patient and patient is agreeable to try the medication.  Did caution patient that antidepressants can trigger zakia, so she should watch for any symptoms of that.  Patient agrees and verbalizes understanding.  Start Remeron 15 mg nightly for insomnia.  We will see patient again in 4 weeks to reassess.  Informed patient to call if she had any issues sooner.    TREATMENT PLAN/GOALS: Continue supportive psychotherapy efforts and medications as indicated. Treatment and medication options discussed during today's visit. Patient acknowledged and verbally consented to continue with current treatment plan and was educated on the importance of compliance with treatment and follow-up appointments.    Short Term Goals: Patient will be compliant with medication, and patient will have no significant medication related side effects.  Patient will be engaged in psychotherapy as indicated.  Patient will report subjective improvement of symptoms.    Long term goals: To stabilize mood and treat/improve subjective symptoms, the patient will stay out of the hospital, the patient will be at an optimal level of functioning, and the patient will take all medications as prescribed.  The patient verbalized understanding and agreement with goals that were mutually set.    MEDICATION ISSUES:    Discussed medication options and treatment plan of prescribed medication as  well as the risks, benefits, and side effects including potential falls, possible impaired driving and metabolic adversities among others. Patient is agreeable to call the office with any worsening of symptoms or onset of side effects. Patient is agreeable to call 911 or go to the nearest ER should he/she begin having SI/HI. If patient has any concerns or needs assistance they were instructed to call the [facility] at [phone].    MEDS ORDERED DURING VISIT:  New Medications Ordered This Visit   Medications   • FLUoxetine (PROzac) 20 MG capsule     Sig: Take 1 capsule by mouth Daily.     Dispense:  30 capsule     Refill:  0   • lamoTRIgine (LaMICtal) 200 MG tablet     Sig: Take 1 tablet by mouth Daily.     Dispense:  30 tablet     Refill:  0   • mirtazapine (Remeron) 15 MG tablet     Sig: Take 1 tablet by mouth Every Night for 30 days.     Dispense:  30 tablet     Refill:  0       Return in about 4 weeks (around 11/1/2022) for Video visit.             This document has been electronically signed by FRANCHESKA Hernandez  October 4, 2022 15:20 EDT    Part of this note may be an electronic transmission of spoken language to printed text using the Dragon Dictation System.

## 2022-10-20 DIAGNOSIS — F51.05 INSOMNIA DUE TO MENTAL CONDITION: ICD-10-CM

## 2022-10-20 DIAGNOSIS — F31.32 BIPOLAR AFFECTIVE DISORDER, CURRENTLY DEPRESSED, MODERATE: ICD-10-CM

## 2022-10-20 DIAGNOSIS — F41.1 GENERALIZED ANXIETY DISORDER: ICD-10-CM

## 2022-10-20 RX ORDER — MIRTAZAPINE 15 MG/1
TABLET, FILM COATED ORAL
Qty: 30 TABLET | Refills: 0 | OUTPATIENT
Start: 2022-10-20

## 2022-10-20 RX ORDER — FLUOXETINE HYDROCHLORIDE 20 MG/1
CAPSULE ORAL
Qty: 30 CAPSULE | Refills: 0 | OUTPATIENT
Start: 2022-10-20

## 2022-10-20 RX ORDER — LAMOTRIGINE 200 MG/1
TABLET ORAL
Qty: 30 TABLET | Refills: 0 | OUTPATIENT
Start: 2022-10-20

## 2022-11-01 ENCOUNTER — TELEPHONE (OUTPATIENT)
Dept: INTERNAL MEDICINE | Facility: CLINIC | Age: 32
End: 2022-11-01

## 2022-11-01 NOTE — TELEPHONE ENCOUNTER
Claudia Kingsley this is one of Charles's patients and he's out of the office so I was wandering if you could advise patient what to do? she just called stating her face is warm and sweating she also stated she's breathing heavily. States her blood pressure is 156/107 and her heart rate is 11. Patient wanted to know what she should do?

## 2022-11-01 NOTE — TELEPHONE ENCOUNTER
Got in touch with Charles he recommend patient go to the ER. Spoke with patient verbalized understanding and said okay.

## 2022-11-10 ENCOUNTER — TELEMEDICINE (OUTPATIENT)
Dept: PSYCHIATRY | Facility: CLINIC | Age: 32
End: 2022-11-10

## 2022-11-10 DIAGNOSIS — F41.1 GENERALIZED ANXIETY DISORDER: Chronic | ICD-10-CM

## 2022-11-10 DIAGNOSIS — F51.05 INSOMNIA DUE TO MENTAL CONDITION: ICD-10-CM

## 2022-11-10 DIAGNOSIS — F31.32 BIPOLAR AFFECTIVE DISORDER, CURRENTLY DEPRESSED, MODERATE: Chronic | ICD-10-CM

## 2022-11-10 PROCEDURE — 99214 OFFICE O/P EST MOD 30 MIN: CPT

## 2022-11-10 RX ORDER — HYDROXYZINE HYDROCHLORIDE 25 MG/1
25-50 TABLET, FILM COATED ORAL 3 TIMES DAILY PRN
Qty: 180 TABLET | Refills: 0 | Status: SHIPPED | OUTPATIENT
Start: 2022-11-10 | End: 2022-12-12 | Stop reason: SDUPTHER

## 2022-11-10 RX ORDER — MIRTAZAPINE 15 MG/1
15 TABLET, FILM COATED ORAL NIGHTLY
Qty: 30 TABLET | Refills: 0 | Status: SHIPPED | OUTPATIENT
Start: 2022-11-10 | End: 2022-12-29 | Stop reason: SDUPTHER

## 2022-11-10 RX ORDER — LAMOTRIGINE 200 MG/1
200 TABLET ORAL DAILY
Qty: 30 TABLET | Refills: 0 | Status: SHIPPED | OUTPATIENT
Start: 2022-11-10 | End: 2022-12-12 | Stop reason: SDUPTHER

## 2022-11-10 RX ORDER — FLUOXETINE HYDROCHLORIDE 40 MG/1
40 CAPSULE ORAL DAILY
Qty: 30 CAPSULE | Refills: 0 | Status: SHIPPED | OUTPATIENT
Start: 2022-11-10 | End: 2022-12-12 | Stop reason: SDUPTHER

## 2022-11-10 NOTE — PROGRESS NOTES
This provider is located at [place], [place]. The Patient is seen remotely using Video. Patient is being seen via telehealth and confirm that they are in a secure environment for this session. Patient is located in [place], [place] at her home. The patient's condition being diagnosed/treated is appropriate for telemedicine. Provider identified as Maribell Hernandez as well as credentials FRANCHESKA NARAYANAN PMHNP-BC.   The client/patient gave consent to be seen remotely, and when consent is given they understand that the consent allows for patient identifiable information to be sent to a third party as needed.  They may refuse to be seen remotely at any time. The electronic data is encrypted and password protected, and the patient has been advised of the potential risks to privacy not withstanding such measures.    Chief Complaint  Manic Behavior, Depression, Anxiety, and Sleeping Problem    Subjective        Chen Galaviz presents to [facility] for   History of Present Illness  Patient is seen today for follow-up visit for bipolar disorder, anxiety, and insomnia.  Patient reports that she has noticed an improvement in her depression.  Rates her depression a 5 on a 1-10 scale with 10 being the worst.  Denies any hopelessness.  Denies any suicidal or homicidal ideation.  She states her sleep is improved with Remeron and is pleased about that.  Appetite is good.  States her anxiety is still high.  Rates her anxiety an 8 on a 1-10 scale with 10 being the worst.  States she has noticed some panic type symptoms in which she will have increased heart rate.  Also continues to have some worry and overthinking type symptoms.  She denies any recurrence of any hypomanic type symptoms.  Denies any paranoia.  Denies any auditory or visual hallucinations.  Denies any side effects to the medications.  Objective   Vital Signs:   There were no vitals taken for this visit.      PHQ-9 Score:   PHQ-9 Total Score:  (P) 12     Mental Status Exam:   Hygiene:   good  Cooperation:  Cooperative  Eye Contact:  Good  Psychomotor Behavior:  Appropriate  Affect:  Full range  Mood: anxious  Speech:  Normal  Thought Process:  Goal directed  Thought Content:  Normal  Suicidal:  None  Homicidal:  None  Hallucinations:  None  Delusion:  None  Memory:  Intact  Orientation:  Person, Place, Time and Situation  Reliability:  good  Insight:  Good  Judgement:  Good  Impulse Control:  Good  Physical/Medical Issues:  No      PHQ-9 Depression Screening  Little interest or pleasure in doing things? (P) 1   Feeling down, depressed, or hopeless? (P) 2   Trouble falling or staying asleep, or sleeping too much? (P) 2   Feeling tired or having little energy? (P) 3   Poor appetite or overeating? (P) 1   Feeling bad about yourself - or that you are a failure or have let yourself or your family down? (P) 1   Trouble concentrating on things, such as reading the newspaper or watching television? (P) 1   Moving or speaking so slowly that other people could have noticed? Or the opposite - being so fidgety or restless that you have been moving around a lot more than usual? (P) 1   Thoughts that you would be better off dead, or of hurting yourself in some way? (P) 0   PHQ-9 Total Score (P) 12   If you checked off any problems, how difficult have these problems made it for you to do your work, take care of things at home, or get along with other people? (P) Somewhat difficult     PHQ-9 Total Score: (P) 12    ERENDIRA 7 anxiety screening tool that patient filled out virtually reviewed by this APRN at today's encounter.    PROMIS scale screening tool that patient filled out virtually reviewed by this APRN at today's encounter.    Previous Provider notes and available records reviewed by this APRN today.   Current Medications:   Current Outpatient Medications   Medication Sig Dispense Refill   • acetaminophen (TYLENOL) 325 MG tablet Take 2 tablets by mouth Every 6 (Six)  Hours As Needed for Mild Pain .     • albuterol sulfate  (90 Base) MCG/ACT inhaler Inhale 2 puffs Every 6 (Six) Hours As Needed for Wheezing. 18 g 11   • cetirizine (zyrTEC) 10 MG tablet Take 10 mg by mouth Daily.     • FLUoxetine (PROzac) 40 MG capsule Take 1 capsule by mouth Daily. 30 capsule 0   • fluticasone (Flovent HFA) 220 MCG/ACT inhaler Inhale 1 puff 2 (Two) Times a Day. 12 g 11   • hydrOXYzine (ATARAX) 25 MG tablet Take 1-2 tablets by mouth 3 (Three) Times a Day As Needed for Anxiety. 180 tablet 0   • lamoTRIgine (LaMICtal) 200 MG tablet Take 1 tablet by mouth Daily. 30 tablet 0   • lisinopril (PRINIVIL,ZESTRIL) 40 MG tablet Take 1 tablet by mouth Daily. 90 tablet 1   • mirtazapine (Remeron) 15 MG tablet Take 1 tablet by mouth Every Night for 30 days. 30 tablet 0   • montelukast (SINGULAIR) 10 MG tablet Take 1 tablet by mouth every night at bedtime. 90 tablet 1   • rOPINIRole (REQUIP) 0.25 MG tablet Take 1 tablet by mouth Every Night. Take 1 hour before bedtime. 90 tablet 1     No current facility-administered medications for this visit.       Physical Exam   Result Review :    The following data was reviewed by: FRANCHESKA Hernandez on 11/10/2022:  Common labs    Common Labs 5/23/22 5/23/22 6/4/22 6/4/22 9/15/22 9/15/22 9/15/22 9/15/22    1522 1522 1842 1842 0822 0822 0822 0822   Glucose  87     98    Glucose    89       BUN  12  16   11    Creatinine  0.92  0.96   0.89    eGFR Non  Am    >60       eGFR African Am    >60       Sodium  136  138   139    Potassium  4.4  4.0   4.1    Chloride  103  106   101    Calcium  9.3  8.6 (A)   9.5    Albumin  4.50  4.1   4.40    Total Bilirubin  0.2  0.2   0.2    Alkaline Phosphatase  102  112 (A)   112    AST (SGOT)  14  8 (A)   14    ALT (SGPT)  10  8   10    WBC 10.36  10.77 (A)  10.40      Hemoglobin 15.6  15.6  15.3      Hematocrit 46.8 (A)  46.2 (A)  45.4      Platelets 272  302  324      Total Cholesterol        194   Triglycerides        133    HDL Cholesterol        39 (A)   LDL Cholesterol         131 (A)   Hemoglobin A1C      5.20     (A) Abnormal value       Comments are available for some flowsheets but are not being displayed.              Assessment and Plan   Problem List Items Addressed This Visit        Mental Health    Generalized anxiety disorder (Chronic)    Relevant Medications    FLUoxetine (PROzac) 40 MG capsule    mirtazapine (Remeron) 15 MG tablet    hydrOXYzine (ATARAX) 25 MG tablet    Bipolar affective disorder, currently depressed, moderate (HCC)    Relevant Medications    FLUoxetine (PROzac) 40 MG capsule    mirtazapine (Remeron) 15 MG tablet    lamoTRIgine (LaMICtal) 200 MG tablet    hydrOXYzine (ATARAX) 25 MG tablet   Other Visit Diagnoses     Insomnia due to mental condition        Relevant Medications    FLUoxetine (PROzac) 40 MG capsule    mirtazapine (Remeron) 15 MG tablet    hydrOXYzine (ATARAX) 25 MG tablet        Discussed treatment options with patient.  Discussed with patient that increasing her Prozac at this time would be her best course of action to address her anxiety.  Patient agreeable.  Increase Prozac to 40 mg daily for anxiety.  Continue Lamictal 200 mg daily for bipolar disorder.  Continue Remeron 15 mg nightly for sleep.  Discussed hydroxyzine with patient and how it can be helpful for breakthrough anxiety.  Discussed with patient that propanolol is contraindicated due to her asthma, so we would go that route.  Patient states she has tried hydroxyzine before and it has been helpful.  She states she would like to start the medication.  Start hydroxyzine 25 mg 3 times daily as needed for anxiety.  We will see patient again in 4 weeks to reassess.  Encouraged patient to call if she had any issues sooner.    TREATMENT PLAN/GOALS: Continue supportive psychotherapy efforts and medications as indicated. Treatment and medication options discussed during today's visit. Patient ackowledged and verbally consented to  continue with current treatment plan and was educated on the importance of compliance with treatment and follow-up appointments.    DEPRESSION:  Patient screened positive for depression based on a PHQ-9 score of  on . Follow-up recommendations include: Prescribed antidepressant medication treatment.       MEDICATION ISSUES:  We discussed risks, benefits, and side effects of the above medications and the patient was agreeable with the plan. Patient was educated on the importance of compliance with treatment and follow-up appointments.  Patient is agreeable to call the office with any worsening of symptoms or onset of side effects. Patient is agreeable to call 911 or go to the nearest ER should he/she begin having SI/HI.      Counseled patient regarding multimodal approach with healthy nutrition, healthy sleep, regular physical activity, social activities, counseling, and medications.      Coping skills reviewed and encouraged positive framing of thoughts     Assisted patient in processing above session content; acknowledged and normalized patient’s thoughts, feelings, and concerns.  Applied  positive coping skills and behavior management in session.  Allowed patient to freely discuss issues without interruption or judgment. Provided safe, confidential environment to facilitate the development of positive therapeutic relationship and encourage open, honest communication. Assisted patient in identifying risk factors which would indicate the need for higher level of care including thoughts to harm self or others and/or self-harming behavior and encouraged patient to contact this office, call 911, or present to the nearest emergency room should any of these events occur. Discussed crisis intervention services and means to access. If patient has any concerns or needs assistance they were instructed to call the [facility] at [phone].    MEDS ORDERED DURING VISIT:  New Medications Ordered  This Visit   Medications   • FLUoxetine (PROzac) 40 MG capsule     Sig: Take 1 capsule by mouth Daily.     Dispense:  30 capsule     Refill:  0   • mirtazapine (Remeron) 15 MG tablet     Sig: Take 1 tablet by mouth Every Night for 30 days.     Dispense:  30 tablet     Refill:  0   • lamoTRIgine (LaMICtal) 200 MG tablet     Sig: Take 1 tablet by mouth Daily.     Dispense:  30 tablet     Refill:  0   • hydrOXYzine (ATARAX) 25 MG tablet     Sig: Take 1-2 tablets by mouth 3 (Three) Times a Day As Needed for Anxiety.     Dispense:  180 tablet     Refill:  0         Follow Up   Return in about 4 weeks (around 12/8/2022) for Video visit.    Patient was given instructions and counseling regarding her condition or for health maintenance advice. Please see specific information pulled into the AVS if appropriate.         This document has been electronically signed by FRANCHESKA Hernandez  November 10, 2022 10:54 EST    Part of this note may be an electronic transcription/translation of spoken language to printed text using the Dragon Dictation System.

## 2022-11-30 DIAGNOSIS — F41.1 GENERALIZED ANXIETY DISORDER: ICD-10-CM

## 2022-11-30 RX ORDER — FLUOXETINE HYDROCHLORIDE 20 MG/1
CAPSULE ORAL
Qty: 30 CAPSULE | Refills: 0 | OUTPATIENT
Start: 2022-11-30

## 2022-12-01 ENCOUNTER — OFFICE VISIT (OUTPATIENT)
Dept: INTERNAL MEDICINE | Facility: CLINIC | Age: 32
End: 2022-12-01

## 2022-12-01 ENCOUNTER — LAB (OUTPATIENT)
Dept: LAB | Facility: HOSPITAL | Age: 32
End: 2022-12-01

## 2022-12-01 VITALS
OXYGEN SATURATION: 98 % | TEMPERATURE: 97.3 F | RESPIRATION RATE: 16 BRPM | HEIGHT: 63 IN | DIASTOLIC BLOOD PRESSURE: 68 MMHG | BODY MASS INDEX: 32.46 KG/M2 | WEIGHT: 183.2 LBS | HEART RATE: 106 BPM | SYSTOLIC BLOOD PRESSURE: 110 MMHG

## 2022-12-01 DIAGNOSIS — R82.90 FOUL SMELLING URINE: ICD-10-CM

## 2022-12-01 DIAGNOSIS — R00.0 TACHYCARDIA: Primary | ICD-10-CM

## 2022-12-01 LAB
BILIRUB BLD-MCNC: NEGATIVE MG/DL
CLARITY, POC: CLEAR
COLOR UR: ABNORMAL
EXPIRATION DATE: ABNORMAL
GLUCOSE UR STRIP-MCNC: NEGATIVE MG/DL
KETONES UR QL: NEGATIVE
LEUKOCYTE EST, POC: NEGATIVE
Lab: ABNORMAL
NITRITE UR-MCNC: NEGATIVE MG/ML
PH UR: 6 [PH] (ref 5–8)
PROT UR STRIP-MCNC: ABNORMAL MG/DL
RBC # UR STRIP: NEGATIVE /UL
SP GR UR: 1.02 (ref 1–1.03)
UROBILINOGEN UR QL: NORMAL

## 2022-12-01 PROCEDURE — 99213 OFFICE O/P EST LOW 20 MIN: CPT | Performed by: PHYSICIAN ASSISTANT

## 2022-12-01 PROCEDURE — 87147 CULTURE TYPE IMMUNOLOGIC: CPT | Performed by: PHYSICIAN ASSISTANT

## 2022-12-01 PROCEDURE — 87086 URINE CULTURE/COLONY COUNT: CPT | Performed by: PHYSICIAN ASSISTANT

## 2022-12-01 PROCEDURE — 81003 URINALYSIS AUTO W/O SCOPE: CPT | Performed by: PHYSICIAN ASSISTANT

## 2022-12-01 RX ORDER — PROPRANOLOL HYDROCHLORIDE 10 MG/1
10 TABLET ORAL 2 TIMES DAILY
Qty: 60 TABLET | Refills: 2 | Status: SHIPPED | OUTPATIENT
Start: 2022-12-01 | End: 2022-12-19

## 2022-12-01 RX ORDER — NITROFURANTOIN 25; 75 MG/1; MG/1
100 CAPSULE ORAL 2 TIMES DAILY
Qty: 14 CAPSULE | Refills: 0 | Status: SHIPPED | OUTPATIENT
Start: 2022-12-01 | End: 2022-12-02

## 2022-12-01 NOTE — PROGRESS NOTES
[facility] PRIMARY CARE  [address]  [facility] [address]  Dept: [phone]  Dept Fax: [phone]  Loc: [phone]  Loc Fax: [phone]    Chen Galaviz  1990    Follow Up Office Visit Note    History of Present Illness:  Patient is a 32-year-old female in today to follow-up for hypertension and for foul-smelling urine.  Patient noticed blood pressure being high last week.  Also heart rate still elevated.  Denies any chest pain or headache.  Was on propranolol in the past which helped her heart rate quite a bit.  Would like to try this again.    Patient reports a several day worsening history of foul-smelling urine.  Feels like a UTI at times.    Back Pain  This is a chronic problem. The current episode started 1 to 4 weeks ago. The problem occurs constantly. The problem has been waxing and waning since onset. The pain is present in the lumbar spine. The quality of the pain is described as aching and cramping. The pain does not radiate. The pain is at a severity of 8/10. The pain is the same all the time. The symptoms are aggravated by position. Stiffness is present in the morning. Associated symptoms include bladder incontinence, headaches and pelvic pain. Pertinent negatives include no abdominal pain, bowel incontinence, chest pain, dysuria, fever, leg pain, numbness, paresis, paresthesias, perianal numbness, tingling, weakness or weight loss. Risk factors include history of osteoporosis, lack of exercise, obesity and poor posture.       The following portions of the patient's history were reviewed and updated as appropriate: allergies, current medications, past family history, past medical history, past social history, past surgical history, and problem list.    Medications:    Current Outpatient Medications:   •  acetaminophen (TYLENOL) 325 MG tablet, Take 2 tablets by mouth Every 6 (Six) Hours As Needed for Mild Pain ., Disp: , Rfl:   •  albuterol  sulfate  (90 Base) MCG/ACT inhaler, Inhale 2 puffs Every 6 (Six) Hours As Needed for Wheezing., Disp: 18 g, Rfl: 11  •  cetirizine (zyrTEC) 10 MG tablet, Take 10 mg by mouth Daily., Disp: , Rfl:   •  FLUoxetine (PROzac) 40 MG capsule, Take 1 capsule by mouth Daily., Disp: 30 capsule, Rfl: 0  •  fluticasone (Flovent HFA) 220 MCG/ACT inhaler, Inhale 1 puff 2 (Two) Times a Day., Disp: 12 g, Rfl: 11  •  hydrOXYzine (ATARAX) 25 MG tablet, Take 1-2 tablets by mouth 3 (Three) Times a Day As Needed for Anxiety., Disp: 180 tablet, Rfl: 0  •  lamoTRIgine (LaMICtal) 200 MG tablet, Take 1 tablet by mouth Daily., Disp: 30 tablet, Rfl: 0  •  lisinopril (PRINIVIL,ZESTRIL) 40 MG tablet, Take 1 tablet by mouth Daily., Disp: 90 tablet, Rfl: 1  •  mirtazapine (Remeron) 15 MG tablet, Take 1 tablet by mouth Every Night for 30 days., Disp: 30 tablet, Rfl: 0  •  montelukast (SINGULAIR) 10 MG tablet, Take 1 tablet by mouth every night at bedtime., Disp: 90 tablet, Rfl: 1  •  rOPINIRole (REQUIP) 0.25 MG tablet, Take 1 tablet by mouth Every Night. Take 1 hour before bedtime., Disp: 90 tablet, Rfl: 1  •  nitrofurantoin, macrocrystal-monohydrate, (Macrobid) 100 MG capsule, Take 1 capsule by mouth 2 (Two) Times a Day for 7 days., Disp: 14 capsule, Rfl: 0  •  propranolol (INDERAL) 10 MG tablet, Take 1 tablet by mouth 2 (Two) Times a Day., Disp: 60 tablet, Rfl: 2    Subjective  Allergies   Allergen Reactions   • Bupropion Other (See Comments) and Provider Review Needed     Seizure / wellbutrin    • Naproxen Rash   • Nsaids Rash   • Sulfa Antibiotics Rash        Past Medical History:   Diagnosis Date   • Abnormal liver enzymes 05/09/2016   • ADHD (attention deficit hyperactivity disorder) 1996   • Allergic 2001   • Allergic rhinitis    • Anxiety    • Arthritis     since age 21 in her back   • Arthritis of back N/a   • Asthma     seasonal   • Back pain    • Bacterial vaginosis 05/09/2016   • Bipolar disorder (HCC)     dx age 19   • Chest  pain, pleuritic    • Common migraine with intractable migraine 2016   • CTS (carpal tunnel syndrome) 23   • Depression    • Dyslipidemia    • Dysmenorrhea 2016   • Dyspareunia, female 2018    Added automatically from request for surgery [id number]   • Endometriosis    • Fatigue    • Febrile seizure (HCC)     FEBRILE SEIZURE AT 3YO AND AT 14YO D/T WELBUTRIN   • Frequent UTI    • Gastroesophageal reflux disease 2016   • GERD (gastroesophageal reflux disease)    • Gestational hypertension     History of gestational hypertension. States blood pressure is sometimes high but does not take any medications.   • Headache    • Herpes zoster     denies this visit   • History of robot-assisted laparoscopic hysterectomy/BS 2019   • Hyperlipidemia    • IBS (irritable bowel syndrome)    • Itching    • Knee swelling N/A   • Low back pain    • Lung mass 2016    Description: A.  CT scan of the chest 2014 reports that the previously seen nodule of the left lower lobe is no longer visualized and the micronodular densities along the lateral aspect of the right lower lobe are stable.  There is no change in the appearance of the right axillary lymph nodes.   • Mental retardation     A. Wahoo to be related to hypoxia at birth due to placenta previa   • Migraine    • Migraine    • Multiple gestation    • Obesity    • Onychomycosis of toenail 2016    Impression: 2016 - Refer to podiatry.;    • Organic hypersomnia    • Ovarian cyst    • Pain, upper back    • PMS (premenstrual syndrome)    • Pulmonary nodule    • S/P  cholecystectomy 2018   • Schizophrenia (HCC)    • Severe frontal headaches 2018   • Sinus tachycardia    • Substance abuse ([facility])    • Visual impairment    • Wears glasses        Past Surgical History:   Procedure Laterality Date   •  SECTION     •  SECTION WITH TUBAL N/A 2017    Procedure:  SECTION REPEAT WITH TUBAL;   Surgeon: Fabien Blake MD;  Location: [facility] LABOR DELIVERY;  Service:    • CHOLECYSTECTOMY      age 21   • COLONOSCOPY     • DIAGNOSTIC LAPAROSCOPY      X 4   • ENDOMETRIAL ABLATION     • PELVIC LAPAROSCOPY      age 21   • TONSILLECTOMY      age 23   • TOTAL LAPAROSCOPIC HYSTERECTOMY N/A 01/23/2019    Procedure: TOTAL LAPAROSCOPIC HYSTERECTOMY BILATERAL SALPINGECTOMY WITH DAVINCI ROBOT;  Surgeon: Amador Richey MD;  Location: [facility] OR;  Service: DaVinci   • TUBAL ABDOMINAL LIGATION  2/24/2017   • WISDOM TOOTH EXTRACTION         Family History   Problem Relation Age of Onset   • Asthma Mother    • Hypertension Mother    • Breast cancer Mother    • Diabetes Mother    • Colon polyps Mother    • Cancer Mother    • Anxiety disorder Mother    • Arthritis Mother    • Depression Mother    • Mental illness Mother    • Hypertension Father    • Alcohol abuse Father    • Arthritis Father    • Asthma Father    • COPD Father    • Depression Father    • Drug abuse Father    • Hyperlipidemia Father    • Asthma Brother    • Drug abuse Brother    • Alcohol abuse Brother    • Depression Brother    • Hyperlipidemia Brother    • Melanoma Maternal Grandfather    • Cancer Maternal Grandfather    • Colon polyps Maternal Grandmother    • Diabetes Maternal Grandmother    • Cancer Maternal Grandmother    • Stomach cancer Paternal Grandmother    • Hypertension Paternal Grandmother    • Osteoporosis Paternal Grandmother    • Heart attack Paternal Grandfather 36   • Diabetes Paternal Grandfather    • Hypertension Brother    • Developmental Disability Son    • Learning disabilities Son    • Developmental Disability Son    • Learning disabilities Son    • Developmental Disability Son    • Learning disabilities Son    • Ovarian cancer Neg Hx    • Uterine cancer Neg Hx         Social History     Socioeconomic History   • Marital status: Single   Tobacco Use   • Smoking status: Every Day     Packs/day: 1.00     Years: 10.00     Pack years: 10.00  "    Types: Cigarettes     Start date: 10/9/2002   • Smokeless tobacco: Never   • Tobacco comments:     cutting back, encourage cessation   Vaping Use   • Vaping Use: Never used   Substance and Sexual Activity   • Alcohol use: Not Currently     Alcohol/week: 1.0 standard drink     Types: 1 Drinks containing 0.5 oz of alcohol per week   • Drug use: Not Currently     Frequency: 5.0 times per week     Types: \"Crack\" cocaine, Cocaine(coke), Marijuana, Methamphetamines   • Sexual activity: Yes     Partners: Male     Birth control/protection: None, Same-sex partner       Review of Systems   Constitutional: Negative for fever and weight loss.   Cardiovascular: Negative for chest pain.   Gastrointestinal: Negative for abdominal pain and bowel incontinence.   Genitourinary: Positive for bladder incontinence and pelvic pain. Negative for dysuria.   Musculoskeletal: Positive for back pain.   Neurological: Positive for headaches. Negative for tingling, weakness, numbness and paresthesias.         Objective  Vitals:    12/01/22 1339   BP: 110/68   BP Location: Left arm   Patient Position: Sitting   Cuff Size: Large Adult   Pulse: 106   Resp: 16   Temp: 97.3 °F (36.3 °C)   TempSrc: Temporal   SpO2: 98%   Weight: 83.1 kg (183 lb 3.2 oz)   Height: 160 cm (62.99\")     Body mass index is 32.46 kg/m².     Physical Exam  Physical Exam  Vitals and nursing note reviewed.   Constitutional:       General: She is not in acute distress.     Appearance: She is not ill-appearing.   HENT:      Head: Normocephalic.      Right Ear: Tympanic membrane, ear canal and external ear normal. There is no impacted cerumen.      Left Ear: Tympanic membrane, ear canal and external ear normal. There is no impacted cerumen.      Nose: No congestion or rhinorrhea.      Mouth/Throat:      Mouth: Mucous membranes are moist.      Pharynx: Oropharynx is clear. No oropharyngeal exudate or posterior oropharyngeal erythema.   Eyes:      General:         Right eye: No " discharge.         Left eye: No discharge.      Extraocular Movements: Extraocular movements intact.      Conjunctiva/sclera: Conjunctivae normal.      Pupils: Pupils are equal, round, and reactive to light.   Cardiovascular:      Rate and Rhythm: Regular rhythm. Tachycardia present.      Heart sounds: Normal heart sounds. No murmur heard.    No friction rub. No gallop.   Pulmonary:      Effort: Pulmonary effort is normal. No respiratory distress.      Breath sounds: Normal breath sounds. No wheezing.   Abdominal:      General: Bowel sounds are normal. There is no distension.      Palpations: Abdomen is soft. There is no mass.      Tenderness: There is no abdominal tenderness.   Musculoskeletal:         General: No swelling. Normal range of motion.      Cervical back: Normal range of motion. No tenderness.      Right lower leg: No edema.      Left lower leg: No edema.   Lymphadenopathy:      Cervical: No cervical adenopathy.   Skin:     Findings: No bruising, erythema or rash.   Neurological:      Mental Status: She is oriented to person, place, and time.      Gait: Gait normal.   Psychiatric:         Mood and Affect: Mood normal.         Behavior: Behavior normal.         Thought Content: Thought content normal.         Judgment: Judgment normal.         Diagnostic Data  Procedures    Assessment  Diagnoses and all orders for this visit:    1. Tachycardia (Primary)    2. Foul smelling urine  -     Urine Culture - Urine, Urine, Clean Catch; Future  -     Urine Culture - Urine, Urine, Clean Catch  -     POCT urinalysis dipstick, automated    Other orders  -     propranolol (INDERAL) 10 MG tablet; Take 1 tablet by mouth 2 (Two) Times a Day.  Dispense: 60 tablet; Refill: 2  -     nitrofurantoin, macrocrystal-monohydrate, (Macrobid) 100 MG capsule; Take 1 capsule by mouth 2 (Two) Times a Day for 7 days.  Dispense: 14 capsule; Refill: 0        Plan    1. Tachycardia (Primary)- in the presence of hypertension, normotensive  today, start propranolol 10 mg twice daily. Advised patient to take blood pressure readings at home 2-3 times daily. Advised if blood pressure higher than 160/100 or lower than 100/60 to call the office immediately. If symptomatic, go to the ER. Patient verbalized understanding of all instructions given and complied.    2. Foul smelling urine- UA in office today negative.  Start Macrobid preemptively.  Sent for urine culture and sensitivity.      Return in about 2 weeks (around 12/15/2022) for Recheck.    Charles Ferrera PA-C  12/01/2022  Answers for HPI/ROS submitted by the patient on 11/29/2022  What is the primary reason for your visit?: Back Pain

## 2022-12-02 DIAGNOSIS — I10 ESSENTIAL HYPERTENSION: ICD-10-CM

## 2022-12-02 DIAGNOSIS — F41.1 GENERALIZED ANXIETY DISORDER: Chronic | ICD-10-CM

## 2022-12-02 DIAGNOSIS — N39.0 ACUTE UTI: Primary | ICD-10-CM

## 2022-12-02 DIAGNOSIS — F31.32 BIPOLAR AFFECTIVE DISORDER, CURRENTLY DEPRESSED, MODERATE: Chronic | ICD-10-CM

## 2022-12-02 LAB — BACTERIA SPEC AEROBE CULT: ABNORMAL

## 2022-12-02 RX ORDER — AMOXICILLIN 875 MG/1
875 TABLET, COATED ORAL EVERY 12 HOURS SCHEDULED
Qty: 20 TABLET | Refills: 0 | Status: SHIPPED | OUTPATIENT
Start: 2022-12-02 | End: 2022-12-12

## 2022-12-02 RX ORDER — LISINOPRIL 20 MG/1
TABLET ORAL
Qty: 30 TABLET | Refills: 0 | OUTPATIENT
Start: 2022-12-02

## 2022-12-05 DIAGNOSIS — F51.05 INSOMNIA DUE TO MENTAL CONDITION: ICD-10-CM

## 2022-12-05 DIAGNOSIS — F41.1 GENERALIZED ANXIETY DISORDER: Chronic | ICD-10-CM

## 2022-12-05 RX ORDER — FLUOXETINE HYDROCHLORIDE 40 MG/1
40 CAPSULE ORAL DAILY
Qty: 30 CAPSULE | Refills: 0 | Status: CANCELLED | OUTPATIENT
Start: 2022-12-05

## 2022-12-05 RX ORDER — MIRTAZAPINE 15 MG/1
15 TABLET, FILM COATED ORAL NIGHTLY
Qty: 30 TABLET | Refills: 0 | Status: CANCELLED | OUTPATIENT
Start: 2022-12-05 | End: 2023-01-04

## 2022-12-05 RX ORDER — HYDROXYZINE HYDROCHLORIDE 25 MG/1
25-50 TABLET, FILM COATED ORAL 3 TIMES DAILY PRN
Qty: 180 TABLET | Refills: 0 | OUTPATIENT
Start: 2022-12-05

## 2022-12-05 RX ORDER — LAMOTRIGINE 200 MG/1
TABLET ORAL
Qty: 30 TABLET | Refills: 0 | OUTPATIENT
Start: 2022-12-05

## 2022-12-05 NOTE — TELEPHONE ENCOUNTER
These were sent and on November 10, 2022 and I see her on December 8, 2022.  She should have enough until the appointment.

## 2022-12-12 DIAGNOSIS — F41.1 GENERALIZED ANXIETY DISORDER: Chronic | ICD-10-CM

## 2022-12-12 DIAGNOSIS — F31.32 BIPOLAR AFFECTIVE DISORDER, CURRENTLY DEPRESSED, MODERATE: Chronic | ICD-10-CM

## 2022-12-12 RX ORDER — HYDROXYZINE HYDROCHLORIDE 25 MG/1
25-50 TABLET, FILM COATED ORAL 3 TIMES DAILY PRN
Qty: 180 TABLET | Refills: 0 | Status: SHIPPED | OUTPATIENT
Start: 2022-12-12 | End: 2023-01-19 | Stop reason: SDUPTHER

## 2022-12-12 RX ORDER — LAMOTRIGINE 200 MG/1
200 TABLET ORAL DAILY
Qty: 30 TABLET | Refills: 0 | Status: SHIPPED | OUTPATIENT
Start: 2022-12-12 | End: 2022-12-29 | Stop reason: SDUPTHER

## 2022-12-12 RX ORDER — FLUOXETINE HYDROCHLORIDE 40 MG/1
40 CAPSULE ORAL DAILY
Qty: 30 CAPSULE | Refills: 0 | Status: SHIPPED | OUTPATIENT
Start: 2022-12-12 | End: 2022-12-29 | Stop reason: SDUPTHER

## 2022-12-12 RX ORDER — PROPRANOLOL HYDROCHLORIDE 10 MG/1
10 TABLET ORAL 2 TIMES DAILY
Qty: 60 TABLET | Refills: 2 | Status: CANCELLED | OUTPATIENT
Start: 2022-12-12

## 2022-12-14 DIAGNOSIS — I10 ESSENTIAL HYPERTENSION: ICD-10-CM

## 2022-12-15 RX ORDER — LISINOPRIL 40 MG/1
TABLET ORAL
Qty: 30 TABLET | Refills: 1 | Status: SHIPPED | OUTPATIENT
Start: 2022-12-15 | End: 2022-12-29 | Stop reason: SDUPTHER

## 2022-12-19 ENCOUNTER — OFFICE VISIT (OUTPATIENT)
Dept: INTERNAL MEDICINE | Facility: CLINIC | Age: 32
End: 2022-12-19

## 2022-12-19 ENCOUNTER — LAB (OUTPATIENT)
Dept: LAB | Facility: HOSPITAL | Age: 32
End: 2022-12-19

## 2022-12-19 VITALS
RESPIRATION RATE: 16 BRPM | HEART RATE: 86 BPM | OXYGEN SATURATION: 98 % | BODY MASS INDEX: 32.82 KG/M2 | WEIGHT: 185.2 LBS | DIASTOLIC BLOOD PRESSURE: 86 MMHG | SYSTOLIC BLOOD PRESSURE: 128 MMHG | HEIGHT: 63 IN | TEMPERATURE: 97.1 F

## 2022-12-19 DIAGNOSIS — R00.0 TACHYCARDIA: ICD-10-CM

## 2022-12-19 DIAGNOSIS — R39.82 CHRONIC URINARY BLADDER PAIN: ICD-10-CM

## 2022-12-19 DIAGNOSIS — F41.9 ANXIETY: ICD-10-CM

## 2022-12-19 DIAGNOSIS — I10 ESSENTIAL HYPERTENSION: Primary | ICD-10-CM

## 2022-12-19 DIAGNOSIS — R30.0 DYSURIA: ICD-10-CM

## 2022-12-19 LAB
BILIRUB BLD-MCNC: NEGATIVE MG/DL
CLARITY, POC: CLEAR
COLOR UR: YELLOW
EXPIRATION DATE: NORMAL
GLUCOSE UR STRIP-MCNC: NEGATIVE MG/DL
KETONES UR QL: NEGATIVE
LEUKOCYTE EST, POC: NEGATIVE
Lab: NORMAL
NITRITE UR-MCNC: NEGATIVE MG/ML
PH UR: 6 [PH] (ref 5–8)
PROT UR STRIP-MCNC: NEGATIVE MG/DL
RBC # UR STRIP: NEGATIVE /UL
SP GR UR: 1.02 (ref 1–1.03)
UROBILINOGEN UR QL: NORMAL

## 2022-12-19 PROCEDURE — 99214 OFFICE O/P EST MOD 30 MIN: CPT | Performed by: PHYSICIAN ASSISTANT

## 2022-12-19 PROCEDURE — 87086 URINE CULTURE/COLONY COUNT: CPT | Performed by: PHYSICIAN ASSISTANT

## 2022-12-19 PROCEDURE — 81003 URINALYSIS AUTO W/O SCOPE: CPT | Performed by: PHYSICIAN ASSISTANT

## 2022-12-19 RX ORDER — PROPRANOLOL HYDROCHLORIDE 10 MG/1
10 TABLET ORAL 3 TIMES DAILY
Qty: 90 TABLET | Refills: 1 | Status: SHIPPED | OUTPATIENT
Start: 2022-12-19 | End: 2023-02-27

## 2022-12-19 NOTE — PROGRESS NOTES
[facility] PRIMARY CARE  [address]  [place] [address]  Dept: [phone]  Dept Fax: [phone]  Loc: [phone]  Loc Fax: [phone]    Chen Galaviz  1990    Follow Up Office Visit Note    History of Present Illness:  Patient is a 32-year-old female in today to follow-up for tachycardia, anxiety, hypertension, and for chronic dysuria.  On propranolol 10 mg twice daily.  Taking as directed with any problems or side effects.  Blood pressure and heart rate as well as anxiety doing better on this medicine.  Still having some breakthrough symptoms and noting blood pressure still running a little high.  Would like to increase dose of this.    Patient continues to struggle with chronic pain with urination.  Patient also feels as if she is having bladder pain as well.  Has burning with urination a lot.  This has been a chronic and recurrent issue.  Needing referral to urology for further evaluation at this point.      The following portions of the patient's history were reviewed and updated as appropriate: allergies, current medications, past family history, past medical history, past social history, past surgical history, and problem list.    Medications:    Current Outpatient Medications:   •  acetaminophen (TYLENOL) 325 MG tablet, Take 2 tablets by mouth Every 6 (Six) Hours As Needed for Mild Pain ., Disp: , Rfl:   •  albuterol sulfate  (90 Base) MCG/ACT inhaler, Inhale 2 puffs Every 6 (Six) Hours As Needed for Wheezing., Disp: 18 g, Rfl: 11  •  cetirizine (zyrTEC) 10 MG tablet, Take 10 mg by mouth Daily., Disp: , Rfl:   •  FLUoxetine (PROzac) 40 MG capsule, Take 1 capsule by mouth Daily., Disp: 30 capsule, Rfl: 0  •  fluticasone (Flovent HFA) 220 MCG/ACT inhaler, Inhale 1 puff 2 (Two) Times a Day., Disp: 12 g, Rfl: 11  •  hydrOXYzine (ATARAX) 25 MG tablet, Take 1-2 tablets by mouth 3 (Three) Times a Day As Needed for Anxiety., Disp: 180 tablet,  Rfl: 0  •  lamoTRIgine (LaMICtal) 200 MG tablet, Take 1 tablet by mouth Daily., Disp: 30 tablet, Rfl: 0  •  lisinopril (PRINIVIL,ZESTRIL) 40 MG tablet, TAKE 1 TABLET BY MOUTH EVERY DAY, Disp: 30 tablet, Rfl: 1  •  montelukast (SINGULAIR) 10 MG tablet, Take 1 tablet by mouth every night at bedtime., Disp: 90 tablet, Rfl: 1  •  propranolol (INDERAL) 10 MG tablet, Take 1 tablet by mouth 3 (Three) Times a Day., Disp: 90 tablet, Rfl: 1  •  rOPINIRole (REQUIP) 0.25 MG tablet, Take 1 tablet by mouth Every Night. Take 1 hour before bedtime., Disp: 90 tablet, Rfl: 1  •  mirtazapine (Remeron) 15 MG tablet, Take 1 tablet by mouth Every Night for 30 days., Disp: 30 tablet, Rfl: 0    Subjective  Allergies   Allergen Reactions   • Bupropion Other (See Comments) and Provider Review Needed     Seizure / wellbutrin    • Naproxen Rash   • Nsaids Rash   • Sulfa Antibiotics Rash        Past Medical History:   Diagnosis Date   • Abnormal liver enzymes 05/09/2016   • ADHD (attention deficit hyperactivity disorder) 1996   • Allergic 2001   • Allergic rhinitis    • Anxiety    • Arthritis     since age 21 in her back   • Arthritis of back N/a   • Asthma     seasonal   • Back pain    • Bacterial vaginosis 05/09/2016   • Bipolar disorder (HCC)     dx age 19   • Chest pain, pleuritic    • Common migraine with intractable migraine 05/09/2016   • CTS (carpal tunnel syndrome) 8/20/22   • Depression    • Dyslipidemia    • Dysmenorrhea 05/09/2016   • Dyspareunia, female 12/12/2018    Added automatically from request for surgery [id number]   • Endometriosis    • Fatigue    • Febrile seizure (HCC)     FEBRILE SEIZURE AT 3YO AND AT 16YO D/T WELBUTRIN   • Frequent UTI    • Gastroesophageal reflux disease 05/09/2016   • GERD (gastroesophageal reflux disease)    • Gestational hypertension     History of gestational hypertension. States blood pressure is sometimes high but does not take any medications.   • Headache    • Herpes zoster     denies this visit    • History of robot-assisted laparoscopic hysterectomy/BS 2019   • Hyperlipidemia    • IBS (irritable bowel syndrome)    • Itching    • Knee swelling N/A   • Low back pain    • Lung mass 2016    Description: A.  CT scan of the chest 2014 reports that the previously seen nodule of the left lower lobe is no longer visualized and the micronodular densities along the lateral aspect of the right lower lobe are stable.  There is no change in the appearance of the right axillary lymph nodes.   • Mental retardation     A. Cantwell to be related to hypoxia at birth due to placenta previa   • Migraine    • Migraine    • Multiple gestation    • Obesity    • Onychomycosis of toenail 2016    Impression: 2016 - Refer to podiatry.;    • Organic hypersomnia    • Ovarian cyst    • Pain, upper back    • PMS (premenstrual syndrome)    • Pulmonary nodule    • S/P 2013 cholecystectomy 2018   • Schizophrenia (HCC)    • Severe frontal headaches 2018   • Sinus tachycardia    • Substance abuse (HCC)    • Visual impairment    • Wears glasses        Past Surgical History:   Procedure Laterality Date   •  SECTION     •  SECTION WITH TUBAL N/A 2017    Procedure:  SECTION REPEAT WITH TUBAL;  Surgeon: Fabien Blake MD;  Location: [facility] LABOR DELIVERY;  Service:    • CHOLECYSTECTOMY      age 21   • COLONOSCOPY     • DIAGNOSTIC LAPAROSCOPY      X 4   • ENDOMETRIAL ABLATION     • PELVIC LAPAROSCOPY      age 21   • TONSILLECTOMY      age 23   • TOTAL LAPAROSCOPIC HYSTERECTOMY N/A 2019    Procedure: TOTAL LAPAROSCOPIC HYSTERECTOMY BILATERAL SALPINGECTOMY WITH DAVINCI ROBOT;  Surgeon: Amador Richey MD;  Location: [facility] OR;  Service: DaVinci   • TUBAL ABDOMINAL LIGATION  2017   • WISDOM TOOTH EXTRACTION         Family History   Problem Relation Age of Onset   • Asthma Mother    • Hypertension Mother    • Breast cancer Mother    • Diabetes Mother    • Colon  "polyps Mother    • Cancer Mother    • Anxiety disorder Mother    • Arthritis Mother    • Depression Mother    • Mental illness Mother    • Hypertension Father    • Alcohol abuse Father    • Arthritis Father    • Asthma Father    • COPD Father    • Depression Father    • Drug abuse Father    • Hyperlipidemia Father    • Asthma Brother    • Drug abuse Brother    • Alcohol abuse Brother    • Depression Brother    • Hyperlipidemia Brother    • Melanoma Maternal Grandfather    • Cancer Maternal Grandfather    • Colon polyps Maternal Grandmother    • Diabetes Maternal Grandmother    • Cancer Maternal Grandmother    • Stomach cancer Paternal Grandmother    • Hypertension Paternal Grandmother    • Osteoporosis Paternal Grandmother    • Heart attack Paternal Grandfather 36   • Diabetes Paternal Grandfather    • Hypertension Brother    • Developmental Disability Son    • Learning disabilities Son    • Developmental Disability Son    • Learning disabilities Son    • Developmental Disability Son    • Learning disabilities Son    • Ovarian cancer Neg Hx    • Uterine cancer Neg Hx         Social History     Socioeconomic History   • Marital status: Single   Tobacco Use   • Smoking status: Every Day     Packs/day: 1.00     Years: 10.00     Pack years: 10.00     Types: Cigarettes     Start date: 10/9/2002   • Smokeless tobacco: Never   • Tobacco comments:     cutting back, encourage cessation   Vaping Use   • Vaping Use: Never used   Substance and Sexual Activity   • Alcohol use: Not Currently     Alcohol/week: 1.0 standard drink     Types: 1 Drinks containing 0.5 oz of alcohol per week   • Drug use: Not Currently     Frequency: 5.0 times per week     Types: \"Crack\" cocaine, Cocaine(coke), Marijuana, Methamphetamines   • Sexual activity: Yes     Partners: Male     Birth control/protection: None, Same-sex partner       Review of Systems   Constitutional: Negative for activity change, chills, fatigue, fever and unexpected weight " "change.   HENT: Negative for congestion, ear pain, postnasal drip, sinus pressure and sore throat.    Eyes: Negative for pain, discharge and redness.   Respiratory: Negative for cough, shortness of breath and wheezing.    Cardiovascular: Negative for chest pain, palpitations and leg swelling.   Gastrointestinal: Negative for diarrhea, nausea and vomiting.   Endocrine: Negative for cold intolerance and heat intolerance.   Genitourinary: Positive for dysuria. Negative for decreased urine volume.   Musculoskeletal: Negative for arthralgias and myalgias.   Skin: Negative for rash and wound.   Neurological: Negative for dizziness, light-headedness and headaches.   Hematological: Does not bruise/bleed easily.   Psychiatric/Behavioral: Negative for confusion, dysphoric mood, self-injury, sleep disturbance and suicidal ideas. The patient is nervous/anxious.          Objective  Vitals:    12/19/22 0933   BP: 128/86   BP Location: Left arm   Patient Position: Sitting   Cuff Size: Large Adult   Pulse: 86   Resp: 16   Temp: 97.1 °F (36.2 °C)   TempSrc: Temporal   SpO2: 98%   Weight: 84 kg (185 lb 3.2 oz)   Height: 160 cm (62.99\")     Body mass index is 32.82 kg/m².     Physical Exam  Physical Exam  Vitals and nursing note reviewed.   Constitutional:       General: She is not in acute distress.     Appearance: She is not ill-appearing.   HENT:      Head: Normocephalic.      Right Ear: Tympanic membrane, ear canal and external ear normal. There is no impacted cerumen.      Left Ear: Tympanic membrane, ear canal and external ear normal. There is no impacted cerumen.      Nose: No congestion or rhinorrhea.      Mouth/Throat:      Mouth: Mucous membranes are moist.      Pharynx: Oropharynx is clear. No oropharyngeal exudate or posterior oropharyngeal erythema.   Eyes:      General:         Right eye: No discharge.         Left eye: No discharge.      Extraocular Movements: Extraocular movements intact.      Conjunctiva/sclera: " Conjunctivae normal.      Pupils: Pupils are equal, round, and reactive to light.   Cardiovascular:      Rate and Rhythm: Normal rate and regular rhythm.      Heart sounds: Normal heart sounds. No murmur heard.    No friction rub. No gallop.   Pulmonary:      Effort: Pulmonary effort is normal. No respiratory distress.      Breath sounds: Normal breath sounds. No wheezing.   Abdominal:      General: Bowel sounds are normal. There is no distension.      Palpations: Abdomen is soft. There is no mass.      Tenderness: There is no abdominal tenderness.   Musculoskeletal:         General: No swelling. Normal range of motion.      Cervical back: Normal range of motion. No tenderness.      Right lower leg: No edema.      Left lower leg: No edema.   Lymphadenopathy:      Cervical: No cervical adenopathy.   Skin:     Findings: No bruising, erythema or rash.   Neurological:      Mental Status: She is oriented to person, place, and time.      Gait: Gait normal.   Psychiatric:         Mood and Affect: Mood normal.         Behavior: Behavior normal.         Thought Content: Thought content normal.         Judgment: Judgment normal.         Diagnostic Data  Procedures    Assessment  Diagnoses and all orders for this visit:    1. Essential hypertension (Primary)    2. Tachycardia    3. Dysuria  -     Ambulatory Referral to Urology  -     Urine Culture - Urine, Urine, Clean Catch; Future    4. Anxiety    5. Chronic urinary bladder pain  -     Ambulatory Referral to Urology    Other orders  -     propranolol (INDERAL) 10 MG tablet; Take 1 tablet by mouth 3 (Three) Times a Day.  Dispense: 90 tablet; Refill: 1  -     Cancel: Ambulatory Referral [facility]        Plan    1. Essential hypertension (Primary)- better but still high at times.  Increase propranolol to 10 mg 3 times daily. Advised patient to take blood pressure readings at home 2-3 times daily. Advised if blood pressure higher than 160/100 or lower than 100/60 to call  the office immediately. If symptomatic, go to the ER. Patient verbalized understanding of all instructions given and complied.    2. Tachycardia- better on propranolol 10 mg twice daily.  Increase to 10 mg 3 times daily.    3. Dysuria- worse and chronic at this time.  UA in office negative.  Obtain culture and sensitivity.  Referred to urology.    4. Anxiety- better, continue propranolol.    5. Chronic urinary bladder pain- worse, referred to urology.      Return in about 2 weeks (around 1/2/2023) for Recheck.    Charles Ferrera PA-C  12/19/2022  Answers for HPI/ROS submitted by the patient on 12/15/2022  Please describe your symptoms.: High blood pressure, and rapid heartrate  Have you had these symptoms before?: No  How long have you been having these symptoms?: 5-7 days  What is the primary reason for your visit?: Other

## 2022-12-20 ENCOUNTER — TELEPHONE (OUTPATIENT)
Dept: INTERNAL MEDICINE | Facility: CLINIC | Age: 32
End: 2022-12-20

## 2022-12-20 LAB — BACTERIA SPEC AEROBE CULT: NO GROWTH

## 2022-12-23 ENCOUNTER — TELEPHONE (OUTPATIENT)
Dept: UROLOGY | Facility: CLINIC | Age: 32
End: 2022-12-23

## 2022-12-23 NOTE — TELEPHONE ENCOUNTER
Called and left  for PT that appt. On 1/10/23 at 1:00pm had been rescheduled as Nancy Le will be unavailable that day. New appt. Is 1/6/23 at 8:30am .

## 2022-12-29 ENCOUNTER — TELEPHONE (OUTPATIENT)
Dept: INTERNAL MEDICINE | Facility: CLINIC | Age: 32
End: 2022-12-29

## 2022-12-29 DIAGNOSIS — F41.1 GENERALIZED ANXIETY DISORDER: Chronic | ICD-10-CM

## 2022-12-29 DIAGNOSIS — I10 ESSENTIAL HYPERTENSION: ICD-10-CM

## 2022-12-29 DIAGNOSIS — F51.05 INSOMNIA DUE TO MENTAL CONDITION: ICD-10-CM

## 2022-12-29 DIAGNOSIS — F31.32 BIPOLAR AFFECTIVE DISORDER, CURRENTLY DEPRESSED, MODERATE: Chronic | ICD-10-CM

## 2022-12-29 RX ORDER — LISINOPRIL 40 MG/1
40 TABLET ORAL DAILY
Qty: 90 TABLET | Refills: 1 | Status: SHIPPED | OUTPATIENT
Start: 2022-12-29

## 2022-12-29 RX ORDER — FLUOXETINE HYDROCHLORIDE 40 MG/1
40 CAPSULE ORAL DAILY
Qty: 30 CAPSULE | Refills: 0 | Status: SHIPPED | OUTPATIENT
Start: 2022-12-29 | End: 2023-01-19 | Stop reason: SDUPTHER

## 2022-12-29 RX ORDER — MIRTAZAPINE 15 MG/1
15 TABLET, FILM COATED ORAL NIGHTLY
Qty: 30 TABLET | Refills: 0 | Status: SHIPPED | OUTPATIENT
Start: 2022-12-29 | End: 2023-01-19 | Stop reason: SINTOL

## 2022-12-29 RX ORDER — PROPRANOLOL HYDROCHLORIDE 10 MG/1
10 TABLET ORAL 3 TIMES DAILY
Qty: 90 TABLET | Refills: 1 | Status: CANCELLED | OUTPATIENT
Start: 2022-12-29

## 2022-12-29 RX ORDER — HYDROXYZINE HYDROCHLORIDE 25 MG/1
25-50 TABLET, FILM COATED ORAL 3 TIMES DAILY PRN
Qty: 180 TABLET | Refills: 0 | Status: CANCELLED | OUTPATIENT
Start: 2022-12-29

## 2022-12-29 RX ORDER — LAMOTRIGINE 200 MG/1
200 TABLET ORAL DAILY
Qty: 30 TABLET | Refills: 0 | Status: SHIPPED | OUTPATIENT
Start: 2022-12-29 | End: 2023-01-19 | Stop reason: SDUPTHER

## 2023-01-01 NOTE — PROGRESS NOTES
"Subjective  Follow-up (F/u visit, C/o sharp pain in thoracic area of spine x 1 week)      Chen Galaviz is a 27 y.o. female.   Allergies   Allergen Reactions   • Bupropion Other (See Comments)     Seizure / wellbutrin    • Naproxen Rash   • Nsaids Rash   • Sulfa Antibiotics Rash     History of Present Illness      1-2 weeks ago got sick, got a fever, aches and pains, lasted 2 days , then went to sinus congestion, cough, ear pain x 4 days , now in middle of back has a catch when she tries to stand, gets a sharp pain around that knot and she yells out , does not know what caused it , boyfriend felt and dx the \"knot\"  The following portions of the patient's history were reviewed and updated as appropriate: allergies, past surgical history and problem list.    Review of Systems   Musculoskeletal: Positive for back pain.   All other systems reviewed and are negative.      Objective   Physical Exam   Constitutional: She is oriented to person, place, and time. She appears well-developed and well-nourished.   HENT:   Head: Normocephalic and atraumatic.   Eyes: Conjunctivae are normal.   Cardiovascular: Normal rate.    Pulmonary/Chest: Effort normal.   Musculoskeletal:        Lumbar back: She exhibits tenderness and bony tenderness.   Neurological: She is alert and oriented to person, place, and time.   Skin: Skin is warm and dry.   Psychiatric: She has a normal mood and affect. Her behavior is normal. Judgment and thought content normal.   Nursing note and vitals reviewed.      Assessment/Plan     Problem List Items Addressed This Visit        Nervous and Auditory    Back pain - Primary    Relevant Orders    XR spine lumbar 2 or 3 vw      Other Visit Diagnoses     Mid back pain        Relevant Orders    XR spine thoracic 3 vw          xrays today, I will call with results when available     " no

## 2023-01-05 DIAGNOSIS — F31.32 BIPOLAR AFFECTIVE DISORDER, CURRENTLY DEPRESSED, MODERATE: Chronic | ICD-10-CM

## 2023-01-05 RX ORDER — LAMOTRIGINE 200 MG/1
TABLET ORAL
Qty: 30 TABLET | Refills: 0 | OUTPATIENT
Start: 2023-01-05

## 2023-01-06 ENCOUNTER — OFFICE VISIT (OUTPATIENT)
Dept: UROLOGY | Facility: CLINIC | Age: 33
End: 2023-01-06
Payer: MEDICARE

## 2023-01-06 VITALS — HEIGHT: 63 IN | BODY MASS INDEX: 32.82 KG/M2

## 2023-01-06 DIAGNOSIS — R39.15 URINARY URGENCY: ICD-10-CM

## 2023-01-06 DIAGNOSIS — N39.0 RECURRENT UTI: Primary | ICD-10-CM

## 2023-01-06 LAB
BILIRUB BLD-MCNC: NEGATIVE MG/DL
CLARITY, POC: CLEAR
COLOR UR: YELLOW
EXPIRATION DATE: NORMAL
GLUCOSE UR STRIP-MCNC: NEGATIVE MG/DL
KETONES UR QL: NEGATIVE
LEUKOCYTE EST, POC: NEGATIVE
Lab: NORMAL
NITRITE UR-MCNC: NEGATIVE MG/ML
PH UR: 6 [PH] (ref 5–8)
PROT UR STRIP-MCNC: NEGATIVE MG/DL
RBC # UR STRIP: NEGATIVE /UL
SP GR UR: 1.03 (ref 1–1.03)
UROBILINOGEN UR QL: NORMAL

## 2023-01-06 PROCEDURE — 99214 OFFICE O/P EST MOD 30 MIN: CPT | Performed by: NURSE PRACTITIONER

## 2023-01-06 PROCEDURE — 51798 US URINE CAPACITY MEASURE: CPT | Performed by: NURSE PRACTITIONER

## 2023-01-06 PROCEDURE — 81003 URINALYSIS AUTO W/O SCOPE: CPT | Performed by: NURSE PRACTITIONER

## 2023-01-06 RX ORDER — OXYBUTYNIN CHLORIDE 10 MG/1
10 TABLET, EXTENDED RELEASE ORAL DAILY
Qty: 90 TABLET | Refills: 0 | Status: SHIPPED | OUTPATIENT
Start: 2023-01-06 | End: 2023-04-06

## 2023-01-06 NOTE — PROGRESS NOTES
LUT[facility] Female Office Visit      Patient Name: Chen Galaviz  : 1990   MRN: [id number]     Chief Complaint:  Lower Urinary Tract Symptoms.   Chief Complaint   Patient presents with   • Dysuria   • Chronic Urinary Bladder Pain       Referring Provider: Charles Ferrera, *    History of Present Illness: Mr. Galaviz is a 32 y.o. female with history lower urinary tract symptoms. ***     Primary symptoms include: ***    Patient denies {voiding symptoms:582777}.    Onset was ***.    Previous treatments include: ***    Subjective      Review of System: Review of Systems   Genitourinary: Positive for dysuria, frequency and urgency.      I have reviewed the ROS documented by my clinical staff, I have updated appropriately and I agree. Antoinette Rodriguez MA    Past Medical History:  Past Medical History:   Diagnosis Date   • Abnormal liver enzymes 2016   • ADHD (attention deficit hyperactivity disorder)    • Allergic    • Allergic rhinitis    • Anxiety    • Arthritis     since age 21 in her back   • Arthritis of back N/a   • Asthma     seasonal   • Back pain    • Bacterial vaginosis 2016   • Bipolar disorder (HCC)     dx age 19   • Chest pain, pleuritic    • Common migraine with intractable migraine 2016   • CTS (carpal tunnel syndrome) 23   • Depression    • Dyslipidemia    • Dysmenorrhea 2016   • Dyspareunia, female 2018    Added automatically from request for surgery [id number]   • Endometriosis    • Fatigue    • Febrile seizure (HCC)     FEBRILE SEIZURE AT 3YO AND AT 16YO D/T WELBUTRIN   • Frequent UTI    • Gastroesophageal reflux disease 2016   • GERD (gastroesophageal reflux disease)    • Gestational hypertension     History of gestational hypertension. States blood pressure is sometimes high but does not take any medications.   • Headache    • Herpes zoster     denies this visit   • History of robot-assisted laparoscopic hysterectomy/BS 2019   •  Hyperlipidemia    • IBS (irritable bowel syndrome)    • Itching    • Knee swelling N/A   • Low back pain    • Lung mass 2016    Description: A.  CT scan of the chest 2014 reports that the previously seen nodule of the left lower lobe is no longer visualized and the micronodular densities along the lateral aspect of the right lower lobe are stable.  There is no change in the appearance of the right axillary lymph nodes.   • Mental retardation     A. [place] to be related to hypoxia at birth due to placenta previa   • Migraine    • Migraine    • Multiple gestation    • Obesity    • Onychomycosis of toenail 2016    Impression: 2016 - Refer to podiatry.;    • Organic hypersomnia    • Ovarian cyst    • Pain, upper back    • PMS (premenstrual syndrome)    • Pulmonary nodule    • S/P 2013 cholecystectomy 2018   • Schizophrenia (HCC)    • Severe frontal headaches 2018   • Sinus tachycardia    • Substance abuse (HCC)    • Visual impairment    • Wears glasses        Past Surgical History:  Past Surgical History:   Procedure Laterality Date   •  SECTION     •  SECTION WITH TUBAL N/A 2017    Procedure:  SECTION REPEAT WITH TUBAL;  Surgeon: Fabien Blake MD;  Location: [facility] LABOR DELIVERY;  Service:    • CHOLECYSTECTOMY      age 21   • COLONOSCOPY     • DIAGNOSTIC LAPAROSCOPY      X 4   • ENDOMETRIAL ABLATION     • PELVIC LAPAROSCOPY      age 21   • TONSILLECTOMY      age 23   • TOTAL LAPAROSCOPIC HYSTERECTOMY N/A 2019    Procedure: TOTAL LAPAROSCOPIC HYSTERECTOMY BILATERAL SALPINGECTOMY WITH DAVINCI ROBOT;  Surgeon: Amador Richey MD;  Location: [facility] OR;  Service: DaVinci   • TUBAL ABDOMINAL LIGATION  2017   • WISDOM TOOTH EXTRACTION         Medications:    Current Outpatient Medications:   •  acetaminophen (TYLENOL) 325 MG tablet, Take 2 tablets by mouth Every 6 (Six) Hours As Needed for Mild Pain ., Disp: , Rfl:   •  albuterol sulfate   (90 Base) MCG/ACT inhaler, Inhale 2 puffs Every 6 (Six) Hours As Needed for Wheezing., Disp: 18 g, Rfl: 11  •  cetirizine (zyrTEC) 10 MG tablet, Take 10 mg by mouth Daily., Disp: , Rfl:   •  FLUoxetine (PROzac) 40 MG capsule, Take 1 capsule by mouth Daily., Disp: 30 capsule, Rfl: 0  •  fluticasone (Flovent HFA) 220 MCG/ACT inhaler, Inhale 1 puff 2 (Two) Times a Day., Disp: 12 g, Rfl: 11  •  hydrOXYzine (ATARAX) 25 MG tablet, Take 1-2 tablets by mouth 3 (Three) Times a Day As Needed for Anxiety., Disp: 180 tablet, Rfl: 0  •  lamoTRIgine (LaMICtal) 200 MG tablet, Take 1 tablet by mouth Daily., Disp: 30 tablet, Rfl: 0  •  lisinopril (PRINIVIL,ZESTRIL) 40 MG tablet, Take 1 tablet by mouth Daily., Disp: 90 tablet, Rfl: 1  •  mirtazapine (Remeron) 15 MG tablet, Take 1 tablet by mouth Every Night for 30 days., Disp: 30 tablet, Rfl: 0  •  propranolol (INDERAL) 10 MG tablet, Take 1 tablet by mouth 3 (Three) Times a Day., Disp: 90 tablet, Rfl: 1  •  rOPINIRole (REQUIP) 0.25 MG tablet, Take 1 tablet by mouth Every Night. Take 1 hour before bedtime., Disp: 90 tablet, Rfl: 1  •  montelukast (SINGULAIR) 10 MG tablet, Take 1 tablet by mouth every night at bedtime., Disp: 90 tablet, Rfl: 1    Allergies:  Allergies   Allergen Reactions   • Bupropion Other (See Comments) and Provider Review Needed     Seizure / wellbutrin    • Naproxen Rash   • Nsaids Rash   • Sulfa Antibiotics Rash       Social History:  Social History     Socioeconomic History   • Marital status: Single   Tobacco Use   • Smoking status: Every Day     Packs/day: 1.00     Years: 10.00     Pack years: 10.00     Types: Cigarettes     Start date: 10/9/2002   • Smokeless tobacco: Never   • Tobacco comments:     cutting back, encourage cessation   Vaping Use   • Vaping Use: Never used   Substance and Sexual Activity   • Alcohol use: Not Currently     Alcohol/week: 1.0 standard drink     Types: 1 Drinks containing 0.5 oz of alcohol per week   • Drug use: Not Currently      Frequency: 5.0 times per week     Types: \"Crack\" cocaine, Cocaine(coke), Marijuana, Methamphetamines   • Sexual activity: Yes     Partners: Male     Birth control/protection: None, Same-sex partner       Family History:  Family History   Problem Relation Age of Onset   • Asthma Mother    • Hypertension Mother    • Breast cancer Mother    • Diabetes Mother    • Colon polyps Mother    • Cancer Mother    • Anxiety disorder Mother    • Arthritis Mother    • Depression Mother    • Mental illness Mother    • Hypertension Father    • Alcohol abuse Father    • Arthritis Father    • Asthma Father    • COPD Father    • Depression Father    • Drug abuse Father    • Hyperlipidemia Father    • Asthma Brother    • Drug abuse Brother    • Alcohol abuse Brother    • Depression Brother    • Hyperlipidemia Brother    • Melanoma Maternal Grandfather    • Cancer Maternal Grandfather    • Colon polyps Maternal Grandmother    • Diabetes Maternal Grandmother    • Cancer Maternal Grandmother    • Stomach cancer Paternal Grandmother    • Hypertension Paternal Grandmother    • Osteoporosis Paternal Grandmother    • Heart attack Paternal Grandfather 36   • Diabetes Paternal Grandfather    • Hypertension Brother    • Developmental Disability Son    • Learning disabilities Son    • Developmental Disability Son    • Learning disabilities Son    • Developmental Disability Son    • Learning disabilities Son    • Ovarian cancer Neg Hx    • Uterine cancer Neg Hx          Post void residual bladder scan:    0 mL    Objective     Physical Exam:   Vital Signs:   Vitals:    01/06/23 0845   Height: 160 cm (62.99\")     Body mass index is 32.82 kg/m².     Physical Exam    Labs:   Brief Urine Lab Results  (Last result in the past 365 days)      Color   Clarity   Blood   Leuk Est   Nitrite   Protein   CREAT   Urine HCG        12/19/22 1435 Yellow   Clear   Negative   Negative   Negative   Negative                 Urine Culture    Urine Culture 9/16/22  12/1/22 12/19/22   Urine Culture No growth >100,000 CFU/mL Streptococcus agalactiae (Group B) (A) No growth   (A) Abnormal value       Comments are available for some flowsheets but are not being displayed.              Lab Results   Component Value Date    GLUCOSE 98 09/15/2022    CALCIUM 9.5 09/15/2022     09/15/2022    K 4.1 09/15/2022    CO2 26.0 09/15/2022     09/15/2022    BUN 11 09/15/2022    CREATININE 0.89 09/15/2022    EGFRIFAFRI >60 06/04/2022    EGFRIFNONA >60 06/04/2022    BCR 12.4 09/15/2022    ANIONGAP 12.0 09/15/2022       Lab Results   Component Value Date    WBC 10.40 09/15/2022    HGB 15.3 09/15/2022    HCT 45.4 09/15/2022    MCV 87.6 09/15/2022     09/15/2022       Images:   No Images in the past 120 days found..    Measures:   Tobacco:   Chen Galaviz  reports that she has been smoking cigarettes. She started smoking about 20 years ago. She has a 10.00 pack-year smoking history. She has never used smokeless tobacco.. I have educated her on the risk of diseases from using tobacco products such as {Tobacco Cessation Diseases:10840::\"cancer\",\"COPD\",\"heart disease\"}.     I advised her to quit and she is {Willing/Not Willing to Quit Tobacco Products:42090}.    I spent {Time Spent Tobacco :64206} minutes counseling the patient.           Urine Incontinence: (NOUI)  ***     Assessment / Plan      Assessment:  Mrs. Galaviz is a 32 y.o. female who presented today with lower urinary tract symptoms. ***    There are no diagnoses linked to this encounter.       Follow Up:   No follow-ups on file.    I spent approximately *** minutes providing clinical care for this patient; including review of patient's chart and provider documentation, face to face time spent with patient in examination room (obtaining history, performing physical exam, discussing diagnosis and management options), placing orders, and completing patient documentation.     Nael Ravi MD  [facility] [place]

## 2023-01-06 NOTE — PROGRESS NOTES
UTI Office Visit      Patient Name: Chen Galaviz  : 1990   MRN: [id number]     Chief Complaint:  UTI   Chief Complaint   Patient presents with   • Dysuria   • Chronic Urinary Bladder Pain   • Recurrent UTI       Referring Provider: Charles Ferrera, *    History of Present Illness: Chen Galaviz is a 32 y.o. female who presents today for history of recurrent urinary tract infections and urinary urgency. She reports history of UTIs for several years. When she has a UTI she reports malodorous urine, dysuria and worsening urinary urgency. She underwent CT A/P wo in  to assess for nephrolithiasis, no evidence of stones at that time. She does not have a history of stones.     She is currently sexually active, she denies that her UTIs occur after intercourse. She currently denies dysuria, malodorous urine, gross hematuria or flank pain.    Urine Cx ; + Strep Group B  Urine Cx ; + E.coli  Urine Cx ; + E.Coli    She reports seeing a Urologist years ago for her urinary urgency. She was started on Oxybutynin at that time. She reports her urinary urgency improved while taking the medication. She is not currently taking Oxybutynin. She does not currently wear pads but she has previously. She reports urinary leakage with urgency. She drinks 3 cans of soda a day and occasionally has coffee. She reports occasional urinary leakage with coughing and sneezing, however her urinary urgency with leakage is most bothersome.     Subjective      Review of System: Review of Systems   Genitourinary: Positive for urgency. Negative for dysuria, flank pain, frequency and hematuria.      I have reviewed the ROS documented by my clinical staff, I have updated appropriately and I agree. FRANCHESKA Forman    Past Medical History:  Past Medical History:   Diagnosis Date   • Abnormal liver enzymes 2016   • ADHD (attention deficit hyperactivity disorder)    • Allergic    • Allergic rhinitis    •  Anxiety    • Arthritis     since age 21 in her back   • Arthritis of back N/a   • Asthma     seasonal   • Back pain    • Bacterial vaginosis 05/09/2016   • Bipolar disorder (HCC)     dx age 19   • Chest pain, pleuritic    • Common migraine with intractable migraine 05/09/2016   • CTS (carpal tunnel syndrome) 8/20/22   • Depression    • Dyslipidemia    • Dysmenorrhea 05/09/2016   • Dyspareunia, female 12/12/2018    Added automatically from request for surgery [id number]   • Endometriosis    • Fatigue    • Febrile seizure (HCC)     FEBRILE SEIZURE AT 3YO AND AT 16YO D/T WELBUTRIN   • Frequent UTI    • Gastroesophageal reflux disease 05/09/2016   • GERD (gastroesophageal reflux disease)    • Gestational hypertension     History of gestational hypertension. States blood pressure is sometimes high but does not take any medications.   • Headache    • Herpes zoster     denies this visit   • History of robot-assisted laparoscopic hysterectomy/BS 1/23/2019 02/11/2019   • Hyperlipidemia    • IBS (irritable bowel syndrome)    • Itching    • Knee swelling N/A   • Low back pain    • Lung mass 05/09/2016    Description: A.  CT scan of the chest 05/05/2014 reports that the previously seen nodule of the left lower lobe is no longer visualized and the micronodular densities along the lateral aspect of the right lower lobe are stable.  There is no change in the appearance of the right axillary lymph nodes.   • Mental retardation     A. [place] to be related to hypoxia at birth due to placenta previa   • Migraine    • Migraine    • Multiple gestation    • Obesity    • Onychomycosis of toenail 05/09/2016    Impression: 02/12/2016 - Refer to podiatry.;    • Organic hypersomnia    • Ovarian cyst    • Pain, upper back    • PMS (premenstrual syndrome)    • Pulmonary nodule    • S/P 2013 cholecystectomy 04/11/2018   • Schizophrenia ([facility])    • Severe frontal headaches 08/30/2018   • Sinus tachycardia    • Substance abuse ([facility])    • Visual  impairment    • Wears glasses        Past Surgical History:  Past Surgical History:   Procedure Laterality Date   •  SECTION     •  SECTION WITH TUBAL N/A 2017    Procedure:  SECTION REPEAT WITH TUBAL;  Surgeon: Fabien Blake MD;  Location: [facility] LABOR DELIVERY;  Service:    • CHOLECYSTECTOMY      age 21   • COLONOSCOPY     • DIAGNOSTIC LAPAROSCOPY      X 4   • ENDOMETRIAL ABLATION     • PELVIC LAPAROSCOPY      age 21   • TONSILLECTOMY      age 23   • TOTAL LAPAROSCOPIC HYSTERECTOMY N/A 2019    Procedure: TOTAL LAPAROSCOPIC HYSTERECTOMY BILATERAL SALPINGECTOMY WITH DAVINCI ROBOT;  Surgeon: Amador Richey MD;  Location: [facility] OR;  Service: DaVinci   • TUBAL ABDOMINAL LIGATION  2017   • WISDOM TOOTH EXTRACTION         Medications:    Current Outpatient Medications:   •  acetaminophen (TYLENOL) 325 MG tablet, Take 2 tablets by mouth Every 6 (Six) Hours As Needed for Mild Pain ., Disp: , Rfl:   •  albuterol sulfate  (90 Base) MCG/ACT inhaler, Inhale 2 puffs Every 6 (Six) Hours As Needed for Wheezing., Disp: 18 g, Rfl: 11  •  cetirizine (zyrTEC) 10 MG tablet, Take 10 mg by mouth Daily., Disp: , Rfl:   •  FLUoxetine (PROzac) 40 MG capsule, Take 1 capsule by mouth Daily., Disp: 30 capsule, Rfl: 0  •  fluticasone (Flovent HFA) 220 MCG/ACT inhaler, Inhale 1 puff 2 (Two) Times a Day., Disp: 12 g, Rfl: 11  •  hydrOXYzine (ATARAX) 25 MG tablet, Take 1-2 tablets by mouth 3 (Three) Times a Day As Needed for Anxiety., Disp: 180 tablet, Rfl: 0  •  lamoTRIgine (LaMICtal) 200 MG tablet, Take 1 tablet by mouth Daily., Disp: 30 tablet, Rfl: 0  •  lisinopril (PRINIVIL,ZESTRIL) 40 MG tablet, Take 1 tablet by mouth Daily., Disp: 90 tablet, Rfl: 1  •  mirtazapine (Remeron) 15 MG tablet, Take 1 tablet by mouth Every Night for 30 days., Disp: 30 tablet, Rfl: 0  •  propranolol (INDERAL) 10 MG tablet, Take 1 tablet by mouth 3 (Three) Times a Day., Disp: 90 tablet, Rfl: 1  •  rOPINIRole  (REQUIP) 0.25 MG tablet, Take 1 tablet by mouth Every Night. Take 1 hour before bedtime., Disp: 90 tablet, Rfl: 1  •  montelukast (SINGULAIR) 10 MG tablet, Take 1 tablet by mouth every night at bedtime., Disp: 90 tablet, Rfl: 1  •  oxybutynin XL (Ditropan XL) 10 MG 24 hr tablet, Take 1 tablet by mouth Daily for 90 days., Disp: 90 tablet, Rfl: 0    Allergies:  Allergies   Allergen Reactions   • Bupropion Other (See Comments) and Provider Review Needed     Seizure / wellbutrin    • Naproxen Rash   • Nsaids Rash   • Sulfa Antibiotics Rash       Social History:  Social History     Socioeconomic History   • Marital status: Single   Tobacco Use   • Smoking status: Every Day     Packs/day: 1.00     Years: 10.00     Pack years: 10.00     Types: Cigarettes     Start date: 10/9/2002   • Smokeless tobacco: Never   • Tobacco comments:     cutting back, encourage cessation   Vaping Use   • Vaping Use: Never used   Substance and Sexual Activity   • Alcohol use: Not Currently     Alcohol/week: 1.0 standard drink     Types: 1 Drinks containing 0.5 oz of alcohol per week   • Drug use: Not Currently     Frequency: 5.0 times per week     Types: \"Crack\" cocaine, Cocaine(coke), Marijuana, Methamphetamines   • Sexual activity: Yes     Partners: Male     Birth control/protection: None, Same-sex partner       Family History:  Family History   Problem Relation Age of Onset   • Asthma Mother    • Hypertension Mother    • Breast cancer Mother    • Diabetes Mother    • Colon polyps Mother    • Cancer Mother    • Anxiety disorder Mother    • Arthritis Mother    • Depression Mother    • Mental illness Mother    • Hypertension Father    • Alcohol abuse Father    • Arthritis Father    • Asthma Father    • COPD Father    • Depression Father    • Drug abuse Father    • Hyperlipidemia Father    • Asthma Brother    • Drug abuse Brother    • Alcohol abuse Brother    • Depression Brother    • Hyperlipidemia Brother    • Melanoma Maternal Grandfather     • Cancer Maternal Grandfather    • Colon polyps Maternal Grandmother    • Diabetes Maternal Grandmother    • Cancer Maternal Grandmother    • Stomach cancer Paternal Grandmother    • Hypertension Paternal Grandmother    • Osteoporosis Paternal Grandmother    • Heart attack Paternal Grandfather 36   • Diabetes Paternal Grandfather    • Hypertension Brother    • Developmental Disability Son    • Learning disabilities Son    • Developmental Disability Son    • Learning disabilities Son    • Developmental Disability Son    • Learning disabilities Son    • Ovarian cancer Neg Hx    • Uterine cancer Neg Hx          Objective     Physical Exam:   Vital Signs:   Vitals:    01/06/23 0845   Height: 160 cm (62.99\")     Body mass index is 32.82 kg/m².     Physical Exam  Vitals and nursing note reviewed.   Constitutional:       Appearance: Normal appearance.   HENT:      Head: Normocephalic and atraumatic.      Nose: Nose normal.      Mouth/Throat:      Mouth: Mucous membranes are moist.   Eyes:      Pupils: Pupils are equal, round, and reactive to light.   Pulmonary:      Effort: Pulmonary effort is normal.   Abdominal:      General: Abdomen is flat.      Palpations: Abdomen is soft.   Musculoskeletal:         General: Normal range of motion.      Cervical back: Normal range of motion.   Skin:     General: Skin is warm and dry.      Capillary Refill: Capillary refill takes less than 2 seconds.   Neurological:      General: No focal deficit present.      Mental Status: She is alert.   Psychiatric:         Mood and Affect: Mood normal.         Labs:   Brief Urine Lab Results  (Last result in the past 365 days)      Color   Clarity   Blood   Leuk Est   Nitrite   Protein   CREAT   Urine HCG        01/06/23 0904 Yellow   Clear   Negative   Negative   Negative   Negative                 Urine Culture    Urine Culture 9/16/22 12/1/22 12/19/22   Urine Culture No growth >100,000 CFU/mL Streptococcus agalactiae (Group B) (A) No growth    (A) Abnormal value       Comments are available for some flowsheets but are not being displayed.              Lab Results   Component Value Date    GLUCOSE 98 09/15/2022    CALCIUM 9.5 09/15/2022     09/15/2022    K 4.1 09/15/2022    CO2 26.0 09/15/2022     09/15/2022    BUN 11 09/15/2022    CREATININE 0.89 09/15/2022    EGFRIFAFRI >60 06/04/2022    EGFRIFNONA >60 06/04/2022    BCR 12.4 09/15/2022    ANIONGAP 12.0 09/15/2022       Lab Results   Component Value Date    WBC 10.40 09/15/2022    HGB 15.3 09/15/2022    HCT 45.4 09/15/2022    MCV 87.6 09/15/2022     09/15/2022       Images:   No Images in the past 120 days found..    Measures:   Tobacco:   Chen Galaviz  reports that she has been smoking cigarettes. She started smoking about 20 years ago. She has a 10.00 pack-year smoking history. She has never used smokeless tobacco..    I advised her to quit and she is Not Willing to Quit Tobacco Products     Urine Incontinence: Urinary urgency with leakage    Assessment / Plan      Assessment:  Guillaume is a 32 y.o. who presented today with history of recurrent urinary tract infections as well as urinary urge incontinence.     Today we discussed patients symptoms and recurrent UTI prophylaxis. We discussed beginning Cranberry supplements and a daily probiotic. We also discussed the use of D-Mannose daily as well as possibly beginning Hiprex if needed. We discussed the need for long-term antibiotics to sterilize the urinary tract if she continues to have recurrent UTIs. Encouraged patient to stay hydrate/push fluids. Discussed voiding after intercourse and wiping front to back.     She will begin daily cranberry supplements, daily probiotic and daily D-Mannose for UTI prophylaxis. She will also begin Oxybutynin 10mg XL for her OAB symptoms. We discussed conservative treatment for her OAB symptoms as well to include decreasing caffiene as well as double and timed voiding. UA without infection today.      She will return to the office in one month to assess symptoms. I instructed her to notify our office if she develops another UTI before her next follow up appointment. She is understanding and agreeable with this plan.     Diagnoses and all orders for this visit:    1. Recurrent UTI (Primary)  -     POC Urinalysis Dipstick, Automated    2. Urinary urgency  -     oxybutynin XL (Ditropan XL) 10 MG 24 hr tablet; Take 1 tablet by mouth Daily for 90 days.  Dispense: 90 tablet; Refill: 0         Follow Up:   Return for 4 weeks Nancy Le .    I spent approximately 30 minutes providing clinical care for this patient; including review of patient's chart and provider documentation, face to face time spent with patient in examination room (obtaining history, performing physical exam, discussing diagnosis and management options), placing orders, and completing patient documentation.     FRANCHESKA Forman  [facility] [place]

## 2023-01-12 ENCOUNTER — TELEPHONE (OUTPATIENT)
Dept: UROLOGY | Facility: CLINIC | Age: 33
End: 2023-01-12
Payer: MEDICARE

## 2023-01-12 NOTE — TELEPHONE ENCOUNTER
I called the patient back and she is going to call her provider and have them send the results to us via fax.

## 2023-01-12 NOTE — TELEPHONE ENCOUNTER
Patient saw Nancy for recurrent UTI's, but was negative at visit, she then saw her PCP and they sent a culture that was positive for 3 different strains of organisms.  She said that Nancy told her to call here if she had another infection.  She has been on Amoxicillin, Cipro, and Macrobid in the last couple of months with no help.  Please advise?

## 2023-01-16 ENCOUNTER — TELEPHONE (OUTPATIENT)
Dept: INTERNAL MEDICINE | Facility: CLINIC | Age: 33
End: 2023-01-16

## 2023-01-16 NOTE — TELEPHONE ENCOUNTER
I spoke to the PCP's office that she has listed on her chart and they said that she hasn't had a culture there since December but they're going to send the results to those.  They also said that she had a urine dip stick on 1/6/23 that was negative.

## 2023-01-16 NOTE — TELEPHONE ENCOUNTER
urology called and requested the pts dipstick UA and culture be faxed to [phone], which I did.

## 2023-01-19 ENCOUNTER — TELEMEDICINE (OUTPATIENT)
Dept: PSYCHIATRY | Facility: CLINIC | Age: 33
End: 2023-01-19
Payer: MEDICARE

## 2023-01-19 DIAGNOSIS — F51.05 INSOMNIA DUE TO MENTAL CONDITION: Primary | ICD-10-CM

## 2023-01-19 DIAGNOSIS — F41.1 GENERALIZED ANXIETY DISORDER: Chronic | ICD-10-CM

## 2023-01-19 DIAGNOSIS — F31.32 BIPOLAR AFFECTIVE DISORDER, CURRENTLY DEPRESSED, MODERATE: Chronic | ICD-10-CM

## 2023-01-19 PROCEDURE — 99214 OFFICE O/P EST MOD 30 MIN: CPT

## 2023-01-19 RX ORDER — LAMOTRIGINE 200 MG/1
200 TABLET ORAL DAILY
Qty: 30 TABLET | Refills: 0 | Status: SHIPPED | OUTPATIENT
Start: 2023-01-19 | End: 2023-02-21 | Stop reason: SDUPTHER

## 2023-01-19 RX ORDER — HYDROXYZINE HYDROCHLORIDE 25 MG/1
25-50 TABLET, FILM COATED ORAL 3 TIMES DAILY PRN
Qty: 180 TABLET | Refills: 0 | Status: SHIPPED | OUTPATIENT
Start: 2023-01-19 | End: 2023-02-21 | Stop reason: SDUPTHER

## 2023-01-19 RX ORDER — TRAZODONE HYDROCHLORIDE 50 MG/1
50-100 TABLET ORAL NIGHTLY PRN
Qty: 60 TABLET | Refills: 0 | Status: SHIPPED | OUTPATIENT
Start: 2023-01-19 | End: 2023-02-21 | Stop reason: SDUPTHER

## 2023-01-19 RX ORDER — FLUOXETINE HYDROCHLORIDE 40 MG/1
40 CAPSULE ORAL DAILY
Qty: 30 CAPSULE | Refills: 0 | Status: SHIPPED | OUTPATIENT
Start: 2023-01-19 | End: 2023-02-21 | Stop reason: SDUPTHER

## 2023-01-19 NOTE — PROGRESS NOTES
This provider is located at [place], [place]. The Patient is seen remotely using Video. Patient is being seen via telehealth and confirm that they are in a secure environment for this session. Patient is located in [place], [place] at her home. The patient's condition being diagnosed/treated is appropriate for telemedicine. Provider identified as Maribell Hernandez as well as credentials FRANCHESKA MSN PMHNP-BC.   The client/patient gave consent to be seen remotely, and when consent is given they understand that the consent allows for patient identifiable information to be sent to a third party as needed.  They may refuse to be seen remotely at any time. The electronic data is encrypted and password protected, and the patient has been advised of the potential risks to privacy not withstanding such measures.    Chief Complaint  Depression, Anxiety, Sleeping Problem, and Manic Behavior    Subjective        Chen Galaviz presents to [facility] for   History of Present Illness  Is seen today for a follow-up visit for bipolar disorder, anxiety, and insomnia.  Patient reports that the increase in Prozac was helpful for her anxiety.  She now rates her anxiety a 5 on a 1-10 scale with 10 being the worst.  States her worry and overthinking is decreased.  States she is not experienced anymore the panic type symptoms she had complained about before.  She states she takes hydroxyzine about once daily and states it is helpful.  She rates depression a 4-5 on a 1-10 scale with 10 being the worst.  Denies hopelessness.  Denies any suicidal or homicidal ideation.  States the Remeron was causing her to sleepwalk.  States she woke up smoking a cigarette when not, so she would like to change off of that medication.  States she had that same experience with Seroquel in the past.  Appetite is good.  Denies any recurrence of any hypomanic type symptoms.  Denies any paranoia.  Denies any auditory or visual  hallucinations.  Outside of the sleepwalking, denies any side effects to the medications.  Objective   Vital Signs:   There were no vitals taken for this visit.      Mental Status Exam:   Hygiene:   good  Cooperation:  Cooperative  Eye Contact:  Good  Psychomotor Behavior:  Appropriate  Affect:  Full range  Mood: normal  Speech:  Normal  Thought Process:  Goal directed  Thought Content:  Normal  Suicidal:  None  Homicidal:  None  Hallucinations:  None  Delusion:  None  Memory:  Intact  Orientation:  Person, Place, Time and Situation  Reliability:  good  Insight:  Good  Judgement:  Good  Impulse Control:  Good  Physical/Medical Issues:  No      Previous Provider notes and available records reviewed by this APRN today.   Current Medications:   Current Outpatient Medications   Medication Sig Dispense Refill   • acetaminophen (TYLENOL) 325 MG tablet Take 2 tablets by mouth Every 6 (Six) Hours As Needed for Mild Pain .     • albuterol sulfate  (90 Base) MCG/ACT inhaler Inhale 2 puffs Every 6 (Six) Hours As Needed for Wheezing. 18 g 11   • cetirizine (zyrTEC) 10 MG tablet Take 10 mg by mouth Daily.     • FLUoxetine (PROzac) 40 MG capsule Take 1 capsule by mouth Daily. 30 capsule 0   • fluticasone (Flovent HFA) 220 MCG/ACT inhaler Inhale 1 puff 2 (Two) Times a Day. 12 g 11   • hydrOXYzine (ATARAX) 25 MG tablet Take 1-2 tablets by mouth 3 (Three) Times a Day As Needed for Anxiety. 180 tablet 0   • lamoTRIgine (LaMICtal) 200 MG tablet Take 1 tablet by mouth Daily. 30 tablet 0   • lisinopril (PRINIVIL,ZESTRIL) 40 MG tablet Take 1 tablet by mouth Daily. 90 tablet 1   • oxybutynin XL (Ditropan XL) 10 MG 24 hr tablet Take 1 tablet by mouth Daily for 90 days. 90 tablet 0   • propranolol (INDERAL) 10 MG tablet Take 1 tablet by mouth 3 (Three) Times a Day. 90 tablet 1   • rOPINIRole (REQUIP) 0.25 MG tablet Take 1 tablet by mouth Every Night. Take 1 hour before bedtime. 90 tablet 1   • traZODone (DESYREL) 50 MG tablet Take  1-2 tablets by mouth At Night As Needed for Sleep. 60 tablet 0     No current facility-administered medications for this visit.       Physical Exam   Result Review :    The following data was reviewed by: FRANCHESKA Hernandez on 01/19/2023:  Common labs    Common Labs 5/23/22 5/23/22 6/4/22 6/4/22 9/15/22 9/15/22 9/15/22 9/15/22    1522 1522 1842 1842 0822 0822 0822 0822   Glucose  87     98    Glucose    89       BUN  12  16   11    Creatinine  0.92  0.96   0.89    eGFR Non  Am    >60       eGFR African Am    >60       Sodium  136  138   139    Potassium  4.4  4.0   4.1    Chloride  103  106   101    Calcium  9.3  8.6 (A)   9.5    Albumin  4.50  4.1   4.40    Total Bilirubin  0.2  0.2   0.2    Alkaline Phosphatase  102  112 (A)   112    AST (SGOT)  14  8 (A)   14    ALT (SGPT)  10  8   10    WBC 10.36  10.77 (A)  10.40      Hemoglobin 15.6  15.6  15.3      Hematocrit 46.8 (A)  46.2 (A)  45.4      Platelets 272  302  324      Total Cholesterol        194   Triglycerides        133   HDL Cholesterol        39 (A)   LDL Cholesterol         131 (A)   Hemoglobin A1C      5.20     (A) Abnormal value       Comments are available for some flowsheets but are not being displayed.              Assessment and Plan   Problem List Items Addressed This Visit        Mental Health    Generalized anxiety disorder (Chronic)    Relevant Medications    hydrOXYzine (ATARAX) 25 MG tablet    FLUoxetine (PROzac) 40 MG capsule    traZODone (DESYREL) 50 MG tablet    Bipolar affective disorder, currently depressed, moderate (HCC)    Relevant Medications    hydrOXYzine (ATARAX) 25 MG tablet    lamoTRIgine (LaMICtal) 200 MG tablet    FLUoxetine (PROzac) 40 MG capsule    traZODone (DESYREL) 50 MG tablet   Other Visit Diagnoses     Insomnia due to mental condition    -  Primary    Relevant Medications    hydrOXYzine (ATARAX) 25 MG tablet    FLUoxetine (PROzac) 40 MG capsule    traZODone (DESYREL) 50 MG tablet        Discussed treatment  options with patient.  Discontinue Remeron as patient was having some sleepwalking issues with that.  Discussed trazodone with patient.  States she did take that in the past and it worked for her.  Start trazodone 50 mg take 1 to 2 tablets nightly as needed for sleep.  Continue Lamictal 200 mg daily for bipolar disorder.  Continue Prozac 40 mg daily for anxiety.  Continue hydroxyzine 25 mg 3 times daily as needed for anxiety.  We will see patient again in 4 weeks to reassess.  Encouraged patient to contact the office if she has any issues sooner.    TREATMENT PLAN/GOALS: Continue supportive psychotherapy efforts and medications as indicated. Treatment and medication options discussed during today's visit. Patient ackowledged and verbally consented to continue with current treatment plan and was educated on the importance of compliance with treatment and follow-up appointments.    DEPRESSION:  Patient screened positive for depression based on a PHQ-9 score of  on . Follow-up recommendations include: Prescribed antidepressant medication treatment.       MEDICATION ISSUES:  We discussed risks, benefits, and side effects of the above medications and the patient was agreeable with the plan. Patient was educated on the importance of compliance with treatment and follow-up appointments.  Patient is agreeable to call the office with any worsening of symptoms or onset of side effects. Patient is agreeable to call 911 or go to the nearest ER should he/she begin having SI/HI.      Counseled patient regarding multimodal approach with healthy nutrition, healthy sleep, regular physical activity, social activities, counseling, and medications.      Coping skills reviewed and encouraged positive framing of thoughts     Assisted patient in processing above session content; acknowledged and normalized patient’s thoughts, feelings, and concerns.  Applied  positive coping skills and behavior management in session.  Allowed patient to  freely discuss issues without interruption or judgment. Provided safe, confidential environment to facilitate the development of positive therapeutic relationship and encourage open, honest communication. Assisted patient in identifying risk factors which would indicate the need for higher level of care including thoughts to harm self or others and/or self-harming behavior and encouraged patient to contact this office, call 911, or present to the nearest emergency room should any of these events occur. Discussed crisis intervention services and means to access. If patient has any concerns or needs assistance they were instructed to call the [facility] at [phone].    MEDS ORDERED DURING VISIT:  New Medications Ordered This Visit   Medications   • hydrOXYzine (ATARAX) 25 MG tablet     Sig: Take 1-2 tablets by mouth 3 (Three) Times a Day As Needed for Anxiety.     Dispense:  180 tablet     Refill:  0   • lamoTRIgine (LaMICtal) 200 MG tablet     Sig: Take 1 tablet by mouth Daily.     Dispense:  30 tablet     Refill:  0   • FLUoxetine (PROzac) 40 MG capsule     Sig: Take 1 capsule by mouth Daily.     Dispense:  30 capsule     Refill:  0   • traZODone (DESYREL) 50 MG tablet     Sig: Take 1-2 tablets by mouth At Night As Needed for Sleep.     Dispense:  60 tablet     Refill:  0         Follow Up   Return in about 4 weeks (around 2/16/2023) for Video visit.    Patient was given instructions and counseling regarding her condition or for health maintenance advice. Please see specific information pulled into the AVS if appropriate.         This document has been electronically signed by FRANCHESKA Hernandez  January 19, 2023 08:56 EST    Part of this note may be an electronic transcription/translation of spoken language to printed text using the Dragon Dictation System.

## 2023-01-30 ENCOUNTER — TELEPHONE (OUTPATIENT)
Dept: PSYCHIATRY | Facility: CLINIC | Age: 33
End: 2023-01-30
Payer: MEDICARE

## 2023-02-03 ENCOUNTER — OFFICE VISIT (OUTPATIENT)
Dept: UROLOGY | Facility: CLINIC | Age: 33
End: 2023-02-03
Payer: MEDICARE

## 2023-02-03 VITALS
DIASTOLIC BLOOD PRESSURE: 76 MMHG | OXYGEN SATURATION: 97 % | BODY MASS INDEX: 33.49 KG/M2 | HEART RATE: 91 BPM | HEIGHT: 63 IN | SYSTOLIC BLOOD PRESSURE: 124 MMHG | WEIGHT: 189 LBS

## 2023-02-03 DIAGNOSIS — N39.0 RECURRENT UTI: ICD-10-CM

## 2023-02-03 DIAGNOSIS — R39.15 URINARY URGENCY: ICD-10-CM

## 2023-02-03 PROCEDURE — 81003 URINALYSIS AUTO W/O SCOPE: CPT | Performed by: NURSE PRACTITIONER

## 2023-02-03 PROCEDURE — 99213 OFFICE O/P EST LOW 20 MIN: CPT | Performed by: NURSE PRACTITIONER

## 2023-02-03 PROCEDURE — 87086 URINE CULTURE/COLONY COUNT: CPT | Performed by: NURSE PRACTITIONER

## 2023-02-03 PROCEDURE — 51798 US URINE CAPACITY MEASURE: CPT | Performed by: NURSE PRACTITIONER

## 2023-02-03 RX ORDER — METHENAMINE HIPPURATE 1000 MG/1
1 TABLET ORAL 2 TIMES DAILY WITH MEALS
Qty: 60 TABLET | Refills: 2 | Status: SHIPPED | OUTPATIENT
Start: 2023-02-03 | End: 2023-03-05

## 2023-02-03 NOTE — PROGRESS NOTES
[facility] Female Office Visit      Patient Name: Chen Galaviz  : 1990   MRN: [id number]     Chief Complaint:  Lower Urinary Tract Symptoms.   Chief Complaint   Patient presents with   • Recurrent UTI   • Urinary Urgency       Referring Provider: No ref. provider found    History of Present Illness: Mr. Galaviz is a 32 y.o. female with history lower urinary tract symptoms. She was last seen in our office one month ago for history of recurrent UTIs and Overactive bladder.     She underwent CT A/P wo in  to assess for nephrolithiasis, no evidence of stones at that time. She does not have a history of stones.      Urine Cx ; + Strep Group B  Urine Cx ; + E.coli  Urine Cx ; + E.Coli     She has been taking Oxybutynin 10mg daily which has moderately helped to improve her urinary urgency and frequency.   She currently reports dysuria and intermittent right flank pain. She has been taking daily cranberry, daily probiotics and daily D-mannose for UTI prophylaxis.     She does not drink caffeine.     UA today without blood or infection.     She is a 1/2-1 PPD smoker x15 years     Subjective      Review of System: Review of Systems   Genitourinary: Positive for dysuria, frequency and urgency.      I have reviewed the ROS documented by my clinical staff, I have updated appropriately and I agree. FRANCHESKA Forman    Past Medical History:  Past Medical History:   Diagnosis Date   • Abnormal liver enzymes 2016   • ADHD (attention deficit hyperactivity disorder)    • Allergic    • Allergic rhinitis    • Anxiety    • Arthritis     since age 21 in her back   • Arthritis of back N/a   • Asthma     seasonal   • Back pain    • Bacterial vaginosis 2016   • Bipolar disorder (HCC)     dx age 19   • Chest pain, pleuritic    • Common migraine with intractable migraine 2016   • CTS (carpal tunnel syndrome) 23   • Depression    • Dyslipidemia    • Dysmenorrhea 2016   •  Dyspareunia, female 2018    Added automatically from request for surgery [id number]   • Endometriosis    • Fatigue    • Febrile seizure (HCC)     FEBRILE SEIZURE AT 3YO AND AT 16YO D/T WELBUTRIN   • Frequent UTI    • Gastroesophageal reflux disease 2016   • GERD (gastroesophageal reflux disease)    • Gestational hypertension     History of gestational hypertension. States blood pressure is sometimes high but does not take any medications.   • Headache    • Herpes zoster     denies this visit   • History of robot-assisted laparoscopic hysterectomy/BS 2019   • Hyperlipidemia    • IBS (irritable bowel syndrome)    • Itching    • Knee swelling N/A   • Low back pain    • Lung mass 2016    Description: A.  CT scan of the chest 2014 reports that the previously seen nodule of the left lower lobe is no longer visualized and the micronodular densities along the lateral aspect of the right lower lobe are stable.  There is no change in the appearance of the right axillary lymph nodes.   • Mental retardation     A. [place] to be related to hypoxia at birth due to placenta previa   • Migraine    • Migraine    • Multiple gestation    • Obesity    • Onychomycosis of toenail 2016    Impression: 2016 - Refer to podiatry.;    • Organic hypersomnia    • Ovarian cyst    • Pain, upper back    • PMS (premenstrual syndrome)    • Pulmonary nodule    • S/P 2013 cholecystectomy 2018   • Schizophrenia (HCC)    • Severe frontal headaches 2018   • Sinus tachycardia    • Substance abuse (HCC)    • Visual impairment    • Wears glasses        Past Surgical History:  Past Surgical History:   Procedure Laterality Date   •  SECTION     •  SECTION WITH TUBAL N/A 2017    Procedure:  SECTION REPEAT WITH TUBAL;  Surgeon: Fabien Blake MD;  Location: [facility] LABOR DELIVERY;  Service:    • CHOLECYSTECTOMY      age 21   • COLONOSCOPY     • DIAGNOSTIC LAPAROSCOPY       X 4   • ENDOMETRIAL ABLATION     • PELVIC LAPAROSCOPY      age 21   • TONSILLECTOMY      age 23   • TOTAL LAPAROSCOPIC HYSTERECTOMY N/A 01/23/2019    Procedure: TOTAL LAPAROSCOPIC HYSTERECTOMY BILATERAL SALPINGECTOMY WITH DAVINCI ROBOT;  Surgeon: Amador Richey MD;  [facility];  [facility]   • TUBAL ABDOMINAL LIGATION  2/24/2017   • WISDOM TOOTH EXTRACTION         Medications:    Current Outpatient Medications:   •  acetaminophen (TYLENOL) 325 MG tablet, Take 2 tablets by mouth Every 6 (Six) Hours As Needed for Mild Pain ., Disp: , Rfl:   •  albuterol sulfate  (90 Base) MCG/ACT inhaler, Inhale 2 puffs Every 6 (Six) Hours As Needed for Wheezing., Disp: 18 g, Rfl: 11  •  cetirizine (zyrTEC) 10 MG tablet, Take 10 mg by mouth Daily., Disp: , Rfl:   •  FLUoxetine (PROzac) 40 MG capsule, Take 1 capsule by mouth Daily., Disp: 30 capsule, Rfl: 0  •  fluticasone (Flovent HFA) 220 MCG/ACT inhaler, Inhale 1 puff 2 (Two) Times a Day., Disp: 12 g, Rfl: 11  •  hydrOXYzine (ATARAX) 25 MG tablet, Take 1-2 tablets by mouth 3 (Three) Times a Day As Needed for Anxiety., Disp: 180 tablet, Rfl: 0  •  lamoTRIgine (LaMICtal) 200 MG tablet, Take 1 tablet by mouth Daily., Disp: 30 tablet, Rfl: 0  •  lisinopril (PRINIVIL,ZESTRIL) 40 MG tablet, Take 1 tablet by mouth Daily., Disp: 90 tablet, Rfl: 1  •  oxybutynin XL (Ditropan XL) 10 MG 24 hr tablet, Take 1 tablet by mouth Daily for 90 days., Disp: 90 tablet, Rfl: 0  •  propranolol (INDERAL) 10 MG tablet, Take 1 tablet by mouth 3 (Three) Times a Day., Disp: 90 tablet, Rfl: 1  •  rOPINIRole (REQUIP) 0.25 MG tablet, Take 1 tablet by mouth Every Night. Take 1 hour before bedtime., Disp: 90 tablet, Rfl: 1  •  traZODone (DESYREL) 50 MG tablet, Take 1-2 tablets by mouth At Night As Needed for Sleep., Disp: 60 tablet, Rfl: 0  •  methenamine (HIPREX) 1 g tablet, Take 1 tablet by mouth 2 (Two) Times a Day With Meals for 30 days., Disp: 60 tablet, Rfl: 2    Allergies:  Allergies  "  Allergen Reactions   • Bupropion Other (See Comments) and Provider Review Needed     Seizure / wellbutrin    • Naproxen Rash   • Nsaids Rash   • Sulfa Antibiotics Rash       Social History:  Social History     Socioeconomic History   • Marital status: Single   Tobacco Use   • Smoking status: Every Day     Packs/day: 1.00     Years: 10.00     Pack years: 10.00     Types: Cigarettes     Start date: 10/9/2002   • Smokeless tobacco: Never   • Tobacco comments:     cutting back, encourage cessation   Vaping Use   • Vaping Use: Never used   Substance and Sexual Activity   • Alcohol use: Not Currently     Alcohol/week: 1.0 standard drink     Types: 1 Drinks containing 0.5 oz of alcohol per week   • Drug use: Not Currently     Frequency: 5.0 times per week     Types: \"Crack\" cocaine, Cocaine(coke), Marijuana, Methamphetamines   • Sexual activity: Yes     Partners: Male     Birth control/protection: None, Same-sex partner       Family History:  Family History   Problem Relation Age of Onset   • Asthma Mother    • Hypertension Mother    • Breast cancer Mother    • Diabetes Mother    • Colon polyps Mother    • Cancer Mother    • Anxiety disorder Mother    • Arthritis Mother    • Depression Mother    • Mental illness Mother    • Hypertension Father    • Alcohol abuse Father    • Arthritis Father    • Asthma Father    • COPD Father    • Depression Father    • Drug abuse Father    • Hyperlipidemia Father    • Asthma Brother    • Drug abuse Brother    • Alcohol abuse Brother    • Depression Brother    • Hyperlipidemia Brother    • Melanoma Maternal Grandfather    • Cancer Maternal Grandfather    • Colon polyps Maternal Grandmother    • Diabetes Maternal Grandmother    • Cancer Maternal Grandmother    • Stomach cancer Paternal Grandmother    • Hypertension Paternal Grandmother    • Osteoporosis Paternal Grandmother    • Heart attack Paternal Grandfather 36   • Diabetes Paternal Grandfather    • Hypertension Brother    • " "Developmental Disability Son    • Learning disabilities Son    • Developmental Disability Son    • Learning disabilities Son    • Developmental Disability Son    • Learning disabilities Son    • Ovarian cancer Neg Hx    • Uterine cancer Neg Hx          Post void residual bladder scan:    0 mL    Objective     Physical Exam:   Vital Signs:   Vitals:    02/03/23 1039   BP: 124/76   Pulse: 91   SpO2: 97%   Weight: 85.7 kg (189 lb)   Height: 160 cm (62.99\")     Body mass index is 33.49 kg/m².     Physical Exam  Vitals and nursing note reviewed.   Constitutional:       Appearance: Normal appearance.   HENT:      Head: Normocephalic and atraumatic.      Nose: Nose normal.      Mouth/Throat:      Mouth: Mucous membranes are moist.   Eyes:      Pupils: Pupils are equal, round, and reactive to light.   Pulmonary:      Effort: Pulmonary effort is normal.   Abdominal:      General: Abdomen is flat.      Palpations: Abdomen is soft.   Genitourinary:     Comments: Intermittent right flank pain  Musculoskeletal:         General: Normal range of motion.      Cervical back: Normal range of motion.   Skin:     General: Skin is warm and dry.      Capillary Refill: Capillary refill takes less than 2 seconds.   Neurological:      General: No focal deficit present.      Mental Status: She is alert.   Psychiatric:         Mood and Affect: Mood normal.         Labs:   Brief Urine Lab Results  (Last result in the past 365 days)      Color   Clarity   Blood   Leuk Est   Nitrite   Protein   CREAT   Urine HCG        02/03/23 1051 Yellow   Clear   Negative   Negative   Negative   Negative                 Urine Culture    Urine Culture 9/16/22 12/1/22 12/19/22   Urine Culture No growth >100,000 CFU/mL Streptococcus agalactiae (Group B) (A) No growth   (A) Abnormal value       Comments are available for some flowsheets but are not being displayed.              Lab Results   Component Value Date    GLUCOSE 98 09/15/2022    CALCIUM 9.5 09/15/2022 "     09/15/2022    K 4.1 09/15/2022    CO2 26.0 09/15/2022     09/15/2022    BUN 11 09/15/2022    CREATININE 0.89 09/15/2022    EGFRIFAFRI >60 06/04/2022    EGFRIFNONA >60 06/04/2022    BCR 12.4 09/15/2022    ANIONGAP 12.0 09/15/2022       Lab Results   Component Value Date    WBC 10.40 09/15/2022    HGB 15.3 09/15/2022    HCT 45.4 09/15/2022    MCV 87.6 09/15/2022     09/15/2022       Images:   No Images in the past 120 days found..    Measures:   Tobacco:   Chen Galaviz  reports that she has been smoking cigarettes. She started smoking about 20 years ago. She has a 10.00 pack-year smoking history. She has never used smokeless tobacco..    I advised her to quit and she would like to try to quit smoking.     Urine Incontinence: Patient reports that she is not currently experiencing any symptoms of urinary incontinence.    Assessment / Plan      Assessment:  Mrs. Galaviz is a 32 y.o. female who presented today with history of recurrent UTIs and overactive bladder. She will continue taking Oxybutynin 10mg daily for her OAB symptoms. She will continue to work on double and timed voiding.     We discussed beginning Hiprex for UTI prophylaxis. She will beginning taking Hiprex 1gm BID. She will continue daily probiotic, daily cranberry and D-mannose.     UA without infection today, however she reports dysuria and right flank pain. Will send for culture and will call with results. She will follow up in 3 months. Instructed to notify our office sooner if develops UTI. She is understanding and agreeable with plan of care.     Diagnoses and all orders for this visit:    1. Recurrent UTI  -     POC Urinalysis Dipstick, Automated  -     methenamine (HIPREX) 1 g tablet; Take 1 tablet by mouth 2 (Two) Times a Day With Meals for 30 days.  Dispense: 60 tablet; Refill: 2    2. Urinary urgency  -     POC Urinalysis Dipstick, Automated         Follow Up:   Return for 3 Months .    I spent approximately 30 minutes  providing clinical care for this patient; including review of patient's chart and provider documentation, face to face time spent with patient in examination room (obtaining history, performing physical exam, discussing diagnosis and management options), placing orders, and completing patient documentation.     FRANCHESKA Forman  [facility]

## 2023-02-05 LAB — BACTERIA SPEC AEROBE CULT: NO GROWTH

## 2023-02-12 DIAGNOSIS — F31.32 BIPOLAR AFFECTIVE DISORDER, CURRENTLY DEPRESSED, MODERATE: Chronic | ICD-10-CM

## 2023-02-12 DIAGNOSIS — F41.1 GENERALIZED ANXIETY DISORDER: Chronic | ICD-10-CM

## 2023-02-13 RX ORDER — HYDROXYZINE HYDROCHLORIDE 25 MG/1
25-50 TABLET, FILM COATED ORAL 3 TIMES DAILY PRN
Qty: 180 TABLET | Refills: 0 | OUTPATIENT
Start: 2023-02-13

## 2023-02-13 RX ORDER — LAMOTRIGINE 200 MG/1
TABLET ORAL
Qty: 30 TABLET | Refills: 0 | OUTPATIENT
Start: 2023-02-13

## 2023-02-21 ENCOUNTER — TELEPHONE (OUTPATIENT)
Dept: PSYCHIATRY | Facility: CLINIC | Age: 33
End: 2023-02-21
Payer: MEDICARE

## 2023-02-21 DIAGNOSIS — F31.32 BIPOLAR AFFECTIVE DISORDER, CURRENTLY DEPRESSED, MODERATE: Chronic | ICD-10-CM

## 2023-02-21 DIAGNOSIS — F41.1 GENERALIZED ANXIETY DISORDER: Chronic | ICD-10-CM

## 2023-02-21 DIAGNOSIS — F51.05 INSOMNIA DUE TO MENTAL CONDITION: ICD-10-CM

## 2023-02-21 RX ORDER — FLUOXETINE HYDROCHLORIDE 40 MG/1
40 CAPSULE ORAL DAILY
Qty: 30 CAPSULE | Refills: 0 | Status: SHIPPED | OUTPATIENT
Start: 2023-02-21 | End: 2023-02-28 | Stop reason: ALTCHOICE

## 2023-02-21 RX ORDER — HYDROXYZINE HYDROCHLORIDE 25 MG/1
25-50 TABLET, FILM COATED ORAL 3 TIMES DAILY PRN
Qty: 180 TABLET | Refills: 0 | Status: SHIPPED | OUTPATIENT
Start: 2023-02-21 | End: 2023-02-28 | Stop reason: SDUPTHER

## 2023-02-21 RX ORDER — LAMOTRIGINE 200 MG/1
200 TABLET ORAL DAILY
Qty: 30 TABLET | Refills: 0 | Status: SHIPPED | OUTPATIENT
Start: 2023-02-21 | End: 2023-02-28 | Stop reason: SDUPTHER

## 2023-02-21 RX ORDER — TRAZODONE HYDROCHLORIDE 50 MG/1
50-100 TABLET ORAL NIGHTLY PRN
Qty: 60 TABLET | Refills: 0 | Status: SHIPPED | OUTPATIENT
Start: 2023-02-21 | End: 2023-02-28 | Stop reason: SDUPTHER

## 2023-02-21 NOTE — TELEPHONE ENCOUNTER
Pt called back and rescheduled her appt with provider for 02/28/2023.    Educated patient on no-show policy and reminded them that we understand unexpected circumstances arise, however, anytime you miss your appointment we are unable to provide you appropriate care. We ask that you call at least 24 hours in advance to cancel or reschedule an appointment.  We would like to take this opportunity to remind you of our policy stating patients who miss THREE or more appointments without cancelling or rescheduling 24 hours in advance of the appointment may be subject to dismissal from the practice for non-compliance.

## 2023-02-27 RX ORDER — PROPRANOLOL HYDROCHLORIDE 10 MG/1
TABLET ORAL
Qty: 180 TABLET | Refills: 0 | Status: SHIPPED | OUTPATIENT
Start: 2023-02-27 | End: 2023-03-28 | Stop reason: SDUPTHER

## 2023-02-28 ENCOUNTER — TELEMEDICINE (OUTPATIENT)
Dept: PSYCHIATRY | Facility: CLINIC | Age: 33
End: 2023-02-28
Payer: MEDICARE

## 2023-02-28 DIAGNOSIS — F31.32 BIPOLAR AFFECTIVE DISORDER, CURRENTLY DEPRESSED, MODERATE: Chronic | ICD-10-CM

## 2023-02-28 DIAGNOSIS — F51.05 INSOMNIA DUE TO MENTAL CONDITION: ICD-10-CM

## 2023-02-28 DIAGNOSIS — F41.1 GENERALIZED ANXIETY DISORDER: Chronic | ICD-10-CM

## 2023-02-28 PROCEDURE — 99214 OFFICE O/P EST MOD 30 MIN: CPT

## 2023-02-28 RX ORDER — LAMOTRIGINE 200 MG/1
200 TABLET ORAL DAILY
Qty: 30 TABLET | Refills: 0 | Status: SHIPPED | OUTPATIENT
Start: 2023-02-28 | End: 2023-03-28 | Stop reason: SDUPTHER

## 2023-02-28 RX ORDER — FLUOXETINE HYDROCHLORIDE 60 MG/1
60 TABLET, FILM COATED ORAL; ORAL DAILY
Qty: 30 TABLET | Refills: 0 | Status: SHIPPED | OUTPATIENT
Start: 2023-02-28 | End: 2023-03-28 | Stop reason: ALTCHOICE

## 2023-02-28 RX ORDER — HYDROXYZINE HYDROCHLORIDE 25 MG/1
25-50 TABLET, FILM COATED ORAL 3 TIMES DAILY PRN
Qty: 180 TABLET | Refills: 0 | Status: SHIPPED | OUTPATIENT
Start: 2023-02-28 | End: 2023-03-28 | Stop reason: SDUPTHER

## 2023-02-28 RX ORDER — TRAZODONE HYDROCHLORIDE 50 MG/1
50-100 TABLET ORAL NIGHTLY PRN
Qty: 60 TABLET | Refills: 0 | Status: SHIPPED | OUTPATIENT
Start: 2023-02-28 | End: 2023-03-28 | Stop reason: SDUPTHER

## 2023-02-28 NOTE — PROGRESS NOTES
This provider is located at [place], [place]. The Patient is seen remotely using Video. Patient is being seen via telehealth and confirm that they are in a secure environment for this session. Patient is located in [place], [place] at her home. The patient's condition being diagnosed/treated is appropriate for telemedicine. Provider identified as Maribell Hernandez as well as credentials FRANCHESKA MSN PMHNP-BC.   The client/patient gave consent to be seen remotely, and when consent is given they understand that the consent allows for patient identifiable information to be sent to a third party as needed.  They may refuse to be seen remotely at any time. The electronic data is encrypted and password protected, and the patient has been advised of the potential risks to privacy not withstanding such measures.    Chief Complaint  Depression, Manic Behavior, Anxiety, and Sleeping Problem    Subjective        Chen Galaviz presents to [facility] for   History of Present Illness  Patient is seen today for follow-up visit for bipolar disorder, anxiety, and insomnia.  Patient reports her symptoms have gotten worse over the last month.  States she feels more depressed and anxious now.  She describes her depression as lack of motivation, sleeping too much, and more irritability.  Currently rates depression a 9 on a 1-10 scale with 10 being the worst.  Patient denies any hopelessness.  Denies any suicidal or homicidal ideation.  She states her sleep is improved with the help of trazodone.  Appetite is increased.  Rates anxiety an 8 on a 1-10 scale with 10 being the worst.  States she still has worry and overthinking type symptoms.  States that she wakes up shaking sometimes.  She states she has taken hydroxyzine and that has been helpful with some of the breakthrough anxiety.  She denies any recurrence of any hypomanic type symptoms.  Denies any paranoia.  Denies any auditory or visualizations.   Denies any side effects to the medications.  Objective   Vital Signs:   There were no vitals taken for this visit.      Mental Status Exam:   Hygiene:   good  Cooperation:  Cooperative  Eye Contact:  Good  Psychomotor Behavior:  Appropriate  Affect:  Flat  Mood: depressed and anxious  Speech:  Normal  Thought Process:  Goal directed  Thought Content:  Normal  Suicidal:  None  Homicidal:  None  Hallucinations:  None  Delusion:  None  Memory:  Intact  Orientation:  Person, Place, Time and Situation  Reliability:  good  Insight:  Good  Judgement:  Good  Impulse Control:  Good  Physical/Medical Issues:  No      Previous Provider notes and available records reviewed by this APRN today.   Current Medications:   Current Outpatient Medications   Medication Sig Dispense Refill   • acetaminophen (TYLENOL) 325 MG tablet Take 2 tablets by mouth Every 6 (Six) Hours As Needed for Mild Pain .     • albuterol sulfate  (90 Base) MCG/ACT inhaler Inhale 2 puffs Every 6 (Six) Hours As Needed for Wheezing. 18 g 11   • cetirizine (zyrTEC) 10 MG tablet Take 10 mg by mouth Daily.     • FLUoxetine (PROzac) 60 MG tablet Take 1 tablet by mouth Daily. 30 tablet 0   • fluticasone (Flovent HFA) 220 MCG/ACT inhaler Inhale 1 puff 2 (Two) Times a Day. 12 g 11   • hydrOXYzine (ATARAX) 25 MG tablet Take 1-2 tablets by mouth 3 (Three) Times a Day As Needed for Anxiety. 180 tablet 0   • lamoTRIgine (LaMICtal) 200 MG tablet Take 1 tablet by mouth Daily. 30 tablet 0   • lisinopril (PRINIVIL,ZESTRIL) 40 MG tablet Take 1 tablet by mouth Daily. 90 tablet 1   • methenamine (HIPREX) 1 g tablet Take 1 tablet by mouth 2 (Two) Times a Day With Meals for 30 days. 60 tablet 2   • oxybutynin XL (Ditropan XL) 10 MG 24 hr tablet Take 1 tablet by mouth Daily for 90 days. 90 tablet 0   • propranolol (INDERAL) 10 MG tablet TAKE 1 TABLET BY MOUTH TWICE A  tablet 0   • rOPINIRole (REQUIP) 0.25 MG tablet Take 1 tablet by mouth Every Night. Take 1 hour before  bedtime. 90 tablet 1   • traZODone (DESYREL) 50 MG tablet Take 1-2 tablets by mouth At Night As Needed for Sleep. 60 tablet 0     No current facility-administered medications for this visit.       Physical Exam   Result Review :    The following data was reviewed by: FRANCHESKA Hernandez on 02/28/2023:  Common labs    Common Labs 5/23/22 5/23/22 6/4/22 6/4/22 9/15/22 9/15/22 9/15/22 9/15/22    1522 1522 1842 1842 0822 0822 0822 0822   Glucose  87     98    Glucose    89       BUN  12  16   11    Creatinine  0.92  0.96   0.89    eGFR Non  Am    >60       eGFR African Am    >60       Sodium  136  138   139    Potassium  4.4  4.0   4.1    Chloride  103  106   101    Calcium  9.3  8.6 (A)   9.5    Albumin  4.50  4.1   4.40    Total Bilirubin  0.2  0.2   0.2    Alkaline Phosphatase  102  112 (A)   112    AST (SGOT)  14  8 (A)   14    ALT (SGPT)  10  8   10    WBC 10.36  10.77 (A)  10.40      Hemoglobin 15.6  15.6  15.3      Hematocrit 46.8 (A)  46.2 (A)  45.4      Platelets 272  302  324      Total Cholesterol        194   Triglycerides        133   HDL Cholesterol        39 (A)   LDL Cholesterol         131 (A)   Hemoglobin A1C      5.20     (A) Abnormal value       Comments are available for some flowsheets but are not being displayed.              Assessment and Plan   Problem List Items Addressed This Visit        Mental Health    Generalized anxiety disorder (Chronic)    Relevant Medications    FLUoxetine (PROzac) 60 MG tablet    traZODone (DESYREL) 50 MG tablet    hydrOXYzine (ATARAX) 25 MG tablet    Bipolar affective disorder, currently depressed, moderate (HCC)    Relevant Medications    FLUoxetine (PROzac) 60 MG tablet    lamoTRIgine (LaMICtal) 200 MG tablet    traZODone (DESYREL) 50 MG tablet    hydrOXYzine (ATARAX) 25 MG tablet   Other Visit Diagnoses     Insomnia due to mental condition        Relevant Medications    FLUoxetine (PROzac) 60 MG tablet    traZODone (DESYREL) 50 MG tablet    hydrOXYzine  (ATARAX) 25 MG tablet        Discussed treatment options with patient.  Discussed with patient that we can increase her Prozac to 60 mg daily to target her symptoms.  Patient would like to do so.  Increase Prozac to 60 mg for anxiety and depression for bipolar disorder.  Continue Lamictal 200 mg daily for bipolar disorder.  Continue trazodone 50 mg take 1 to 2 tablets nightly for sleep.  Continue hydroxyzine 25 mg take 1 to 2 tablets 3 times daily as needed for anxiety.  We will see patient again in 4 weeks to reassess.  Encouraged patient to contact the office if she had any issues prior to her appointment.  Also educated patient on the suicide hotline number of [phone].  Patient verbalized understanding.    TREATMENT PLAN/GOALS: Continue supportive psychotherapy efforts and medications as indicated. Treatment and medication options discussed during today's visit. Patient ackowledged and verbally consented to continue with current treatment plan and was educated on the importance of compliance with treatment and follow-up appointments.    DEPRESSION:  Patient screened positive for depression based on a PHQ-9 score of  on . Follow-up recommendations include: Prescribed antidepressant medication treatment.       MEDICATION ISSUES:  We discussed risks, benefits, and side effects of the above medications and the patient was agreeable with the plan. Patient was educated on the importance of compliance with treatment and follow-up appointments.  Patient is agreeable to call the office with any worsening of symptoms or onset of side effects. Patient is agreeable to call 911 or go to the nearest ER should he/she begin having SI/HI.      Counseled patient regarding multimodal approach with healthy nutrition, healthy sleep, regular physical activity, social activities, counseling, and medications.      Coping skills reviewed and encouraged positive framing of thoughts     Assisted patient in processing above session content;  acknowledged and normalized patient’s thoughts, feelings, and concerns.  Applied  positive coping skills and behavior management in session.  Allowed patient to freely discuss issues without interruption or judgment. Provided safe, confidential environment to facilitate the development of positive therapeutic relationship and encourage open, honest communication. Assisted patient in identifying risk factors which would indicate the need for higher level of care including thoughts to harm self or others and/or self-harming behavior and encouraged patient to contact this office, call 911, or present to the nearest emergency room should any of these events occur. Discussed crisis intervention services and means to access. If patient has any concerns or needs assistance they were instructed to call the [facility] at [phone].    MEDS ORDERED DURING VISIT:  New Medications Ordered This Visit   Medications   • FLUoxetine (PROzac) 60 MG tablet     Sig: Take 1 tablet by mouth Daily.     Dispense:  30 tablet     Refill:  0   • lamoTRIgine (LaMICtal) 200 MG tablet     Sig: Take 1 tablet by mouth Daily.     Dispense:  30 tablet     Refill:  0   • traZODone (DESYREL) 50 MG tablet     Sig: Take 1-2 tablets by mouth At Night As Needed for Sleep.     Dispense:  60 tablet     Refill:  0   • hydrOXYzine (ATARAX) 25 MG tablet     Sig: Take 1-2 tablets by mouth 3 (Three) Times a Day As Needed for Anxiety.     Dispense:  180 tablet     Refill:  0         Follow Up   Return in about 4 weeks (around 3/28/2023) for Video visit.    Patient was given instructions and counseling regarding her condition or for health maintenance advice. Please see specific information pulled into the AVS if appropriate.         This document has been electronically signed by FRANCHESKA Hernandez  February 28, 2023 16:42 EST    Part of this note may be an electronic transcription/translation of spoken language to printed text  using the Dragon Dictation System.

## 2023-03-28 ENCOUNTER — TELEMEDICINE (OUTPATIENT)
Dept: PSYCHIATRY | Facility: CLINIC | Age: 33
End: 2023-03-28
Payer: MEDICARE

## 2023-03-28 DIAGNOSIS — F51.05 INSOMNIA DUE TO MENTAL CONDITION: ICD-10-CM

## 2023-03-28 DIAGNOSIS — F31.32 BIPOLAR AFFECTIVE DISORDER, CURRENTLY DEPRESSED, MODERATE: Primary | Chronic | ICD-10-CM

## 2023-03-28 DIAGNOSIS — F41.1 GENERALIZED ANXIETY DISORDER: Chronic | ICD-10-CM

## 2023-03-28 PROCEDURE — 99214 OFFICE O/P EST MOD 30 MIN: CPT

## 2023-03-28 PROCEDURE — 1159F MED LIST DOCD IN RCRD: CPT

## 2023-03-28 PROCEDURE — 1160F RVW MEDS BY RX/DR IN RCRD: CPT

## 2023-03-28 RX ORDER — TRAZODONE HYDROCHLORIDE 100 MG/1
100 TABLET ORAL NIGHTLY PRN
Qty: 30 TABLET | Refills: 0 | Status: SHIPPED | OUTPATIENT
Start: 2023-03-28

## 2023-03-28 RX ORDER — HYDROXYZINE HYDROCHLORIDE 25 MG/1
25-50 TABLET, FILM COATED ORAL 3 TIMES DAILY PRN
Qty: 180 TABLET | Refills: 0 | Status: SHIPPED | OUTPATIENT
Start: 2023-03-28

## 2023-03-28 RX ORDER — LAMOTRIGINE 200 MG/1
200 TABLET ORAL DAILY
Qty: 30 TABLET | Refills: 0 | Status: SHIPPED | OUTPATIENT
Start: 2023-03-28

## 2023-03-28 RX ORDER — PROPRANOLOL HYDROCHLORIDE 10 MG/1
10 TABLET ORAL 2 TIMES DAILY
Qty: 60 TABLET | Refills: 0 | Status: SHIPPED | OUTPATIENT
Start: 2023-03-28

## 2023-03-28 RX ORDER — FLUOXETINE HYDROCHLORIDE 40 MG/1
80 CAPSULE ORAL DAILY
Qty: 60 CAPSULE | Refills: 0 | Status: SHIPPED | OUTPATIENT
Start: 2023-03-28

## 2023-03-28 NOTE — PROGRESS NOTES
This provider is located at [place], [place]. The Patient is seen remotely using Video. Patient is being seen via telehealth and confirm that they are in a secure environment for this session. Patient is located in [place], [place] at her home. The patient's condition being diagnosed/treated is appropriate for telemedicine. Provider identified as Maribell Hernandez as well as credentials APRN MSN PMHNP-BC.   The client/patient gave consent to be seen remotely, and when consent is given they understand that the consent allows for patient identifiable information to be sent to a third party as needed.  They may refuse to be seen remotely at any time. The electronic data is encrypted and password protected, and the patient has been advised of the potential risks to privacy not withstanding such measures.    Chief Complaint  Depression, Anxiety, Sleeping Problem, and Manic Behavior    Subjective        Chen Galaviz presents to [facility] for   History of Present Illness  Patient seen today for follow-up visit for bipolar disorder, anxiety, and insomnia.  Patient reports that she has had a lot of stress on her lately.  States she quit her job at [employer] because she was only making tips.  States some nights she is only making 20 or $25.  States her checks the last couple times were $0.  States she did get her ankle monitor off.  She has court upcoming in May to decide if she gets probation or has to face long term time of 1 to 3 years.  She states that she still cannot see her kids and that is a big stressor for her.  States she is also looking for an apartment.  States after increasing the Prozac she felt she was doing better for about 2 weeks and then she started feeling more depressed and anxious again.  States she will have moments where she feels fine.  She denies any recurrence of any hypomanic type symptoms.  Currently rates her depression as 7-8 on a 1-10 scale with 10 being the  worst.  Patient states she has had some thoughts of being better off dead, but denies any suicidal or homicidal ideation.  Denies any suicidal intent.  She denies hopelessness.  States she has her sleep schedule messed up and she is sleeping a lot during the day, but not at night.  Appetite is fair.  She denies any paranoia.  Denies any auditory or visual hallucinations.  Denies any side effects to her medications.  Objective   Vital Signs:   There were no vitals taken for this visit.      Mental Status Exam:   Hygiene:   good  Cooperation:  Cooperative  Eye Contact:  Good  Psychomotor Behavior:  Appropriate  Affect:  Full range  Mood: depressed and anxious  Speech:  Normal  Thought Process:  Goal directed  Thought Content:  Normal  Suicidal:  None  Homicidal:  None  Hallucinations:  None  Delusion:  None  Memory:  Intact  Orientation:  Person, Place, Time and Situation  Reliability:  good  Insight:  Good  Judgement:  Good  Impulse Control:  Good  Physical/Medical Issues:  No      Previous Provider notes and available records reviewed by this APRN today.   Current Medications:   Current Outpatient Medications   Medication Sig Dispense Refill   • acetaminophen (TYLENOL) 325 MG tablet Take 2 tablets by mouth Every 6 (Six) Hours As Needed for Mild Pain .     • albuterol sulfate  (90 Base) MCG/ACT inhaler Inhale 2 puffs Every 6 (Six) Hours As Needed for Wheezing. 18 g 11   • cetirizine (zyrTEC) 10 MG tablet Take 10 mg by mouth Daily.     • FLUoxetine (PROzac) 40 MG capsule Take 2 capsules by mouth Daily. 60 capsule 0   • fluticasone (Flovent HFA) 220 MCG/ACT inhaler Inhale 1 puff 2 (Two) Times a Day. 12 g 11   • hydrOXYzine (ATARAX) 25 MG tablet Take 1-2 tablets by mouth 3 (Three) Times a Day As Needed for Anxiety. 180 tablet 0   • lamoTRIgine (LaMICtal) 200 MG tablet Take 1 tablet by mouth Daily. 30 tablet 0   • lisinopril (PRINIVIL,ZESTRIL) 40 MG tablet Take 1 tablet by mouth Daily. 90 tablet 1   • oxybutynin  XL (Ditropan XL) 10 MG 24 hr tablet Take 1 tablet by mouth Daily for 90 days. 90 tablet 0   • propranolol (INDERAL) 10 MG tablet Take 1 tablet by mouth 2 (Two) Times a Day. 60 tablet 0   • rOPINIRole (REQUIP) 0.25 MG tablet Take 1 tablet by mouth Every Night. Take 1 hour before bedtime. 90 tablet 1   • traZODone (DESYREL) 100 MG tablet Take 1 tablet by mouth At Night As Needed for Sleep. 30 tablet 0     No current facility-administered medications for this visit.       Physical Exam   Result Review :    The following data was reviewed by: FRANCHESKA Hernandez on 03/28/2023:  Common labs    Common Labs 6/4/22 6/4/22 9/15/22 9/15/22 9/15/22 9/15/22 3/22/23    1842 1842 0822 0822 0822 0822    Glucose     98     Glucose  89        BUN  16   11     Creatinine  0.96   0.89     eGFR Non  Am  >60        eGFR African Am  >60        Sodium  138   139     Potassium  4.0   4.1     Chloride  106   101     Calcium  8.6 (A)   9.5     Albumin  4.1   4.40     Total Bilirubin  0.2   0.2     Alkaline Phosphatase  112 (A)   112     AST (SGOT)  8 (A)   14     ALT (SGPT)  8   10     WBC 10.77 (A)  10.40    9.08   Hemoglobin 15.6  15.3    15.7   Hematocrit 46.2 (A)  45.4    44.8   Platelets 302  324    258   Total Cholesterol      194    Triglycerides      133    HDL Cholesterol      39 (A)    LDL Cholesterol       131 (A)    Hemoglobin A1C    5.20      (A) Abnormal value       Comments are available for some flowsheets but are not being displayed.              Assessment and Plan   Problem List Items Addressed This Visit        Mental Health    Generalized anxiety disorder (Chronic)    Relevant Medications    FLUoxetine (PROzac) 40 MG capsule    traZODone (DESYREL) 100 MG tablet    hydrOXYzine (ATARAX) 25 MG tablet    Bipolar affective disorder, currently depressed, moderate (HCC) - Primary    Relevant Medications    FLUoxetine (PROzac) 40 MG capsule    traZODone (DESYREL) 100 MG tablet    lamoTRIgine (LaMICtal) 200 MG tablet     hydrOXYzine (ATARAX) 25 MG tablet   Other Visit Diagnoses     Insomnia due to mental condition        Relevant Medications    FLUoxetine (PROzac) 40 MG capsule    traZODone (DESYREL) 100 MG tablet    hydrOXYzine (ATARAX) 25 MG tablet        Discussed treatment options with patient.  Discussed with patient we can increase her Prozac to 80 mg daily to target her ongoing depression and anxiety symptoms.  Patient agrees and states she would like to do so.  Also discussed with patient that some of her situational stressors are affecting her mood.  Patient agrees.  Continue hydroxyzine 25 mg 3 times daily as needed for anxiety.  Continue trazodone 100 mg nightly for sleep.  Continue Lamictal 200 mg daily for bipolar disorder.  Discussed with patient that if she had any worsening of symptoms to contact the office.  Discussed if she had any thoughts of harming herself to go to the local ER or call [phone] which is the suicide hotline.  Patient is able to contract for safety and states she will do those things.  We will see patient again in 4 weeks to reassess.    TREATMENT PLAN/GOALS: Continue supportive psychotherapy efforts and medications as indicated. Treatment and medication options discussed during today's visit. Patient ackowledged and verbally consented to continue with current treatment plan and was educated on the importance of compliance with treatment and follow-up appointments.    DEPRESSION:  Patient screened positive for depression based on a PHQ-9 score of  on . Follow-up recommendations include: Prescribed antidepressant medication treatment.       MEDICATION ISSUES:  We discussed risks, benefits, and side effects of the above medications and the patient was agreeable with the plan. Patient was educated on the importance of compliance with treatment and follow-up appointments.  Patient is agreeable to call the office with any worsening of symptoms or onset of side effects. Patient is agreeable to call 911 or go  to the nearest ER should he/she begin having SI/HI.      Counseled patient regarding multimodal approach with healthy nutrition, healthy sleep, regular physical activity, social activities, counseling, and medications.      Coping skills reviewed and encouraged positive framing of thoughts     Assisted patient in processing above session content; acknowledged and normalized patient’s thoughts, feelings, and concerns.  Applied  positive coping skills and behavior management in session.  Allowed patient to freely discuss issues without interruption or judgment. Provided safe, confidential environment to facilitate the development of positive therapeutic relationship and encourage open, honest communication. Assisted patient in identifying risk factors which would indicate the need for higher level of care including thoughts to harm self or others and/or self-harming behavior and encouraged patient to contact this office, call 911, or present to the nearest emergency room should any of these events occur. Discussed crisis intervention services and means to access. If patient has any concerns or needs assistance they were instructed to call the [facility] at [phone].    MEDS ORDERED DURING VISIT:  New Medications Ordered This Visit   Medications   • FLUoxetine (PROzac) 40 MG capsule     Sig: Take 2 capsules by mouth Daily.     Dispense:  60 capsule     Refill:  0   • traZODone (DESYREL) 100 MG tablet     Sig: Take 1 tablet by mouth At Night As Needed for Sleep.     Dispense:  30 tablet     Refill:  0   • propranolol (INDERAL) 10 MG tablet     Sig: Take 1 tablet by mouth 2 (Two) Times a Day.     Dispense:  60 tablet     Refill:  0   • lamoTRIgine (LaMICtal) 200 MG tablet     Sig: Take 1 tablet by mouth Daily.     Dispense:  30 tablet     Refill:  0   • hydrOXYzine (ATARAX) 25 MG tablet     Sig: Take 1-2 tablets by mouth 3 (Three) Times a Day As Needed for Anxiety.     Dispense:  180 tablet      Refill:  0         Follow Up   Return in about 4 weeks (around 4/25/2023) for Video visit.    Patient was given instructions and counseling regarding her condition or for health maintenance advice. Please see specific information pulled into the AVS if appropriate.         This document has been electronically signed by FRANCHESKA Hernandez  March 28, 2023 18:39 EDT    Part of this note may be an electronic transcription/translation of spoken language to printed text using the Dragon Dictation System.

## 2023-04-26 ENCOUNTER — TELEMEDICINE (OUTPATIENT)
Dept: PSYCHIATRY | Facility: CLINIC | Age: 33
End: 2023-04-26
Payer: MEDICARE

## 2023-04-26 DIAGNOSIS — F41.1 GENERALIZED ANXIETY DISORDER: Chronic | ICD-10-CM

## 2023-04-26 DIAGNOSIS — F31.32 BIPOLAR AFFECTIVE DISORDER, CURRENTLY DEPRESSED, MODERATE: Primary | Chronic | ICD-10-CM

## 2023-04-26 DIAGNOSIS — F51.05 INSOMNIA DUE TO MENTAL CONDITION: ICD-10-CM

## 2023-04-26 RX ORDER — ARIPIPRAZOLE 5 MG/1
5 TABLET ORAL DAILY
Qty: 30 TABLET | Refills: 0 | Status: SHIPPED | OUTPATIENT
Start: 2023-04-26

## 2023-04-26 RX ORDER — PROPRANOLOL HYDROCHLORIDE 10 MG/1
10 TABLET ORAL 2 TIMES DAILY
Qty: 60 TABLET | Refills: 0 | Status: SHIPPED | OUTPATIENT
Start: 2023-04-26

## 2023-04-26 RX ORDER — TRAZODONE HYDROCHLORIDE 100 MG/1
100 TABLET ORAL NIGHTLY PRN
Qty: 30 TABLET | Refills: 0 | Status: SHIPPED | OUTPATIENT
Start: 2023-04-26

## 2023-04-26 RX ORDER — FLUOXETINE HYDROCHLORIDE 40 MG/1
80 CAPSULE ORAL DAILY
Qty: 60 CAPSULE | Refills: 0 | Status: SHIPPED | OUTPATIENT
Start: 2023-04-26

## 2023-04-26 RX ORDER — LAMOTRIGINE 200 MG/1
200 TABLET ORAL DAILY
Qty: 30 TABLET | Refills: 0 | Status: SHIPPED | OUTPATIENT
Start: 2023-04-26

## 2023-04-26 NOTE — PROGRESS NOTES
This provider is located at [place], [place]. The Patient is seen remotely using Video. Patient is being seen via telehealth and confirm that they are in a secure environment for this session. Patient is located in [place], [place] at her home. The patient's condition being diagnosed/treated is appropriate for telemedicine. Provider identified as Maribell Hernandez as well as credentials APRN MSN PMHNP-BC.   The client/patient gave consent to be seen remotely, and when consent is given they understand that the consent allows for patient identifiable information to be sent to a third party as needed.  They may refuse to be seen remotely at any time. The electronic data is encrypted and password protected, and the patient has been advised of the potential risks to privacy not withstanding such measures.    Chief Complaint  Manic Behavior, Depression, Anxiety, and Sleeping Problem    Subjective        Chen Galaivz presents to [facility] for   History of Present Illness  Patient is seen today for follow-up visit for bipolar disorder, anxiety, and insomnia.  Patient reports that she continues to struggle with depression.  She states the increase in Prozac did help with her anxiety, but still rates her depression at 8 on a 1-10 scale with 10 being the worst.  States that she continues to have a lot of lack of motivation and fatigue.  Feels like she is sleeping too much.  Appetite is decreased.  Denies any hopelessness.  Denies any suicidal or homicidal ideation.  Rates anxiety a 4 on a 1-10 scale with 10 being the worst.  States she is pleased with her anxiety is at currently.  Denies any recurrence of any hypomanic type symptoms.  Denies any auditory or visual hallucinations.  Denies any paranoia.  Denies any side effects to the medications.  Patient states she has been applying for jobs, but has not had any luck yet.  She also has her court date coming up in May.  Objective   Vital  Signs:   There were no vitals taken for this visit.      Mental Status Exam:   Hygiene:   good  Cooperation:  Cooperative  Eye Contact:  Good  Psychomotor Behavior:  Appropriate  Affect:  Full range  Mood: depressed  Speech:  Normal  Thought Process:  Goal directed  Thought Content:  Normal  Suicidal:  None  Homicidal:  None  Hallucinations:  None  Delusion:  None  Memory:  Intact  Orientation:  Person, Place, Time and Situation  Reliability:  good  Insight:  Good  Judgement:  Good  Impulse Control:  Good  Physical/Medical Issues:  No      Previous Provider notes and available records reviewed by this APRN today.   Current Medications:   Current Outpatient Medications   Medication Sig Dispense Refill   • acetaminophen (TYLENOL) 325 MG tablet Take 2 tablets by mouth Every 6 (Six) Hours As Needed for Mild Pain .     • albuterol sulfate  (90 Base) MCG/ACT inhaler Inhale 2 puffs Every 6 (Six) Hours As Needed for Wheezing. 18 g 11   • ARIPiprazole (Abilify) 5 MG tablet Take 1 tablet by mouth Daily. 30 tablet 0   • cetirizine (zyrTEC) 10 MG tablet Take 10 mg by mouth Daily.     • FLUoxetine (PROzac) 40 MG capsule Take 2 capsules by mouth Daily. 60 capsule 0   • fluticasone (Flovent HFA) 220 MCG/ACT inhaler Inhale 1 puff 2 (Two) Times a Day. 12 g 11   • hydrOXYzine (ATARAX) 25 MG tablet Take 1-2 tablets by mouth 3 (Three) Times a Day As Needed for Anxiety. 180 tablet 0   • lamoTRIgine (LaMICtal) 200 MG tablet Take 1 tablet by mouth Daily. 30 tablet 0   • lisinopril (PRINIVIL,ZESTRIL) 40 MG tablet Take 1 tablet by mouth Daily. 90 tablet 1   • propranolol (INDERAL) 10 MG tablet Take 1 tablet by mouth 2 (Two) Times a Day. 60 tablet 0   • rOPINIRole (REQUIP) 0.25 MG tablet Take 1 tablet by mouth Every Night. Take 1 hour before bedtime. 90 tablet 1   • traZODone (DESYREL) 100 MG tablet Take 1 tablet by mouth At Night As Needed for Sleep. 30 tablet 0     No current facility-administered medications for this visit.        Physical Exam   Result Review :    The following data was reviewed by: FRANCHESKA Hernandez on 04/26/2023:  Common labs        6/4/2022    18:42 9/15/2022    08:22 3/22/2023    14:29   Common Labs   Glucose  98      Glucose 89          BUN 16      11      Creatinine 0.96      0.89      EGFR Non  Am >60          EGFR African Am >60          Sodium 138      139      Potassium 4.0      4.1      Chloride 106      101      Calcium 8.6      9.5      Albumin 4.1      4.40      Total Bilirubin 0.2      0.2      Alkaline Phosphatase 112      112      AST (SGOT) 8      14      ALT (SGPT) 8      10      WBC 10.77      10.40   9.08        Hemoglobin 15.6      15.3   15.7        Hematocrit 46.2      45.4   44.8        Platelets 302      324   258        Total Cholesterol  194      Triglycerides  133      HDL Cholesterol  39      LDL Cholesterol   131      Hemoglobin A1C  5.20          This result is from an external source.        Assessment and Plan   Problem List Items Addressed This Visit        Mental Health    Generalized anxiety disorder (Chronic)    Relevant Medications    traZODone (DESYREL) 100 MG tablet    propranolol (INDERAL) 10 MG tablet    FLUoxetine (PROzac) 40 MG capsule    ARIPiprazole (Abilify) 5 MG tablet    Bipolar affective disorder, currently depressed, moderate - Primary    Relevant Medications    traZODone (DESYREL) 100 MG tablet    lamoTRIgine (LaMICtal) 200 MG tablet    FLUoxetine (PROzac) 40 MG capsule    ARIPiprazole (Abilify) 5 MG tablet   Other Visit Diagnoses     Insomnia due to mental condition        Relevant Medications    traZODone (DESYREL) 100 MG tablet    FLUoxetine (PROzac) 40 MG capsule    ARIPiprazole (Abilify) 5 MG tablet        Discussed treatment options with patient.  Discussed with patient that since the Prozac is at the highest level and her Lamictal was also maxed out, that we could add something like Abilify to target her ongoing depressive symptoms.  Patient states  she remembers taking Abilify in the past and seemed like it was helpful.  States she would like to try the medication.  Discussed with patient class of medication that Abilify is then can contribute to weight gain, increased blood sugar, and increased cholesterol.  Encouraged patient to watch her diet.  Patient verbalized understanding and states she would like to try the medication.  Start Abilify 5 mg daily for bipolar disorder.  Continue Prozac 80 mg daily for anxiety.  Continue Mictoryl 200 mg daily for bipolar disorder.  Continue trazodone 100 mg nightly as needed for sleep.  We will see patient again in 4 weeks to reassess.  Encouraged patient to contact the office if she has any issues sooner.    TREATMENT PLAN/GOALS: Continue supportive psychotherapy efforts and medications as indicated. Treatment and medication options discussed during today's visit. Patient ackowledged and verbally consented to continue with current treatment plan and was educated on the importance of compliance with treatment and follow-up appointments.    DEPRESSION:  Patient screened positive for depression based on a PHQ-9 score of  on . Follow-up recommendations include: Prescribed antidepressant medication treatment.       MEDICATION ISSUES:  We discussed risks, benefits, and side effects of the above medications and the patient was agreeable with the plan. Patient was educated on the importance of compliance with treatment and follow-up appointments.  Patient is agreeable to call the office with any worsening of symptoms or onset of side effects. Patient is agreeable to call 911 or go to the nearest ER should he/she begin having SI/HI.      Counseled patient regarding multimodal approach with healthy nutrition, healthy sleep, regular physical activity, social activities, counseling, and medications.      Coping skills reviewed and encouraged positive framing of thoughts     Assisted patient in processing above session content;  acknowledged and normalized patient’s thoughts, feelings, and concerns.  Applied  positive coping skills and behavior management in session.  Allowed patient to freely discuss issues without interruption or judgment. Provided safe, confidential environment to facilitate the development of positive therapeutic relationship and encourage open, honest communication. Assisted patient in identifying risk factors which would indicate the need for higher level of care including thoughts to harm self or others and/or self-harming behavior and encouraged patient to contact this office, call 911, or present to the nearest emergency room should any of these events occur. Discussed crisis intervention services and means to access. If patient has any concerns or needs assistance they were instructed to call the [facility] at [phone].    MEDS ORDERED DURING VISIT:  New Medications Ordered This Visit   Medications   • traZODone (DESYREL) 100 MG tablet     Sig: Take 1 tablet by mouth At Night As Needed for Sleep.     Dispense:  30 tablet     Refill:  0   • propranolol (INDERAL) 10 MG tablet     Sig: Take 1 tablet by mouth 2 (Two) Times a Day.     Dispense:  60 tablet     Refill:  0   • lamoTRIgine (LaMICtal) 200 MG tablet     Sig: Take 1 tablet by mouth Daily.     Dispense:  30 tablet     Refill:  0   • FLUoxetine (PROzac) 40 MG capsule     Sig: Take 2 capsules by mouth Daily.     Dispense:  60 capsule     Refill:  0   • ARIPiprazole (Abilify) 5 MG tablet     Sig: Take 1 tablet by mouth Daily.     Dispense:  30 tablet     Refill:  0         Follow Up   Return in about 4 weeks (around 5/24/2023) for Video visit.    Patient was given instructions and counseling regarding her condition or for health maintenance advice. Please see specific information pulled into the AVS if appropriate.         This document has been electronically signed by FRANCHESKA Hernandez  April 26, 2023 16:37 EDT    Part of this  note may be an electronic transcription/translation of spoken language to printed text using the Dragon Dictation System.

## 2023-05-08 ENCOUNTER — OFFICE VISIT (OUTPATIENT)
Dept: INTERNAL MEDICINE | Facility: CLINIC | Age: 33
End: 2023-05-08
Payer: MEDICARE

## 2023-05-08 VITALS
HEART RATE: 87 BPM | HEIGHT: 63 IN | DIASTOLIC BLOOD PRESSURE: 80 MMHG | SYSTOLIC BLOOD PRESSURE: 132 MMHG | TEMPERATURE: 96.9 F | OXYGEN SATURATION: 97 % | BODY MASS INDEX: 32.78 KG/M2 | WEIGHT: 185 LBS

## 2023-05-08 DIAGNOSIS — N39.0 ACUTE UTI: Primary | ICD-10-CM

## 2023-05-08 DIAGNOSIS — Z80.3 FAMILY HISTORY OF BREAST CANCER IN MOTHER: ICD-10-CM

## 2023-05-08 DIAGNOSIS — R10.13 DYSPEPSIA: ICD-10-CM

## 2023-05-08 DIAGNOSIS — Z12.31 BREAST CANCER SCREENING BY MAMMOGRAM: ICD-10-CM

## 2023-05-08 DIAGNOSIS — R91.1 PULMONARY NODULE: ICD-10-CM

## 2023-05-08 LAB
BILIRUB BLD-MCNC: NEGATIVE MG/DL
CLARITY, POC: ABNORMAL
COLOR UR: YELLOW
EXPIRATION DATE: ABNORMAL
GLUCOSE UR STRIP-MCNC: NEGATIVE MG/DL
KETONES UR QL: NEGATIVE
LEUKOCYTE EST, POC: ABNORMAL
Lab: ABNORMAL
NITRITE UR-MCNC: NEGATIVE MG/ML
PH UR: 8 [PH] (ref 5–8)
PROT UR STRIP-MCNC: ABNORMAL MG/DL
RBC # UR STRIP: ABNORMAL /UL
SP GR UR: 1.01 (ref 1–1.03)
UROBILINOGEN UR QL: ABNORMAL

## 2023-05-08 PROCEDURE — 3075F SYST BP GE 130 - 139MM HG: CPT | Performed by: INTERNAL MEDICINE

## 2023-05-08 PROCEDURE — 81003 URINALYSIS AUTO W/O SCOPE: CPT | Performed by: INTERNAL MEDICINE

## 2023-05-08 PROCEDURE — 3079F DIAST BP 80-89 MM HG: CPT | Performed by: INTERNAL MEDICINE

## 2023-05-08 PROCEDURE — 99214 OFFICE O/P EST MOD 30 MIN: CPT | Performed by: INTERNAL MEDICINE

## 2023-05-08 RX ORDER — BUPRENORPHINE AND NALOXONE 8; 2 MG/1; MG/1
FILM, SOLUBLE BUCCAL; SUBLINGUAL
COMMUNITY
Start: 2023-05-02

## 2023-05-08 RX ORDER — NITROFURANTOIN 25; 75 MG/1; MG/1
100 CAPSULE ORAL 2 TIMES DAILY
Qty: 10 CAPSULE | Refills: 0 | Status: SHIPPED | OUTPATIENT
Start: 2023-05-08

## 2023-05-08 NOTE — PROGRESS NOTES
Office Note      Date: 2023  Patient Name: Chen Galaviz  MRN: [id number]  : 1990    Chief Complaint   Patient presents with   • Urinary Tract Infection   • Referrals     Ct lung, mammogram, gastro       History of Present Illness: Chen Galaviz is a 32 y.o. female who presents for Urinary Tract Infection and Referrals (Ct lung, mammogram, gastro).   Has dyspepsia and takes Tums frequently.  Would like referral to gastroenterology as she has had prior genetic testing since she was at increased risk for GI cancer.  Needs referral for mammogram, last one done at [facility].  Family history of breast cancer  Has dysuria and back pain.  Had pulmonary nodule noted on CT chest 2022, has not had repeat CT scan    Subjective      Review of Systems:   Pertinent review of systems per HPI.    Review of Systems   Constitutional: Negative for activity change, appetite change, chills, diaphoresis, fatigue, fever and unexpected weight change.   HENT: Negative for congestion, dental problem, drooling, ear discharge, ear pain, facial swelling, hearing loss and mouth sores.    Eyes: Negative for pain, discharge and itching.   Respiratory: Negative for apnea, cough, choking, chest tightness and shortness of breath.    Cardiovascular: Negative for chest pain, palpitations and leg swelling.   Gastrointestinal: Negative for abdominal distention, abdominal pain, blood in stool, constipation and diarrhea.   Endocrine: Negative for cold intolerance, heat intolerance, polydipsia and polyuria.   Genitourinary: Positive for dysuria. Negative for difficulty urinating, frequency and hematuria.   Skin: Negative for color change, pallor, rash and wound.   Allergic/Immunologic: Negative for environmental allergies, food allergies and immunocompromised state.   Neurological: Negative for dizziness, weakness and light-headedness.   Psychiatric/Behavioral: Negative for agitation, behavioral problems, confusion, decreased  "concentration and self-injury. The patient is not nervous/anxious.    All other systems reviewed and are negative.    Allergies   Allergen Reactions   • Bupropion Other (See Comments) and Provider Review Needed     Seizure / wellbutrin    • Naproxen Rash   • Nsaids Rash   • Sulfa Antibiotics Rash       Objective     Physical Exam:  Vital Signs:   Vitals:    05/08/23 1324   BP: 132/80   Pulse: 87   Temp: 96.9 °F (36.1 °C)   SpO2: 97%   Weight: 83.9 kg (185 lb)   Height: 160 cm (62.99\")   PainSc: 0-No pain      Body mass index is 32.78 kg/m².    Physical Exam  Vitals and nursing note reviewed.   Constitutional:       General: She is not in acute distress.     Appearance: She is well-developed.   HENT:      Head: Normocephalic and atraumatic.      Right Ear: External ear normal.      Left Ear: External ear normal.   Eyes:      General: No scleral icterus.        Right eye: No discharge.         Left eye: No discharge.      Conjunctiva/sclera: Conjunctivae normal.   Cardiovascular:      Rate and Rhythm: Normal rate and regular rhythm.   Pulmonary:      Effort: Pulmonary effort is normal. No respiratory distress.      Breath sounds: Normal breath sounds. No wheezing or rales.   Abdominal:      Tenderness: There is no right CVA tenderness or left CVA tenderness.   Skin:     General: Skin is warm and dry.      Coloration: Skin is not pale.         Assessment / Plan      Assessment & Plan:    1. Acute UTI  UA positive for UTI, Rx for Macrobid  - POCT urinalysis dipstick, automated  - nitrofurantoin, macrocrystal-monohydrate, (Macrobid) 100 MG capsule; Take 1 capsule by mouth 2 (Two) Times a Day.  Dispense: 10 capsule; Refill: 0    2. Pulmonary nodule  Repeat scan ordered  - CT Chest With & Without Contrast; Future    3. Breast cancer screening by mammogram  Patient to contact [facility] for repeat mammo    4. Family history of breast cancer in mother      5. Dyspepsia    - Ambulatory Referral to Gastroenterology      Dominique " MD Ethan  05/08/2023

## 2023-05-11 ENCOUNTER — TELEPHONE (OUTPATIENT)
Dept: UROLOGY | Facility: CLINIC | Age: 33
End: 2023-05-11

## 2023-05-11 NOTE — TELEPHONE ENCOUNTER
Caller: Chen Galaviz    Relationship to patient: SELF    Best call back number: [phone]    Patient is needing: PT WENT TO PCP ON 5-8-23 AND HAS A UTI- LABS, PROG NOTES ARE IN CHART. PT WAS GIVEN MACROBID TO TAKE. PT STATES HER SYMPTOMS ARE WORSENING AND SHE IS HAVING TO STRAIN TO URINATE AND SOMETIMES IT TAKES SEVERAL MINUTES BEFORE SHE CAN EVEN BEGIN TO URINATE. PLEASE GIVE PT A CALL BACK.TO ADVISE.

## 2023-05-11 NOTE — TELEPHONE ENCOUNTER
Caller: Chen Galaviz    Relationship to patient: Self    Best call back number:     Patient is [phone]    UNABLE TO REACH THE CLINICAL TEAM.    PT CALLING BACK AGAIN FOR UPDATE REGARDING MESSAGE THAT WAS SENT TO [facility]. PLEASE ADVISE. PT SAID SHE IS HAVING BLOOD IN HER URINE AND NOT ABLE TO URINATE. PLEASE CALL PT BACK AS SOON AS POSSIBLE.

## 2023-05-12 ENCOUNTER — TELEPHONE (OUTPATIENT)
Dept: UROLOGY | Facility: CLINIC | Age: 33
End: 2023-05-12
Payer: MEDICARE

## 2023-05-12 NOTE — TELEPHONE ENCOUNTER
Called pt to let her know to stop using Hiprex while on Macrobid, she stated she had stopped using it but wasn't sure when she would be able to reschedule her Tamra due to only having one vehicle and would call back once she figured out when she was available to do so.

## 2023-05-12 NOTE — TELEPHONE ENCOUNTER
APOLOGIZED THAT NO ONE HAS CONTACTED YET, EXPLAINED TO PATIENT THAT [facility] IS OUT DUE TO ILLNESS AND DR IRVIN IS IN SURGERY UNTIL THIS AFTERNOON. PER MAYLIN, I EXPLAINED TO PATIENT THAT IF SHE  IS HAVING THESE SYMPTOMS THAT SHE SHOULD NOT HESITATE TO GO TI THE EMERGENCY ROOM.

## 2023-05-16 ENCOUNTER — TELEPHONE (OUTPATIENT)
Dept: PSYCHIATRY | Facility: CLINIC | Age: 33
End: 2023-05-16
Payer: MEDICARE

## 2023-05-16 DIAGNOSIS — I10 ESSENTIAL HYPERTENSION: ICD-10-CM

## 2023-05-16 DIAGNOSIS — F41.1 GENERALIZED ANXIETY DISORDER: Chronic | ICD-10-CM

## 2023-05-16 RX ORDER — LISINOPRIL 40 MG/1
40 TABLET ORAL DAILY
Qty: 90 TABLET | Refills: 1 | Status: SHIPPED | OUTPATIENT
Start: 2023-05-16

## 2023-05-16 RX ORDER — PROPRANOLOL HYDROCHLORIDE 10 MG/1
10 TABLET ORAL 2 TIMES DAILY
Qty: 60 TABLET | Refills: 0 | Status: SHIPPED | OUTPATIENT
Start: 2023-05-16

## 2023-05-16 NOTE — TELEPHONE ENCOUNTER
Caller: Chen Galaviz NILESH    Relationship: Self    Best call back number: [phone]    Requested Prescriptions:   Requested Prescriptions     Pending Prescriptions Disp Refills   • lisinopril (PRINIVIL,ZESTRIL) 40 MG tablet 90 tablet 1     Sig: Take 1 tablet by mouth Daily.   • propranolol (INDERAL) 10 MG tablet 60 tablet 0     Sig: Take 1 tablet by mouth 2 (Two) Times a Day.      Pharmacy where request should be sent: [id number] - [place], [place] - [address] - [phone]  - [phone] FX     Last office visit with prescribing clinician: 12/19/2022   Last telemedicine visit with prescribing clinician: 5/8/2023   Next office visit with prescribing clinician: Visit date not found     Additional details provided by patient: NO MEDICATION ON  HAND, PATIENT WAS IN THE MIDDLE OF MOVING AND LOST HER BLOOD PRESSURE MEDICATION.     THE PATIENT IS REQUESTING TO HAVE A FEW PILLS  OR FULL SCRIPT IF ALLOWED SENT TO THE PHARMACY IF POSSIBLE .    PLEASE CALL PATIENT IF HER REQUEST IS APPROVED    Does the patient have less than a 3 day supply:  [x] Yes  [] No    Would you like a call back once the refill request has been completed: [x] Yes [] No    If the office needs to give you a call back, can they leave a voicemail: [x] Yes [] No    Errol Romero   05/16/23 09:00 EDT

## 2023-05-16 NOTE — TELEPHONE ENCOUNTER
Pt. States she has worsening depression. Not currently having SI/HI. Mother is with patient. Advised to go to ER if symptoms get worse.     Moved appointment to tomorrow.

## 2023-05-17 ENCOUNTER — TELEMEDICINE (OUTPATIENT)
Dept: PSYCHIATRY | Facility: CLINIC | Age: 33
End: 2023-05-17
Payer: MEDICARE

## 2023-05-17 DIAGNOSIS — F41.1 GENERALIZED ANXIETY DISORDER: Primary | Chronic | ICD-10-CM

## 2023-05-17 DIAGNOSIS — F51.05 INSOMNIA DUE TO MENTAL CONDITION: ICD-10-CM

## 2023-05-17 DIAGNOSIS — F31.32 BIPOLAR AFFECTIVE DISORDER, CURRENTLY DEPRESSED, MODERATE: Chronic | ICD-10-CM

## 2023-05-17 RX ORDER — DULOXETIN HYDROCHLORIDE 30 MG/1
CAPSULE, DELAYED RELEASE ORAL
Qty: 53 CAPSULE | Refills: 0 | Status: SHIPPED | OUTPATIENT
Start: 2023-05-17 | End: 2023-06-16

## 2023-05-17 RX ORDER — ARIPIPRAZOLE 5 MG/1
5 TABLET ORAL DAILY
Qty: 30 TABLET | Refills: 0 | Status: SHIPPED | OUTPATIENT
Start: 2023-05-17

## 2023-05-17 RX ORDER — HYDROXYZINE HYDROCHLORIDE 25 MG/1
25-50 TABLET, FILM COATED ORAL 3 TIMES DAILY PRN
Qty: 180 TABLET | Refills: 0 | Status: SHIPPED | OUTPATIENT
Start: 2023-05-17

## 2023-05-17 RX ORDER — LAMOTRIGINE 200 MG/1
200 TABLET ORAL DAILY
Qty: 30 TABLET | Refills: 0 | Status: SHIPPED | OUTPATIENT
Start: 2023-05-17

## 2023-05-17 RX ORDER — TRAZODONE HYDROCHLORIDE 100 MG/1
100 TABLET ORAL NIGHTLY PRN
Qty: 30 TABLET | Refills: 0 | Status: SHIPPED | OUTPATIENT
Start: 2023-05-17

## 2023-05-17 NOTE — PROGRESS NOTES
This provider is located at [place], [place]. The Patient is seen remotely using Video. Patient is being seen via telehealth and confirm that they are in a secure environment for this session. Patient is located in [place], [place] at her home. The patient's condition being diagnosed/treated is appropriate for telemedicine. Provider identified as Maribell Hernandez as well as credentials FRANCHESKA MSN PMHNP-BC.   The client/patient gave consent to be seen remotely, and when consent is given they understand that the consent allows for patient identifiable information to be sent to a third party as needed.  They may refuse to be seen remotely at any time. The electronic data is encrypted and password protected, and the patient has been advised of the potential risks to privacy not withstanding such measures.    Chief Complaint  Manic Behavior, Depression, Anxiety, and Sleeping Problem    Subjective        Chen Galaviz presents to [facility] for   History of Present Illness  Patient is seen today for follow-up visit for bipolar disorder, anxiety, and insomnia.  Patient reports that she has had worsening of symptoms over the last several days.  States she was kicked out of the place she was staying in [place].  States there was really no reason for that, but she thought those people were her friends.  States she is currently staying at her aunt's house now.  She states that is a safe living environment for her.  She states her court date got pushed up to July.  Currently rates depression an 8 on a 1-10 scale with 10 being the worst.  Patient verbalizes that she feels like her Prozac is not working anymore.  She is requesting to be switched to another agent.  States she has had some feelings of hopelessness.  States she has a lot of fatigue and lack of motivation.  Denies any suicidal or homicidal ideation.  States her sleep has been fair.  Appetite is fair.  Rates anxiety as 6-7 on a 1-10  scale with 10 being the worst.  States she has been taking the hydroxyzine 3 times daily and feels like it is not fully controlling her symptoms.  Denies any recurrence of any hypomanic type symptoms.  Denies any paranoia.  Denies any auditory or visual hallucinations.  Denies any side effects to the medications.  Objective   Vital Signs:   There were no vitals taken for this visit.      Mental Status Exam:   Hygiene:   good  Cooperation:  Cooperative  Eye Contact:  Good  Psychomotor Behavior:  Appropriate  Affect:  Full range  Mood: depressed and anxious  Speech:  Normal  Thought Process:  Goal directed  Thought Content:  Normal  Suicidal:  None  Homicidal:  None  Hallucinations:  None  Delusion:  None  Memory:  Intact  Orientation:  Person, Place, Time and Situation  Reliability:  good  Insight:  Good  Judgement:  Good  Impulse Control:  Good  Physical/Medical Issues:  No      Previous Provider notes and available records reviewed by this APRN today.   Current Medications:   Current Outpatient Medications   Medication Sig Dispense Refill   • acetaminophen (TYLENOL) 325 MG tablet Take 2 tablets by mouth Every 6 (Six) Hours As Needed for Mild Pain .     • albuterol sulfate  (90 Base) MCG/ACT inhaler Inhale 2 puffs Every 6 (Six) Hours As Needed for Wheezing. 18 g 11   • ARIPiprazole (Abilify) 5 MG tablet Take 1 tablet by mouth Daily. 30 tablet 0   • buprenorphine-naloxone (SUBOXONE) 8-2 MG film film DISSOLVE 2 FLIMS BY MOUTH ONCE A DAY AS DIRECTED FILL TODAY, FOLLOW INDUCTION PROTOCOL     • cetirizine (zyrTEC) 10 MG tablet Take 1 tablet by mouth Daily.     • DULoxetine (Cymbalta) 30 MG capsule Take 1 capsule by mouth Daily for 7 days, THEN 1 capsule 2 (Two) Times a Day for 23 days. 53 capsule 0   • fluticasone (Flovent HFA) 220 MCG/ACT inhaler Inhale 1 puff 2 (Two) Times a Day. 12 g 11   • hydrOXYzine (ATARAX) 25 MG tablet Take 1-2 tablets by mouth 3 (Three) Times a Day As Needed for Anxiety. 180 tablet 0   •  lamoTRIgine (LaMICtal) 200 MG tablet Take 1 tablet by mouth Daily. 30 tablet 0   • lisinopril (PRINIVIL,ZESTRIL) 40 MG tablet Take 1 tablet by mouth Daily. 90 tablet 1   • nitrofurantoin, macrocrystal-monohydrate, (Macrobid) 100 MG capsule Take 1 capsule by mouth 2 (Two) Times a Day. 10 capsule 0   • propranolol (INDERAL) 10 MG tablet Take 1 tablet by mouth 2 (Two) Times a Day. 60 tablet 0   • rOPINIRole (REQUIP) 0.25 MG tablet Take 1 tablet by mouth Every Night. Take 1 hour before bedtime. 90 tablet 1   • traZODone (DESYREL) 100 MG tablet Take 1 tablet by mouth At Night As Needed for Sleep. 30 tablet 0     No current facility-administered medications for this visit.       Physical Exam   Result Review :    The following data was reviewed by: FRANCHESKA Hernandez on 05/17/2023:  Common labs        6/4/2022    18:42 9/15/2022    08:22 3/22/2023    14:29   Common Labs   Glucose  98      Glucose 89          BUN 16      11      Creatinine 0.96      0.89      EGFR Non  Am >60          EGFR African Am >60          Sodium 138      139      Potassium 4.0      4.1      Chloride 106      101      Calcium 8.6      9.5      Albumin 4.1      4.40      Total Bilirubin 0.2      0.2      Alkaline Phosphatase 112      112      AST (SGOT) 8      14      ALT (SGPT) 8      10      WBC 10.77      10.40   9.08        Hemoglobin 15.6      15.3   15.7        Hematocrit 46.2      45.4   44.8        Platelets 302      324   258        Total Cholesterol  194      Triglycerides  133      HDL Cholesterol  39      LDL Cholesterol   131      Hemoglobin A1C  5.20          This result is from an external source.        Assessment and Plan   Problem List Items Addressed This Visit        Mental Health    Generalized anxiety disorder - Primary (Chronic)    Relevant Medications    ARIPiprazole (Abilify) 5 MG tablet    traZODone (DESYREL) 100 MG tablet    DULoxetine (Cymbalta) 30 MG capsule    hydrOXYzine (ATARAX) 25 MG tablet    Bipolar  affective disorder, currently depressed, moderate    Relevant Medications    ARIPiprazole (Abilify) 5 MG tablet    traZODone (DESYREL) 100 MG tablet    lamoTRIgine (LaMICtal) 200 MG tablet    DULoxetine (Cymbalta) 30 MG capsule    hydrOXYzine (ATARAX) 25 MG tablet   Other Visit Diagnoses     Insomnia due to mental condition        Relevant Medications    ARIPiprazole (Abilify) 5 MG tablet    traZODone (DESYREL) 100 MG tablet    DULoxetine (Cymbalta) 30 MG capsule    hydrOXYzine (ATARAX) 25 MG tablet        Discussed treatment options with patient.  Discussed with patient that we can change her Prozac to another agent.  Patient states she had had some success on Cymbalta in the past and request to be changed to that.  Instructed patient to take Prozac 40 mg daily for 4 days, then discontinue.  Start Cymbalta 30 mg daily for 7 days, then increase to 30 mg twice daily for anxiety and depression associated with her bipolar disorder.  Continue Abilify 5 mg daily for bipolar disorder.  Continue trazodone 100 mg nightly for sleep.  Continue hydroxyzine 25 mg 3 times daily as needed for anxiety.  Continue Lamictal 200 mg daily for bipolar disorder.  We will see patient again in 4 weeks to reassess.  Encouraged patient to contact the office if she has any issues sooner.    TREATMENT PLAN/GOALS: Continue supportive psychotherapy efforts and medications as indicated. Treatment and medication options discussed during today's visit. Patient ackowledged and verbally consented to continue with current treatment plan and was educated on the importance of compliance with treatment and follow-up appointments.    DEPRESSION:  Patient screened positive for depression based on a PHQ-9 score of  on . Follow-up recommendations include: Prescribed antidepressant medication treatment.       MEDICATION ISSUES:  We discussed risks, benefits, and side effects of the above medications and the patient was agreeable with the plan. Patient was  educated on the importance of compliance with treatment and follow-up appointments.  Patient is agreeable to call the office with any worsening of symptoms or onset of side effects. Patient is agreeable to call 911 or go to the nearest ER should he/she begin having SI/HI.      Counseled patient regarding multimodal approach with healthy nutrition, healthy sleep, regular physical activity, social activities, counseling, and medications.      Coping skills reviewed and encouraged positive framing of thoughts     Assisted patient in processing above session content; acknowledged and normalized patient’s thoughts, feelings, and concerns.  Applied  positive coping skills and behavior management in session.  Allowed patient to freely discuss issues without interruption or judgment. Provided safe, confidential environment to facilitate the development of positive therapeutic relationship and encourage open, honest communication. Assisted patient in identifying risk factors which would indicate the need for higher level of care including thoughts to harm self or others and/or self-harming behavior and encouraged patient to contact this office, call 911, or present to the nearest emergency room should any of these events occur. Discussed crisis intervention services and means to access. If patient has any concerns or needs assistance they were instructed to call the [facility] at [phone].    MEDS ORDERED DURING VISIT:  New Medications Ordered This Visit   Medications   • ARIPiprazole (Abilify) 5 MG tablet     Sig: Take 1 tablet by mouth Daily.     Dispense:  30 tablet     Refill:  0   • traZODone (DESYREL) 100 MG tablet     Sig: Take 1 tablet by mouth At Night As Needed for Sleep.     Dispense:  30 tablet     Refill:  0   • lamoTRIgine (LaMICtal) 200 MG tablet     Sig: Take 1 tablet by mouth Daily.     Dispense:  30 tablet     Refill:  0   • DULoxetine (Cymbalta) 30 MG capsule     Sig: Take 1  capsule by mouth Daily for 7 days, THEN 1 capsule 2 (Two) Times a Day for 23 days.     Dispense:  53 capsule     Refill:  0   • hydrOXYzine (ATARAX) 25 MG tablet     Sig: Take 1-2 tablets by mouth 3 (Three) Times a Day As Needed for Anxiety.     Dispense:  180 tablet     Refill:  0         Follow Up   Return in about 4 weeks (around 6/14/2023) for Video visit.    Patient was given instructions and counseling regarding her condition or for health maintenance advice. Please see specific information pulled into the AVS if appropriate.         This document has been electronically signed by FRANCHESKA Hernandez  May 17, 2023 17:03 EDT    Part of this note may be an electronic transcription/translation of spoken language to printed text using the Dragon Dictation System.

## 2023-05-19 DIAGNOSIS — F31.32 BIPOLAR AFFECTIVE DISORDER, CURRENTLY DEPRESSED, MODERATE: Chronic | ICD-10-CM

## 2023-05-19 DIAGNOSIS — F41.1 GENERALIZED ANXIETY DISORDER: Chronic | ICD-10-CM

## 2023-06-06 ENCOUNTER — PATIENT OUTREACH (OUTPATIENT)
Dept: CASE MANAGEMENT | Facility: OTHER | Age: 33
End: 2023-06-06
Payer: MEDICARE

## 2023-06-06 NOTE — OUTREACH NOTE
AMBULATORY CASE MANAGEMENT NOTE    Name and Relationship of Patient/Support Person: Chen Galaviz D - Self    Patient Outreach    Pt contacted regarding [facility] visit 6/3/23.  Role of Ambulatory Nurse  explained.  States she went in for flu like symptoms.  She reports was not diagnosed with flu, but viral infection and no prescriptions given.  She states she is able to drink plenty to stay hydrated.  Offered to assist in scheduling f/u appt with her PCP, Charles Ferrera PA-C, which she accepted.  She will be driving herself, so no transportation issues.     Care Coordination    Scheduling contacted and appt scheduled for 6/12/23 at 9:15 with Charles Ferrera PA-C  Attempted to contact pt regarding appt, however had to leave message on her voicemail.     Shital MOBLEY  Ambulatory Case Management    6/6/2023, 10:43 EDT

## 2023-06-08 RX ORDER — SODIUM, POTASSIUM,MAG SULFATES 17.5-3.13G
SOLUTION, RECONSTITUTED, ORAL ORAL
Qty: 354 ML | Refills: 0 | Status: SHIPPED | OUTPATIENT
Start: 2023-06-08

## 2023-06-09 DIAGNOSIS — F41.1 GENERALIZED ANXIETY DISORDER: Chronic | ICD-10-CM

## 2023-06-09 RX ORDER — PROPRANOLOL HYDROCHLORIDE 10 MG/1
TABLET ORAL
Qty: 60 TABLET | Refills: 0 | Status: SHIPPED | OUTPATIENT
Start: 2023-06-09

## 2023-06-12 ENCOUNTER — TELEPHONE (OUTPATIENT)
Dept: CASE MANAGEMENT | Facility: OTHER | Age: 33
End: 2023-06-12
Payer: MEDICARE

## 2023-06-12 NOTE — TELEPHONE ENCOUNTER
Pt contacted initially regarding [facility] visit 6/3/23 and she accepted assistance with scheduling appt with her PCP for 6/12/23.  Attempted to contact pt regarding the scheduled appt.  4 attempts were made and all 4 attempts were unsuccessful.  Was able to leave message with RN-ACM contact info on all attempts.  Per protocol, will route this message to PCP, Charles Ferrera PA-C ([email or web address]).

## 2023-10-17 PROCEDURE — 96365 THER/PROPH/DIAG IV INF INIT: CPT

## 2023-10-17 PROCEDURE — 96375 TX/PRO/DX INJ NEW DRUG ADDON: CPT

## 2023-10-17 PROCEDURE — 99284 EMERGENCY DEPT VISIT MOD MDM: CPT

## 2023-10-17 RX ORDER — ONDANSETRON 2 MG/ML
4 INJECTION INTRAMUSCULAR; INTRAVENOUS ONCE
Status: COMPLETED | OUTPATIENT
Start: 2023-10-17 | End: 2023-10-18

## 2023-10-17 RX ORDER — MORPHINE SULFATE 4 MG/ML
4 INJECTION, SOLUTION INTRAMUSCULAR; INTRAVENOUS ONCE
Status: COMPLETED | OUTPATIENT
Start: 2023-10-17 | End: 2023-10-18

## 2023-10-17 RX ORDER — SODIUM CHLORIDE 0.9 % (FLUSH) 0.9 %
10 SYRINGE (ML) INJECTION AS NEEDED
Status: DISCONTINUED | OUTPATIENT
Start: 2023-10-17 | End: 2023-10-18 | Stop reason: HOSPADM

## 2023-10-18 ENCOUNTER — HOSPITAL ENCOUNTER (EMERGENCY)
Facility: HOSPITAL | Age: 33
Discharge: HOME | End: 2023-10-18
Attending: EMERGENCY MEDICINE
Payer: MEDICARE

## 2023-10-18 ENCOUNTER — APPOINTMENT (OUTPATIENT)
Dept: CT IMAGING | Facility: HOSPITAL | Age: 33
End: 2023-10-18
Payer: MEDICARE

## 2023-10-18 VITALS
DIASTOLIC BLOOD PRESSURE: 82 MMHG | TEMPERATURE: 98 F | BODY MASS INDEX: 35.44 KG/M2 | OXYGEN SATURATION: 100 % | HEIGHT: 63 IN | RESPIRATION RATE: 16 BRPM | HEART RATE: 98 BPM | SYSTOLIC BLOOD PRESSURE: 126 MMHG | WEIGHT: 200 LBS

## 2023-10-18 DIAGNOSIS — N30.00 ACUTE CYSTITIS WITHOUT HEMATURIA: Primary | ICD-10-CM

## 2023-10-18 LAB
ALBUMIN SERPL-MCNC: 4.3 G/DL (ref 3.5–5.2)
ALBUMIN/GLOB SERPL: 1.4 G/DL
ALP SERPL-CCNC: 134 U/L (ref 39–117)
ALT SERPL W P-5'-P-CCNC: 14 U/L (ref 1–33)
ANION GAP SERPL CALCULATED.3IONS-SCNC: 9 MMOL/L (ref 5–15)
AST SERPL-CCNC: 13 U/L (ref 1–32)
BACTERIA UR QL AUTO: ABNORMAL /HPF
BASOPHILS # BLD AUTO: 0.06 10*3/MM3 (ref 0–0.2)
BASOPHILS NFR BLD AUTO: 0.4 % (ref 0–1.5)
BILIRUB SERPL-MCNC: 0.3 MG/DL (ref 0–1.2)
BILIRUB UR QL STRIP: NEGATIVE
BUN SERPL-MCNC: 13 MG/DL (ref 6–20)
BUN/CREAT SERPL: 16 (ref 7–25)
CALCIUM SPEC-SCNC: 9.4 MG/DL (ref 8.6–10.5)
CHLORIDE SERPL-SCNC: 102 MMOL/L (ref 98–107)
CLARITY UR: ABNORMAL
CO2 SERPL-SCNC: 27 MMOL/L (ref 22–29)
COLOR UR: YELLOW
CREAT SERPL-MCNC: 0.81 MG/DL (ref 0.57–1)
DEPRECATED RDW RBC AUTO: 40.3 FL (ref 37–54)
EGFRCR SERPLBLD CKD-EPI 2021: 99.1 ML/MIN/1.73
EOSINOPHIL # BLD AUTO: 0.15 10*3/MM3 (ref 0–0.4)
EOSINOPHIL NFR BLD AUTO: 0.9 % (ref 0.3–6.2)
ERYTHROCYTE [DISTWIDTH] IN BLOOD BY AUTOMATED COUNT: 12.9 % (ref 12.3–15.4)
GLOBULIN UR ELPH-MCNC: 3 GM/DL
GLUCOSE SERPL-MCNC: 107 MG/DL (ref 65–99)
GLUCOSE UR STRIP-MCNC: NEGATIVE MG/DL
HCT VFR BLD AUTO: 42.3 % (ref 34–46.6)
HGB BLD-MCNC: 14.4 G/DL (ref 12–15.9)
HGB UR QL STRIP.AUTO: ABNORMAL
HYALINE CASTS UR QL AUTO: ABNORMAL /LPF
IMM GRANULOCYTES # BLD AUTO: 0.09 10*3/MM3 (ref 0–0.05)
IMM GRANULOCYTES NFR BLD AUTO: 0.6 % (ref 0–0.5)
KETONES UR QL STRIP: NEGATIVE
LEUKOCYTE ESTERASE UR QL STRIP.AUTO: ABNORMAL
LIPASE SERPL-CCNC: 26 U/L (ref 13–60)
LYMPHOCYTES # BLD AUTO: 2.56 10*3/MM3 (ref 0.7–3.1)
LYMPHOCYTES NFR BLD AUTO: 16 % (ref 19.6–45.3)
MCH RBC QN AUTO: 29.4 PG (ref 26.6–33)
MCHC RBC AUTO-ENTMCNC: 34 G/DL (ref 31.5–35.7)
MCV RBC AUTO: 86.3 FL (ref 79–97)
MONOCYTES # BLD AUTO: 0.99 10*3/MM3 (ref 0.1–0.9)
MONOCYTES NFR BLD AUTO: 6.2 % (ref 5–12)
NEUTROPHILS NFR BLD AUTO: 12.18 10*3/MM3 (ref 1.7–7)
NEUTROPHILS NFR BLD AUTO: 75.9 % (ref 42.7–76)
NITRITE UR QL STRIP: NEGATIVE
NRBC BLD AUTO-RTO: 0 /100 WBC (ref 0–0.2)
PH UR STRIP.AUTO: 6.5 [PH] (ref 5–8)
PLATELET # BLD AUTO: 353 10*3/MM3 (ref 140–450)
PMV BLD AUTO: 9.9 FL (ref 6–12)
POTASSIUM SERPL-SCNC: 3.6 MMOL/L (ref 3.5–5.2)
PROT SERPL-MCNC: 7.3 G/DL (ref 6–8.5)
PROT UR QL STRIP: ABNORMAL
RBC # BLD AUTO: 4.9 10*6/MM3 (ref 3.77–5.28)
RBC # UR STRIP: ABNORMAL /HPF
REF LAB TEST METHOD: ABNORMAL
SODIUM SERPL-SCNC: 138 MMOL/L (ref 136–145)
SP GR UR STRIP: 1.01 (ref 1–1.03)
SQUAMOUS #/AREA URNS HPF: ABNORMAL /HPF
UROBILINOGEN UR QL STRIP: ABNORMAL
WBC # UR STRIP: ABNORMAL /HPF
WBC NRBC COR # BLD: 16.03 10*3/MM3 (ref 3.4–10.8)

## 2023-10-18 PROCEDURE — 96365 THER/PROPH/DIAG IV INF INIT: CPT

## 2023-10-18 PROCEDURE — 25010000002 MORPHINE PER 10 MG: Performed by: EMERGENCY MEDICINE

## 2023-10-18 PROCEDURE — 80053 COMPREHEN METABOLIC PANEL: CPT | Performed by: EMERGENCY MEDICINE

## 2023-10-18 PROCEDURE — 25010000002 ONDANSETRON PER 1 MG: Performed by: EMERGENCY MEDICINE

## 2023-10-18 PROCEDURE — 96375 TX/PRO/DX INJ NEW DRUG ADDON: CPT

## 2023-10-18 PROCEDURE — 81001 URINALYSIS AUTO W/SCOPE: CPT | Performed by: EMERGENCY MEDICINE

## 2023-10-18 PROCEDURE — 74176 CT ABD & PELVIS W/O CONTRAST: CPT

## 2023-10-18 PROCEDURE — 25010000002 CEFTRIAXONE PER 250 MG: Performed by: EMERGENCY MEDICINE

## 2023-10-18 PROCEDURE — 83690 ASSAY OF LIPASE: CPT | Performed by: EMERGENCY MEDICINE

## 2023-10-18 PROCEDURE — 85025 COMPLETE CBC W/AUTO DIFF WBC: CPT | Performed by: EMERGENCY MEDICINE

## 2023-10-18 RX ORDER — CEFDINIR 300 MG/1
300 CAPSULE ORAL 2 TIMES DAILY
Qty: 14 CAPSULE | Refills: 0 | Status: SHIPPED | OUTPATIENT
Start: 2023-10-18 | End: 2023-10-25

## 2023-10-18 RX ADMIN — MORPHINE SULFATE 4 MG: 4 INJECTION, SOLUTION INTRAMUSCULAR; INTRAVENOUS at 01:10

## 2023-10-18 RX ADMIN — CEFTRIAXONE SODIUM 2000 MG: 2 INJECTION, POWDER, FOR SOLUTION INTRAMUSCULAR; INTRAVENOUS at 01:47

## 2023-10-18 RX ADMIN — ONDANSETRON 4 MG: 2 INJECTION INTRAMUSCULAR; INTRAVENOUS at 01:10

## 2023-10-18 NOTE — ED PROVIDER NOTES
Subjective   History of Present Illness  Is a 33-year-old female presented to the emergency department with some lower abdominal pain for the last 3 days.  Patient states that she has a longstanding history of endometriosis and ovarian cyst.  States that she had a hysterectomy secondary to this, however she occasionally gets flares up of her ovarian pain.  States that it feels very similar to that.  She is having dull pain located in the left lower quadrant.  It is nonradiating.  She is not having associated nausea or vomiting.  Denies any diarrhea.  No hematuria dysuria.  No vaginal discharge.  She denies any fevers or chills.  No headache or change in vision.  No focal weakness.  No chest pain or shortness of breath    History provided by:  Patient   used: No        Review of Systems   Constitutional:  Negative for chills and fever.   HENT:  Negative for congestion, ear pain and sore throat.    Eyes:  Negative for visual disturbance.   Respiratory:  Negative for shortness of breath.    Cardiovascular:  Negative for chest pain.   Gastrointestinal:  Positive for abdominal pain.   Genitourinary:  Negative for difficulty urinating.   Musculoskeletal:  Negative for arthralgias.   Skin:  Negative for rash.   Neurological:  Negative for dizziness, weakness and numbness.   Psychiatric/Behavioral:  Negative for agitation.        Past Medical History:   Diagnosis Date    Abnormal liver enzymes 05/09/2016    ADHD (attention deficit hyperactivity disorder) 1996    Allergic 2001    Allergic rhinitis     Anxiety     Arthritis     since age 21 in her back    Arthritis of back N/a    Asthma     seasonal    Back pain     Bacterial vaginosis 05/09/2016    Bipolar disorder     dx age 19    Chest pain, pleuritic     Common migraine with intractable migraine 05/09/2016    CTS (carpal tunnel syndrome) 8/20/22    Depression     Dyslipidemia     Dysmenorrhea 05/09/2016    Dyspareunia, female 12/12/2018    Added  automatically from request for surgery [id number]    Endometriosis     Fatigue     Febrile seizure     FEBRILE SEIZURE AT 3YO AND AT 14YO D/T WELBUTRIN    Frequent UTI     Gastroesophageal reflux disease 2016    GERD (gastroesophageal reflux disease)     Gestational hypertension     History of gestational hypertension. States blood pressure is sometimes high but does not take any medications.    Headache     Herpes zoster     denies this visit    History of robot-assisted laparoscopic hysterectomy/BS 2019    Hyperlipidemia     IBS (irritable bowel syndrome)     Itching     Knee swelling N/A    Low back pain     Lung mass 2016    Description: A.  CT scan of the chest 2014 reports that the previously seen nodule of the left lower lobe is no longer visualized and the micronodular densities along the lateral aspect of the right lower lobe are stable.  There is no change in the appearance of the right axillary lymph nodes.    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 due to placenta previa    Migraine     Migraine     Multiple gestation     Obesity     Onychomycosis of toenail 2016    Impression: 2016 - Refer to podiatry.;     Organic hypersomnia     Ovarian cyst     Pain, upper back     PMS (premenstrual syndrome)     Pulmonary nodule     S/P 2013 cholecystectomy 2018    Schizophrenia     Severe frontal headaches 2018    Sinus tachycardia     Substance abuse     Visual impairment     Wears glasses        Allergies   Allergen Reactions    Bupropion Other (See Comments) and Provider Review Needed     Seizure / wellbutrin     Naproxen Rash    Nsaids Rash    Sulfa Antibiotics Rash       Past Surgical History:   Procedure Laterality Date     SECTION       SECTION WITH TUBAL N/A 2017    Procedure:  SECTION REPEAT WITH TUBAL;  Surgeon: Fabien Blake MD;  Location: [facility] LABOR DELIVERY;  Service:      CHOLECYSTECTOMY      age 21    COLONOSCOPY      DIAGNOSTIC LAPAROSCOPY      X 4    ENDOMETRIAL ABLATION      PELVIC LAPAROSCOPY      age 21    TONSILLECTOMY      age 23    TOTAL LAPAROSCOPIC HYSTERECTOMY N/A 01/23/2019    Procedure: TOTAL LAPAROSCOPIC HYSTERECTOMY BILATERAL SALPINGECTOMY WITH DAVINCI ROBOT;  Surgeon: Amador Richey MD;  Location: [facility];  Service: DaVinci    TUBAL ABDOMINAL LIGATION  2/24/2017    WISDOM TOOTH EXTRACTION         Family History   Problem Relation Age of Onset    Asthma Mother     Hypertension Mother     Breast cancer Mother     Diabetes Mother     Colon polyps Mother     Cancer Mother     Anxiety disorder Mother     Arthritis Mother     Depression Mother     Mental illness Mother     Hypertension Father     Alcohol abuse Father     Arthritis Father     Asthma Father     COPD Father     Depression Father     Drug abuse Father     Hyperlipidemia Father     Asthma Brother     Drug abuse Brother     Alcohol abuse Brother     Depression Brother     Hyperlipidemia Brother     Melanoma Maternal Grandfather     Cancer Maternal Grandfather     Diabetes Maternal Grandfather     Colon polyps Maternal Grandmother     Diabetes Maternal Grandmother     Cancer Maternal Grandmother     Stomach cancer Paternal Grandmother     Hypertension Paternal Grandmother     Osteoporosis Paternal Grandmother     Heart attack Paternal Grandfather 36    Diabetes Paternal Grandfather     Hypertension Brother     Developmental Disability Son     Learning disabilities Son     Developmental Disability Son     Learning disabilities Son     Developmental Disability Son     Learning disabilities Son     Ovarian cancer Neg Hx     Uterine cancer Neg Hx        Social History     Socioeconomic History    Marital status: Single   Tobacco Use    Smoking status: Every Day     Packs/day: 1.00     Years: 15.00     Additional pack years: 0.00     Total pack years: 15.00     Types: Cigarettes     Start date: 10/9/2002    Smokeless  tobacco: Never    Tobacco comments:     cutting back, encourage cessation   Vaping Use    Vaping Use: Never used   Substance and Sexual Activity    Alcohol use: Yes     Alcohol/week: 1.0 standard drink of alcohol     Types: 1 Drinks containing 0.5 oz of alcohol per week    Drug use: Not Currently     Frequency: 30.0 times per week     Types: Cocaine(coke), Marijuana, Methamphetamines    Sexual activity: Yes     Partners: Male     Birth control/protection: None, Same-sex partner           Objective   Physical Exam  Vitals and nursing note reviewed.   Constitutional:       General: She is not in acute distress.     Appearance: She is not ill-appearing or toxic-appearing.   HENT:      Mouth/Throat:      Pharynx: No posterior oropharyngeal erythema.   Eyes:      Conjunctiva/sclera: Conjunctivae normal.      Pupils: Pupils are equal, round, and reactive to light.   Cardiovascular:      Rate and Rhythm: Normal rate and regular rhythm.   Pulmonary:      Effort: Pulmonary effort is normal. No respiratory distress.   Abdominal:      General: Abdomen is flat. There is no distension.      Palpations: There is no mass.      Tenderness: There is abdominal tenderness. There is no guarding or rebound.      Comments: Moderate tenderness palpation the left lower quadrant   Musculoskeletal:         General: No deformity. Normal range of motion.   Skin:     General: Skin is warm.      Findings: No rash.   Neurological:      General: No focal deficit present.      Mental Status: She is alert and oriented to person, place, and time.      Motor: No weakness.         Procedures           ED Course  ED Course as of 10/18/23 0140   Wed Oct 18, 2023   0138 Temp: 98.3 °F (36.8 °C) [JK]   0138 Temp src: Oral [JK]   0138 Heart Rate: 110 [JK]   0138 Resp: 18 [JK]   0138 SpO2: 99 % [JK]   0138 Device (Oxygen Therapy): room air  Patient's repeat vitals, telemetry tracing, and pulse oximetry tracing were directly viewed and interpreted by myself.   Patient slightly tachycardic with a heart rate of 110 bpm [JK]   0138 CBC & Differential(!) [JK]   0138 Comprehensive Metabolic Panel(!) [JK]   0138 Lipase [JK]   0138 Urinalysis, Microscopic Only - Urine, Clean Catch(!) [JK]   0138 Urinalysis With Microscopic If Indicated (No Culture) - Urine, Clean Catch(!)  Laboratory studies were reviewed and interpreted directly by myself.  CBC showed a leukocytosis with a white blood cell count of 16.03, CMP normal, lipase normal, urinalysis did show some bacteria and white cells [JK]   0139 CT Abdomen Pelvis Without Contrast  Imaging was directly visualized by myself, per my interpretations, CT abdomen pelvis was unremarkable. [JK]   0139 Patient's findings are consistent with acute UTI.  We did start the patient on IV antibiotics.  She be discharged with short course antibiotics.   [JK]   0139 On reevaluation, the patient states that they are feeling much better.  Patient tolerated by mouth challenge of juice and crackers without difficulty.  They are not complaining of any new abdominal pain.  Repeat examination did not show any significant guarding or rebound.  No evidence of peritonitis.  No acute no tenderness.  Patient was given strict return precautions for acute abdomen.  They need to be reevaluated within 24 hours for acute abdomen by PCP or return to the emergency department for reexamination.   [JK]   0139 I had a discussion with the patient/family regarding diagnosis, diagnostic results, treatment plan, and medications. The patient/family indicated understanding of these instructions. I spent adequate time at the bedside prior to discharge necessary to discuss the aftercare instructions, giving patient education, providing explanations of the results of our evaluations/findings, and my decision making to assure that the patient/family understand the plan of care. Time was allotted to answer questions at that time and throughout the ED course. Patient is required  to maintain timely follow up, as discussed. I also discussed the potential for the development of an acute emergent condition requiring further evaluation, return to the ER, admission, or even surgical intervention.  I encouraged the patient to return to the emergency department immediately for any concerns, worsening symptoms, new complaints, or if symptoms persist and they are unable to seek follow-up in a timely fashion. The patient/family expressed understanding and agreement with this plan    Shared decision making:   After full review of the patient's clinical presentation, review of any work-up including but not limited to laboratory studies and radiology obtained, I had a discussion with the patient.  Treatment options were discussed as well as the risks, benefits and consequences.  I discussed all findings with the patient and family members if available.  During the discussion, treatment goals were understood by all as well as any misconceptions which were addressed with the patient.  Ample time was given for any questions they may have had.  They are in agreement with the treatment plan as well as final disposition. [JK]      ED Course User Index  [JK] Mt Waterman MD                                           Medical Decision Making  This is a 33-year-old female presented to the emergency department some left lower abdominal pain.  Patient apparently has a history of ovarian cyst.  Her abdominal examination is relatively benign.  There is no evidence of acute abdomen or peritonitis.  Overall she is hemodynamically stable.  Afebrile.  IV access established.  Patient provided symptomatic treatment.  Work-up initiated.      Differential diagnosis: Ovarian cyst, abdominal pain, colitis, diverticulosis      Amount and/or Complexity of Data Reviewed  External Data Reviewed: labs, radiology and notes.     Details: External laboratories, imaging as well as notes were reviewed personally by myself.  All  relevant studies were used to guide decision making.     Date of previous record: 6/3/2023    Source of note: Outlying emergency department record    Summary: Patient was seen evaluated for URI type symptoms.  I did review multiple laboratory studies on file as well as imaging.  Records reviewed    Labs: ordered. Decision-making details documented in ED Course.  Radiology: ordered and independent interpretation performed. Decision-making details documented in ED Course.    Risk  Prescription drug management.        Final diagnoses:   Acute cystitis without hematuria       ED Disposition  ED Disposition       ED Disposition   Discharge    Condition   Stable    Comment   --               Charles Ferrera PA-C  [address] [address]  [phone]    Call in 1 day           Medication List        New Prescriptions      cefdinir 300 MG capsule  Commonly known as: OMNICEF  Take 1 capsule by mouth 2 (Two) Times a Day for 7 days.               Where to Get Your Medications        These medications were sent to [facility]/pharmacy #[id number] - [place], [place] - [address] - [phone] [facility] [phone]   [address] [id number]      Phone: [phone]   cefdinir 300 MG capsule            Mt Waterman MD  10/18/23 0140

## 2023-10-24 ENCOUNTER — OFFICE VISIT (OUTPATIENT)
Dept: INTERNAL MEDICINE | Facility: CLINIC | Age: 33
End: 2023-10-24
Payer: MEDICARE

## 2023-10-24 VITALS
OXYGEN SATURATION: 100 % | SYSTOLIC BLOOD PRESSURE: 104 MMHG | DIASTOLIC BLOOD PRESSURE: 56 MMHG | WEIGHT: 201.4 LBS | BODY MASS INDEX: 35.68 KG/M2 | HEIGHT: 63 IN | HEART RATE: 94 BPM | TEMPERATURE: 97.3 F

## 2023-10-24 DIAGNOSIS — R11.0 CHRONIC NAUSEA: ICD-10-CM

## 2023-10-24 DIAGNOSIS — Z12.31 SCREENING MAMMOGRAM, ENCOUNTER FOR: ICD-10-CM

## 2023-10-24 DIAGNOSIS — I10 ESSENTIAL HYPERTENSION: Primary | ICD-10-CM

## 2023-10-24 DIAGNOSIS — F41.1 GENERALIZED ANXIETY DISORDER: ICD-10-CM

## 2023-10-24 DIAGNOSIS — Z00.00 HEALTH CARE MAINTENANCE: ICD-10-CM

## 2023-10-24 RX ORDER — LISINOPRIL 40 MG/1
40 TABLET ORAL DAILY
Qty: 90 TABLET | Refills: 1 | Status: SHIPPED | OUTPATIENT
Start: 2023-10-24

## 2023-10-24 RX ORDER — PROPRANOLOL HYDROCHLORIDE 10 MG/1
10 TABLET ORAL 2 TIMES DAILY
Qty: 180 TABLET | Refills: 2 | Status: SHIPPED | OUTPATIENT
Start: 2023-10-24

## 2023-10-24 NOTE — PROGRESS NOTES
[facility] PRIMARY CARE  [address]  [facility] [address]  Dept: [phone]  Dept Fax: [phone]  Loc: [phone]  Loc Fax: [phone]    Chen Galaviz  1990    Follow Up Office Visit Note    History of Present Illness:  Patient is a 33-year-old female in today to follow-up for high blood pressure, anxiety, and chronic nausea.  On lisinopril 40 mg daily and propranolol 10 mg daily as directed with any problems or side effects for blood pressure.  Blood pressure running normal.  Needing refills.    Patient taking hydroxyzine as directed for anxiety.  Reports good symptomatic control.    Patient continues to have chronic nausea.  Needing referral back to GI.      Dysuria   This is a recurrent problem. The current episode started in the past 7 days. The problem occurs every urination. The problem has been gradually worsening. The quality of the pain is described as burning. The pain is at a severity of 6/10. There has been no fever. She is Sexually active. There is A history of pyelonephritis. Associated symptoms include chills, flank pain, frequency, hesitancy, nausea and urgency. Pertinent negatives include no discharge, hematuria, possible pregnancy, sweats or vomiting.   Hypertension  Pertinent negatives include no chest pain, headaches, palpitations, shortness of breath or sweats.       The following portions of the patient's history were reviewed and updated as appropriate: allergies, current medications, past family history, past medical history, past social history, past surgical history, and problem list.    Medications:    Current Outpatient Medications:     acetaminophen (TYLENOL) 325 MG tablet, Take 2 tablets by mouth Every 6 (Six) Hours As Needed for Mild Pain ., Disp: , Rfl:     albuterol sulfate  (90 Base) MCG/ACT inhaler, Inhale 2 puffs Every 6 (Six) Hours As Needed for Wheezing., Disp: 18 g, Rfl: 11    ARIPiprazole (Abilify) 5 MG  tablet, Take 1 tablet by mouth Daily., Disp: 30 tablet, Rfl: 0    cefdinir (OMNICEF) 300 MG capsule, Take 1 capsule by mouth 2 (Two) Times a Day for 7 days., Disp: 14 capsule, Rfl: 0    fluticasone (Flovent HFA) 220 MCG/ACT inhaler, Inhale 1 puff 2 (Two) Times a Day., Disp: 12 g, Rfl: 11    hydrOXYzine (ATARAX) 25 MG tablet, Take 1-2 tablets by mouth 3 (Three) Times a Day As Needed for Anxiety., Disp: 180 tablet, Rfl: 0    lamoTRIgine (LaMICtal) 200 MG tablet, Take 1 tablet by mouth Daily., Disp: 30 tablet, Rfl: 0    lisinopril (PRINIVIL,ZESTRIL) 40 MG tablet, Take 1 tablet by mouth Daily., Disp: 90 tablet, Rfl: 1    naloxone (NARCAN) 4 MG/0.1ML nasal spray, , Disp: , Rfl:     oxybutynin XL (DITROPAN-XL) 10 MG 24 hr tablet, TAKE 1 TABLET BY MOUTH EVERY DAY, Disp: 90 tablet, Rfl: 0    propranolol (INDERAL) 10 MG tablet, Take 1 tablet by mouth 2 (Two) Times a Day., Disp: 180 tablet, Rfl: 2    traZODone (DESYREL) 100 MG tablet, Take 1 tablet by mouth At Night As Needed for Sleep., Disp: 30 tablet, Rfl: 0    DULoxetine (Cymbalta) 30 MG capsule, Take 1 capsule by mouth Daily for 7 days, THEN 1 capsule 2 (Two) Times a Day for 23 days., Disp: 53 capsule, Rfl: 0    Subjective  Allergies   Allergen Reactions    Bupropion Other (See Comments) and Provider Review Needed     Seizure / wellbutrin     Naproxen Rash    Nsaids Rash    Sulfa Antibiotics Rash        Past Medical History:   Diagnosis Date    Abnormal liver enzymes 05/09/2016    ADHD (attention deficit hyperactivity disorder) 1996    Allergic 2001    Allergic rhinitis     Anxiety     Arthritis     since age 21 in her back    Arthritis of back N/a    Asthma     seasonal    Back pain     Bacterial vaginosis 05/09/2016    Bipolar disorder     dx age 19    Chest pain, pleuritic     Common migraine with intractable migraine 05/09/2016    CTS (carpal tunnel syndrome) 8/20/22    Depression     Dyslipidemia     Dysmenorrhea 05/09/2016    Dyspareunia, female 12/12/2018    Added  automatically from request for surgery [id number]    Endometriosis     Fatigue     Febrile seizure     FEBRILE SEIZURE AT 3YO AND AT 16YO D/T WELBUTRIN    Frequent UTI     Gastroesophageal reflux disease 2016    GERD (gastroesophageal reflux disease)     Gestational hypertension     History of gestational hypertension. States blood pressure is sometimes high but does not take any medications.    Headache     Herpes zoster     denies this visit    History of robot-assisted laparoscopic hysterectomy/BS 2019    Hyperlipidemia     IBS (irritable bowel syndrome)     Itching     Knee swelling N/A    Low back pain     Lung mass 2016    Description: A.  CT scan of the chest 2014 reports that the previously seen nodule of the left lower lobe is no longer visualized and the micronodular densities along the lateral aspect of the right lower lobe are stable.  There is no change in the appearance of the right axillary lymph nodes.    Mental retardation     A. [place] to be related to hypoxia at birth due to placenta previa    Migraine     Migraine     Multiple gestation     Obesity     Onychomycosis of toenail 2016    Impression: 2016 - Refer to podiatry.;     Organic hypersomnia     Ovarian cyst     Pain, upper back     PMS (premenstrual syndrome)     Pulmonary nodule     S/P 2013 cholecystectomy 2018    Schizophrenia     Severe frontal headaches 2018    Sinus tachycardia     Substance abuse     Visual impairment     Wears glasses        Past Surgical History:   Procedure Laterality Date     SECTION       SECTION WITH TUBAL N/A 2017    Procedure:  SECTION REPEAT WITH TUBAL;  Surgeon: Fabien Blake MD;  [facility]: [facility] DELIV[facility];  Service:     CHOLECYSTECTOMY      age 21    COLONOSCOPY      DIAGNOSTIC LAPAROSCOPY      X 4    ENDOMETRIAL ABLATION      PELVIC LAPAROSCOPY      age 21    TONSILLECTOMY      age 23    TOTAL LAPAROSCOPIC  HYSTERECTOMY N/A 01/23/2019    Procedure: TOTAL LAPAROSCOPIC HYSTERECTOMY BILATERAL SALPINGECTOMY WITH DAVINCI ROBOT;  Surgeon: Amador Richey MD;  Location: [facility];  Service: DaVinci    TUBAL ABDOMINAL LIGATION  2/24/2017    WISDOM TOOTH EXTRACTION         Family History   Problem Relation Age of Onset    Asthma Mother     Hypertension Mother     Breast cancer Mother     Diabetes Mother     Colon polyps Mother     Cancer Mother     Anxiety disorder Mother     Arthritis Mother     Depression Mother     Mental illness Mother     Hypertension Father     Alcohol abuse Father     Arthritis Father     Asthma Father     COPD Father     Depression Father     Drug abuse Father     Hyperlipidemia Father     Asthma Brother     Drug abuse Brother     Alcohol abuse Brother     Depression Brother     Hyperlipidemia Brother     Melanoma Maternal Grandfather     Cancer Maternal Grandfather     Diabetes Maternal Grandfather     Colon polyps Maternal Grandmother     Diabetes Maternal Grandmother     Cancer Maternal Grandmother     Stomach cancer Paternal Grandmother     Hypertension Paternal Grandmother     Osteoporosis Paternal Grandmother     Heart attack Paternal Grandfather 36    Diabetes Paternal Grandfather     Hypertension Brother     Developmental Disability Son     Learning disabilities Son     Developmental Disability Son     Learning disabilities Son     Developmental Disability Son     Learning disabilities Son     Ovarian cancer Neg Hx     Uterine cancer Neg Hx         Social History     Socioeconomic History    Marital status: Single   Tobacco Use    Smoking status: Every Day     Packs/day: 1.00     Years: 15.00     Additional pack years: 0.00     Total pack years: 15.00     Types: Cigarettes     Start date: 10/9/2002    Smokeless tobacco: Never    Tobacco comments:     cutting back, encourage cessation   Vaping Use    Vaping Use: Never used   Substance and Sexual Activity    Alcohol use: Yes     Alcohol/week: 1.0  "standard drink of alcohol     Types: 1 Drinks containing 0.5 oz of alcohol per week    Drug use: Not Currently     Frequency: 30.0 times per week     Types: Cocaine(coke), Marijuana, Methamphetamines    Sexual activity: Yes     Partners: Male     Birth control/protection: None, Same-sex partner       Review of Systems   Constitutional:  Positive for chills. Negative for activity change, fatigue, fever and unexpected weight change.   HENT:  Negative for congestion, ear pain, postnasal drip, sinus pressure and sore throat.    Eyes:  Negative for pain, discharge and redness.   Respiratory:  Negative for cough, shortness of breath and wheezing.    Cardiovascular:  Negative for chest pain, palpitations and leg swelling.   Gastrointestinal:  Positive for nausea. Negative for diarrhea and vomiting.   Endocrine: Negative for cold intolerance and heat intolerance.   Genitourinary:  Positive for dysuria, flank pain, frequency, hesitancy and urgency. Negative for decreased urine volume and hematuria.   Musculoskeletal:  Negative for arthralgias and myalgias.   Skin:  Negative for rash and wound.   Neurological:  Negative for dizziness, light-headedness and headaches.   Hematological:  Does not bruise/bleed easily.   Psychiatric/Behavioral:  Negative for confusion, dysphoric mood and sleep disturbance. The patient is not nervous/anxious.          Objective  Vitals:    10/24/23 1101   BP: 104/56   BP Location: Left arm   Patient Position: Sitting   Cuff Size: Large Adult   Pulse: 94   Temp: 97.3 °F (36.3 °C)   TempSrc: Temporal   SpO2: 100%   Weight: 91.4 kg (201 lb 6.4 oz)   Height: 160 cm (62.99\")     Body mass index is 35.69 kg/m².     Physical Exam  Physical Exam  Vitals and nursing note reviewed.   Constitutional:       General: She is not in acute distress.     Appearance: She is not ill-appearing.   HENT:      Head: Normocephalic.      Right Ear: Tympanic membrane, ear canal and external ear normal. There is no impacted " cerumen.      Left Ear: Tympanic membrane, ear canal and external ear normal. There is no impacted cerumen.      Nose: No congestion or rhinorrhea.      Mouth/Throat:      Mouth: Mucous membranes are moist.      Pharynx: Oropharynx is clear. No oropharyngeal exudate or posterior oropharyngeal erythema.   Eyes:      General:         Right eye: No discharge.         Left eye: No discharge.      Extraocular Movements: Extraocular movements intact.      Conjunctiva/sclera: Conjunctivae normal.      Pupils: Pupils are equal, round, and reactive to light.   Cardiovascular:      Rate and Rhythm: Normal rate and regular rhythm.      Heart sounds: Normal heart sounds. No murmur heard.     No friction rub. No gallop.   Pulmonary:      Effort: Pulmonary effort is normal. No respiratory distress.      Breath sounds: Normal breath sounds. No wheezing.   Abdominal:      General: Bowel sounds are normal. There is no distension.      Palpations: Abdomen is soft. There is no mass.      Tenderness: There is no abdominal tenderness.   Musculoskeletal:         General: No swelling. Normal range of motion.      Cervical back: Normal range of motion. No tenderness.      Right lower leg: No edema.      Left lower leg: No edema.   Lymphadenopathy:      Cervical: No cervical adenopathy.   Skin:     Findings: No bruising, erythema or rash.   Neurological:      Mental Status: She is oriented to person, place, and time.      Gait: Gait normal.   Psychiatric:         Mood and Affect: Mood normal.         Behavior: Behavior normal.         Thought Content: Thought content normal.         Judgment: Judgment normal.         Diagnostic Data  Procedures    Assessment  Diagnoses and all orders for this visit:    1. Essential hypertension (Primary)  -     lisinopril (PRINIVIL,ZESTRIL) 40 MG tablet; Take 1 tablet by mouth Daily.  Dispense: 90 tablet; Refill: 1    2. Generalized anxiety disorder  -     propranolol (INDERAL) 10 MG tablet; Take 1 tablet  by mouth 2 (Two) Times a Day.  Dispense: 180 tablet; Refill: 2    3. Chronic nausea  -     Ambulatory Referral to Gastroenterology    4. Screening mammogram, encounter for  -     Mammo screening digital tomosynthesis bilateral w CAD; Future    5. Health care maintenance  -     CBC (No Diff)  -     Comprehensive Metabolic Panel  -     TSH Rfx On Abnormal To Free T4  -     Hemoglobin A1c; Future  -     Lipid Panel        Plan    1. Essential hypertension (Primary)- well-controlled on lisinopril and propranolol.  Keep same.  Refilled meds.  Continue to monitor.    2. Generalized anxiety disorder- well-controlled on hydroxyzine.  Keep same.  Continue to monitor.    3. Chronic nausea- worse, referred to GI.    4. Screening mammogram, encounter for- ordered mammogram.    5. Health care maintenance- obtain fasting labs.      Return in about 4 weeks (around 11/21/2023) for Medicare Wellness.    Charles Ferrera PA-C  10/24/2023  Answers submitted by the patient for this visit:  Primary Reason for Visit (Submitted on 10/23/2023)  What is the primary reason for your visit?: Painful Urination

## 2023-10-25 ENCOUNTER — TELEMEDICINE (OUTPATIENT)
Dept: PSYCHIATRY | Facility: CLINIC | Age: 33
End: 2023-10-25
Payer: MEDICARE

## 2023-10-25 DIAGNOSIS — F31.32 BIPOLAR AFFECTIVE DISORDER, CURRENTLY DEPRESSED, MODERATE: Primary | Chronic | ICD-10-CM

## 2023-10-25 DIAGNOSIS — F51.05 INSOMNIA DUE TO MENTAL CONDITION: ICD-10-CM

## 2023-10-25 DIAGNOSIS — F41.1 GENERALIZED ANXIETY DISORDER: Chronic | ICD-10-CM

## 2023-10-25 RX ORDER — LAMOTRIGINE 200 MG/1
200 TABLET ORAL DAILY
Qty: 30 TABLET | Refills: 0 | Status: SHIPPED | OUTPATIENT
Start: 2023-10-25

## 2023-10-25 RX ORDER — HYDROXYZINE HYDROCHLORIDE 25 MG/1
25-50 TABLET, FILM COATED ORAL 3 TIMES DAILY PRN
Qty: 180 TABLET | Refills: 0 | Status: SHIPPED | OUTPATIENT
Start: 2023-10-25

## 2023-10-25 RX ORDER — DULOXETIN HYDROCHLORIDE 60 MG/1
60 CAPSULE, DELAYED RELEASE ORAL DAILY
Qty: 30 CAPSULE | Refills: 0 | Status: SHIPPED | OUTPATIENT
Start: 2023-10-25

## 2023-10-25 RX ORDER — TRAZODONE HYDROCHLORIDE 100 MG/1
100 TABLET ORAL NIGHTLY PRN
Qty: 30 TABLET | Refills: 0 | Status: SHIPPED | OUTPATIENT
Start: 2023-10-25

## 2023-10-25 RX ORDER — HYDROXYZINE HYDROCHLORIDE 25 MG/1
25-50 TABLET, FILM COATED ORAL 3 TIMES DAILY PRN
Qty: 180 TABLET | Refills: 0 | OUTPATIENT
Start: 2023-10-25

## 2023-10-25 RX ORDER — LAMOTRIGINE 200 MG/1
TABLET ORAL
Qty: 30 TABLET | Refills: 0 | OUTPATIENT
Start: 2023-10-25

## 2023-10-25 RX ORDER — HYDROXYZINE HYDROCHLORIDE 25 MG/1
TABLET, FILM COATED ORAL
Qty: 180 TABLET | Refills: 0 | OUTPATIENT
Start: 2023-10-25

## 2023-10-26 NOTE — PROGRESS NOTES
This provider is located at [place], [place]. The Patient is seen remotely using Video. Patient is being seen via telehealth and confirm that they are in a secure environment for this session. Patient is located in [place], [place] at her aunt's home. The patient's condition being diagnosed/treated is appropriate for telemedicine. Provider identified as Maribell Hernandez as well as credentials APRN MSN PMHNP-BC.   The client/patient gave consent to be seen remotely, and when consent is given they understand that the consent allows for patient identifiable information to be sent to a third party as needed.  They may refuse to be seen remotely at any time. The electronic data is encrypted and password protected, and the patient has been advised of the potential risks to privacy not withstanding such measures.    Chief Complaint  Manic Behavior, Depression, Anxiety, and Sleeping Problem    Subjective        Chen Galaviz presents to [facility] for   History of Present Illness  Patient is seen today for follow-up visit for bipolar disorder, anxiety, and insomnia.  Patient reports she had not been seen since May because she was in nursing home.  States she was in the wrong place at the wrong time and was arrested.  She states that she has been out of nursing home for about a month.  States she did not get to take her medications while in nursing home, but restarted all of them when she got out.  She states the 1 that she did not restart with Abilify.  She states she feels she has been doing good without that medication.  States she is now working at [employer] about 36 to 40 hours/week.  She states things are going well currently.  She rates depression and anxiety a 4 on a 1-10 scale with 10 being the worst.  Denies any hopelessness.  States her sleep is good.  Appetite is good.  Denies any recurrence of any hypomanic type symptoms.  Denies any paranoia.  Denies any auditory or visual hallucinations.  Denies any  side effects to the medications.  Objective   Vital Signs:   There were no vitals taken for this visit.      Mental Status Exam:   Hygiene:   good  Cooperation:  Cooperative  Eye Contact:  Good  Psychomotor Behavior:  Appropriate  Affect:  Full range  Mood: normal  Speech:  Normal  Thought Process:  Goal directed  Thought Content:  Normal  Suicidal:  None  Homicidal:  None  Hallucinations:  None  Delusion:  None  Memory:  Intact  Orientation:  Person, Place, Time, and Situation  Reliability:  good  Insight:  Good  Judgement:  Good  Impulse Control:  Good  Physical/Medical Issues:  No      Previous Provider notes and available records reviewed by this APRN today.   Current Medications:   Current Outpatient Medications   Medication Sig Dispense Refill    acetaminophen (TYLENOL) 325 MG tablet Take 2 tablets by mouth Every 6 (Six) Hours As Needed for Mild Pain .      albuterol sulfate  (90 Base) MCG/ACT inhaler Inhale 2 puffs Every 6 (Six) Hours As Needed for Wheezing. 18 g 11    DULoxetine (Cymbalta) 60 MG capsule Take 1 capsule by mouth Daily. 30 capsule 0    fluticasone (Flovent HFA) 220 MCG/ACT inhaler Inhale 1 puff 2 (Two) Times a Day. 12 g 11    hydrOXYzine (ATARAX) 25 MG tablet Take 1-2 tablets by mouth 3 (Three) Times a Day As Needed for Anxiety. 180 tablet 0    lamoTRIgine (LaMICtal) 200 MG tablet Take 1 tablet by mouth Daily. 30 tablet 0    lisinopril (PRINIVIL,ZESTRIL) 40 MG tablet Take 1 tablet by mouth Daily. 90 tablet 1    naloxone (NARCAN) 4 MG/0.1ML nasal spray       oxybutynin XL (DITROPAN-XL) 10 MG 24 hr tablet TAKE 1 TABLET BY MOUTH EVERY DAY 90 tablet 0    propranolol (INDERAL) 10 MG tablet Take 1 tablet by mouth 2 (Two) Times a Day. 180 tablet 2    traZODone (DESYREL) 100 MG tablet Take 1 tablet by mouth At Night As Needed for Sleep. 30 tablet 0     No current facility-administered medications for this visit.       Physical Exam   Result Review :    The following data was reviewed by: Maribell  FRANCHESKA Espinoza on 10/25/2023:  Common labs          3/22/2023    14:29 10/18/2023    00:28   Common Labs   Glucose  107    BUN  13    Creatinine  0.81    Sodium  138    Potassium  3.6    Chloride  102    Calcium  9.4    Albumin  4.3    Total Bilirubin  0.3    Alkaline Phosphatase  134    AST (SGOT)  13    ALT (SGPT)  14    WBC 9.08     16.03    Hemoglobin 15.7     14.4    Hematocrit 44.8     42.3    Platelets 258     353       Details          This result is from an external source.                Assessment and Plan   Problem List Items Addressed This Visit          Mental Health    Generalized anxiety disorder (Chronic)    Relevant Medications    DULoxetine (Cymbalta) 60 MG capsule    traZODone (DESYREL) 100 MG tablet    hydrOXYzine (ATARAX) 25 MG tablet    Bipolar affective disorder, currently depressed, moderate - Primary    Relevant Medications    DULoxetine (Cymbalta) 60 MG capsule    traZODone (DESYREL) 100 MG tablet    lamoTRIgine (LaMICtal) 200 MG tablet    hydrOXYzine (ATARAX) 25 MG tablet     Other Visit Diagnoses       Insomnia due to mental condition        Relevant Medications    DULoxetine (Cymbalta) 60 MG capsule    traZODone (DESYREL) 100 MG tablet    hydrOXYzine (ATARAX) 25 MG tablet          Discussed treatment options with patient.  Patient is currently pleased with her medications.  Discontinue Abilify as patient did not resume that once getting out of longterm.  Discussed with patient if she had any type of rash associated with Lamictal.  States she has had none.  Continue Lamictal 200 mg daily for bipolar disorder.  Continue trazodone 100 mg nightly as needed for sleep.  Continue Cymbalta 60 mg daily for anxiety.  Continue hydroxyzine take 1 to 2 tablets 3 times daily as needed for anxiety.  We will see patient again in 4 weeks to reassess.  Encouraged patient to contact the office if she has any issues sooner.    TREATMENT PLAN/GOALS: Continue supportive psychotherapy efforts and medications as  indicated. Treatment and medication options discussed during today's visit. Patient ackowledged and verbally consented to continue with current treatment plan and was educated on the importance of compliance with treatment and follow-up appointments.    DEPRESSION:  Patient screened positive for depression based on a PHQ-9 score of 16 on 5/17/2023. Follow-up recommendations include: Prescribed antidepressant medication treatment.       MEDICATION ISSUES:  We discussed risks, benefits, and side effects of the above medications and the patient was agreeable with the plan. Patient was educated on the importance of compliance with treatment and follow-up appointments.  Patient is agreeable to call the office with any worsening of symptoms or onset of side effects. Patient is agreeable to call 911 or go to the nearest ER should he/she begin having SI/HI.      Counseled patient regarding multimodal approach with healthy nutrition, healthy sleep, regular physical activity, social activities, counseling, and medications.      Coping skills reviewed and encouraged positive framing of thoughts     Assisted patient in processing above session content; acknowledged and normalized patient’s thoughts, feelings, and concerns.  Applied  positive coping skills and behavior management in session.  Allowed patient to freely discuss issues without interruption or judgment. Provided safe, confidential environment to facilitate the development of positive therapeutic relationship and encourage open, honest communication. Assisted patient in identifying risk factors which would indicate the need for higher level of care including thoughts to harm self or others and/or self-harming behavior and encouraged patient to contact this office, call 911, or present to the nearest emergency room should any of these events occur. Discussed crisis intervention services and means to access. If patient has any concerns or needs assistance they were  instructed to call the [facility] at [phone].    MEDS ORDERED DURING VISIT:  New Medications Ordered This Visit   Medications    DULoxetine (Cymbalta) 60 MG capsule     Sig: Take 1 capsule by mouth Daily.     Dispense:  30 capsule     Refill:  0    traZODone (DESYREL) 100 MG tablet     Sig: Take 1 tablet by mouth At Night As Needed for Sleep.     Dispense:  30 tablet     Refill:  0    lamoTRIgine (LaMICtal) 200 MG tablet     Sig: Take 1 tablet by mouth Daily.     Dispense:  30 tablet     Refill:  0    hydrOXYzine (ATARAX) 25 MG tablet     Sig: Take 1-2 tablets by mouth 3 (Three) Times a Day As Needed for Anxiety.     Dispense:  180 tablet     Refill:  0         Follow Up   Return in about 4 weeks (around 11/22/2023) for Video visit.    Patient was given instructions and counseling regarding her condition or for health maintenance advice. Please see specific information pulled into the AVS if appropriate.         This document has been electronically signed by FRANCHESKA Hernandez  October 26, 2023 13:26 EDT    Part of this note may be an electronic transcription/translation of spoken language to printed text using the Dragon Dictation System.

## 2023-11-03 ENCOUNTER — TELEPHONE (OUTPATIENT)
Dept: INTERNAL MEDICINE | Facility: CLINIC | Age: 33
End: 2023-11-03
Payer: MEDICARE

## 2023-11-03 NOTE — TELEPHONE ENCOUNTER
" central scheduling \"11/02/2023 PT states her last Mammogram , which looks like it was 04/09/2021 at [facility], which came back needed extra imagining. So this order needs to be a diagnostic Mammogram. Please put in a new order for the Diagnostic so we can get her scheduled\" please advise  "

## 2023-11-04 DIAGNOSIS — Z12.31 SCREENING MAMMOGRAM, ENCOUNTER FOR: Primary | ICD-10-CM

## 2023-11-04 DIAGNOSIS — R92.8 ABNORMAL MAMMOGRAM: ICD-10-CM

## 2023-11-30 ENCOUNTER — TELEPHONE (OUTPATIENT)
Dept: PSYCHIATRY | Facility: CLINIC | Age: 33
End: 2023-11-30
Payer: MEDICARE

## 2023-11-30 NOTE — TELEPHONE ENCOUNTER
Pt called and stated they need a letter on the services that you provide with them. Pt wanted me to let you know it is in regards to their criminal case they are dealing with. Please advise

## 2023-12-04 ENCOUNTER — TELEMEDICINE (OUTPATIENT)
Dept: PSYCHIATRY | Facility: CLINIC | Age: 33
End: 2023-12-04
Payer: MEDICARE

## 2023-12-04 DIAGNOSIS — F41.1 GENERALIZED ANXIETY DISORDER: Chronic | ICD-10-CM

## 2023-12-04 DIAGNOSIS — F31.32 BIPOLAR AFFECTIVE DISORDER, CURRENTLY DEPRESSED, MODERATE: Chronic | ICD-10-CM

## 2023-12-04 DIAGNOSIS — F51.05 INSOMNIA DUE TO MENTAL CONDITION: ICD-10-CM

## 2023-12-04 RX ORDER — DULOXETIN HYDROCHLORIDE 60 MG/1
120 CAPSULE, DELAYED RELEASE ORAL DAILY
Qty: 60 CAPSULE | Refills: 0 | Status: SHIPPED | OUTPATIENT
Start: 2023-12-04 | End: 2023-12-04 | Stop reason: SDUPTHER

## 2023-12-04 RX ORDER — DULOXETIN HYDROCHLORIDE 60 MG/1
120 CAPSULE, DELAYED RELEASE ORAL DAILY
Qty: 60 CAPSULE | Refills: 0 | Status: SHIPPED | OUTPATIENT
Start: 2023-12-04

## 2023-12-04 RX ORDER — LAMOTRIGINE 200 MG/1
200 TABLET ORAL DAILY
Qty: 30 TABLET | Refills: 0 | Status: SHIPPED | OUTPATIENT
Start: 2023-12-04 | End: 2023-12-04 | Stop reason: SDUPTHER

## 2023-12-04 RX ORDER — TRAZODONE HYDROCHLORIDE 150 MG/1
150 TABLET ORAL NIGHTLY PRN
Qty: 30 TABLET | Refills: 0 | Status: SHIPPED | OUTPATIENT
Start: 2023-12-04 | End: 2023-12-04 | Stop reason: SDUPTHER

## 2023-12-04 RX ORDER — LAMOTRIGINE 200 MG/1
200 TABLET ORAL DAILY
Qty: 30 TABLET | Refills: 0 | Status: SHIPPED | OUTPATIENT
Start: 2023-12-04

## 2023-12-04 RX ORDER — TRAZODONE HYDROCHLORIDE 150 MG/1
150 TABLET ORAL NIGHTLY PRN
Qty: 30 TABLET | Refills: 0 | Status: SHIPPED | OUTPATIENT
Start: 2023-12-04

## 2023-12-04 NOTE — LETTER
December 5, 2023     Chen Galaviz    Patient: Chen Galaviz   YOB: 1990   Date of Visit: 12/4/2023     To Whom It May Concern:    This letter is in reference to the above-named patient.  I have been seeing her since October 4, 2022 for bipolar disorder, generalized anxiety disorder, and insomnia.  She is currently maintained on Cymbalta 120 mg daily, trazodone 150 mg nightly, Lamictal 200 mg daily, hydroxyzine 25 mg take 1 to 2 tablets 3 times daily as needed for anxiety, and propranolol 10 mg twice daily as needed for anxiety.  Patient has been compliant with all treatment recommendations.         Sincerely,        This document has been electronically signed by FRANCHESKA Hernandez  December 5, 2023 09:25 EST

## 2023-12-05 NOTE — PROGRESS NOTES
This provider is located at [place], [place]. The Patient is seen remotely using Video. Patient is being seen via telehealth and confirm that they are in a secure environment for this session. Patient is located in [place], [place] at her home. The patient's condition being diagnosed/treated is appropriate for telemedicine. Provider identified as Maribell Hernandez as well as credentials APRN MSN PMHNP-BC.   The client/patient gave consent to be seen remotely, and when consent is given they understand that the consent allows for patient identifiable information to be sent to a third party as needed.  They may refuse to be seen remotely at any time. The electronic data is encrypted and password protected, and the patient has been advised of the potential risks to privacy not withstanding such measures.    Chief Complaint  Manic Behavior, Depression, Anxiety, and Sleeping Problem    Subjective        Chen Galaviz presents to [facility] for   History of Present Illness  Patient is seen today for follow-up visit for bipolar disorder, anxiety, and insomnia.  Patient reports that she is doing okay.  She rates both her depression and anxiety a 4 on a 1-10 scale with 10 being the worst.  Denies any hopelessness.  Denies any suicidal or homicidal ideation.  States she is no longer working at [employer].  States she does have disability income.  States her sleep has been more restless over the last month.  Appetite is good.  Continues to use hydroxyzine and propranolol as needed for breakthrough anxiety.  Denies any recurrence of any manic type symptoms.  Denies any paranoia.  Denies any auditory or visual hallucinations.  Denies any side effects to the medications.  Objective   Vital Signs:   There were no vitals taken for this visit.      Mental Status Exam:   Hygiene:   good  Cooperation:  Cooperative  Eye Contact:  Good  Psychomotor Behavior:  Appropriate  Affect:  Full range  Mood:  normal  Speech:  Normal  Thought Process:  Goal directed  Thought Content:  Normal  Suicidal:  None  Homicidal:  None  Hallucinations:  None  Delusion:  None  Memory:  Intact  Orientation:  Person, Place, Time, and Situation  Reliability:  good  Insight:  Good  Judgement:  Good  Impulse Control:  Good  Physical/Medical Issues:  No      PHQ-9 Depression Screening  Little interest or pleasure in doing things? (P) 3-->nearly every day   Feeling down, depressed, or hopeless? (P) 1-->several days   Trouble falling or staying asleep, or sleeping too much? (P) 3-->nearly every day   Feeling tired or having little energy? (P) 3-->nearly every day   Poor appetite or overeating? (P) 1-->several days   Feeling bad about yourself - or that you are a failure or have let yourself or your family down? (P) 0-->not at all   Trouble concentrating on things, such as reading the newspaper or watching television? (P) 3-->nearly every day   Moving or speaking so slowly that other people could have noticed? Or the opposite - being so fidgety or restless that you have been moving around a lot more than usual? (P) 0-->not at all   Thoughts that you would be better off dead, or of hurting yourself in some way? (P) 0-->not at all   PHQ-9 Total Score (P) 14   If you checked off any problems, how difficult have these problems made it for you to do your work, take care of things at home, or get along with other people? (P) extremely difficult        ERENDIRA 7 anxiety screening tool that patient filled out virtually reviewed by this APRN at today's encounter.    Previous Provider notes and available records reviewed by this APRN today.   Current Medications:   Current Outpatient Medications   Medication Sig Dispense Refill    acetaminophen (TYLENOL) 325 MG tablet Take 2 tablets by mouth Every 6 (Six) Hours As Needed for Mild Pain .      albuterol sulfate  (90 Base) MCG/ACT inhaler Inhale 2 puffs Every 6 (Six) Hours As Needed for Wheezing. 18 g  11    DULoxetine (Cymbalta) 60 MG capsule Take 2 capsules by mouth Daily. 60 capsule 0    fluticasone (Flovent HFA) 220 MCG/ACT inhaler Inhale 1 puff 2 (Two) Times a Day. 12 g 11    hydrOXYzine (ATARAX) 25 MG tablet Take 1-2 tablets by mouth 3 (Three) Times a Day As Needed for Anxiety. 180 tablet 0    lamoTRIgine (LaMICtal) 200 MG tablet Take 1 tablet by mouth Daily. 30 tablet 0    lisinopril (PRINIVIL,ZESTRIL) 40 MG tablet Take 1 tablet by mouth Daily. 90 tablet 1    naloxone (NARCAN) 4 MG/0.1ML nasal spray       oxybutynin XL (DITROPAN-XL) 10 MG 24 hr tablet TAKE 1 TABLET BY MOUTH EVERY DAY 90 tablet 0    propranolol (INDERAL) 10 MG tablet Take 1 tablet by mouth 2 (Two) Times a Day. 180 tablet 2    traZODone (DESYREL) 150 MG tablet Take 1 tablet by mouth At Night As Needed for Sleep. 30 tablet 0     No current facility-administered medications for this visit.       Physical Exam   Result Review :    The following data was reviewed by: FRANCHESKA Hernandez on 12/04/2023:  Common labs          3/22/2023    14:29 10/18/2023    00:28 11/8/2023    10:50   Common Labs   Glucose  107     BUN  13     Creatinine  0.81     Sodium  138     Potassium  3.6     Chloride  102     Calcium  9.4     Albumin  4.3     Total Bilirubin  0.3     Alkaline Phosphatase  134     AST (SGOT)  13     ALT (SGPT)  14     WBC 9.08     16.03  10.00       Hemoglobin 15.7     14.4  13.8       Hematocrit 44.8     42.3  39.6       Platelets 258     353  354          Details          This result is from an external source.                Assessment and Plan   Problem List Items Addressed This Visit          Mental Health    Generalized anxiety disorder (Chronic)    Relevant Medications    traZODone (DESYREL) 150 MG tablet    DULoxetine (Cymbalta) 60 MG capsule    Bipolar affective disorder, currently depressed, moderate    Relevant Medications    traZODone (DESYREL) 150 MG tablet    lamoTRIgine (LaMICtal) 200 MG tablet    DULoxetine (Cymbalta) 60 MG  capsule     Other Visit Diagnoses       Insomnia due to mental condition        Relevant Medications    traZODone (DESYREL) 150 MG tablet    DULoxetine (Cymbalta) 60 MG capsule          Discussed treatment options with patient.  Discussed patient's sleep.  Discussed with patient that we can increase her trazodone to 150 mg daily for insomnia.  Patient ask about going back on Seroquel, but cautioned patient about metabolic syndrome and she decided not to go that route.  Continue Cymbalta 120 mg daily for anxiety.  Continue Lamictal 200 mg daily for bipolar disorder.  Continue propranolol 10 mg twice daily as needed for anxiety.  Continue hydroxyzine 25 mg take 1 to 2 tablets 3 times daily as needed for anxiety.    TREATMENT PLAN/GOALS: Continue supportive psychotherapy efforts and medications as indicated. Treatment and medication options discussed during today's visit. Patient ackowledged and verbally consented to continue with current treatment plan and was educated on the importance of compliance with treatment and follow-up appointments.    DEPRESSION:  Patient screened positive for depression based on a PHQ-9 score of 14 on 12/3/2023. Follow-up recommendations include: Prescribed antidepressant medication treatment.       MEDICATION ISSUES:  We discussed risks, benefits, and side effects of the above medications and the patient was agreeable with the plan. Patient was educated on the importance of compliance with treatment and follow-up appointments.  Patient is agreeable to call the office with any worsening of symptoms or onset of side effects. Patient is agreeable to call 911 or go to the nearest ER should he/she begin having SI/HI.      Counseled patient regarding multimodal approach with healthy nutrition, healthy sleep, regular physical activity, social activities, counseling, and medications.      Coping skills reviewed and encouraged positive framing of thoughts     Assisted patient in processing above  session content; acknowledged and normalized patient’s thoughts, feelings, and concerns.  Applied  positive coping skills and behavior management in session.  Allowed patient to freely discuss issues without interruption or judgment. Provided safe, confidential environment to facilitate the development of positive therapeutic relationship and encourage open, honest communication. Assisted patient in identifying risk factors which would indicate the need for higher level of care including thoughts to harm self or others and/or self-harming behavior and encouraged patient to contact this office, call 911, or present to the nearest emergency room should any of these events occur. Discussed crisis intervention services and means to access. If patient has any concerns or needs assistance they were instructed to call the [facility] at [phone].    MEDS ORDERED DURING VISIT:  New Medications Ordered This Visit   Medications    traZODone (DESYREL) 150 MG tablet     Sig: Take 1 tablet by mouth At Night As Needed for Sleep.     Dispense:  30 tablet     Refill:  0    lamoTRIgine (LaMICtal) 200 MG tablet     Sig: Take 1 tablet by mouth Daily.     Dispense:  30 tablet     Refill:  0    DULoxetine (Cymbalta) 60 MG capsule     Sig: Take 2 capsules by mouth Daily.     Dispense:  60 capsule     Refill:  0         Follow Up   Return in about 4 weeks (around 1/1/2024) for Video visit.    Patient was given instructions and counseling regarding her condition or for health maintenance advice. Please see specific information pulled into the AVS if appropriate.         This document has been electronically signed by FRANCHESKA Hernandez  December 5, 2023 09:16 EST    Part of this note may be an electronic transcription/translation of spoken language to printed text using the Dragon Dictation System.

## 2023-12-26 ENCOUNTER — TELEMEDICINE (OUTPATIENT)
Dept: INTERNAL MEDICINE | Facility: CLINIC | Age: 33
End: 2023-12-26
Payer: MEDICARE

## 2023-12-26 DIAGNOSIS — J18.9 PNEUMONIA OF BOTH LUNGS DUE TO INFECTIOUS ORGANISM, UNSPECIFIED PART OF LUNG: Primary | ICD-10-CM

## 2023-12-26 RX ORDER — ALBUTEROL SULFATE 90 UG/1
2 AEROSOL, METERED RESPIRATORY (INHALATION) EVERY 4 HOURS PRN
Qty: 18 G | Refills: 1 | Status: SHIPPED | OUTPATIENT
Start: 2023-12-26

## 2023-12-26 NOTE — PROGRESS NOTES
[facility]  [address]  [facility] [address]  Dept: [phone]  Dept Fax: [phone]  Loc: [phone]  Loc Fax: [phone]    Chen Galaviz  1990    Televisit Note    You have chosen to receive care through a telephone visit. Do you consent to use a telephone visit for your medical care today? Yes.  Patient at home and provider in office during visit today.    History of Present Illness:  Chen Galaviz is a 33 y.o. female who presents via video-conference for ER follow-up for pneumonia.  Patient went to the emergency room yesterday for wheezing and shortness of breath.  Has been recently diagnosed with strep as well. Pt was started on doxy bid x 10 days. Overall feeling better today. Requesting albuterol inhaler.    The following portions of the patient's history were reviewed and updated as appropriate: allergies, current medications, past family history, past medical history, past social history, past surgical history, and problem list.    This visit was scheduled as a phone visit to comply with patient safety concerns in accordance with CDC recommendations.  Time spent in discussion with the patient was 15 minutes.     Medications    Current Outpatient Medications:     acetaminophen (TYLENOL) 325 MG tablet, Take 2 tablets by mouth Every 6 (Six) Hours As Needed for Mild Pain ., Disp: , Rfl:     albuterol sulfate  (90 Base) MCG/ACT inhaler, Inhale 2 puffs Every 4 (Four) Hours As Needed for Wheezing., Disp: 18 g, Rfl: 1    DULoxetine (Cymbalta) 60 MG capsule, Take 2 capsules by mouth Daily., Disp: 60 capsule, Rfl: 0    fluticasone (Flovent HFA) 220 MCG/ACT inhaler, Inhale 1 puff 2 (Two) Times a Day., Disp: 12 g, Rfl: 11    hydrOXYzine (ATARAX) 25 MG tablet, Take 1-2 tablets by mouth 3 (Three) Times a Day As Needed for Anxiety., Disp: 180 tablet, Rfl: 0    lamoTRIgine (LaMICtal) 200 MG tablet, Take 1 tablet by mouth Daily., Disp: 30  tablet, Rfl: 0    lisinopril (PRINIVIL,ZESTRIL) 40 MG tablet, Take 1 tablet by mouth Daily., Disp: 90 tablet, Rfl: 1    naloxone (NARCAN) 4 MG/0.1ML nasal spray, , Disp: , Rfl:     oxybutynin XL (DITROPAN-XL) 10 MG 24 hr tablet, TAKE 1 TABLET BY MOUTH EVERY DAY, Disp: 90 tablet, Rfl: 0    propranolol (INDERAL) 10 MG tablet, Take 1 tablet by mouth 2 (Two) Times a Day., Disp: 180 tablet, Rfl: 2    traZODone (DESYREL) 150 MG tablet, Take 1 tablet by mouth At Night As Needed for Sleep., Disp: 30 tablet, Rfl: 0    Subjective  Allergies   Allergen Reactions    Bupropion Other (See Comments) and Provider Review Needed     Seizure / wellbutrin     Naproxen Rash    Nsaids Rash    Sulfa Antibiotics Rash        Past Medical History:   Diagnosis Date    Abnormal liver enzymes 05/09/2016    ADHD (attention deficit hyperactivity disorder) 1996    Allergic 2001    Allergic rhinitis     Anxiety     Arthritis     since age 21 in her back    Arthritis of back N/a    Asthma     seasonal    Back pain     Bacterial vaginosis 05/09/2016    Bipolar disorder     dx age 19    Chest pain, pleuritic     Common migraine with intractable migraine 05/09/2016    CTS (carpal tunnel syndrome) 8/20/22    Depression     Dyslipidemia     Dysmenorrhea 05/09/2016    Dyspareunia, female 12/12/2018    Added automatically from request for surgery [id number]    Endometriosis     Fatigue     Febrile seizure     FEBRILE SEIZURE AT 3YO AND AT 16YO D/T WELBUTRIN    Frequent UTI     Gastroesophageal reflux disease 05/09/2016    GERD (gastroesophageal reflux disease)     Gestational hypertension     History of gestational hypertension. States blood pressure is sometimes high but does not take any medications.    Headache     Herpes zoster     denies this visit    History of robot-assisted laparoscopic hysterectomy/BS 1/23/2019 02/11/2019    Hyperlipidemia     IBS (irritable bowel syndrome)     Itching     Knee swelling N/A    Low back pain     Lung mass 05/09/2016     Description: A.  CT scan of the chest 2014 reports that the previously seen nodule of the left lower lobe is no longer visualized and the micronodular densities along the lateral aspect of the right lower lobe are stable.  There is no change in the appearance of the right axillary lymph nodes.    Mental retardation     A. 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 birth due to placenta previa    Migraine     Migraine     Multiple gestation     Obesity     Onychomycosis of toenail 2016    Impression: 2016 - Refer to podiatry.;     Organic hypersomnia     Ovarian cyst     Pain, upper back     PMS (premenstrual syndrome)     Pulmonary nodule     S/P  cholecystectomy 2018    Schizophrenia     Severe frontal headaches 2018    Sinus tachycardia     Substance abuse     Visual impairment     Wears glasses        Past Surgical History:   Procedure Laterality Date     SECTION       SECTION WITH TUBAL N/A 2017    Procedure:  SECTION REPEAT WITH TUBAL;  Surgeon: Fabien Blake MD;  Location: [facility] LABOR DELIVERY;  Service:     CHOLECYSTECTOMY      age 21    COLONOSCOPY      DIAGNOSTIC LAPAROSCOPY      X 4    ENDOMETRIAL ABLATION      PELVIC LAPAROSCOPY      age 21    TONSILLECTOMY      age 23    TOTAL LAPAROSCOPIC HYSTERECTOMY N/A 2019    Procedure: TOTAL LAPAROSCOPIC HYSTERECTOMY BILATERAL SALPINGECTOMY WITH DAVINCI ROBOT;  Surgeon: Amador Richey MD;  Location: [facility] OR;  Service: DaVinci    TUBAL ABDOMINAL LIGATION  2017    WISDOM TOOTH EXTRACTION         Family History   Problem Relation Age of Onset    Asthma Mother     Hypertension Mother     Breast cancer Mother     Diabetes Mother     Colon polyps Mother     Cancer Mother     Anxiety disorder Mother     Arthritis Mother     Depression Mother     Mental illness Mother     Hypertension Father     Alcohol abuse Father     Arthritis Father     Asthma Father     COPD Father     Depression Father      Drug abuse Father     Hyperlipidemia Father     Asthma Brother     Drug abuse Brother     Alcohol abuse Brother     Depression Brother     Hyperlipidemia Brother     Melanoma Maternal Grandfather     Cancer Maternal Grandfather     Diabetes Maternal Grandfather     Colon polyps Maternal Grandmother     Diabetes Maternal Grandmother     Cancer Maternal Grandmother     Stomach cancer Paternal Grandmother     Hypertension Paternal Grandmother     Osteoporosis Paternal Grandmother     Heart attack Paternal Grandfather 36    Diabetes Paternal Grandfather     Hypertension Brother     Developmental Disability Son     Learning disabilities Son     Developmental Disability Son     Learning disabilities Son     Developmental Disability Son     Learning disabilities Son     Ovarian cancer Neg Hx     Uterine cancer Neg Hx         Social History     Socioeconomic History    Marital status: Single   Tobacco Use    Smoking status: Every Day     Packs/day: 1.00     Years: 15.00     Additional pack years: 0.00     Total pack years: 15.00     Types: Cigarettes     Start date: 10/9/2002    Smokeless tobacco: Never    Tobacco comments:     cutting back, encourage cessation   Vaping Use    Vaping Use: Never used   Substance and Sexual Activity    Alcohol use: Yes     Alcohol/week: 1.0 standard drink of alcohol     Types: 1 Drinks containing 0.5 oz of alcohol per week    Drug use: Not Currently     Frequency: 30.0 times per week     Types: Cocaine(coke), Marijuana, Methamphetamines    Sexual activity: Yes     Partners: Male     Birth control/protection: None, Same-sex partner         Objective  There were no vitals filed for this visit.  There is no height or weight on file to calculate BMI.    Assessment  Diagnoses and all orders for this visit:    1. Pneumonia of both lungs due to infectious organism, unspecified part of lung (Primary)    Other orders  -     albuterol sulfate  (90 Base) MCG/ACT inhaler; Inhale 2 puffs Every 4  (Four) Hours As Needed for Wheezing.  Dispense: 18 g; Refill: 1        Plan    1. Pneumonia of both lungs due to infectious organism, unspecified part of lung (Primary)- overall better, sent in albuterol inhaler. Advised if sx/condition does not cont to improve or worse to go back to ER. Patient verbalized understanding of all instructions given and complied.      No follow-ups on file.    Charles Ferrera PA-C  12/26/2023  Answers submitted by the patient for this visit:  Other (Submitted on 12/26/2023)  Please describe your symptoms.: Referral for lungs  Have you had these symptoms before?: Yes  How long have you been having these symptoms?: Greater than 2 weeks  Primary Reason for Visit (Submitted on 12/26/2023)  What is the primary reason for your visit?: Other

## 2023-12-28 ENCOUNTER — TELEMEDICINE (OUTPATIENT)
Dept: INTERNAL MEDICINE | Facility: CLINIC | Age: 33
End: 2023-12-28
Payer: MEDICARE

## 2023-12-28 ENCOUNTER — HOSPITAL ENCOUNTER (OUTPATIENT)
Dept: GENERAL RADIOLOGY | Facility: HOSPITAL | Age: 33
Discharge: HOME | End: 2023-12-28
Admitting: PHYSICIAN ASSISTANT
Payer: MEDICARE

## 2023-12-28 DIAGNOSIS — J18.9 PNEUMONIA OF BOTH LUNGS DUE TO INFECTIOUS ORGANISM, UNSPECIFIED PART OF LUNG: Primary | ICD-10-CM

## 2023-12-28 DIAGNOSIS — J18.9 PNEUMONIA OF BOTH LUNGS DUE TO INFECTIOUS ORGANISM, UNSPECIFIED PART OF LUNG: ICD-10-CM

## 2023-12-28 PROCEDURE — 71046 X-RAY EXAM CHEST 2 VIEWS: CPT

## 2023-12-28 RX ORDER — AZITHROMYCIN 250 MG/1
TABLET, FILM COATED ORAL
Qty: 6 TABLET | Refills: 0 | Status: SHIPPED | OUTPATIENT
Start: 2023-12-28

## 2023-12-28 NOTE — PROGRESS NOTES
[facility]  [address]  [facility] [address]  Dept: [phone]  Dept Fax: [phone]  Loc: [phone]  Loc Fax: [phone]    Chen Galaviz  1990    Televisit Note    You have chosen to receive care through a telephone visit. Do you consent to use a telephone visit for your medical care today? Yes.  Patient at home and provider in office during visit today.    History of Present Illness:  Chen Galaviz is a 33 y.o. female who presents via video-conference for pneumonia.  Patient has been taking doxycycline as directed but has had GI intolerance and has been throwing up the antibiotic.  Patient still having symptoms of pneumonia.  Would like to try different antibiotic.    The following portions of the patient's history were reviewed and updated as appropriate: allergies, current medications, past family history, past medical history, past social history, past surgical history, and problem list.    This visit was scheduled as a phone visit to comply with patient safety concerns in accordance with CDC recommendations.  Time spent in discussion with the patient was 15 minutes.     Medications    Current Outpatient Medications:     acetaminophen (TYLENOL) 325 MG tablet, Take 2 tablets by mouth Every 6 (Six) Hours As Needed for Mild Pain ., Disp: , Rfl:     albuterol sulfate  (90 Base) MCG/ACT inhaler, Inhale 2 puffs Every 4 (Four) Hours As Needed for Wheezing., Disp: 18 g, Rfl: 1    azithromycin (Zithromax Z-Jaron) 250 MG tablet, Take 2 tablets by mouth on day 1, then 1 tablet daily on days 2-5, Disp: 6 tablet, Rfl: 0    DULoxetine (Cymbalta) 60 MG capsule, Take 2 capsules by mouth Daily., Disp: 60 capsule, Rfl: 0    fluticasone (Flovent HFA) 220 MCG/ACT inhaler, Inhale 1 puff 2 (Two) Times a Day., Disp: 12 g, Rfl: 11    hydrOXYzine (ATARAX) 25 MG tablet, Take 1-2 tablets by mouth 3 (Three) Times a Day As Needed for Anxiety., Disp: 180  tablet, Rfl: 0    lamoTRIgine (LaMICtal) 200 MG tablet, Take 1 tablet by mouth Daily., Disp: 30 tablet, Rfl: 0    lisinopril (PRINIVIL,ZESTRIL) 40 MG tablet, Take 1 tablet by mouth Daily., Disp: 90 tablet, Rfl: 1    naloxone (NARCAN) 4 MG/0.1ML nasal spray, , Disp: , Rfl:     oxybutynin XL (DITROPAN-XL) 10 MG 24 hr tablet, TAKE 1 TABLET BY MOUTH EVERY DAY, Disp: 90 tablet, Rfl: 0    propranolol (INDERAL) 10 MG tablet, Take 1 tablet by mouth 2 (Two) Times a Day., Disp: 180 tablet, Rfl: 2    traZODone (DESYREL) 150 MG tablet, Take 1 tablet by mouth At Night As Needed for Sleep., Disp: 30 tablet, Rfl: 0    Subjective  Allergies   Allergen Reactions    Bupropion Other (See Comments) and Provider Review Needed     Seizure / wellbutrin     Naproxen Rash    Nsaids Rash    Sulfa Antibiotics Rash        Past Medical History:   Diagnosis Date    Abnormal liver enzymes 05/09/2016    ADHD (attention deficit hyperactivity disorder) 1996    Allergic 2001    Allergic rhinitis     Anxiety     Arthritis     since age 21 in her back    Arthritis of back N/a    Asthma     seasonal    Back pain     Bacterial vaginosis 05/09/2016    Bipolar disorder     dx age 19    Chest pain, pleuritic     Common migraine with intractable migraine 05/09/2016    CTS (carpal tunnel syndrome) 8/20/22    Depression     Dyslipidemia     Dysmenorrhea 05/09/2016    Dyspareunia, female 12/12/2018    Added automatically from request for surgery [id number]    Endometriosis     Fatigue     Febrile seizure     FEBRILE SEIZURE AT 3YO AND AT 16YO D/T WELBUTRIN    Frequent UTI     Gastroesophageal reflux disease 05/09/2016    GERD (gastroesophageal reflux disease)     Gestational hypertension     History of gestational hypertension. States blood pressure is sometimes high but does not take any medications.    Headache     Herpes zoster     denies this visit    History of robot-assisted laparoscopic hysterectomy/BS 1/23/2019 02/11/2019    Hyperlipidemia     IBS  (irritable bowel syndrome)     Itching     Knee swelling N/A    Low back pain     Lung mass 2016    Description: A.  CT scan of the chest 2014 reports that the previously seen nodule of the left lower lobe is no longer visualized and the micronodular densities along the lateral aspect of the right lower lobe are stable.  There is no change in the appearance of the right axillary lymph nodes.    Mental retardation     A. [place] to be related to hypoxia at birth due to placenta previa    Migraine     Migraine     Multiple gestation     Obesity     Onychomycosis of toenail 2016    Impression: 2016 - Refer to podiatry.;     Organic hypersomnia     Ovarian cyst     Pain, upper back     PMS (premenstrual syndrome)     Pulmonary nodule     S/P  cholecystectomy 2018    Schizophrenia     Severe frontal headaches 2018    Sinus tachycardia     Substance abuse     Visual impairment     Wears glasses        Past Surgical History:   Procedure Laterality Date     SECTION       SECTION WITH TUBAL N/A 2017    Procedure:  SECTION REPEAT WITH TUBAL;  Surgeon: Fabien Blake MD;  Location: [facility] LABOR DELIVERY;  Service:     CHOLECYSTECTOMY      age 21    COLONOSCOPY      DIAGNOSTIC LAPAROSCOPY      X 4    ENDOMETRIAL ABLATION      PELVIC LAPAROSCOPY      age 21    TONSILLECTOMY      age 23    TOTAL LAPAROSCOPIC HYSTERECTOMY N/A 2019    Procedure: TOTAL LAPAROSCOPIC HYSTERECTOMY BILATERAL SALPINGECTOMY WITH DAVINCI ROBOT;  Surgeon: Amador Richey MD;  Location: [facility] OR;  Service: [place]    TUBAL ABDOMINAL LIGATION  2017    WISDOM TOOTH EXTRACTION         Family History   Problem Relation Age of Onset    Asthma Mother     Hypertension Mother     Breast cancer Mother     Diabetes Mother     Colon polyps Mother     Cancer Mother     Anxiety disorder Mother     Arthritis Mother     Depression Mother     Mental illness Mother     Hypertension Father      Alcohol abuse Father     Arthritis Father     Asthma Father     COPD Father     Depression Father     Drug abuse Father     Hyperlipidemia Father     Asthma Brother     Drug abuse Brother     Alcohol abuse Brother     Depression Brother     Hyperlipidemia Brother     Melanoma Maternal Grandfather     Cancer Maternal Grandfather     Diabetes Maternal Grandfather     Colon polyps Maternal Grandmother     Diabetes Maternal Grandmother     Cancer Maternal Grandmother     Stomach cancer Paternal Grandmother     Hypertension Paternal Grandmother     Osteoporosis Paternal Grandmother     Heart attack Paternal Grandfather 36    Diabetes Paternal Grandfather     Hypertension Brother     Developmental Disability Son     Learning disabilities Son     Developmental Disability Son     Learning disabilities Son     Developmental Disability Son     Learning disabilities Son     Ovarian cancer Neg Hx     Uterine cancer Neg Hx         Social History     Socioeconomic History    Marital status: Single   Tobacco Use    Smoking status: Every Day     Packs/day: 1.00     Years: 15.00     Additional pack years: 0.00     Total pack years: 15.00     Types: Cigarettes     Start date: 10/9/2002    Smokeless tobacco: Never    Tobacco comments:     cutting back, encourage cessation   Vaping Use    Vaping Use: Never used   Substance and Sexual Activity    Alcohol use: Yes     Alcohol/week: 1.0 standard drink of alcohol     Types: 1 Drinks containing 0.5 oz of alcohol per week    Drug use: Not Currently     Frequency: 30.0 times per week     Types: Cocaine(coke), Marijuana, Methamphetamines    Sexual activity: Yes     Partners: Male     Birth control/protection: None, Same-sex partner         Objective  There were no vitals filed for this visit.  There is no height or weight on file to calculate BMI.    Assessment  Diagnoses and all orders for this visit:    1. Pneumonia of both lungs due to infectious organism, unspecified part of lung  (Primary)  -     XR Chest PA & Lateral; Future    Other orders  -     azithromycin (Zithromax Z-Jaron) 250 MG tablet; Take 2 tablets by mouth on day 1, then 1 tablet daily on days 2-5  Dispense: 6 tablet; Refill: 0        Plan    1. Pneumonia of both lungs due to infectious organism, unspecified part of lung (Primary)- stop Doxy and changed to Z-Jaron.  Repeat chest x-ray.  Advised for any worse symptoms or symptoms not better to go to ER immediately.Patient verbalized understanding of all instructions given and complied.      Return in about 1 week (around 1/4/2024) for Recheck.    Charles Ferrera PA-C  12/28/2023

## 2024-01-02 ENCOUNTER — TELEPHONE (OUTPATIENT)
Dept: INTERNAL MEDICINE | Facility: CLINIC | Age: 34
End: 2024-01-02
Payer: MEDICARE

## 2024-01-02 DIAGNOSIS — R91.1 PULMONARY NODULE: Primary | ICD-10-CM

## 2024-01-02 NOTE — TELEPHONE ENCOUNTER
Patient viewed xray results on [facility]      ----- Message from Charles Ferrera PA-C sent at 12/29/2023  5:16 PM EST -----  Stable xray, pt has nodule, recommend referral to pulm.

## 2024-01-09 ENCOUNTER — TELEMEDICINE (OUTPATIENT)
Dept: PSYCHIATRY | Facility: CLINIC | Age: 34
End: 2024-01-09
Payer: MEDICARE

## 2024-01-09 DIAGNOSIS — F31.32 BIPOLAR AFFECTIVE DISORDER, CURRENTLY DEPRESSED, MODERATE: Primary | Chronic | ICD-10-CM

## 2024-01-09 DIAGNOSIS — F41.1 GENERALIZED ANXIETY DISORDER: Chronic | ICD-10-CM

## 2024-01-09 DIAGNOSIS — F51.05 INSOMNIA DUE TO MENTAL CONDITION: ICD-10-CM

## 2024-01-09 RX ORDER — ARIPIPRAZOLE 5 MG/1
5 TABLET ORAL DAILY
Qty: 30 TABLET | Refills: 0 | Status: SHIPPED | OUTPATIENT
Start: 2024-01-09

## 2024-01-09 RX ORDER — LAMOTRIGINE 200 MG/1
200 TABLET ORAL DAILY
Qty: 30 TABLET | Refills: 0 | Status: SHIPPED | OUTPATIENT
Start: 2024-01-09

## 2024-01-09 RX ORDER — DULOXETIN HYDROCHLORIDE 60 MG/1
120 CAPSULE, DELAYED RELEASE ORAL DAILY
Qty: 60 CAPSULE | Refills: 0 | Status: SHIPPED | OUTPATIENT
Start: 2024-01-09

## 2024-01-09 RX ORDER — TRAZODONE HYDROCHLORIDE 100 MG/1
200 TABLET ORAL NIGHTLY PRN
Qty: 60 TABLET | Refills: 0 | Status: SHIPPED | OUTPATIENT
Start: 2024-01-09

## 2024-01-11 NOTE — PROGRESS NOTES
This provider is located at [place], [place]. The Patient is seen remotely using Video. Patient is being seen via telehealth and confirm that they are in a secure environment for this session. Patient is located in [place], [place] at her home. The patient's condition being diagnosed/treated is appropriate for telemedicine. Provider identified as Maribell Hernandez as well as credentials APRN MSN PMHNP-BC.   The client/patient gave consent to be seen remotely, and when consent is given they understand that the consent allows for patient identifiable information to be sent to a third party as needed.  They may refuse to be seen remotely at any time. The electronic data is encrypted and password protected, and the patient has been advised of the potential risks to privacy not withstanding such measures.    Chief Complaint  Depression, Manic Behavior, Anxiety, and Sleeping Problem    Subjective        Chen Galaviz presents to [facility] for   History of Present Illness  Patient is seen today for follow-up visit for bipolar disorder, anxiety, and insomnia.  Patient reports she has been feeling more depressed over the last month.  States she just had more feelings of being down.  States that she has been having trouble going to sleep and also waking up.  Appetite is fair.  Has decreased interest in doing things.  Concentration is decreased.  Continues to have negative thoughts about herself.  She is asking for help in that area.  Rates depression as 6 on a 1-10 scale with 10 being the worst.  Denies any hopelessness.  Denies any suicidal or homicidal ideation.  She states her anxiety has been pretty well under control.  Rates anxiety a 3 on a 1-10 scale with 10 being the worst.  Denies any recurrence of any manic type symptoms.  Denies any paranoia.  Denies any auditory or visual hallucinations.  Denies any side effects to the medications.  Objective   Vital Signs:   There were no vitals  taken for this visit.      Mental Status Exam:   Hygiene:   good  Cooperation:  Cooperative  Eye Contact:  Good  Psychomotor Behavior:  Appropriate  Affect:  Appropriate  Mood: depressed  Speech:  Normal  Thought Process:  Goal directed  Thought Content:  Normal  Suicidal:  None  Homicidal:  None  Hallucinations:  None  Delusion:  None  Memory:  Intact  Orientation:  Person, Place, Time, and Situation  Reliability:  good  Insight:  Good  Judgement:  Good  Impulse Control:  Good  Physical/Medical Issues:  No      PHQ-9 Depression Screening  Little interest or pleasure in doing things? (P) 3-->nearly every day   Feeling down, depressed, or hopeless? (P) 1-->several days   Trouble falling or staying asleep, or sleeping too much? (P) 3-->nearly every day   Feeling tired or having little energy? (P) 3-->nearly every day   Poor appetite or overeating? (P) 3-->nearly every day   Feeling bad about yourself - or that you are a failure or have let yourself or your family down? (P) 2-->more than half the days   Trouble concentrating on things, such as reading the newspaper or watching television? (P) 3-->nearly every day   Moving or speaking so slowly that other people could have noticed? Or the opposite - being so fidgety or restless that you have been moving around a lot more than usual? (P) 0-->not at all   Thoughts that you would be better off dead, or of hurting yourself in some way? (P) 0-->not at all   PHQ-9 Total Score (P) 18   If you checked off any problems, how difficult have these problems made it for you to do your work, take care of things at home, or get along with other people? (P) extremely difficult        PHQ-9 Total Score: (P) 18     ERENDIRA 7 anxiety screening tool that patient filled out virtually reviewed by this APRN at today's encounter.    Previous Provider notes and available records reviewed by this APRN today.   Current Medications:   Current Outpatient Medications   Medication Sig Dispense Refill     acetaminophen (TYLENOL) 325 MG tablet Take 2 tablets by mouth Every 6 (Six) Hours As Needed for Mild Pain .      albuterol sulfate  (90 Base) MCG/ACT inhaler Inhale 2 puffs Every 4 (Four) Hours As Needed for Wheezing. 18 g 1    ARIPiprazole (Abilify) 5 MG tablet Take 1 tablet by mouth Daily. 30 tablet 0    azithromycin (Zithromax Z-Jaron) 250 MG tablet Take 2 tablets by mouth on day 1, then 1 tablet daily on days 2-5 6 tablet 0    DULoxetine (Cymbalta) 60 MG capsule Take 2 capsules by mouth Daily. 60 capsule 0    fluticasone (Flovent HFA) 220 MCG/ACT inhaler Inhale 1 puff 2 (Two) Times a Day. 12 g 11    hydrOXYzine (ATARAX) 25 MG tablet Take 1-2 tablets by mouth 3 (Three) Times a Day As Needed for Anxiety. 180 tablet 0    lamoTRIgine (LaMICtal) 200 MG tablet Take 1 tablet by mouth Daily. 30 tablet 0    lisinopril (PRINIVIL,ZESTRIL) 40 MG tablet Take 1 tablet by mouth Daily. 90 tablet 1    naloxone (NARCAN) 4 MG/0.1ML nasal spray       oxybutynin XL (DITROPAN-XL) 10 MG 24 hr tablet TAKE 1 TABLET BY MOUTH EVERY DAY 90 tablet 0    propranolol (INDERAL) 10 MG tablet Take 1 tablet by mouth 2 (Two) Times a Day. 180 tablet 2    traZODone (DESYREL) 100 MG tablet Take 2 tablets by mouth At Night As Needed for Sleep. 60 tablet 0     No current facility-administered medications for this visit.       Physical Exam   Result Review :    The following data was reviewed by: FRANCHESKA Hernandez on 01/09/2024:  Common labs          10/18/2023    00:28 11/8/2023    10:50 12/25/2023    13:50   Common Labs   Glucose 107      BUN 13      Creatinine 0.81      Sodium 138      Potassium 3.6      Chloride 102      Calcium 9.4      Albumin 4.3      Total Bilirubin 0.3      Alkaline Phosphatase 134      AST (SGOT) 13      ALT (SGPT) 14      WBC 16.03  10.00     13.29       Hemoglobin 14.4  13.8     13.4       Hematocrit 42.3  39.6     38.8       Platelets 353  354     344          Details          This result is from an external source.                 Assessment and Plan   Problem List Items Addressed This Visit          Mental Health    Generalized anxiety disorder (Chronic)    Relevant Medications    ARIPiprazole (Abilify) 5 MG tablet    traZODone (DESYREL) 100 MG tablet    DULoxetine (Cymbalta) 60 MG capsule    Bipolar affective disorder, currently depressed, moderate - Primary    Relevant Medications    ARIPiprazole (Abilify) 5 MG tablet    traZODone (DESYREL) 100 MG tablet    lamoTRIgine (LaMICtal) 200 MG tablet    DULoxetine (Cymbalta) 60 MG capsule     Other Visit Diagnoses       Insomnia due to mental condition        Relevant Medications    ARIPiprazole (Abilify) 5 MG tablet    traZODone (DESYREL) 100 MG tablet    DULoxetine (Cymbalta) 60 MG capsule          Discussed treatment options with patient.  Patient had been on Abilify in the past and stopped it when she was in nursing home.  Discussed with patient if she would like to go back on the medications.  She states she thought it worked well and would be willing to go back on the Abilify.  Start Abilify 5 mg daily for bipolar disorder.  Discussed patient's sleep.  Increase trazodone to 200 mg nightly for sleep.  Continue Cymbalta 120 mg daily for anxiety.  Continue Lamictal 200 mg daily for bipolar disorder.  We will see patient again in 4 weeks to reassess.  Encouraged patient to contact the office if she has any issues sooner.    TREATMENT PLAN/GOALS: Continue supportive psychotherapy efforts and medications as indicated. Treatment and medication options discussed during today's visit. Patient ackowledged and verbally consented to continue with current treatment plan and was educated on the importance of compliance with treatment and follow-up appointments.    DEPRESSION:  Patient screened positive for depression based on a PHQ-9 score of 18 on 1/7/2024. Follow-up recommendations include: Prescribed antidepressant medication treatment.       MEDICATION ISSUES:  We discussed risks, benefits,  and side effects of the above medications and the patient was agreeable with the plan. Patient was educated on the importance of compliance with treatment and follow-up appointments.  Patient is agreeable to call the office with any worsening of symptoms or onset of side effects. Patient is agreeable to call 911 or go to the nearest ER should he/she begin having SI/HI.      Counseled patient regarding multimodal approach with healthy nutrition, healthy sleep, regular physical activity, social activities, counseling, and medications.      Coping skills reviewed and encouraged positive framing of thoughts     Assisted patient in processing above session content; acknowledged and normalized patient’s thoughts, feelings, and concerns.  Applied  positive coping skills and behavior management in session.  Allowed patient to freely discuss issues without interruption or judgment. Provided safe, confidential environment to facilitate the development of positive therapeutic relationship and encourage open, honest communication. Assisted patient in identifying risk factors which would indicate the need for higher level of care including thoughts to harm self or others and/or self-harming behavior and encouraged patient to contact this office, call 911, or present to the nearest emergency room should any of these events occur. Discussed crisis intervention services and means to access. If patient has any concerns or needs assistance they were instructed to call the [facility] at [phone].    MEDS ORDERED DURING VISIT:  New Medications Ordered This Visit   Medications    ARIPiprazole (Abilify) 5 MG tablet     Sig: Take 1 tablet by mouth Daily.     Dispense:  30 tablet     Refill:  0    traZODone (DESYREL) 100 MG tablet     Sig: Take 2 tablets by mouth At Night As Needed for Sleep.     Dispense:  60 tablet     Refill:  0    lamoTRIgine (LaMICtal) 200 MG tablet     Sig: Take 1 tablet by mouth Daily.      Dispense:  30 tablet     Refill:  0    DULoxetine (Cymbalta) 60 MG capsule     Sig: Take 2 capsules by mouth Daily.     Dispense:  60 capsule     Refill:  0         Follow Up   Return in about 4 weeks (around 2/6/2024) for Video visit.    Patient was given instructions and counseling regarding her condition or for health maintenance advice. Please see specific information pulled into the AVS if appropriate.         This document has been electronically signed by FRANCHESKA Hernandez  January 11, 2024 12:53 EST    Part of this note may be an electronic transcription/translation of spoken language to printed text using the Dragon Dictation System.

## 2024-01-24 ENCOUNTER — TELEMEDICINE (OUTPATIENT)
Dept: PSYCHIATRY | Facility: CLINIC | Age: 34
End: 2024-01-24
Payer: MEDICARE

## 2024-01-24 DIAGNOSIS — F51.05 INSOMNIA DUE TO MENTAL CONDITION: ICD-10-CM

## 2024-01-24 DIAGNOSIS — F41.1 GENERALIZED ANXIETY DISORDER: Chronic | ICD-10-CM

## 2024-01-24 DIAGNOSIS — F31.32 BIPOLAR AFFECTIVE DISORDER, CURRENTLY DEPRESSED, MODERATE: Primary | Chronic | ICD-10-CM

## 2024-01-24 RX ORDER — PROPRANOLOL HYDROCHLORIDE 10 MG/1
10 TABLET ORAL 2 TIMES DAILY
Qty: 180 TABLET | Refills: 2 | Status: SHIPPED | OUTPATIENT
Start: 2024-01-24

## 2024-01-24 RX ORDER — DULOXETIN HYDROCHLORIDE 60 MG/1
120 CAPSULE, DELAYED RELEASE ORAL DAILY
Qty: 60 CAPSULE | Refills: 0 | Status: SHIPPED | OUTPATIENT
Start: 2024-01-24

## 2024-01-24 RX ORDER — HYDROXYZINE HYDROCHLORIDE 25 MG/1
25-50 TABLET, FILM COATED ORAL 3 TIMES DAILY PRN
Qty: 180 TABLET | Refills: 0 | Status: SHIPPED | OUTPATIENT
Start: 2024-01-24

## 2024-01-24 RX ORDER — MIRTAZAPINE 15 MG/1
15 TABLET, FILM COATED ORAL NIGHTLY
Qty: 30 TABLET | Refills: 0 | Status: SHIPPED | OUTPATIENT
Start: 2024-01-24

## 2024-01-24 RX ORDER — LURASIDONE HYDROCHLORIDE 20 MG/1
20 TABLET, FILM COATED ORAL DAILY
Qty: 30 TABLET | Refills: 0 | Status: SHIPPED | OUTPATIENT
Start: 2024-01-24

## 2024-01-24 NOTE — PROGRESS NOTES
This provider is located at [place], [place]. The Patient is seen remotely using Video. Patient is being seen via telehealth and confirm that they are in a secure environment for this session. Patient is located in [place], [place] at her home. The patient's condition being diagnosed/treated is appropriate for telemedicine. Provider identified as Maribell Hernandez as well as credentials APRN MSN PMHNP-BC.   The client/patient gave consent to be seen remotely, and when consent is given they understand that the consent allows for patient identifiable information to be sent to a third party as needed.  They may refuse to be seen remotely at any time. The electronic data is encrypted and password protected, and the patient has been advised of the potential risks to privacy not withstanding such measures.    Chief Complaint  Manic Behavior, Depression, Anxiety, and Sleeping Problem    Subjective        Chen Galaviz presents to [facility] for   History of Present Illness  Patient is seen today for follow-up visit for bipolar disorder, anxiety, and insomnia.  Patient reports the Abilify worked with helping her depressive symptoms.  She now rates her depression at 2 on a 1-10 scale with 10 being the worst.  Denies any hopelessness.  Denies any suicidal or homicidal ideation.  She does state that she feels more anxious now.  She states that she feels like she has to be moving her legs and is fidgety since starting the Abilify.  Discussed with patient that this could be akathisia and we can try different medication if she would like.  She would like to.  She states this has caused her to rate her anxiety higher.  Currently rates anxiety an 8 on a 1-10 scale with 10 being the worst.  She states that she has been able to go to sleep, but is having trouble staying asleep.  She is also asking for help in that area.  Outside of not sleeping well, patient denies any other manic type symptoms.   Denies any impulsivity.  Denies any racing thoughts.  Appetite is good.  Denies any paranoia.  Denies any auditory or visual hallucinations.  Outside of akathisia, patient denies any side effects to the medications.  Objective   Vital Signs:   There were no vitals taken for this visit.      Mental Status Exam:   Hygiene:   good  Cooperation:  Cooperative  Eye Contact:  Good  Psychomotor Behavior:  Appropriate  Affect:  Full range  Mood: anxious  Speech:  Normal  Thought Process:  Goal directed  Thought Content:  Normal  Suicidal:  None  Homicidal:  None  Hallucinations:  None  Delusion:  None  Memory:  Intact  Orientation:  Person, Place, Time, and Situation  Reliability:  good  Insight:  Good  Judgement:  Good  Impulse Control:  Good  Physical/Medical Issues:  No      PHQ-9 Depression Screening  Little interest or pleasure in doing things? (P) 0-->not at all   Feeling down, depressed, or hopeless? (P) 0-->not at all   Trouble falling or staying asleep, or sleeping too much? (P) 3-->nearly every day   Feeling tired or having little energy? (P) 0-->not at all   Poor appetite or overeating? (P) 0-->not at all   Feeling bad about yourself - or that you are a failure or have let yourself or your family down? (P) 0-->not at all   Trouble concentrating on things, such as reading the newspaper or watching television? (P) 0-->not at all   Moving or speaking so slowly that other people could have noticed? Or the opposite - being so fidgety or restless that you have been moving around a lot more than usual? (P) 2-->more than half the days   Thoughts that you would be better off dead, or of hurting yourself in some way? (P) 0-->not at all   PHQ-9 Total Score (P) 5   If you checked off any problems, how difficult have these problems made it for you to do your work, take care of things at home, or get along with other people? (P) somewhat difficult        PHQ-9 Total Score: (P) 5     ERENDIRA 7 anxiety screening tool that patient  filled out virtually reviewed by this APRN at today's encounter.    Previous Provider notes and available records reviewed by this APRN today.   Current Medications:   Current Outpatient Medications   Medication Sig Dispense Refill    acetaminophen (TYLENOL) 325 MG tablet Take 2 tablets by mouth Every 6 (Six) Hours As Needed for Mild Pain .      albuterol sulfate  (90 Base) MCG/ACT inhaler Inhale 2 puffs Every 4 (Four) Hours As Needed for Wheezing. 18 g 1    azithromycin (Zithromax Z-Jaron) 250 MG tablet Take 2 tablets by mouth on day 1, then 1 tablet daily on days 2-5 6 tablet 0    DULoxetine (Cymbalta) 60 MG capsule Take 2 capsules by mouth Daily. 60 capsule 0    fluticasone (Flovent HFA) 220 MCG/ACT inhaler Inhale 1 puff 2 (Two) Times a Day. 12 g 11    hydrOXYzine (ATARAX) 25 MG tablet Take 1-2 tablets by mouth 3 (Three) Times a Day As Needed for Anxiety. 180 tablet 0    lamoTRIgine (LaMICtal) 200 MG tablet Take 1 tablet by mouth Daily. 30 tablet 0    lisinopril (PRINIVIL,ZESTRIL) 40 MG tablet Take 1 tablet by mouth Daily. 90 tablet 1    Lurasidone HCl (Latuda) 20 MG tablet tablet Take 1 tablet by mouth Daily. 30 tablet 0    mirtazapine (Remeron) 15 MG tablet Take 1 tablet by mouth Every Night. 30 tablet 0    naloxone (NARCAN) 4 MG/0.1ML nasal spray       oxybutynin XL (DITROPAN-XL) 10 MG 24 hr tablet TAKE 1 TABLET BY MOUTH EVERY DAY 90 tablet 0    propranolol (INDERAL) 10 MG tablet Take 1 tablet by mouth 2 (Two) Times a Day. 180 tablet 2     No current facility-administered medications for this visit.       Physical Exam   Result Review :    The following data was reviewed by: FRANCHESKA Hernandez on 01/24/2024:  Common labs          10/18/2023    00:28 11/8/2023    10:50 12/25/2023    13:50   Common Labs   Glucose 107      BUN 13      Creatinine 0.81      Sodium 138      Potassium 3.6      Chloride 102      Calcium 9.4      Albumin 4.3      Total Bilirubin 0.3      Alkaline Phosphatase 134      AST (SGOT)  13      ALT (SGPT) 14      WBC 16.03  10.00     13.29       Hemoglobin 14.4  13.8     13.4       Hematocrit 42.3  39.6     38.8       Platelets 353  354     344          Details          This result is from an external source.                  Assessment and Plan   Problem List Items Addressed This Visit          Mental Health    Generalized anxiety disorder (Chronic)    Relevant Medications    DULoxetine (Cymbalta) 60 MG capsule    hydrOXYzine (ATARAX) 25 MG tablet    propranolol (INDERAL) 10 MG tablet    Lurasidone HCl (Latuda) 20 MG tablet tablet    mirtazapine (Remeron) 15 MG tablet    Bipolar affective disorder, currently depressed, moderate - Primary    Relevant Medications    DULoxetine (Cymbalta) 60 MG capsule    hydrOXYzine (ATARAX) 25 MG tablet    Lurasidone HCl (Latuda) 20 MG tablet tablet    mirtazapine (Remeron) 15 MG tablet     Other Visit Diagnoses       Insomnia due to mental condition        Relevant Medications    DULoxetine (Cymbalta) 60 MG capsule    hydrOXYzine (ATARAX) 25 MG tablet    Lurasidone HCl (Latuda) 20 MG tablet tablet    mirtazapine (Remeron) 15 MG tablet          Discussed treatment options with patient.  Discussed with patient that we will discontinue the Abilify due to side effects.  Discussed different treatment options and mutually agreed to try Latuda 20 mg daily for bipolar disorder.  Discussed with patient that she should take the Latuda with food to reach maximum effectiveness.  Patient verbalized understanding and agreed.  Continue Cymbalta 120 mg daily for anxiety.  Continue propranolol 10 mg twice daily as needed for anxiety.  Continue hydroxyzine 25 mg 3 times daily as needed for anxiety.  Discontinue trazodone.  Patient request to try something different.  Start Remeron 15 mg nightly for sleep.  We will see patient again in 4 weeks to reassess.  Instructed patient that if she notices any worsening of mood or increased manic type symptoms to immediately call the office so  we can address that.  Patient agreeable.    TREATMENT PLAN/GOALS: Continue supportive psychotherapy efforts and medications as indicated. Treatment and medication options discussed during today's visit. Patient ackowledged and verbally consented to continue with current treatment plan and was educated on the importance of compliance with treatment and follow-up appointments.    DEPRESSION:  Patient screened positive for depression based on a PHQ-9 score of 5 on 1/24/2024. Follow-up recommendations include: Prescribed antidepressant medication treatment.       MEDICATION ISSUES:  We discussed risks, benefits, and side effects of the above medications and the patient was agreeable with the plan. Patient was educated on the importance of compliance with treatment and follow-up appointments.  Patient is agreeable to call the office with any worsening of symptoms or onset of side effects. Patient is agreeable to call 911 or go to the nearest ER should he/she begin having SI/HI.      Counseled patient regarding multimodal approach with healthy nutrition, healthy sleep, regular physical activity, social activities, counseling, and medications.      Coping skills reviewed and encouraged positive framing of thoughts     Assisted patient in processing above session content; acknowledged and normalized patient’s thoughts, feelings, and concerns.  Applied  positive coping skills and behavior management in session.  Allowed patient to freely discuss issues without interruption or judgment. Provided safe, confidential environment to facilitate the development of positive therapeutic relationship and encourage open, honest communication. Assisted patient in identifying risk factors which would indicate the need for higher level of care including thoughts to harm self or others and/or self-harming behavior and encouraged patient to contact this office, call 911, or present to the nearest emergency room should any of these events  occur. Discussed crisis intervention services and means to access. If patient has any concerns or needs assistance they were instructed to call the [facility] at [phone].    MEDS ORDERED DURING VISIT:  New Medications Ordered This Visit   Medications    DULoxetine (Cymbalta) 60 MG capsule     Sig: Take 2 capsules by mouth Daily.     Dispense:  60 capsule     Refill:  0    hydrOXYzine (ATARAX) 25 MG tablet     Sig: Take 1-2 tablets by mouth 3 (Three) Times a Day As Needed for Anxiety.     Dispense:  180 tablet     Refill:  0    propranolol (INDERAL) 10 MG tablet     Sig: Take 1 tablet by mouth 2 (Two) Times a Day.     Dispense:  180 tablet     Refill:  2    Lurasidone HCl (Latuda) 20 MG tablet tablet     Sig: Take 1 tablet by mouth Daily.     Dispense:  30 tablet     Refill:  0    mirtazapine (Remeron) 15 MG tablet     Sig: Take 1 tablet by mouth Every Night.     Dispense:  30 tablet     Refill:  0         Follow Up   Return in about 4 weeks (around 2/21/2024) for Video visit.    Patient was given instructions and counseling regarding her condition or for health maintenance advice. Please see specific information pulled into the AVS if appropriate.         This document has been electronically signed by FRANCHESKA Hernandez  January 24, 2024 10:21 EST    Part of this note may be an electronic transcription/translation of spoken language to printed text using the Dragon Dictation System.

## 2024-02-20 DIAGNOSIS — F31.32 BIPOLAR AFFECTIVE DISORDER, CURRENTLY DEPRESSED, MODERATE: Chronic | ICD-10-CM

## 2024-02-20 DIAGNOSIS — F51.05 INSOMNIA DUE TO MENTAL CONDITION: ICD-10-CM

## 2024-02-21 ENCOUNTER — TELEMEDICINE (OUTPATIENT)
Dept: PSYCHIATRY | Facility: CLINIC | Age: 34
End: 2024-02-21
Payer: MEDICARE

## 2024-02-21 DIAGNOSIS — F51.05 INSOMNIA DUE TO MENTAL CONDITION: ICD-10-CM

## 2024-02-21 DIAGNOSIS — F41.1 GENERALIZED ANXIETY DISORDER: Chronic | ICD-10-CM

## 2024-02-21 DIAGNOSIS — F31.32 BIPOLAR AFFECTIVE DISORDER, CURRENTLY DEPRESSED, MODERATE: Chronic | ICD-10-CM

## 2024-02-21 RX ORDER — LURASIDONE HYDROCHLORIDE 40 MG/1
40 TABLET, FILM COATED ORAL DAILY
Qty: 30 TABLET | Refills: 0 | Status: SHIPPED | OUTPATIENT
Start: 2024-02-21

## 2024-02-21 RX ORDER — HYDROXYZINE HYDROCHLORIDE 25 MG/1
25-50 TABLET, FILM COATED ORAL 3 TIMES DAILY PRN
Qty: 180 TABLET | Refills: 0 | Status: SHIPPED | OUTPATIENT
Start: 2024-02-21

## 2024-02-21 RX ORDER — MIRTAZAPINE 15 MG/1
15 TABLET, FILM COATED ORAL NIGHTLY
Qty: 30 TABLET | Refills: 0 | Status: SHIPPED | OUTPATIENT
Start: 2024-02-21

## 2024-02-21 RX ORDER — LURASIDONE HYDROCHLORIDE 20 MG/1
20 TABLET, FILM COATED ORAL DAILY
Qty: 30 TABLET | Refills: 0 | OUTPATIENT
Start: 2024-02-21

## 2024-02-21 RX ORDER — DULOXETIN HYDROCHLORIDE 60 MG/1
120 CAPSULE, DELAYED RELEASE ORAL DAILY
Qty: 60 CAPSULE | Refills: 0 | Status: SHIPPED | OUTPATIENT
Start: 2024-02-21

## 2024-02-21 RX ORDER — MIRTAZAPINE 15 MG/1
15 TABLET, FILM COATED ORAL NIGHTLY
Qty: 30 TABLET | Refills: 0 | OUTPATIENT
Start: 2024-02-21

## 2024-02-21 RX ORDER — LAMOTRIGINE 200 MG/1
200 TABLET ORAL DAILY
Qty: 30 TABLET | Refills: 0 | Status: SHIPPED | OUTPATIENT
Start: 2024-02-21

## 2024-02-21 NOTE — PROGRESS NOTES
This provider is located at [place], [place]. The Patient is seen remotely using Video. Patient is being seen via telehealth and confirm that they are in a secure environment for this session. Patient is located in [place], [place] at her home. The patient's condition being diagnosed/treated is appropriate for telemedicine. Provider identified as Maribell Hernandez as well as credentials APRN MSN PMHNP-BC.   The client/patient gave consent to be seen remotely, and when consent is given they understand that the consent allows for patient identifiable information to be sent to a third party as needed.  They may refuse to be seen remotely at any time. The electronic data is encrypted and password protected, and the patient has been advised of the potential risks to privacy not withstanding such measures.    Chief Complaint  Manic Behavior, Depression, Anxiety, and Sleeping Problem    Subjective        Chen Galaviz presents to [facility] for   History of Present Illness  Patient is seen today for follow-up visit for bipolar disorder, anxiety, and insomnia.  Patient reports that she did get changed over from Abilify to Latuda.  She states that the akathisia has went away.  States she has not had any further side effects from her medications.  She states that now she rates her anxiety at 2 on a 1-10 scale with 10 being the worst.  States that she has not had any problems with anxiety.  She states that her depression is a 4 on a 1-10 scale with 10 being the worst.  States she has had some crying spells and feeling down on some days.  Denies any hopelessness.  Denies any suicidal or homicidal ideation.  Sleep and appetite are good.  Denies any recurrence of any manic type symptoms.  Denies any paranoia.  Denies any auditory or visual hallucinations.  Objective   Vital Signs:   There were no vitals taken for this visit.      Mental Status Exam:   Hygiene:   good  Cooperation:   Cooperative  Eye Contact:  Good  Psychomotor Behavior:  Appropriate  Affect:  Full range  Mood: depressed  Speech:  Normal  Thought Process:  Goal directed  Thought Content:  Normal  Suicidal:  None  Homicidal:  None  Hallucinations:  None  Delusion:  None  Memory:  Intact  Orientation:  Person, Place, Time, and Situation  Reliability:  good  Insight:  Good  Judgement:  Good  Impulse Control:  Good  Physical/Medical Issues:  No      PHQ-9 Depression Screening  Little interest or pleasure in doing things? (P) 3-->nearly every day   Feeling down, depressed, or hopeless? (P) 3-->nearly every day   Trouble falling or staying asleep, or sleeping too much? (P) 2-->more than half the days   Feeling tired or having little energy? (P) 3-->nearly every day   Poor appetite or overeating? (P) 3-->nearly every day   Feeling bad about yourself - or that you are a failure or have let yourself or your family down? (P) 1-->several days   Trouble concentrating on things, such as reading the newspaper or watching television? (P) 1-->several days   Moving or speaking so slowly that other people could have noticed? Or the opposite - being so fidgety or restless that you have been moving around a lot more than usual? (P) 0-->not at all   Thoughts that you would be better off dead, or of hurting yourself in some way? (P) 0-->not at all   PHQ-9 Total Score (P) 16   If you checked off any problems, how difficult have these problems made it for you to do your work, take care of things at home, or get along with other people? (P) extremely difficult        PHQ-9 Total Score: (P) 16     ERENDIRA 7 anxiety screening tool that patient filled out virtually reviewed by this APRN at today's encounter.    Previous Provider notes and available records reviewed by this APRN today.   Current Medications:   Current Outpatient Medications   Medication Sig Dispense Refill    acetaminophen (TYLENOL) 325 MG tablet Take 2 tablets by mouth Every 6 (Six) Hours As  Needed for Mild Pain .      albuterol sulfate  (90 Base) MCG/ACT inhaler Inhale 2 puffs Every 4 (Four) Hours As Needed for Wheezing. 18 g 1    azithromycin (Zithromax Z-Jaron) 250 MG tablet Take 2 tablets by mouth on day 1, then 1 tablet daily on days 2-5 6 tablet 0    DULoxetine (Cymbalta) 60 MG capsule Take 2 capsules by mouth Daily. 60 capsule 0    fluticasone (Flovent HFA) 220 MCG/ACT inhaler Inhale 1 puff 2 (Two) Times a Day. 12 g 11    hydrOXYzine (ATARAX) 25 MG tablet Take 1-2 tablets by mouth 3 (Three) Times a Day As Needed for Anxiety. 180 tablet 0    lamoTRIgine (LaMICtal) 200 MG tablet Take 1 tablet by mouth Daily. 30 tablet 0    lisinopril (PRINIVIL,ZESTRIL) 40 MG tablet Take 1 tablet by mouth Daily. 90 tablet 1    lurasidone (Latuda) 40 MG tablet tablet Take 1 tablet by mouth Daily. 30 tablet 0    mirtazapine (Remeron) 15 MG tablet Take 1 tablet by mouth Every Night. 30 tablet 0    naloxone (NARCAN) 4 MG/0.1ML nasal spray       oxybutynin XL (DITROPAN-XL) 10 MG 24 hr tablet TAKE 1 TABLET BY MOUTH EVERY DAY 90 tablet 0    propranolol (INDERAL) 10 MG tablet Take 1 tablet by mouth 2 (Two) Times a Day. 180 tablet 2     No current facility-administered medications for this visit.       Physical Exam   Result Review :    The following data was reviewed by: FRANCHESKA Hernandez on 02/21/2024:  Common labs          10/18/2023    00:28 11/8/2023    10:50 12/25/2023    13:50   Common Labs   Glucose 107      BUN 13      Creatinine 0.81      Sodium 138      Potassium 3.6      Chloride 102      Calcium 9.4      Albumin 4.3      Total Bilirubin 0.3      Alkaline Phosphatase 134      AST (SGOT) 13      ALT (SGPT) 14      WBC 16.03  10.00     13.29       Hemoglobin 14.4  13.8     13.4       Hematocrit 42.3  39.6     38.8       Platelets 353  354     344          Details          This result is from an external source.                Assessment and Plan   Problem List Items Addressed This Visit          Mental  Health    Generalized anxiety disorder (Chronic)    Relevant Medications    lurasidone (Latuda) 40 MG tablet tablet    mirtazapine (Remeron) 15 MG tablet    DULoxetine (Cymbalta) 60 MG capsule    hydrOXYzine (ATARAX) 25 MG tablet    Bipolar affective disorder, currently depressed, moderate    Relevant Medications    lurasidone (Latuda) 40 MG tablet tablet    mirtazapine (Remeron) 15 MG tablet    DULoxetine (Cymbalta) 60 MG capsule    lamoTRIgine (LaMICtal) 200 MG tablet    hydrOXYzine (ATARAX) 25 MG tablet     Other Visit Diagnoses       Insomnia due to mental condition        Relevant Medications    lurasidone (Latuda) 40 MG tablet tablet    mirtazapine (Remeron) 15 MG tablet    DULoxetine (Cymbalta) 60 MG capsule    hydrOXYzine (ATARAX) 25 MG tablet          Discussed treatment options with patient.  Discussed with patient that we could increase her Latuda to address her ongoing depression symptoms.  Patient agreeable.  Increase Latuda to 40 mg daily for bipolar disorder.  Reminded patient to take the medication with food for it to reach its full efficacy.  Patient verbalized understanding.  Continue Cymbalta 120 mg daily for anxiety.  Continue Remeron 15 mg nightly for sleep.  Continue hydroxyzine 25 mg take 1 to 2 tablets 3 times daily as needed for anxiety.  Continue Lamictal 200 mg daily for bipolar disorder.  Discussed with patient that once we get the Latuda at an effective dose, that we may discuss decreasing and/or discontinuing Lamictal in the future.  Patient agreeable.  We will see patient again in 4 weeks to reassess.  Encouraged patient to contact the office if she has any issues sooner.    TREATMENT PLAN/GOALS: Continue supportive psychotherapy efforts and medications as indicated. Treatment and medication options discussed during today's visit. Patient ackowledged and verbally consented to continue with current treatment plan and was educated on the importance of compliance with treatment and  follow-up appointments.    DEPRESSION:  Patient screened positive for depression based on a PHQ-9 score of 16 on 2/20/2024. Follow-up recommendations include: Prescribed antidepressant medication treatment.       MEDICATION ISSUES:  We discussed risks, benefits, and side effects of the above medications and the patient was agreeable with the plan. Patient was educated on the importance of compliance with treatment and follow-up appointments.  Patient is agreeable to call the office with any worsening of symptoms or onset of side effects. Patient is agreeable to call 911 or go to the nearest ER should he/she begin having SI/HI.      Counseled patient regarding multimodal approach with healthy nutrition, healthy sleep, regular physical activity, social activities, counseling, and medications.      Coping skills reviewed and encouraged positive framing of thoughts     Assisted patient in processing above session content; acknowledged and normalized patient’s thoughts, feelings, and concerns.  Applied  positive coping skills and behavior management in session.  Allowed patient to freely discuss issues without interruption or judgment. Provided safe, confidential environment to facilitate the development of positive therapeutic relationship and encourage open, honest communication. Assisted patient in identifying risk factors which would indicate the need for higher level of care including thoughts to harm self or others and/or self-harming behavior and encouraged patient to contact this office, call 911, or present to the nearest emergency room should any of these events occur. Discussed crisis intervention services and means to access. If patient has any concerns or needs assistance they were instructed to call the [facility] at [phone].    MEDS ORDERED DURING VISIT:  New Medications Ordered This Visit   Medications    lurasidone (Latuda) 40 MG tablet tablet     Sig: Take 1 tablet by  mouth Daily.     Dispense:  30 tablet     Refill:  0    mirtazapine (Remeron) 15 MG tablet     Sig: Take 1 tablet by mouth Every Night.     Dispense:  30 tablet     Refill:  0    DULoxetine (Cymbalta) 60 MG capsule     Sig: Take 2 capsules by mouth Daily.     Dispense:  60 capsule     Refill:  0    lamoTRIgine (LaMICtal) 200 MG tablet     Sig: Take 1 tablet by mouth Daily.     Dispense:  30 tablet     Refill:  0    hydrOXYzine (ATARAX) 25 MG tablet     Sig: Take 1-2 tablets by mouth 3 (Three) Times a Day As Needed for Anxiety.     Dispense:  180 tablet     Refill:  0         Follow Up   Return in about 4 weeks (around 3/20/2024) for Video visit.    Patient was given instructions and counseling regarding her condition or for health maintenance advice. Please see specific information pulled into the AVS if appropriate.         This document has been electronically signed by FRANCHESKA Hernandez  February 21, 2024 11:26 EST    Part of this note may be an electronic transcription/translation of spoken language to printed text using the Dragon Dictation System.

## 2024-07-17 DIAGNOSIS — F41.1 GENERALIZED ANXIETY DISORDER: Chronic | ICD-10-CM

## 2024-07-17 RX ORDER — DULOXETIN HYDROCHLORIDE 60 MG/1
120 CAPSULE, DELAYED RELEASE ORAL DAILY
Qty: 60 CAPSULE | Refills: 0 | OUTPATIENT
Start: 2024-07-17

## 2024-07-17 NOTE — TELEPHONE ENCOUNTER
I have not saw this patient since February and she was given a month supply at that time.  She has most likely been out of the medication.  Please schedule appointment.  Thanks

## 2024-07-20 DIAGNOSIS — F31.32 BIPOLAR AFFECTIVE DISORDER, CURRENTLY DEPRESSED, MODERATE: Chronic | ICD-10-CM

## 2024-07-20 DIAGNOSIS — F41.1 GENERALIZED ANXIETY DISORDER: Chronic | ICD-10-CM

## 2024-07-22 RX ORDER — ARIPIPRAZOLE 5 MG/1
5 TABLET ORAL DAILY
Qty: 30 TABLET | Refills: 0 | OUTPATIENT
Start: 2024-07-22

## 2024-07-22 RX ORDER — DULOXETIN HYDROCHLORIDE 60 MG/1
120 CAPSULE, DELAYED RELEASE ORAL DAILY
Qty: 60 CAPSULE | Refills: 0 | OUTPATIENT
Start: 2024-07-22

## 2024-11-01 DIAGNOSIS — F41.1 GENERALIZED ANXIETY DISORDER: Chronic | ICD-10-CM

## 2024-11-04 RX ORDER — PROPRANOLOL HYDROCHLORIDE 10 MG/1
10 TABLET ORAL 2 TIMES DAILY
Qty: 180 TABLET | Refills: 2 | OUTPATIENT
Start: 2024-11-04

## (undated) DEVICE — SYS CLS PORTSITE CT CLOSESURE 5AND10/12

## (undated) DEVICE — DECANT BG O JET

## (undated) DEVICE — TRY SPINE BLCK WHITACRE 25G 3X5IN

## (undated) DEVICE — GOWN,PREVENTION PLUS,XXLARGE,STERILE: Brand: MEDLINE

## (undated) DEVICE — MANIP UTER RUMI TP 5.1MM 6CM LAV

## (undated) DEVICE — APL DUPLOSPRAYER MIS 40CM

## (undated) DEVICE — SOL IRR NACL 0.9PCT BT 1000ML

## (undated) DEVICE — FLTR PLUMEPORT LAP W/CONN STRL

## (undated) DEVICE — APPL CHLORAPREP W/TINT 26ML BLU

## (undated) DEVICE — PROXIMATE RH ROTATING HEAD SKIN STAPLERS (35 WIDE) CONTAINS 35 STAINLESS STEEL STAPLES: Brand: PROXIMATE

## (undated) DEVICE — COATED VICRYL  (POLYGLACTIN 910) SUTURE, VIOLET BRAIDED, STERILE, SYNTHETIC ABSORBABLE SUTURE: Brand: COATED VICRYL

## (undated) DEVICE — SOL IRR H2O BTL 1000ML STRL

## (undated) DEVICE — ANTIBACTERIAL UNDYED BRAIDED (POLYGLACTIN 910), SYNTHETIC ABSORBABLE SUTURE: Brand: COATED VICRYL

## (undated) DEVICE — SUT GUT PLN 0 STD TIE 54IN S104H

## (undated) DEVICE — GLV SURG SIGNATURE TOUCH PF LTX 7.5 STRL BX/50

## (undated) DEVICE — CANNULA SEAL

## (undated) DEVICE — ENDOPATH XCEL BLADELESS TROCARS WITH STABILITY SLEEVES: Brand: ENDOPATH XCEL

## (undated) DEVICE — PAD STEEP TRENDELENBURG W/RAIL STRAP INTEGR ARM PROTECT WING

## (undated) DEVICE — OBT BLADLES ENDOWRIST DAVINCI/S 8MM

## (undated) DEVICE — TROCARS: Brand: KII® OPTICAL ACCESS SYSTEM

## (undated) DEVICE — SUT VIC 2/0 CT1 27IN J339H BX/36

## (undated) DEVICE — VISUALIZATION SYSTEM: Brand: CLEARIFY

## (undated) DEVICE — 39" SINGLE PATIENT USE HOVERMATT: Brand: SINGLE PATIENT USE HOVERMATT

## (undated) DEVICE — TIP COVER ACCESSORY

## (undated) DEVICE — COVER,LIGHT HANDLE,FLX,1/PK: Brand: MEDLINE INDUSTRIES, INC.

## (undated) DEVICE — ENDOCUT SCISSOR TIP, DISPOSABLE: Brand: RENEW

## (undated) DEVICE — GLV SURG BIOGEL LTX PF 7 1/2

## (undated) DEVICE — ADHS SKIN DERMABOND TOPICAL HI VISC

## (undated) DEVICE — SUT PLAIN  3/0 CT1 27IN 842H

## (undated) DEVICE — DRP ADAPT ALLY UTER POSTN SYS 1P/U

## (undated) DEVICE — Device

## (undated) DEVICE — PK MAJ GYN DAVINCI 10

## (undated) DEVICE — [HIGH FLOW INSUFFLATOR,  DO NOT USE IF PACKAGE IS DAMAGED,  KEEP DRY,  KEEP AWAY FROM SUNLIGHT,  PROTECT FROM HEAT AND RADIOACTIVE SOURCES.]: Brand: PNEUMOSURE

## (undated) DEVICE — OCCL COLPO PNEUMO  STRL

## (undated) DEVICE — SEAL CANN CAM ENDOWRIST DAVINCI/S 8.5MM

## (undated) DEVICE — SUT GUT PLN 3/0 FS2 27IN 1630H

## (undated) DEVICE — GLV SURG TRIUMPH CLASSIC PF LTX 8 STRL

## (undated) DEVICE — PK C/SECT 10

## (undated) DEVICE — DRAPE,TOP,102X53,STERILE: Brand: MEDLINE

## (undated) DEVICE — SUT GUT CHRM 1 CTX 36IN 905H